# Patient Record
Sex: MALE | Race: WHITE | NOT HISPANIC OR LATINO | Employment: OTHER | URBAN - NONMETROPOLITAN AREA
[De-identification: names, ages, dates, MRNs, and addresses within clinical notes are randomized per-mention and may not be internally consistent; named-entity substitution may affect disease eponyms.]

---

## 2017-02-23 ENCOUNTER — APPOINTMENT (OUTPATIENT)
Dept: LAB | Facility: MEDICAL CENTER | Age: 72
End: 2017-02-23
Payer: COMMERCIAL

## 2017-02-23 ENCOUNTER — TRANSCRIBE ORDERS (OUTPATIENT)
Dept: LAB | Facility: MEDICAL CENTER | Age: 72
End: 2017-02-23

## 2017-02-23 DIAGNOSIS — E78.2 MIXED HYPERLIPIDEMIA: ICD-10-CM

## 2017-02-23 DIAGNOSIS — E78.2 MIXED HYPERLIPIDEMIA: Primary | ICD-10-CM

## 2017-02-23 LAB
AST SERPL W P-5'-P-CCNC: 24 U/L (ref 5–45)
CHOLEST SERPL-MCNC: 201 MG/DL (ref 50–200)
HDLC SERPL-MCNC: 73 MG/DL (ref 40–60)
LDLC SERPL CALC-MCNC: 107 MG/DL (ref 0–100)
LDLC SERPL DIRECT ASSAY-MCNC: 119 MG/DL (ref 0–100)
TRIGL SERPL-MCNC: 104 MG/DL

## 2017-02-23 PROCEDURE — 36415 COLL VENOUS BLD VENIPUNCTURE: CPT

## 2017-02-23 PROCEDURE — 83721 ASSAY OF BLOOD LIPOPROTEIN: CPT

## 2017-02-23 PROCEDURE — 84450 TRANSFERASE (AST) (SGOT): CPT

## 2017-02-23 PROCEDURE — 80061 LIPID PANEL: CPT

## 2017-07-11 ENCOUNTER — GENERIC CONVERSION - ENCOUNTER (OUTPATIENT)
Dept: OTHER | Facility: OTHER | Age: 72
End: 2017-07-11

## 2017-10-17 ENCOUNTER — ALLSCRIPTS OFFICE VISIT (OUTPATIENT)
Dept: OTHER | Facility: OTHER | Age: 72
End: 2017-10-17

## 2017-10-17 DIAGNOSIS — Z00.00 ENCOUNTER FOR GENERAL ADULT MEDICAL EXAMINATION WITHOUT ABNORMAL FINDINGS: ICD-10-CM

## 2017-10-17 DIAGNOSIS — E78.5 HYPERLIPIDEMIA: ICD-10-CM

## 2017-11-08 ENCOUNTER — TRANSCRIBE ORDERS (OUTPATIENT)
Dept: RADIOLOGY | Facility: MEDICAL CENTER | Age: 72
End: 2017-11-08

## 2017-11-08 ENCOUNTER — APPOINTMENT (OUTPATIENT)
Dept: LAB | Facility: MEDICAL CENTER | Age: 72
End: 2017-11-08
Payer: COMMERCIAL

## 2017-11-08 ENCOUNTER — GENERIC CONVERSION - ENCOUNTER (OUTPATIENT)
Dept: OTHER | Facility: OTHER | Age: 72
End: 2017-11-08

## 2017-11-08 DIAGNOSIS — E78.5 HYPERLIPIDEMIA: ICD-10-CM

## 2017-11-08 DIAGNOSIS — Z00.00 ENCOUNTER FOR GENERAL ADULT MEDICAL EXAMINATION WITHOUT ABNORMAL FINDINGS: ICD-10-CM

## 2017-11-08 LAB
ALBUMIN SERPL BCP-MCNC: 4 G/DL (ref 3.5–5)
ALP SERPL-CCNC: 56 U/L (ref 46–116)
ALT SERPL W P-5'-P-CCNC: 27 U/L (ref 12–78)
ANION GAP SERPL CALCULATED.3IONS-SCNC: 6 MMOL/L (ref 4–13)
AST SERPL W P-5'-P-CCNC: 31 U/L (ref 5–45)
BILIRUB SERPL-MCNC: 0.78 MG/DL (ref 0.2–1)
BUN SERPL-MCNC: 23 MG/DL (ref 5–25)
CALCIUM SERPL-MCNC: 9.3 MG/DL (ref 8.3–10.1)
CHLORIDE SERPL-SCNC: 103 MMOL/L (ref 100–108)
CO2 SERPL-SCNC: 32 MMOL/L (ref 21–32)
CREAT SERPL-MCNC: 1.03 MG/DL (ref 0.6–1.3)
GFR SERPL CREATININE-BSD FRML MDRD: 71 ML/MIN/1.73SQ M
GLUCOSE P FAST SERPL-MCNC: 92 MG/DL (ref 65–99)
POTASSIUM SERPL-SCNC: 4.1 MMOL/L (ref 3.5–5.3)
PROT SERPL-MCNC: 7.4 G/DL (ref 6.4–8.2)
SODIUM SERPL-SCNC: 141 MMOL/L (ref 136–145)

## 2017-11-08 PROCEDURE — 36415 COLL VENOUS BLD VENIPUNCTURE: CPT

## 2017-11-08 PROCEDURE — 80053 COMPREHEN METABOLIC PANEL: CPT

## 2018-01-15 NOTE — RESULT NOTES
Verified Results  (1) COMPREHENSIVE METABOLIC PANEL [id number] 75:39QH Rufus GUALLPA Order Number: [id number]     Test Name Result Flag Reference   SODIUM 141 mmol/L  136-145   POTASSIUM 4 1 mmol/L  3 5-5 3   CHLORIDE 103 mmol/L  100-108   CARBON DIOXIDE 32 mmol/L  21-32   ANION GAP (CALC) 6 mmol/L  4-13   BLOOD UREA NITROGEN 23 mg/dL  5-25   CREATININE 1 03 mg/dL  0 60-1 30   Standardized to IDMS reference method   CALCIUM 9 3 mg/dL  8 3-10 1   BILI, TOTAL 0 78 mg/dL  0 20-1 00   ALK PHOSPHATAS 56 U/L     ALT (SGPT) 27 U/L  12-78   Specimen collection should occur prior to Sulfasalazine and/or Sulfapyridine administration due to the potential for falsely depressed results  AST(SGOT) 31 U/L  5-45   Specimen collection should occur prior to Sulfasalazine administration due to the potential for falsely depressed results  ALBUMIN 4 0 g/dL  3 5-5 0   TOTAL PROTEIN 7 4 g/dL  6 4-8 2   eGFR 71 ml/min/1 73sq m     National Kidney Disease Education Program recommendations are as follows:  GFR calculation is accurate only with a steady state creatinine  Chronic Kidney disease less than 60 ml/min/1 73 sq  meters  Kidney failure less than 15 ml/min/1 73 sq  meters  GLUCOSE FASTING 92 mg/dL  65-99   Specimen collection should occur prior to Sulfasalazine administration due to the potential for falsely depressed results  Specimen collection should occur prior to Sulfapyridine administration due to the potential for falsely elevated results

## 2018-01-23 NOTE — PROGRESS NOTES
Assessment    1  Never smoker   2  No advance directives (V49 89) (Z78 9)   3  Exercise counseling (V65 41) (Z71 82)   4  Screening for neurological condition (V80 09) (Z13 89)   5  Screening for genitourinary condition (V81 6) (Z13 89)   6  Medicare annual wellness visit, subsequent (V70 0) (Z00 00)   7  Encounter for preventive health examination (V70 0) (Z00 00)    Plan  Exercise counseling    · Benefits of Exercise/Physical Activity; Status:Complete;   Done: [id number]  Health Maintenance    · DIGITAL RECTAL EXAM; Status:Complete;   Done: 52SMG6989 09:42AM  Health Maintenance, Hyperlipemia    · (1) COMPREHENSIVE METABOLIC PANEL; Status:Active; Requested TMX:[id number];   Medicare annual wellness visit, subsequent    · Medicare Annual Wellness Visit; Status:Complete;   Done: 83WNL9757 09:30AM  Need for influenza vaccination    · Stop: Fluzone High-Dose 0 5 ML Intramuscular Suspension Prefilled  Syringe  Screening for depression    · *VB-Depression Screening; Status:Complete;   Done: 14ZWF3143 09:29AM  Screening for genitourinary condition    · *VB - Urinary Incontinence Screen (Dx Z13 89 Screen for UI); Status:Complete;   Done:  87SCU4620 09:29AM  Screening for neurological condition    · *VB - Fall Risk Assessment  (Dx Z13 89 Screen for Neurologic Disorder);  Status:Complete;   Done: 48AWZ6325 09:28AM  SocHx: No advance directives    · We recommend that you create an advance directive ; Status:Complete;   Done:  87DHL3659    Discussion/Summary  Impression: Subsequent Annual Wellness Visit  Cardiovascular screening and counseling: the risks and benefits of screening were discussed and screening is current  Diabetes screening and counseling: the risks and benefits of screening were discussed and screening is current  Colorectal cancer screening and counseling: the risks and benefits of screening were discussed and screening is current     Prostate cancer screening and counseling: the risks and benefits of screening were discussed and screening is current  Osteoporosis screening and counseling: the risks and benefits of screening were discussed and screening is current  Glaucoma screening and counseling: the risks and benefits of screening were discussed and screening is current  History of Present Illness  The patient is being seen for the subsequent annual wellness visit  Medicare Screening and Risk Factors   Hospitalizations: he has been previously hospitalizied and he has been hospitalized Pt denies any hospitalizations within the last year times  Once per lifetime medicare screening tests: ECG and AAA screening US has not yet been done  Medicare Screening Tests Risk Questions   Abdominal aortic aneurysm risk assessment: over 72years of age, but none indicated  Osteoporosis risk assessment:  and over 48years of age  HIV risk assessment: none indicated  Drug and Alcohol Use: The patient has never smoked cigarettes  The patient reports never drinking alcohol  He has never used illicit drugs  Diet and Physical Activity: Current diet includes well balanced meals, limited junk food and 1-2 nothing regularly cups of tea per day  The patient does not exercise  Exercise: No specific exercise, but Pt remains very active around his home or fly fishing  Mood Disorder and Cognitive Impairment Screening: He denies feeling down, depressed, or hopeless over the past two weeks  He denies feeling little interest or pleasure in doing things over the past two weeks  Cognitive impairment screening: denies difficulty learning/retaining new information, denies difficulty handling complex tasks, denies difficulty with reasoning, denies difficulty with spatial ability and orientation, denies difficulty with language and denies difficulty with behavior  Functional Ability/Level of Safety: Hearing is slightly decreased, normal in the right ear and slightly decreased in the left ear   He does not use a hearing aid  The patient is currently able to do activities of daily living without limitations, able to do instrumental activities of daily living without limitations, able to participate in social activities without limitations and able to drive without limitations  Activities of daily living details: does not need help using the phone, no transportation help needed, does not need help shopping, no meal preparation help needed, does not need help doing housework, does not need help doing laundry, does not need help managing medications and does not need help managing money  Fall risk factors: The patient fell None times in the past 12 months  Injury History: no polypharmacy, no alcohol use, no mobility impairment, no antidepressant use, no deconditioning, no postural hypotension, no sedative use, no visual impairment, no urinary incontinence, no antihypertensive use, no cognitive impairment, up and go test was normal and no previous fall  Home safety risk factors:  no grab bars in the bathroom, but no unfamiliar surroundings, no loose rugs, no poor household lighting, no uneven floors, no household clutter and handrails on the stairs  Advance Directives: Advance directives: no living will, no durable power of  for health care directives and no advance directives  Co-Managers and Medical Equipment/Suppliers: See Patient Care Team   Falls Risk: The patient fell 0 times in the past 12 months  The patient is currently asymptomatic Symptoms Include: The patient currently has no urinary incontinence symptoms  Date of last glaucoma screen was [facility] October 2017      Patient Care Team    Care Team Member Role Specialty Office Number           Review of Systems    Constitutional: negative  Eyes: negative  ENT: negative  Cardiovascular: negative  Respiratory: negative  Gastrointestinal: negative  Genitourinary: negative  Musculoskeletal: negative  Integumentary and Breasts: negative  Neurological: negative  Psychiatric: negative  Endocrine: negative  Hematologic and Lymphatic: negative  Over the past 2 weeks, how often have you been bothered by the following problems? 1 ) Little interest or pleasure in doing things? Not at all    2 ) Feeling down, depressed or hopeless? Not at all    3 ) Trouble falling asleep or sleeping too much? Not at all    4 ) Feeling tired or having little energy? Not at all    5 ) Poor appetite or overeating? Not at all    6 ) Feeling bad about yourself, or that you are a failure, or have let yourself or your family down? Not at all    7 ) Trouble concentrating on things, such as reading a newspaper or watching television? Not at all    8 ) Moving or speaking so slowly that other people could have noticed, or the opposite, moving or speaking faster than usual? Not at all    9 ) Thoughts that you would be better off dead or of hurting yourself in some way? Not at all  Score 0      Active Problems    1  Arthralgia (719 40) (M25 50)   2  Encounter for prostate cancer screening (V76 44) (Z12 5)   3  Exercise counseling (V65 41) (Z71 82)   4  Hyperlipemia (272 4) (E78 5)   5  Lyme disease (088 81) (A69 20)   6  Mitral valve prolapse (424 0) (I34 1)   7  Need for influenza vaccination (V04 81) (Z23)   8  No advance directives (V49 89) (Z78 9)   9  Screening for colorectal cancer (V76 51) (Z12 11,Z12 12)   10  Screening for depression (V79 0) (Z13 89)   11  Special screening for other neurological conditions (V80 09) (Z13 89)    Past Medical History    1  History of Chronic cough (786 2) (R05)   2  History of Cough variant asthma (493 82) (J45 991)   3  History of Direct inguinal hernia (550 90) (K40 90)   4  History of Herpes zoster with ophthalmic complication, unspecified herpes zoster eye   disease (053 29) (B02 30)   5  History of Status post left inguinal hernia repair (V45 89) (Z98 890,Z87 19)   6   Status post left inguinal hernia repair, follow-up exam (V67 09) (Z09)   7  History of Unexplained weight loss (783 21) (R63 4)    The active problems and past medical history were reviewed and updated today  Surgical History    1  History of Appendectomy   2  History of Inguinal Hernia Repair   3  History of Tonsillectomy With Adenoidectomy    The surgical history was reviewed and updated today  Family History  Mother    1  Family history of Coronary disease (414 00) (I25 10)    The family history was reviewed and updated today  Social History    · Always uses seat belt   · Never smoker   · No advance directives (V49 89) (Z78 9)   · Non drinker / no alcohol use   · Retired   ·   The social history was reviewed and updated today  The social history was reviewed and is unchanged  Current Meds   1  Aspirin 81 MG TABS; Take 1 tablet daily Recorded   2  Simvastatin 5 MG Oral Tablet; TAKE 1 TABLET DAILY AT BEDTIME Recorded    The medication list was reviewed and updated today  Allergies    1  No Known Drug Allergies    Immunizations  Influenza --- Gavino Rose: Temporarily Deferred: Pt refuses     Vitals  Signs   Recorded: 31GIN2746 54:19JS   Systolic: 793  Diastolic: 68    Results/Data  Medicare Annual Wellness Visit 77VXJ4435 09:30AM [employer]     Test Name Result Flag Reference   MEDICARE [place] VISIT [id number]       *VB - Urinary Incontinence Screen (Dx Z13 89 Screen for UI) 73KEQ2146 09:29AM [employer]     Test Name Result Flag Reference   Urinary Incontinence Assessment 39IQA8023       *VB-Depression Screening 18RAR6787 09:29AM [employer]     Test Name Result Flag Reference   Depression Scale Result      Depression Screen - Negative For Symptoms     *VB - Fall Risk Assessment  (Dx Z13 89 Screen for Neurologic Disorder) 74LCN6853 09:28AM [employer]     Test Name Result Flag Reference   Falls Risk      No falls in the past year       Health Management  Screening for colorectal cancer   COLONOSCOPY; every 10 years;  Next Due: 04SJM0307; Overdue  Health Maintenance   COLONOSCOPY; every 10 years; Next Due: 96CJD9199;  Overdue    Signatures   Electronically signed by : Michelle Aburto DO; Oct 17 2017  9:44AM EST                       (Author)

## 2018-07-04 ENCOUNTER — HOSPITAL ENCOUNTER (EMERGENCY)
Facility: HOSPITAL | Age: 73
Discharge: HOME | End: 2018-07-04
Attending: EMERGENCY MEDICINE
Payer: COMMERCIAL

## 2018-07-04 ENCOUNTER — APPOINTMENT (EMERGENCY)
Dept: RADIOLOGY | Facility: HOSPITAL | Age: 73
End: 2018-07-04
Payer: COMMERCIAL

## 2018-07-04 ENCOUNTER — APPOINTMENT (EMERGENCY)
Dept: ULTRASOUND IMAGING | Facility: HOSPITAL | Age: 73
End: 2018-07-04
Payer: COMMERCIAL

## 2018-07-04 VITALS
DIASTOLIC BLOOD PRESSURE: 64 MMHG | SYSTOLIC BLOOD PRESSURE: 156 MMHG | TEMPERATURE: 98.9 F | OXYGEN SATURATION: 100 % | RESPIRATION RATE: 16 BRPM | HEART RATE: 74 BPM

## 2018-07-04 DIAGNOSIS — M79.89 SWELLING OF LEFT LOWER EXTREMITY: Primary | ICD-10-CM

## 2018-07-04 LAB
ALBUMIN SERPL BCP-MCNC: 3.6 G/DL (ref 3.5–5)
ALP SERPL-CCNC: 68 U/L (ref 46–116)
ALT SERPL W P-5'-P-CCNC: 26 U/L (ref 12–78)
ANION GAP SERPL CALCULATED.3IONS-SCNC: 9 MMOL/L (ref 4–13)
AST SERPL W P-5'-P-CCNC: 23 U/L (ref 5–45)
BASOPHILS # BLD AUTO: 0.07 THOUSANDS/ΜL (ref 0–0.1)
BASOPHILS NFR BLD AUTO: 1 % (ref 0–1)
BILIRUB SERPL-MCNC: 0.4 MG/DL (ref 0.2–1)
BUN SERPL-MCNC: 23 MG/DL (ref 5–25)
CALCIUM SERPL-MCNC: 8.9 MG/DL (ref 8.3–10.1)
CHLORIDE SERPL-SCNC: 102 MMOL/L (ref 100–108)
CK MB SERPL-MCNC: 1.2 NG/ML (ref 0–5)
CK MB SERPL-MCNC: <1 % (ref 0–2.5)
CK SERPL-CCNC: 169 U/L (ref 39–308)
CO2 SERPL-SCNC: 30 MMOL/L (ref 21–32)
CREAT SERPL-MCNC: 0.91 MG/DL (ref 0.6–1.3)
EOSINOPHIL # BLD AUTO: 0.35 THOUSAND/ΜL (ref 0–0.61)
EOSINOPHIL NFR BLD AUTO: 4 % (ref 0–6)
ERYTHROCYTE [DISTWIDTH] IN BLOOD BY AUTOMATED COUNT: 12.7 % (ref 11.6–15.1)
GFR SERPL CREATININE-BSD FRML MDRD: 82 ML/MIN/1.73SQ M
GLUCOSE SERPL-MCNC: 97 MG/DL (ref 65–140)
HCT VFR BLD AUTO: 41.2 % (ref 36.5–49.3)
HGB BLD-MCNC: 13.7 G/DL (ref 12–17)
LYMPHOCYTES # BLD AUTO: 2.42 THOUSANDS/ΜL (ref 0.6–4.47)
LYMPHOCYTES NFR BLD AUTO: 30 % (ref 14–44)
MCH RBC QN AUTO: 30.5 PG (ref 26.8–34.3)
MCHC RBC AUTO-ENTMCNC: 33.3 G/DL (ref 31.4–37.4)
MCV RBC AUTO: 92 FL (ref 82–98)
MONOCYTES # BLD AUTO: 0.92 THOUSAND/ΜL (ref 0.17–1.22)
MONOCYTES NFR BLD AUTO: 11 % (ref 4–12)
NEUTROPHILS # BLD AUTO: 4.41 THOUSANDS/ΜL (ref 1.85–7.62)
NEUTS SEG NFR BLD AUTO: 54 % (ref 43–75)
PLATELET # BLD AUTO: 233 THOUSANDS/UL (ref 149–390)
PMV BLD AUTO: 10.5 FL (ref 8.9–12.7)
POTASSIUM SERPL-SCNC: 4.1 MMOL/L (ref 3.5–5.3)
PROT SERPL-MCNC: 7.2 G/DL (ref 6.4–8.2)
RBC # BLD AUTO: 4.49 MILLION/UL (ref 3.88–5.62)
SODIUM SERPL-SCNC: 141 MMOL/L (ref 136–145)
WBC # BLD AUTO: 8.17 THOUSAND/UL (ref 4.31–10.16)

## 2018-07-04 PROCEDURE — 73590 X-RAY EXAM OF LOWER LEG: CPT

## 2018-07-04 PROCEDURE — 82550 ASSAY OF CK (CPK): CPT | Performed by: EMERGENCY MEDICINE

## 2018-07-04 PROCEDURE — 93971 EXTREMITY STUDY: CPT

## 2018-07-04 PROCEDURE — 82553 CREATINE MB FRACTION: CPT | Performed by: EMERGENCY MEDICINE

## 2018-07-04 PROCEDURE — 80053 COMPREHEN METABOLIC PANEL: CPT | Performed by: EMERGENCY MEDICINE

## 2018-07-04 PROCEDURE — 36415 COLL VENOUS BLD VENIPUNCTURE: CPT | Performed by: EMERGENCY MEDICINE

## 2018-07-04 PROCEDURE — 85025 COMPLETE CBC W/AUTO DIFF WBC: CPT | Performed by: EMERGENCY MEDICINE

## 2018-07-04 PROCEDURE — 99284 EMERGENCY DEPT VISIT MOD MDM: CPT

## 2018-07-04 RX ORDER — ASPIRIN 81 MG/1
81 TABLET ORAL DAILY
COMMUNITY
End: 2022-04-14 | Stop reason: HOSPADM

## 2018-07-04 RX ORDER — SIMVASTATIN 5 MG
5 TABLET ORAL
COMMUNITY
End: 2019-05-20 | Stop reason: SDUPTHER

## 2018-07-04 NOTE — DISCHARGE INSTRUCTIONS
IF YOU HAVE PERSISTENT LEG SWELLING IN 7-10 DAYS CALL YOUR PCP FOR FOLLOW UP ULTRASOUNDS      Leg Edema   WHAT YOU NEED TO KNOW:   Leg edema is swelling caused by fluid buildup  Your legs may swell if you sit or stand for long periods of time, are pregnant, or are injured  Swelling may also occur if you have heart failure or circulation problems  This means that your heart does not pump blood through your body as it should  DISCHARGE INSTRUCTIONS:   Self-care:   · Elevate your legs:  Raise your legs above the level of your heart as often as you can  This will help decrease swelling and pain  Prop your legs on pillows or blankets to keep them elevated comfortably  · Wear pressure stockings: These tight stockings put pressure on your legs to promote blood flow and prevent blood clots  Wear the stockings during the day  Do not wear them while you sleep  · Apply heat:  Heat helps decrease pain and swelling  Apply heat on the area for 20 to 30 minutes every 2 hours for as many days as directed  · Stay active:  Do not stand or sit for long periods of time  Ask your healthcare provider about the best exercise plan for you  · Eat healthy foods:  Healthy foods include fruits, vegetables, whole-grain breads, low-fat dairy products, beans, lean meats, and fish  Ask if you need to be on a special diet  Limit salt  Salt will make your body hold even more fluid  Follow up with your healthcare provider as directed:  Write down your questions so you remember to ask them during your visits  Contact your healthcare provider if:   · You have a fever or feel more tired than usual     · The veins in your legs look larger than usual  They may look full or bulging  · Your legs itch or feel heavy  · You have red or white areas or sores on your legs  The skin may also appear dimpled or have indentations  · You are gaining weight  · You have trouble moving your ankles      · The swelling does not go away, or other parts of your body swell  · You have questions or concerns about your condition or care  Return to the emergency department if:   · You cannot walk  · You feel faint or confused  · Your skin turns blue or gray  · Your leg feels warm, tender, and painful  It may be swollen and red  · You have chest pain or trouble breathing that is worse when you lie down  · You suddenly feel lightheaded and have trouble breathing  · You have new and sudden chest pain  You may have more pain when you take deep breaths or cough  You may also cough up blood  © 2017 [address] Information is for End User's use only and may not be sold, redistributed or otherwise used for commercial purposes  All illustrations and images included in CareNotes® are the copyrighted property of A D [employer] M [employer] Inc  or Clement Díaz  The above information is an  only  It is not intended as medical advice for individual conditions or treatments  Talk to your doctor, nurse or pharmacist before following any medical regimen to see if it is safe and effective for you

## 2018-07-04 NOTE — ED PROVIDER NOTES
History  Chief Complaint   Patient presents with    Leg Swelling     Pt states left lower leg swelling x3 days  Concerned for DVT but states no hx  Patient is a 70-year-old male with a history of hyperlipidemia, mitral prolapse, past history of Lyme disease and shingles coming in with several days of worsening left calf swelling  Patient states approximately 1 week ago he hit both of his legs off a table but no fall or other injuries  He was able to ambulate well and had no pain  He notes over the past 1-2 days worsening left lower extremity swelling  He states yesterday he measured it and it was 3/4 of an inch to an inch bigger  However, today he noticed it is approximately 3 in bear compared to his right side  He has no chest pain, shortness of breath, palpitations, syncope, fevers, back pain, urinary tension  He has no history of recent surgeries in the past 6 weeks, history of blood clots or PEs  No history of clotting disorders    Patient has no pain at this time        History provided by:  Patient   used: No    Leg Pain   Location:  Leg  Injury: no    Leg location:  L lower leg  Pain details:     Quality:  Pressure and aching    Radiates to:  Does not radiate    Severity:  Moderate    Onset quality:  Gradual    Timing:  Constant    Progression:  Worsening  Chronicity:  New  Dislocation: no    Foreign body present:  No foreign bodies  Tetanus status:  Unknown  Prior injury to area:  No  Relieved by:  None tried  Worsened by:  Nothing  Ineffective treatments:  None tried  Associated symptoms: swelling    Associated symptoms: no back pain, no decreased ROM, no fatigue, no fever, no itching, no muscle weakness, no neck pain, no numbness, no stiffness and no tingling    Risk factors: no concern for non-accidental trauma, no frequent fractures, no known bone disorder, no obesity and no recent illness        Prior to Admission Medications   Prescriptions Last Dose Informant Patient Reported? Taking?   aspirin (ECOTRIN LOW STRENGTH) 81 mg EC tablet  Self Yes Yes   Sig: Take 81 mg by mouth daily   simvastatin (ZOCOR) 5 MG tablet  Self Yes Yes   Sig: Take 5 mg by mouth daily at bedtime      Facility-Administered Medications: None       Past Medical History:   Diagnosis Date    Hyperlipidemia     Lyme disease     Mitral valve prolapse     Shingles        Past Surgical History:   Procedure Laterality Date    APPENDECTOMY      HERNIA REPAIR      TONSILLECTOMY         History reviewed  No pertinent family history  I have reviewed and agree with the history as documented  Social History   Substance Use Topics    Smoking status: Never Smoker    Smokeless tobacco: Never Used    Alcohol use No        Review of Systems   Constitutional: Negative for diaphoresis, fatigue and fever  HENT: Negative for ear pain and sore throat  Eyes: Negative for visual disturbance  Respiratory: Negative for chest tightness and shortness of breath  Cardiovascular: Negative for chest pain and palpitations  Gastrointestinal: Negative for abdominal pain, nausea and vomiting  Genitourinary: Negative for difficulty urinating and dysuria  Musculoskeletal: Negative for back pain, neck pain and stiffness  Skin: Negative for itching and rash  Neurological: Negative for weakness  Psychiatric/Behavioral: Negative for confusion  All other systems reviewed and are negative  Physical Exam  Physical Exam   Constitutional: He is oriented to person, place, and time  He appears well-developed and well-nourished  No distress  HENT:   Head: Normocephalic and atraumatic  Mouth/Throat: Oropharynx is clear and moist    Eyes: Conjunctivae and EOM are normal  Pupils are equal, round, and reactive to light  Neck: Normal range of motion  Neck supple  Cardiovascular: Normal rate, regular rhythm, normal heart sounds and intact distal pulses  No murmur heard    Pulmonary/Chest: Effort normal and breath sounds normal  No stridor  No respiratory distress  Abdominal: Soft  Bowel sounds are normal  He exhibits no distension  There is no tenderness  Musculoskeletal: Normal range of motion  He exhibits no edema  Left knee: Normal         Left ankle: Normal         Legs:  Patient has full active range of motion of the bilateral hips, knees, ankles  Patient is neurovascular intact bilaterally  Femoral, popliteal, dorsalis pedis pulses 2+ equal bilateral    Neurological: He is alert and oriented to person, place, and time  No cranial nerve deficit  Skin: Skin is warm  Capillary refill takes less than 2 seconds  He is not diaphoretic  Nursing note and vitals reviewed        Vital Signs  ED Triage Vitals [07/04/18 1817]   Temperature Pulse Respirations Blood Pressure SpO2   98 9 °F (37 2 °C) 68 20 160/74 100 %      Temp Source Heart Rate Source Patient Position - Orthostatic VS BP Location FiO2 (%)   Temporal Monitor Sitting Left arm --      Pain Score       No Pain           Vitals:    07/04/18 1817 07/04/18 1935   BP: 160/74 156/64   Pulse: 68 74   Patient Position - Orthostatic VS: Sitting        Visual Acuity      ED Medications  Medications - No data to display    Diagnostic Studies  Results Reviewed     Procedure Component Value Units Date/Time    Comprehensive metabolic panel [[id number]] Collected:  07/04/18 1903    Lab Status:  Final result Specimen:  Blood from Arm, Right Updated:  07/04/18 1923     Sodium 141 mmol/L      Potassium 4 1 mmol/L      Chloride 102 mmol/L      CO2 30 mmol/L      Anion Gap 9 mmol/L      BUN 23 mg/dL      Creatinine 0 91 mg/dL      Glucose 97 mg/dL      Calcium 8 9 mg/dL      AST 23 U/L      ALT 26 U/L      Alkaline Phosphatase 68 U/L      Total Protein 7 2 g/dL      Albumin 3 6 g/dL      Total Bilirubin 0 40 mg/dL      eGFR 82 ml/min/1 73sq m     Narrative:         National Kidney Disease Education Program recommendations are as follows:  GFR calculation is accurate only with a steady state creatinine  Chronic Kidney disease less than 60 ml/min/1 73 sq  meters  Kidney failure less than 15 ml/min/1 73 sq  meters  CK (with reflex to MB) [[id number]]  (Normal) Collected:  07/04/18 1903    Lab Status:  Final result Specimen:  Blood from Arm, Right Updated:  07/04/18 1923     Total  U/L     CKMB [[id number]] Collected:  07/04/18 1903    Lab Status: In process Specimen:  Blood from Arm, Right Updated:  07/04/18 1923    CBC and differential [[id number]]  (Normal) Collected:  07/04/18 1903    Lab Status:  Final result Specimen:  Blood from Arm, Right Updated:  07/04/18 1910     WBC 8 17 Thousand/uL      RBC 4 49 Million/uL      Hemoglobin 13 7 g/dL      Hematocrit 41 2 %      MCV 92 fL      MCH 30 5 pg      MCHC 33 3 g/dL      RDW 12 7 %      MPV 10 5 fL      Platelets 624 Thousands/uL      Neutrophils Relative 54 %      Lymphocytes Relative 30 %      Monocytes Relative 11 %      Eosinophils Relative 4 %      Basophils Relative 1 %      Neutrophils Absolute 4 41 Thousands/µL      Lymphocytes Absolute 2 42 Thousands/µL      Monocytes Absolute 0 92 Thousand/µL      Eosinophils Absolute 0 35 Thousand/µL      Basophils Absolute 0 07 Thousands/µL                  XR tibia fibula 2 views LEFT    (Results Pending)   VAS lower limb venous duplex study, unilateral/limited    (Results Pending)              Procedures  Procedures       Phone Contacts  ED Phone Contact    ED Course                               MDM  Number of Diagnoses or Management Options  Swelling of left lower extremity:   Diagnosis management comments: According to Phylliss Lime score for DVT patient does have a 2 points for collateral superficial veins present as well as calf swelling greater than 3 cm  Will perform CBC, CMP, CK as he is on a stand as well as ultrasound rule out DVT was paged for completion    7:19 PM  Signed out to DR Bridger Phelan for follow up labs, US and dispo       7:30 PM  Labs, US, and xray resulted - no acute pathology  Will dc home   I personally discharged patient and took out IV from Right arm  Portions of the record may have been created with voice recognition software  Occasional wrong word or "sound a like" substitutions may have occurred due to the inherent limitations of voice recognition software  Read the chart carefully and recognize, using context, where substitutions have occurred  Amount and/or Complexity of Data Reviewed  Clinical lab tests: ordered and reviewed  Tests in the radiology section of CPT®: ordered and reviewed  Tests in the medicine section of CPT®: ordered and reviewed  Independent visualization of images, tracings, or specimens: yes (XRAY ON MY REVIEW REVEALS NO ACUTE FRACTURE)      CritCare Time    Disposition  Final diagnoses:   Swelling of left lower extremity     Time reflects when diagnosis was documented in both MDM as applicable and the Disposition within this note     Time User Action Codes Description Comment    7/4/2018  6:42 PM Ravin Massey [M79 89] Swelling of left lower extremity       ED Disposition     ED Disposition Condition Comment    Discharge  Delma Chandler discharge to home/self care  Condition at discharge: Stable        Follow-up Information     Follow up With Specialties Details Why Contact Info    Tomma Lefort, DO Family Medicine Schedule an appointment as soon as possible for a visit in 3 days  [address]  [place] [phone]  [phone]            Patient's Medications   Discharge Prescriptions    No medications on file     No discharge procedures on file      ED Provider  Electronically Signed by           Hiwot Laureano DO  07/04/18 757 Samuel Johnson DO  07/04/18 [id number]

## 2018-07-05 ENCOUNTER — TELEPHONE (OUTPATIENT)
Dept: FAMILY MEDICINE CLINIC | Facility: CLINIC | Age: 73
End: 2018-07-05

## 2018-07-05 PROCEDURE — 93971 EXTREMITY STUDY: CPT | Performed by: SURGERY

## 2018-07-05 NOTE — TELEPHONE ENCOUNTER
He just wanted to report, he went to the er yesterday for  left calf swelling   They did all kinds of test and did not find anything

## 2018-12-10 ENCOUNTER — OFFICE VISIT (OUTPATIENT)
Dept: CARDIOLOGY CLINIC | Facility: CLINIC | Age: 73
End: 2018-12-10
Payer: COMMERCIAL

## 2018-12-10 VITALS
WEIGHT: 126 LBS | HEIGHT: 69 IN | HEART RATE: 67 BPM | SYSTOLIC BLOOD PRESSURE: 124 MMHG | DIASTOLIC BLOOD PRESSURE: 80 MMHG | BODY MASS INDEX: 18.66 KG/M2

## 2018-12-10 DIAGNOSIS — I34.1 MVP (MITRAL VALVE PROLAPSE): Primary | ICD-10-CM

## 2018-12-10 DIAGNOSIS — E78.5 DYSLIPIDEMIA: ICD-10-CM

## 2018-12-10 DIAGNOSIS — A69.20 LYME DISEASE: ICD-10-CM

## 2018-12-10 PROCEDURE — 93000 ELECTROCARDIOGRAM COMPLETE: CPT | Performed by: INTERNAL MEDICINE

## 2018-12-10 PROCEDURE — 99214 OFFICE O/P EST MOD 30 MIN: CPT | Performed by: INTERNAL MEDICINE

## 2018-12-10 NOTE — PATIENT INSTRUCTIONS
Mitral Valve Prolapse   WHAT YOU NEED TO KNOW:   What is mitral valve prolapse? Mitral valve prolapse (MVP) is a weak or bulging mitral valve in your heart  Your mitral valve has 2 flaps that open and close  It allows blood to flow through your heart in one direction  MVP is a common heart condition that often has no signs or symptoms  What increases my risk for MVP? Most people who have MVP are born with it  The following may increase your risk for MVP:  · Family history of MVP    · Connective tissue problems, such as Marfan syndrome    · Muscle or skeletal problems, such as scoliosis    · Heart disease or heart attack    · Thyroid problems, such as Graves disease  What signs and symptoms may I have with MVP? · Fatigue, anxiety, or migraines    · Dizzy, lightheaded, or faint    · A fast or pounding heartbeat, or heart flutters    · Shortness of breath    · Chest pain  How is MVP diagnosed? Your healthcare provider will examine you and listen to your heart  He will ask what medicines you take, and if you have other health conditions  You may also need any of the following:  · A heart monitor  is also called an ECG or EKG  Sticky pads placed on your skin record your heart's electrical activity  · An echocardiogram  is a type of ultrasound  Sound waves are used to show the structure, movement, and blood vessels of your heart  · An angiogram  may be done to take pictures of your blood vessels and arteries  The pictures may show narrowing or a blockage  You will be given dye to help healthcare providers see the pictures better  Tell the healthcare provider if you have ever had an allergic reaction to contrast dye  How is MVP treated? You may not need any treatment if you do not feel any symptoms  · Medicines:      ¨ Blood pressure medicine  is given to lower your blood pressure  A controlled blood pressure helps protect your heart  ¨ Heart medicine  helps your heart beat more strongly or regularly  ¨ Antiplatelet medicines , such as aspirin, keep platelets from sticking to a damaged part of your artery  Platelets are a part of your blood that helps heal injuries  Platelets may cause a blockage in your mitral valve and affect blood flow through your heart  ¨ Blood thinners  keep clots from forming  Clots may cause a stroke, and can be life-threatening  This medicine makes it more likely for you to bleed or bruise  Follow all blood thinner safety instructions you receive  · Surgery  may be needed if you have severe heart damage from your MVP  Your mitral valve may need to be repaired or replaced during surgery  How can I manage MVP? · Floss and brush your teeth regularly  Tell your healthcare provider or dentist if you have MVP  Professional tooth cleaning , tooth decay, or gum problems could lead to a heart infection  · Eat heart-healthy foods  Heart-healthy foods include fresh fruits and vegetables, low-fat dairy products, chicken (without skin), lean meats, and fish  Eat two 4-ounce servings of fish high in omega-3 fats each week  Examples are salmon, fresh tuna, and herring  Ask for more information on a heart-healthy diet  · Drink liquids as directed  Ask your healthcare provider which liquids you should drink, and how much to have each day  · Stay active  Ask your healthcare provider which activities are best for you  · Do not use alcohol or caffeine  These could make your MVP worse  · Do not smoke  If you smoke, it is never too late to quit  Ask your healthcare provider for information if you need help quitting  Avoid being around others who smoke  When should I contact my healthcare provider? · You have a fever, sore throat, or body aches  · You have questions or concerns about your condition or care  When should I seek immediate care or call 911?    · You have any of the following signs of a stroke:     ¨ Part of your face droops or is numb    ¨ Weakness in an arm or leg    ¨ Confusion or difficulty speaking    ¨ Dizziness, a severe headache, or vision loss    · You have shortness of breath or chest pain  · Your heart beats faster than normal for you, or skips beats  · You suddenly feel dizzy or faint  CARE AGREEMENT:   You have the right to help plan your care  Learn about your health condition and how it may be treated  Discuss treatment options with your caregivers to decide what care you want to receive  You always have the right to refuse treatment  The above information is an  only  It is not intended as medical advice for individual conditions or treatments  Talk to your doctor, nurse or pharmacist before following any medical regimen to see if it is safe and effective for you  © 2017 [employer] Information is for End User's use only and may not be sold, redistributed or otherwise used for commercial purposes  All illustrations and images included in CareNotes® are the copyrighted property of [employer]  or [employer]

## 2018-12-10 NOTE — PROGRESS NOTES
Subjective:        Patient ID: Bjorn Yeh is a 74 y o  male  Chief Complaint:  Justin Andrew is here for routine cardiac follow-up in light of MVP with trace MR and dyslipidemia  Extensive questioning is negative with respect to review of systems cardiac systems  Denies any chest pain, shortness of breath, palpitations, presyncope, syncope, TIA or claudication like symptoms  He remains quite active  Normotensive, euvolemic  EKG stable  The following portions of the patient's history were reviewed and updated as appropriate: allergies, current medications, past family history, past medical history, past social history, past surgical history and problem list   Review of Systems   Constitution: Negative for chills, diaphoresis, malaise/fatigue and weight gain  HENT: Negative for nosebleeds and stridor  Eyes: Negative for double vision, vision loss in left eye, vision loss in right eye and visual disturbance  Cardiovascular: Negative for chest pain, claudication, cyanosis, dyspnea on exertion, irregular heartbeat, leg swelling, near-syncope, orthopnea, palpitations, paroxysmal nocturnal dyspnea and syncope  Respiratory: Negative for cough, shortness of breath, snoring and wheezing  Endocrine: Negative for polydipsia, polyphagia and polyuria  Hematologic/Lymphatic: Negative for bleeding problem  Does not bruise/bleed easily  Skin: Negative for flushing and rash  Musculoskeletal: Negative for falls and myalgias  Gastrointestinal: Negative for abdominal pain, heartburn, hematemesis, hematochezia, melena and nausea  Genitourinary: Negative for hematuria  Neurological: Negative for brief paralysis, dizziness, focal weakness, headaches, light-headedness, loss of balance and vertigo  Psychiatric/Behavioral: Negative for altered mental status and substance abuse  Allergic/Immunologic: Negative for hives            Objective:      /80   Pulse 67   Ht 5' 8 5" (1 74 m)   Wt 57 2 kg (126 lb)   BMI 18 88 kg/m²   Physical Exam   Constitutional: He is oriented to person, place, and time  He appears well-developed and well-nourished  No distress  HENT:   Head: Normocephalic and atraumatic  Eyes: Pupils are equal, round, and reactive to light  EOM are normal  No scleral icterus  Neck: Normal range of motion  Neck supple  No JVD present  No thyromegaly present  Cardiovascular: Normal rate, regular rhythm and normal heart sounds  Exam reveals no gallop and no friction rub  No murmur heard  Pulmonary/Chest: Effort normal and breath sounds normal  No stridor  No respiratory distress  He has no wheezes  He has no rales  Abdominal: Soft  Bowel sounds are normal  He exhibits no distension and no mass  There is no tenderness  Musculoskeletal: Normal range of motion  He exhibits no edema or deformity  Neurological: He is alert and oriented to person, place, and time  Coordination normal    Skin: Skin is warm and dry  No erythema  No pallor  Psychiatric: He has a normal mood and affect   His behavior is normal        Lab Review:   Admission on 07/04/2018, Discharged on 07/04/2018   Component Date Value    WBC 07/04/2018 8 17     RBC 07/04/2018 4 49     Hemoglobin 07/04/2018 13 7     Hematocrit 07/04/2018 41 2     MCV 07/04/2018 92     MCH 07/04/2018 30 5     MCHC 07/04/2018 33 3     RDW 07/04/2018 12 7     MPV 07/04/2018 10 5     Platelets 48/86/4390 233     Neutrophils Relative 07/04/2018 54     Lymphocytes Relative 07/04/2018 30     Monocytes Relative 07/04/2018 11     Eosinophils Relative 07/04/2018 4     Basophils Relative 07/04/2018 1     Neutrophils Absolute 07/04/2018 4 41     Lymphocytes Absolute 07/04/2018 2 42     Monocytes Absolute 07/04/2018 0 92     Eosinophils Absolute 07/04/2018 0 35     Basophils Absolute 07/04/2018 0 07     Sodium 07/04/2018 141     Potassium 07/04/2018 4 1     Chloride 07/04/2018 102     CO2 07/04/2018 30     ANION GAP 07/04/2018 9     BUN 07/04/2018 23     Creatinine 07/04/2018 0 91     Glucose 07/04/2018 97     Calcium 07/04/2018 8 9     AST 07/04/2018 23     ALT 07/04/2018 26     Alkaline Phosphatase 07/04/2018 68     Total Protein 07/04/2018 7 2     Albumin 07/04/2018 3 6     Total Bilirubin 07/04/2018 0 40     eGFR 07/04/2018 82     Total CK 07/04/2018 169     CK-MB Index 07/04/2018 <1 0     CK-MB 07/04/2018 1 2      No results found  Assessment:       1  MVP (mitral valve prolapse)  POCT ECG   2  Lyme disease  POCT ECG   3  Dyslipidemia  Lipid panel    Hepatic function panel        Plan:       Roverto Aguirre is without any signs or symptoms reminiscent of angina, heart failure nor electrical instability  There is no audible evidence for progressive MR  He has no cardiac signs or symptoms reminiscent of recurrent Lyme disease  His diet has not changed, weight is stable, I suspect his lipids are stable, I ordered a lipid profile along with transaminases prior to his next visit in 6 months time  He will call me sooner should any concerning symptoms arise  We did discuss how empiric baby aspirin therapy is no longer strongly recommended for primary prevention  He is aware of the slight increased risk of bleeding risk and he has decided to continue baby aspirin for now

## 2019-05-20 DIAGNOSIS — E78.2 MIXED HYPERLIPIDEMIA: Primary | ICD-10-CM

## 2019-05-20 RX ORDER — SIMVASTATIN 5 MG
5 TABLET ORAL
Qty: 90 TABLET | Refills: 3 | Status: SHIPPED | OUTPATIENT
Start: 2019-05-20 | End: 2020-01-15 | Stop reason: SDUPTHER

## 2019-06-04 ENCOUNTER — APPOINTMENT (OUTPATIENT)
Dept: LAB | Facility: MEDICAL CENTER | Age: 74
End: 2019-06-04
Payer: COMMERCIAL

## 2019-06-04 DIAGNOSIS — E78.5 DYSLIPIDEMIA: ICD-10-CM

## 2019-06-04 LAB
ALBUMIN SERPL BCP-MCNC: 3.9 G/DL (ref 3.5–5)
ALP SERPL-CCNC: 58 U/L (ref 46–116)
ALT SERPL W P-5'-P-CCNC: 23 U/L (ref 12–78)
AST SERPL W P-5'-P-CCNC: 23 U/L (ref 5–45)
BILIRUB DIRECT SERPL-MCNC: 0.24 MG/DL (ref 0–0.2)
BILIRUB SERPL-MCNC: 0.88 MG/DL (ref 0.2–1)
CHOLEST SERPL-MCNC: 189 MG/DL (ref 50–200)
HDLC SERPL-MCNC: 73 MG/DL (ref 40–60)
LDLC SERPL CALC-MCNC: 106 MG/DL (ref 0–100)
NONHDLC SERPL-MCNC: 116 MG/DL
PROT SERPL-MCNC: 7.3 G/DL (ref 6.4–8.2)
TRIGL SERPL-MCNC: 51 MG/DL

## 2019-06-04 PROCEDURE — 80061 LIPID PANEL: CPT

## 2019-06-04 PROCEDURE — 80076 HEPATIC FUNCTION PANEL: CPT

## 2019-06-04 PROCEDURE — 36415 COLL VENOUS BLD VENIPUNCTURE: CPT

## 2019-06-10 ENCOUNTER — OFFICE VISIT (OUTPATIENT)
Dept: CARDIOLOGY CLINIC | Facility: CLINIC | Age: 74
End: 2019-06-10
Payer: COMMERCIAL

## 2019-06-10 VITALS
BODY MASS INDEX: 19.4 KG/M2 | DIASTOLIC BLOOD PRESSURE: 62 MMHG | HEART RATE: 67 BPM | WEIGHT: 128 LBS | HEIGHT: 68 IN | SYSTOLIC BLOOD PRESSURE: 118 MMHG

## 2019-06-10 DIAGNOSIS — E78.5 DYSLIPIDEMIA: Primary | ICD-10-CM

## 2019-06-10 DIAGNOSIS — I34.1 MITRAL VALVE PROLAPSE: ICD-10-CM

## 2019-06-10 PROCEDURE — 99214 OFFICE O/P EST MOD 30 MIN: CPT | Performed by: INTERNAL MEDICINE

## 2020-01-15 ENCOUNTER — OFFICE VISIT (OUTPATIENT)
Dept: CARDIOLOGY CLINIC | Facility: CLINIC | Age: 75
End: 2020-01-15
Payer: COMMERCIAL

## 2020-01-15 VITALS
BODY MASS INDEX: 18.94 KG/M2 | HEIGHT: 68 IN | DIASTOLIC BLOOD PRESSURE: 72 MMHG | WEIGHT: 125 LBS | HEART RATE: 64 BPM | SYSTOLIC BLOOD PRESSURE: 122 MMHG

## 2020-01-15 DIAGNOSIS — I34.1 MITRAL VALVE PROLAPSE: Primary | ICD-10-CM

## 2020-01-15 DIAGNOSIS — E78.2 MIXED HYPERLIPIDEMIA: ICD-10-CM

## 2020-01-15 PROCEDURE — 99214 OFFICE O/P EST MOD 30 MIN: CPT | Performed by: INTERNAL MEDICINE

## 2020-01-15 RX ORDER — SIMVASTATIN 5 MG
5 TABLET ORAL
Qty: 90 TABLET | Refills: 3 | Status: SHIPPED | OUTPATIENT
Start: 2020-01-15 | End: 2022-02-04

## 2020-01-15 NOTE — PROGRESS NOTES
Subjective:        Patient ID: Onur Villanueva is a 66 y o  male  Chief Complaint:  Mya Peck is here for routine follow-up  He has mitral valve prolapse and dyslipidemia  No cardiac symptoms  No audible evidence for progressive mitral insufficiency, mid systolic click heard  The following portions of the patient's history were reviewed and updated as appropriate: allergies, current medications, past family history, past medical history, past social history, past surgical history and problem list   Review of Systems   Constitution: Negative for chills, diaphoresis, malaise/fatigue and weight gain  HENT: Negative for nosebleeds and stridor  Eyes: Negative for double vision, vision loss in left eye, vision loss in right eye and visual disturbance  Cardiovascular: Negative for chest pain, claudication, cyanosis, dyspnea on exertion, irregular heartbeat, leg swelling, near-syncope, orthopnea, palpitations, paroxysmal nocturnal dyspnea and syncope  Respiratory: Negative for cough, shortness of breath, snoring and wheezing  Endocrine: Negative for polydipsia, polyphagia and polyuria  Hematologic/Lymphatic: Negative for bleeding problem  Does not bruise/bleed easily  Skin: Negative for flushing and rash  Musculoskeletal: Negative for falls and myalgias  Gastrointestinal: Negative for abdominal pain, heartburn, hematemesis, hematochezia, melena and nausea  Genitourinary: Negative for hematuria  Neurological: Negative for brief paralysis, dizziness, focal weakness, headaches, light-headedness, loss of balance and vertigo  Psychiatric/Behavioral: Negative for altered mental status and substance abuse  Allergic/Immunologic: Negative for hives  Objective:      /72   Pulse 64   Ht 5' 8" (1 727 m)   Wt 56 7 kg (125 lb)   BMI 19 01 kg/m²   Physical Exam   Constitutional: He is oriented to person, place, and time  He appears well-developed and well-nourished  No distress  HENT:   Head: Normocephalic and atraumatic  Eyes: Pupils are equal, round, and reactive to light  EOM are normal  No scleral icterus  Neck: Normal range of motion  Neck supple  No JVD present  No thyromegaly present  Cardiovascular: Normal rate, regular rhythm and intact distal pulses  Exam reveals no gallop and no friction rub  Murmur (Short grade 1 S1 coincident murmur at apex) heard  Mid systolic click heard near apex   Pulmonary/Chest: Effort normal and breath sounds normal  No stridor  No respiratory distress  He has no wheezes  He has no rales  Abdominal: Soft  Bowel sounds are normal  He exhibits no distension and no mass  There is no tenderness  Musculoskeletal: Normal range of motion  He exhibits no edema or deformity  Neurological: He is alert and oriented to person, place, and time  Coordination normal    Skin: Skin is warm and dry  No erythema  No pallor  Psychiatric: He has a normal mood and affect  His behavior is normal  Judgment and thought content normal        Lab Review:   No visits with results within 6 Month(s) from this visit  Latest known visit with results is:   Appointment on 06/04/2019   Component Date Value    Cholesterol 06/04/2019 189     Triglycerides 06/04/2019 51     HDL, Direct 06/04/2019 73*    LDL Calculated 06/04/2019 106*    Non-HDL-Chol (CHOL-HDL) 06/04/2019 116     Total Bilirubin 06/04/2019 0 88     Bilirubin, Direct 06/04/2019 0 24*    Alkaline Phosphatase 06/04/2019 58     AST 06/04/2019 23     ALT 06/04/2019 23     Total Protein 06/04/2019 7 3     Albumin 06/04/2019 3 9      No results found  Assessment:       1  Mitral valve prolapse     2  Mixed hyperlipidemia  simvastatin (ZOCOR) 5 MG tablet        Plan:       Debra Chappell has no signs or symptoms reminiscent of angina, heart failure nor electrical instability/dysrhythmia  He maintains a healthy active lifestyle no cardiopulmonary symptoms      His lipids are well controlled, transaminases normal, mitral valve prolapse stable with no audible evidence for progressive MR  SBE antibiotic prophylaxis is not required  I will see him back in 6 months time, he will call sooner with any concerning potential cardiac symptoms in the meantime

## 2020-09-10 ENCOUNTER — OFFICE VISIT (OUTPATIENT)
Dept: CARDIOLOGY CLINIC | Facility: CLINIC | Age: 75
End: 2020-09-10
Payer: COMMERCIAL

## 2020-09-10 VITALS
BODY MASS INDEX: 19.55 KG/M2 | HEART RATE: 73 BPM | HEIGHT: 68 IN | WEIGHT: 129 LBS | DIASTOLIC BLOOD PRESSURE: 60 MMHG | SYSTOLIC BLOOD PRESSURE: 120 MMHG

## 2020-09-10 DIAGNOSIS — E78.5 DYSLIPIDEMIA: ICD-10-CM

## 2020-09-10 DIAGNOSIS — I34.1 MITRAL VALVE PROLAPSE: Primary | ICD-10-CM

## 2020-09-10 PROCEDURE — 93000 ELECTROCARDIOGRAM COMPLETE: CPT | Performed by: INTERNAL MEDICINE

## 2020-09-10 PROCEDURE — 99213 OFFICE O/P EST LOW 20 MIN: CPT | Performed by: INTERNAL MEDICINE

## 2020-09-10 NOTE — PROGRESS NOTES
Subjective:        Patient ID: Riesa Angelucci is a 66 y o  male  Chief Complaint:  Danii Joseph is here for MVP and dyslipidemia follow-up  He offers no complaints whatsoever  Active still fishing, denies chest pain shortness of breath palpitations presyncope syncope TIA or claudication like symptoms  No edema orthopnea  The following portions of the patient's history were reviewed and updated as appropriate: allergies, current medications, past family history, past medical history, past social history, past surgical history and problem list   Review of Systems   Constitution: Negative for chills, diaphoresis, malaise/fatigue and weight gain  HENT: Negative for nosebleeds and stridor  Eyes: Negative for double vision, vision loss in left eye, vision loss in right eye and visual disturbance  Cardiovascular: Negative for chest pain, claudication, cyanosis, dyspnea on exertion, irregular heartbeat, leg swelling, near-syncope, orthopnea, palpitations, paroxysmal nocturnal dyspnea and syncope  Respiratory: Negative for cough, shortness of breath, snoring and wheezing  Endocrine: Negative for polydipsia, polyphagia and polyuria  Hematologic/Lymphatic: Negative for bleeding problem  Does not bruise/bleed easily  Skin: Negative for flushing and rash  Musculoskeletal: Negative for falls and myalgias  Gastrointestinal: Negative for abdominal pain, heartburn, hematemesis, hematochezia, melena and nausea  Genitourinary: Negative for hematuria  Neurological: Negative for brief paralysis, dizziness, focal weakness, headaches, light-headedness, loss of balance and vertigo  Psychiatric/Behavioral: Negative for altered mental status and substance abuse  Allergic/Immunologic: Negative for hives  Objective:      /60   Pulse 73   Ht 5' 8" (1 727 m)   Wt 58 5 kg (129 lb)   BMI 19 61 kg/m²   Physical Exam   Constitutional: He is oriented to person, place, and time   He appears well-developed and well-nourished  No distress  HENT:   Head: Normocephalic and atraumatic  Eyes: Pupils are equal, round, and reactive to light  EOM are normal  No scleral icterus  Neck: Normal range of motion  Neck supple  No JVD present  No thyromegaly present  Cardiovascular: Normal rate and regular rhythm  Exam reveals no gallop and no friction rub  Murmur (Grade 1-2 S1 coincident short systolic murmur at apex) heard  Pulmonary/Chest: Effort normal and breath sounds normal  No stridor  No respiratory distress  He has no wheezes  He has no rales  Abdominal: Soft  Bowel sounds are normal  He exhibits no distension and no mass  There is no abdominal tenderness  Musculoskeletal: Normal range of motion  General: No deformity or edema  Neurological: He is alert and oriented to person, place, and time  Coordination normal    Skin: Skin is warm and dry  No erythema  No pallor  Psychiatric: He has a normal mood and affect  His behavior is normal        Lab Review:   No visits with results within 6 Month(s) from this visit  Latest known visit with results is:   Appointment on 06/04/2019   Component Date Value    Cholesterol 06/04/2019 189     Triglycerides 06/04/2019 51     HDL, Direct 06/04/2019 73*    LDL Calculated 06/04/2019 106*    Non-HDL-Chol (CHOL-HDL) 06/04/2019 116     Total Bilirubin 06/04/2019 0 88     Bilirubin, Direct 06/04/2019 0 24*    Alkaline Phosphatase 06/04/2019 58     AST 06/04/2019 23     ALT 06/04/2019 23     Total Protein 06/04/2019 7 3     Albumin 06/04/2019 3 9      No results found  Assessment:       1  Mitral valve prolapse  Lipid panel    CBC and differential    Comprehensive metabolic panel    Echo complete with contrast if indicated   2  Dyslipidemia  POCT ECG        Plan:       Rosa Hatch has no signs or symptoms reminiscent of angina heart failure nor electrical instability      By auscultation his MR from MVP sounds as if it remains mild, it remains asymptomatic echocardiogram ordered in 6 months time just prior to his next visit  SBE prophylaxis not required  Lipids have always been well controlled, continue low-dose statin therapy, check transaminases and lipid profile with other routine blood work ordered today to be done at his earliest convenience before the end of the year fasting  He will call should any elective surgery be scheduled or should he develop any concerning potential cardiac symptoms prior to his 6 month scheduled follow-up visit

## 2020-09-10 NOTE — PATIENT INSTRUCTIONS
Mitral Valve Prolapse   WHAT YOU NEED TO KNOW:   What is mitral valve prolapse? Mitral valve prolapse (MVP) is a weak or bulging mitral valve in your heart  Your mitral valve has 2 flaps that open and close  It allows blood to flow through your heart in one direction  MVP is a common heart condition that often has no signs or symptoms  What increases my risk for MVP? Most people who have MVP are born with it  The following may increase your risk for MVP:  · Family history of MVP    · Connective tissue problems, such as Marfan syndrome    · Muscle or skeletal problems, such as scoliosis    · Heart disease or heart attack    · Thyroid problems, such as Graves disease  What signs and symptoms may I have with MVP? · Fatigue, anxiety, or migraines    · Dizzy, lightheaded, or faint    · A fast or pounding heartbeat, or heart flutters    · Shortness of breath    · Chest pain  How is MVP diagnosed? Your healthcare provider will examine you and listen to your heart  He will ask what medicines you take, and if you have other health conditions  You may also need any of the following:  · A heart monitor  is also called an ECG or EKG  Sticky pads placed on your skin record your heart's electrical activity  · An echocardiogram  is a type of ultrasound  Sound waves are used to show the structure, movement, and blood vessels of your heart  · An angiogram  may be done to take pictures of your blood vessels and arteries  The pictures may show narrowing or a blockage  You will be given dye to help healthcare providers see the pictures better  Tell the healthcare provider if you have ever had an allergic reaction to contrast dye  How is MVP treated? You may not need any treatment if you do not feel any symptoms  · Medicines:      ¨ Blood pressure medicine  is given to lower your blood pressure  A controlled blood pressure helps protect your heart  ¨ Heart medicine  helps your heart beat more strongly or regularly  ¨ Antiplatelet medicines , such as aspirin, keep platelets from sticking to a damaged part of your artery  Platelets are a part of your blood that helps heal injuries  Platelets may cause a blockage in your mitral valve and affect blood flow through your heart  ¨ Blood thinners  keep clots from forming  Clots may cause a stroke, and can be life-threatening  This medicine makes it more likely for you to bleed or bruise  Follow all blood thinner safety instructions you receive  · Surgery  may be needed if you have severe heart damage from your MVP  Your mitral valve may need to be repaired or replaced during surgery  How can I manage MVP? · Floss and brush your teeth regularly  Tell your healthcare provider or dentist if you have MVP  Professional tooth cleaning , tooth decay, or gum problems could lead to a heart infection  · Eat heart-healthy foods  Heart-healthy foods include fresh fruits and vegetables, low-fat dairy products, chicken (without skin), lean meats, and fish  Eat two 4-ounce servings of fish high in omega-3 fats each week  Examples are salmon, fresh tuna, and herring  Ask for more information on a heart-healthy diet  · Drink liquids as directed  Ask your healthcare provider which liquids you should drink, and how much to have each day  · Stay active  Ask your healthcare provider which activities are best for you  · Do not use alcohol or caffeine  These could make your MVP worse  · Do not smoke  If you smoke, it is never too late to quit  Ask your healthcare provider for information if you need help quitting  Avoid being around others who smoke  When should I contact my healthcare provider? · You have a fever, sore throat, or body aches  · You have questions or concerns about your condition or care  When should I seek immediate care or call 911?    · You have any of the following signs of a stroke:     ¨ Part of your face droops or is numb    ¨ Weakness in an arm or leg    ¨ Confusion or difficulty speaking    ¨ Dizziness, a severe headache, or vision loss    · You have shortness of breath or chest pain  · Your heart beats faster than normal for you, or skips beats  · You suddenly feel dizzy or faint  CARE AGREEMENT:   You have the right to help plan your care  Learn about your health condition and how it may be treated  Discuss treatment options with your caregivers to decide what care you want to receive  You always have the right to refuse treatment  The above information is an  only  It is not intended as medical advice for individual conditions or treatments  Talk to your doctor, nurse or pharmacist before following any medical regimen to see if it is safe and effective for you  © 2017 [address] Information is for End User's use only and may not be sold, redistributed or otherwise used for commercial purposes  All illustrations and images included in CareNotes® are the copyrighted property of [employer]  or [facility]  Cholesterol and Your Health   AMBULATORY CARE:   Cholesterol  is a waxy, fat-like substance  Cholesterol is made by your body, but also comes from certain foods you eat  Your body uses cholesterol to make hormones and new cells  Your body also uses cholesterol to protect nerves  Cholesterol comes from foods such as meat and dairy products  Your total cholesterol level is made up by LDL cholesterol, HDL cholesterol, and triglycerides:  · LDL cholesterol  is called bad cholesterol  because it forms plaque in your arteries  As plaque builds up, your arteries become narrow, and less blood flows through  When plaque decreases blood flow to your heart, you may have chest pain  If plaque completely blocks an artery that bring blood to your heart, you may have a heart attack  Plaque can break off and form blood clots  Blood clots may block arteries in your brain and cause a stroke             · HDL cholesterol  is called good cholesterol  because it helps remove LDL cholesterol from your arteries  It does this by attaching to LDL cholesterol and carrying it to your liver  Your liver breaks down LDL cholesterol so your body can get rid of it  High levels of HDL cholesterol can help prevent a heart attack and stroke  Low levels of HDL cholesterol can increase your risk for heart disease, heart attack, and stroke  · Triglycerides  are a type of fat that store energy from foods you eat  High levels of triglycerides also cause plaque buildup  This can increase your risk for a heart attack or stroke  If your triglyceride level is high, your LDL cholesterol level may also be high  How food affects your cholesterol levels:   · Unhealthy fats  increase LDL cholesterol and triglyceride levels in your blood  They are found in foods high in cholesterol, saturated fat, and trans fat:     ¨ Cholesterol  is found in eggs, dairy, and meat  ¨ Saturated fat  is found in butter, cheese, ice cream, whole milk, and coconut oil  Saturated fat is also found in meat, such as sausage, hot dogs, and bologna  ¨ Trans fat  is found in liquid oils and is used in fried and baked foods  Foods that contain trans fats include chips, crackers, muffins, sweet rolls, microwave popcorn, and cookies  · Healthy fats,  also called unsaturated fats, help lower LDL cholesterol and triglyceride levels  Healthy fats include the following:     ¨ Monounsaturated fats  are found in foods such as olive oil, canola oil, avocado, nuts, and olives  ¨ Polyunsaturated fats,  such as omega 3 fats, are found in fish, such as salmon, trout, and tuna  They can also be found in plant foods such as flaxseed, walnuts, and soybeans    Other things that affect your cholesterol levels:   · Smoking cigarettes    · Being overweight or obese     · Drinking large amounts of alcohol    · Not enough exercise or no exercise    · Certain genes passed from your parents to you  What you need to know about having your cholesterol levels checked: Adults 21to 39years of age should have their cholesterol levels checked every 4 to 6 years  Adults 45 years and older should have their cholesterol checked every 1 to 2 years  You may need your cholesterol checked more often, or at a younger age, if you have risk factors for heart disease  You may also need to have your cholesterol checked more often if you have other health conditions, such as diabetes  Blood tests are used to check cholesterol levels  Blood tests measure your levels of triglycerides, LDL cholesterol, and HDL cholesterol  Cholesterol level goals: Your cholesterol level goal may depend on your risk for heart disease  It may also depend on your age and other health conditions  Ask your healthcare provider if the following goals are right for you:  · Your total cholesterol level  should be less than 200 mg/dL  This number may also depend on your HDL and LDL cholesterol goals  · Your LDL cholesterol level  should be less than 130 mg/dL  · Your HDL cholesterol level  should be 60 mg/dL or higher  · Your triglyceride level  should be less than 150 mg/dL  Treatment for high cholesterol:  Treatment for high cholesterol will also decrease your risk of heart disease, heart attack, and stroke  Treatment may include any of the following:  · Medicines  may be given to lower your LDL cholesterol, triglyceride levels, or total cholesterol level   You may need medicines to lower your cholesterol if any of the following is true:     ¨ You have a history of stroke, TIA, unstable angina, or a heart attack    ¨ Your LDL cholesterol level is 190 mg/dL or higher    ¨ You are age 36to 76years of age, have diabetes, and your LDL cholesterol is 70 mg/dL or higher    ¨ You are age 36to 76years of age, have risk factors for heart disease, and your LDL cholesterol is 70 mg/dL or higher    · Lifestyle changes  include changes to your diet, exercise, weight loss, and quitting smoking  It also includes decreasing the amount of alcohol you drink  · Supplements  include fish oil, red yeast rice, and garlic  Fish oil may help lower your triglyceride and LDL cholesterol levels  It may also increase your HDL cholesterol level  Red yeast rice may help decrease your total cholesterol level and LDL cholesterol level  Garlic may help lower your total cholesterol level  Do not take these supplements without talking to your healthcare provider  Nutrition to help lower your cholesterol levels:  A registered dietitian can help you create a healthy eating plan  Read food labels and choose foods low in saturated fat, trans fats, and cholesterol  · Decrease the total amount of fat you eat  Choose lean meats, fat-free or 1% fat milk, and low-fat dairy products, such as yogurt and cheese  Try to limit or avoid red meats  Limit or do not eat fried foods or baked goods such as cookies  · Replace unhealthy fats with healthy fats  Cook foods in olive oil or canola oil  Choose soft margarines that are low in saturated fat and trans fat  Seeds, nuts, and avocados are other examples of healthy fats  · Eat foods with omega-3 fats  Examples include salmon, tuna, mackerel, walnuts, and flaxseed  Eat fish 2 times per week  Children and pregnant women should not eat fish that have high levels of mercury, such as shark, swordfish, and marissa mackerel  · Increase the amount of plant-based foods you eat  Plant-based foods are low in cholesterol and fat  Eating more of these foods may help lower your cholesterol and help you lose weight  Examples of plant-based foods includes fruits, vegetables, legumes, and whole grains  Replace milk that contains dairy with almond, soy, or coconut milk  Eat beans and foods with soy for protein instead of meat  Ask your healthcare provider or dietitian for more information on plant-based foods       · Increase the amount of fiber you eat  High-fiber foods can help lower your LDL cholesterol  You should eat between 20 and 30 grams of fiber each day  Eat at least 5 servings of fruits and vegetables each day  Other examples of high-fiber foods include whole-grain or whole-wheat breads, pastas, or cereals, and brown rice  Eat 3 ounces of whole-grain foods each day  Increase fiber slowly  You may have abdominal discomfort, bloating, and gas if you add fiber to your diet too quickly  Lifestyle changes you can make to help lower your cholesterol levels:   · Maintain a healthy weight  Ask your healthcare provider how much you should weigh  Ask him or her to help you create a weight loss plan if you are overweight  Weight loss can decrease your total cholesterol and triglyceride levels  · Exercise regularly  Exercise can help lower your total cholesterol level and maintain a healthy weight  Exercise can also help increase your HDL cholesterol level  Work with your healthcare provider to create an exercise program that is right for you  Get at least 30 minutes of moderate exercise most days of the week  Examples of exercise include brisk walking, swimming, or biking  · Do not smoke  Nicotine and other chemicals in cigarettes and cigars can damage your lungs, heart, and blood vessels  They can also raise your triglyceride levels  Ask your healthcare provider for information if you currently smoke and need help to quit  E-cigarettes or smokeless tobacco still contain nicotine  Talk to your healthcare provider before you use these products  · Limit or do not drink alcohol  Alcohol can increase your triglyceride levels  Ask your healthcare provider if it is safe for you to drink alcohol  Also ask how much is safe for you to drink each day  © 2017 [address] Information is for End User's use only and may not be sold, redistributed or otherwise used for commercial purposes   All illustrations and images included in [employer] are the copyrighted property of [employer]  or [employer]  The above information is an  only  It is not intended as medical advice for individual conditions or treatments  Talk to your doctor, nurse or pharmacist before following any medical regimen to see if it is safe and effective for you

## 2020-12-15 ENCOUNTER — LAB (OUTPATIENT)
Dept: LAB | Facility: MEDICAL CENTER | Age: 75
End: 2020-12-15
Payer: COMMERCIAL

## 2020-12-15 LAB
ALBUMIN SERPL BCP-MCNC: 4.1 G/DL (ref 3.5–5)
ALP SERPL-CCNC: 65 U/L (ref 46–116)
ALT SERPL W P-5'-P-CCNC: 27 U/L (ref 12–78)
ANION GAP SERPL CALCULATED.3IONS-SCNC: 6 MMOL/L (ref 4–13)
AST SERPL W P-5'-P-CCNC: 25 U/L (ref 5–45)
BASOPHILS # BLD AUTO: 0.09 THOUSANDS/ΜL (ref 0–0.1)
BASOPHILS NFR BLD AUTO: 1 % (ref 0–1)
BILIRUB SERPL-MCNC: 0.93 MG/DL (ref 0.2–1)
BUN SERPL-MCNC: 23 MG/DL (ref 5–25)
CALCIUM SERPL-MCNC: 9.5 MG/DL (ref 8.3–10.1)
CHLORIDE SERPL-SCNC: 105 MMOL/L (ref 100–108)
CHOLEST SERPL-MCNC: 213 MG/DL (ref 50–200)
CO2 SERPL-SCNC: 31 MMOL/L (ref 21–32)
CREAT SERPL-MCNC: 0.94 MG/DL (ref 0.6–1.3)
EOSINOPHIL # BLD AUTO: 0.52 THOUSAND/ΜL (ref 0–0.61)
EOSINOPHIL NFR BLD AUTO: 7 % (ref 0–6)
ERYTHROCYTE [DISTWIDTH] IN BLOOD BY AUTOMATED COUNT: 13 % (ref 11.6–15.1)
GFR SERPL CREATININE-BSD FRML MDRD: 78 ML/MIN/1.73SQ M
GLUCOSE P FAST SERPL-MCNC: 99 MG/DL (ref 65–99)
HCT VFR BLD AUTO: 44.4 % (ref 36.5–49.3)
HDLC SERPL-MCNC: 115 MG/DL
HGB BLD-MCNC: 14.1 G/DL (ref 12–17)
IMM GRANULOCYTES # BLD AUTO: 0.02 THOUSAND/UL (ref 0–0.2)
IMM GRANULOCYTES NFR BLD AUTO: 0 % (ref 0–2)
LDLC SERPL CALC-MCNC: 76 MG/DL (ref 0–100)
LYMPHOCYTES # BLD AUTO: 2.93 THOUSANDS/ΜL (ref 0.6–4.47)
LYMPHOCYTES NFR BLD AUTO: 40 % (ref 14–44)
MCH RBC QN AUTO: 29.3 PG (ref 26.8–34.3)
MCHC RBC AUTO-ENTMCNC: 31.8 G/DL (ref 31.4–37.4)
MCV RBC AUTO: 92 FL (ref 82–98)
MONOCYTES # BLD AUTO: 0.77 THOUSAND/ΜL (ref 0.17–1.22)
MONOCYTES NFR BLD AUTO: 11 % (ref 4–12)
NEUTROPHILS # BLD AUTO: 3 THOUSANDS/ΜL (ref 1.85–7.62)
NEUTS SEG NFR BLD AUTO: 41 % (ref 43–75)
NONHDLC SERPL-MCNC: 98 MG/DL
NRBC BLD AUTO-RTO: 0 /100 WBCS
PLATELET # BLD AUTO: 210 THOUSANDS/UL (ref 149–390)
PMV BLD AUTO: 11 FL (ref 8.9–12.7)
POTASSIUM SERPL-SCNC: 4 MMOL/L (ref 3.5–5.3)
PROT SERPL-MCNC: 7.4 G/DL (ref 6.4–8.2)
RBC # BLD AUTO: 4.82 MILLION/UL (ref 3.88–5.62)
SODIUM SERPL-SCNC: 142 MMOL/L (ref 136–145)
TRIGL SERPL-MCNC: 112 MG/DL
WBC # BLD AUTO: 7.33 THOUSAND/UL (ref 4.31–10.16)

## 2020-12-15 PROCEDURE — 80053 COMPREHEN METABOLIC PANEL: CPT | Performed by: INTERNAL MEDICINE

## 2020-12-15 PROCEDURE — 85025 COMPLETE CBC W/AUTO DIFF WBC: CPT | Performed by: INTERNAL MEDICINE

## 2020-12-15 PROCEDURE — 36415 COLL VENOUS BLD VENIPUNCTURE: CPT | Performed by: INTERNAL MEDICINE

## 2020-12-15 PROCEDURE — 80061 LIPID PANEL: CPT | Performed by: INTERNAL MEDICINE

## 2021-04-01 ENCOUNTER — OFFICE VISIT (OUTPATIENT)
Dept: CARDIOLOGY CLINIC | Facility: CLINIC | Age: 76
End: 2021-04-01
Payer: COMMERCIAL

## 2021-04-01 VITALS
HEART RATE: 71 BPM | DIASTOLIC BLOOD PRESSURE: 70 MMHG | WEIGHT: 128 LBS | SYSTOLIC BLOOD PRESSURE: 126 MMHG | HEIGHT: 68 IN | BODY MASS INDEX: 19.4 KG/M2

## 2021-04-01 DIAGNOSIS — R01.1 CHANGE IN HEART MURMUR: ICD-10-CM

## 2021-04-01 DIAGNOSIS — I34.1 MITRAL VALVE PROLAPSE: Primary | ICD-10-CM

## 2021-04-01 DIAGNOSIS — R53.83 FATIGUE, UNSPECIFIED TYPE: ICD-10-CM

## 2021-04-01 PROCEDURE — 99214 OFFICE O/P EST MOD 30 MIN: CPT | Performed by: INTERNAL MEDICINE

## 2021-04-01 NOTE — PROGRESS NOTES
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, he feels little bit more tired than usual   He wonders if it is just because he is working too much  He is not tired doing physical activity, not like that, he just says he feels overall tired after a long day  No edema orthopnea or PND  No palpitations presyncope or syncope  No chest pain tightness or pressure  He was able to remove a lot of snow and shovel snow this year without any cardiopulmonary complaints  The following portions of the patient's history were reviewed and updated as appropriate: allergies, current medications, past family history, past medical history, past social history, past surgical history and problem list   Review of Systems   Constitution: Positive for malaise/fatigue  Negative for chills, diaphoresis and weight gain  HENT: Negative for nosebleeds and stridor  Eyes: Negative for double vision, vision loss in left eye, vision loss in right eye and visual disturbance  Cardiovascular: Negative for chest pain, claudication, cyanosis, dyspnea on exertion, irregular heartbeat, leg swelling, near-syncope, orthopnea, palpitations, paroxysmal nocturnal dyspnea and syncope  Respiratory: Negative for cough, shortness of breath, snoring and wheezing  Endocrine: Negative for polydipsia, polyphagia and polyuria  Hematologic/Lymphatic: Negative for bleeding problem  Does not bruise/bleed easily  Skin: Negative for flushing and rash  Musculoskeletal: Negative for falls and myalgias  Gastrointestinal: Negative for abdominal pain, heartburn, hematemesis, hematochezia, melena and nausea  Genitourinary: Negative for hematuria  Neurological: Negative for brief paralysis, dizziness, focal weakness, headaches, light-headedness, loss of balance and vertigo     Psychiatric/Behavioral: Negative for altered mental status and substance abuse  Allergic/Immunologic: Negative for hives  Objective:      /70   Pulse 71   Ht 5' 8" (1 727 m)   Wt 58 1 kg (128 lb)   BMI 19 46 kg/m²   Physical Exam   Constitutional: He is oriented to person, place, and time  He appears well-developed and well-nourished  No distress  HENT:   Head: Normocephalic and atraumatic  Eyes: Pupils are equal, round, and reactive to light  EOM are normal  No scleral icterus  Neck: Normal range of motion  Neck supple  No JVD present  No thyromegaly present  Cardiovascular: Normal rate and regular rhythm  Exam reveals no gallop and no friction rub  Murmur (At S1 coincident grade 2 short systolic murmur at apex ) heard  Pulmonary/Chest: Effort normal and breath sounds normal  No stridor  No respiratory distress  He has no wheezes  He has no rales  Abdominal: Soft  Bowel sounds are normal  He exhibits no distension and no mass  There is no abdominal tenderness  Musculoskeletal: Normal range of motion  General: No deformity or edema  Neurological: He is alert and oriented to person, place, and time  Coordination normal    Skin: Skin is warm and dry  No erythema  No pallor  Psychiatric: He has a normal mood and affect  His behavior is normal        Lab Review:   No visits with results within 2 Month(s) from this visit     Latest known visit with results is:   Office Visit on 09/10/2020   Component Date Value    Cholesterol 12/15/2020 213*    Triglycerides 12/15/2020 112     HDL, Direct 12/15/2020 115     LDL Calculated 12/15/2020 76     Non-HDL-Chol (CHOL-HDL) 12/15/2020 98     WBC 12/15/2020 7 33     RBC 12/15/2020 4 82     Hemoglobin 12/15/2020 14 1     Hematocrit 12/15/2020 44 4     MCV 12/15/2020 92     MCH 12/15/2020 29 3     MCHC 12/15/2020 31 8     RDW 12/15/2020 13 0     MPV 12/15/2020 11 0     Platelets 61/20/4788 210     nRBC 12/15/2020 0     Neutrophils Relative 12/15/2020 41*    Immat GRANS % 12/15/2020 0     Lymphocytes Relative 12/15/2020 40     Monocytes Relative 12/15/2020 11     Eosinophils Relative 12/15/2020 7*    Basophils Relative 12/15/2020 1     Neutrophils Absolute 12/15/2020 3 00     Immature Grans Absolute 12/15/2020 0 02     Lymphocytes Absolute 12/15/2020 2 93     Monocytes Absolute 12/15/2020 0 77     Eosinophils Absolute 12/15/2020 0 52     Basophils Absolute 12/15/2020 0 09     Sodium 12/15/2020 142     Potassium 12/15/2020 4 0     Chloride 12/15/2020 105     CO2 12/15/2020 31     ANION GAP 12/15/2020 6     BUN 12/15/2020 23     Creatinine 12/15/2020 0 94     Glucose, Fasting 12/15/2020 99     Calcium 12/15/2020 9 5     AST 12/15/2020 25     ALT 12/15/2020 27     Alkaline Phosphatase 12/15/2020 65     Total Protein 12/15/2020 7 4     Albumin 12/15/2020 4 1     Total Bilirubin 12/15/2020 0 93     eGFR 12/15/2020 78      No results found  Assessment:       1  Mitral valve prolapse  Echo complete with contrast if indicated   2  Fatigue, unspecified type  Echo complete with contrast if indicated    TSH, 3rd generation with Free T4 reflex    Vitamin D 25 hydroxy   3  Change in heart murmur  Echo complete with contrast if indicated        Plan:       Echocardiogram ordered for MVP MR investigation in light of progressive fatigue and progressive audible murmur  His lipids are spectacular, I recommend no changes here  I also ordered a vitamin D level and TSH level due to fatigue  As long as the above testing is unremarkable I do not think this is anything more than appropriate fatigue for his age after long days work  I will see him back in 6 months, he will call sooner with any progressive symptoms or development of any potential cardiac symptoms otherwise

## 2021-04-01 NOTE — PATIENT INSTRUCTIONS
Mitral Valve Prolapse   WHAT YOU NEED TO KNOW:   What is mitral valve prolapse? Mitral valve prolapse (MVP) is a weak or bulging mitral valve in your heart  Your mitral valve has 2 flaps that open and close  It allows blood to flow through your heart in one direction  MVP is a common heart condition that often has no signs or symptoms  What increases my risk for MVP? Most people who have MVP are born with it  The following may increase your risk for MVP:  · Family history of MVP    · History of rheumatic fever    · Connective tissue disorders, such as Marfan syndrome    · Muscle or skeletal problems, such as some types of muscular dystrophy or scoliosis    · Heart disease or heart attack    · Autoimmune conditions such as Graves disease    What signs and symptoms may I have with MVP? Many people do not have symptoms  You may have any of the following if MVP gets worse:  · A fast or pounding heartbeat, or heart flutters    · Feeling dizzy, lightheaded, or faint    · Fatigue or anxiety    · Feeling out of breath    · Chest pain    How is MVP diagnosed? Your healthcare provider will examine you and listen to your heart  He or she will ask what medicines you take, and if you have other health conditions  You may also need any of the following:  · An echocardiogram  is a type of ultrasound  Sound waves are used to show the structure, movement, and blood vessels of your heart  · A heart monitor  is also called an ECG or EKG  Sticky pads placed on your skin record your heart's electrical activity  How is MVP treated? You may not need any treatment if you do not have any symptoms  If you have symptoms, you may need any of the following:  · Medicines  may be given to help your heart beat more strongly or regularly  You may need blood thinning medicines to lower your risk of blood clots  You may instead be given medicines to make your blood vessels dilate (open)       · Surgery  may be needed if you have severe MVP that causes blood to flow back into your atrium  Your mitral valve may need to be repaired or replaced during surgery  How can I manage MVP? · Eat heart-healthy foods  Heart-healthy foods include fresh fruits and vegetables, low-fat dairy products, chicken (without skin), lean meats, and fish  Eat two 4-ounce servings of fish high in omega-3 fats each week  Examples are salmon, fresh tuna, and herring  Ask for more information on a heart-healthy diet  · Maintain a healthy weight  Being overweight makes your heart work harder  Ask your healthcare provider how much you should weigh  Ask him or her to help you create a weight loss plan if you are overweight  · Stay active  Ask your healthcare provider which activities are best for you  · Do not use caffeine  Caffeine can make your MVP symptoms worse  · Do not smoke  Nicotine and other chemicals in cigarettes and cigars can cause damage to your heart  Ask your healthcare provider for information if you currently smoke and need help to quit  E-cigarettes or smokeless tobacco still contain nicotine  Talk to your healthcare provider before you use these products  Call 911 for any of the following:   · You have shortness of breath or chest pain  · You have any of the following signs of a stroke:     ? Part of your face droops or is numb    ? Weakness in an arm or leg    ? Confusion or difficulty speaking    ? Dizziness, a severe headache, or vision loss    When should I seek immediate care? · You faint  · You have new symptoms or your symptoms get worse  When should I contact my healthcare provider? · Your heart beats faster than normal for you, or skips beats  · You have questions or concerns about your condition or care  CARE AGREEMENT:   You have the right to help plan your care  Learn about your health condition and how it may be treated   Discuss treatment options with your healthcare providers to decide what care you want to receive  You always have the right to refuse treatment  The above information is an  only  It is not intended as medical advice for individual conditions or treatments  Talk to your doctor, nurse or pharmacist before following any medical regimen to see if it is safe and effective for you  © Copyright [address] Information is for End User's use only and may not be sold, redistributed or otherwise used for commercial purposes   All illustrations and images included in CareNotes® are the copyrighted property of [employer]  or [address] [place] [address]

## 2021-04-12 ENCOUNTER — APPOINTMENT (OUTPATIENT)
Dept: LAB | Facility: MEDICAL CENTER | Age: 76
End: 2021-04-12
Payer: COMMERCIAL

## 2021-04-12 LAB
25(OH)D3 SERPL-MCNC: 26.6 NG/ML (ref 30–100)
TSH SERPL DL<=0.05 MIU/L-ACNC: 3.5 UIU/ML (ref 0.36–3.74)

## 2021-04-12 PROCEDURE — 36415 COLL VENOUS BLD VENIPUNCTURE: CPT | Performed by: INTERNAL MEDICINE

## 2021-04-12 PROCEDURE — 84443 ASSAY THYROID STIM HORMONE: CPT | Performed by: INTERNAL MEDICINE

## 2021-04-12 PROCEDURE — 82306 VITAMIN D 25 HYDROXY: CPT | Performed by: INTERNAL MEDICINE

## 2021-04-12 NOTE — RESULT ENCOUNTER NOTE
Call patient  [place] normal but Vit D low  I rec Vit supplement  [id number] iu for 8 weeks then we recheck it  Let me know if agrees to Rx and ill order

## 2021-04-13 DIAGNOSIS — E55.9 VITAMIN D DEFICIENCY: Primary | ICD-10-CM

## 2021-04-15 DIAGNOSIS — E55.9 VITAMIN D DEFICIENCY: Primary | ICD-10-CM

## 2021-04-15 RX ORDER — ERGOCALCIFEROL 1.25 MG/1
50000 CAPSULE ORAL WEEKLY
Qty: 8 CAPSULE | Refills: 0 | Status: ON HOLD | OUTPATIENT
Start: 2021-04-15 | End: 2022-01-28

## 2021-04-29 ENCOUNTER — HOSPITAL ENCOUNTER (OUTPATIENT)
Dept: NON INVASIVE DIAGNOSTICS | Facility: CLINIC | Age: 76
Discharge: HOME | End: 2021-04-29
Payer: COMMERCIAL

## 2021-04-29 DIAGNOSIS — I34.1 MITRAL VALVE PROLAPSE: ICD-10-CM

## 2021-04-29 DIAGNOSIS — R01.1 CHANGE IN HEART MURMUR: ICD-10-CM

## 2021-04-29 DIAGNOSIS — R53.83 FATIGUE, UNSPECIFIED TYPE: ICD-10-CM

## 2021-04-29 PROCEDURE — 93306 TTE W/DOPPLER COMPLETE: CPT | Performed by: INTERNAL MEDICINE

## 2021-04-29 PROCEDURE — 93306 TTE W/DOPPLER COMPLETE: CPT

## 2021-07-12 ENCOUNTER — APPOINTMENT (OUTPATIENT)
Dept: LAB | Facility: MEDICAL CENTER | Age: 76
End: 2021-07-12
Payer: COMMERCIAL

## 2021-07-12 DIAGNOSIS — E55.9 VITAMIN D DEFICIENCY: ICD-10-CM

## 2021-07-12 LAB — 25(OH)D3 SERPL-MCNC: 62.5 NG/ML (ref 30–100)

## 2021-07-12 PROCEDURE — 82306 VITAMIN D 25 HYDROXY: CPT

## 2021-07-12 PROCEDURE — 36415 COLL VENOUS BLD VENIPUNCTURE: CPT

## 2021-10-11 ENCOUNTER — OFFICE VISIT (OUTPATIENT)
Dept: CARDIOLOGY CLINIC | Facility: CLINIC | Age: 76
End: 2021-10-11
Payer: COMMERCIAL

## 2021-10-11 VITALS
SYSTOLIC BLOOD PRESSURE: 126 MMHG | DIASTOLIC BLOOD PRESSURE: 70 MMHG | HEIGHT: 68 IN | BODY MASS INDEX: 18.79 KG/M2 | WEIGHT: 124 LBS | HEART RATE: 80 BPM

## 2021-10-11 DIAGNOSIS — E78.5 DYSLIPIDEMIA: ICD-10-CM

## 2021-10-11 DIAGNOSIS — I34.1 MITRAL VALVE PROLAPSE: Primary | ICD-10-CM

## 2021-10-11 PROCEDURE — 99214 OFFICE O/P EST MOD 30 MIN: CPT | Performed by: INTERNAL MEDICINE

## 2022-01-28 ENCOUNTER — APPOINTMENT (EMERGENCY)
Dept: RADIOLOGY | Facility: HOSPITAL | Age: 77
End: 2022-01-28
Payer: COMMERCIAL

## 2022-01-28 ENCOUNTER — HOSPITAL ENCOUNTER (OUTPATIENT)
Facility: HOSPITAL | Age: 77
Setting detail: OBSERVATION
Discharge: HOME | End: 2022-01-29
Attending: EMERGENCY MEDICINE | Admitting: FAMILY MEDICINE
Payer: COMMERCIAL

## 2022-01-28 ENCOUNTER — APPOINTMENT (EMERGENCY)
Dept: CT IMAGING | Facility: HOSPITAL | Age: 77
End: 2022-01-28
Payer: COMMERCIAL

## 2022-01-28 DIAGNOSIS — R53.1 WEAKNESS: ICD-10-CM

## 2022-01-28 DIAGNOSIS — R53.83 FATIGUE: ICD-10-CM

## 2022-01-28 DIAGNOSIS — U07.1 PNEUMONIA DUE TO COVID-19 VIRUS: Primary | ICD-10-CM

## 2022-01-28 DIAGNOSIS — J12.82 PNEUMONIA DUE TO COVID-19 VIRUS: Primary | ICD-10-CM

## 2022-01-28 PROBLEM — E78.5 DYSLIPIDEMIA: Status: ACTIVE | Noted: 2022-01-28

## 2022-01-28 PROBLEM — A41.9 SEPSIS (HCC): Status: ACTIVE | Noted: 2022-01-28

## 2022-01-28 PROBLEM — E87.1 HYPONATREMIA: Status: ACTIVE | Noted: 2022-01-28

## 2022-01-28 LAB
2HR DELTA HS TROPONIN: -1 NG/L
4HR DELTA HS TROPONIN: -1 NG/L
ALBUMIN SERPL BCP-MCNC: 3.6 G/DL (ref 3.5–5)
ALP SERPL-CCNC: 62 U/L (ref 46–116)
ALT SERPL W P-5'-P-CCNC: 25 U/L (ref 12–78)
ANION GAP SERPL CALCULATED.3IONS-SCNC: 13 MMOL/L (ref 4–13)
APTT PPP: 30 SECONDS (ref 23–37)
AST SERPL W P-5'-P-CCNC: 31 U/L (ref 5–45)
ATRIAL RATE: 101 BPM
BASOPHILS # BLD AUTO: 0.02 THOUSANDS/ΜL (ref 0–0.1)
BASOPHILS NFR BLD AUTO: 0 % (ref 0–1)
BILIRUB SERPL-MCNC: 1.03 MG/DL (ref 0.2–1)
BUN SERPL-MCNC: 23 MG/DL (ref 5–25)
CALCIUM SERPL-MCNC: 9.6 MG/DL (ref 8.3–10.1)
CARDIAC TROPONIN I PNL SERPL HS: 8 NG/L
CARDIAC TROPONIN I PNL SERPL HS: 8 NG/L
CARDIAC TROPONIN I PNL SERPL HS: 9 NG/L
CHLORIDE SERPL-SCNC: 95 MMOL/L (ref 100–108)
CO2 SERPL-SCNC: 26 MMOL/L (ref 21–32)
CREAT SERPL-MCNC: 1.09 MG/DL (ref 0.6–1.3)
D DIMER PPP FEU-MCNC: 0.61 UG/ML FEU
EOSINOPHIL # BLD AUTO: 0 THOUSAND/ΜL (ref 0–0.61)
EOSINOPHIL NFR BLD AUTO: 0 % (ref 0–6)
ERYTHROCYTE [DISTWIDTH] IN BLOOD BY AUTOMATED COUNT: 12.3 % (ref 11.6–15.1)
FLUAV RNA RESP QL NAA+PROBE: NEGATIVE
FLUBV RNA RESP QL NAA+PROBE: NEGATIVE
GFR SERPL CREATININE-BSD FRML MDRD: 64 ML/MIN/1.73SQ M
GLUCOSE SERPL-MCNC: 134 MG/DL (ref 65–140)
HCT VFR BLD AUTO: 48.3 % (ref 36.5–49.3)
HGB BLD-MCNC: 16.4 G/DL (ref 12–17)
IMM GRANULOCYTES # BLD AUTO: 0.03 THOUSAND/UL (ref 0–0.2)
IMM GRANULOCYTES NFR BLD AUTO: 0 % (ref 0–2)
INR PPP: 1.09 (ref 0.84–1.19)
LACTATE SERPL-SCNC: 1.9 MMOL/L (ref 0.5–2)
LYMPHOCYTES # BLD AUTO: 1.54 THOUSANDS/ΜL (ref 0.6–4.47)
LYMPHOCYTES NFR BLD AUTO: 16 % (ref 14–44)
MCH RBC QN AUTO: 29 PG (ref 26.8–34.3)
MCHC RBC AUTO-ENTMCNC: 34 G/DL (ref 31.4–37.4)
MCV RBC AUTO: 85 FL (ref 82–98)
MONOCYTES # BLD AUTO: 0.73 THOUSAND/ΜL (ref 0.17–1.22)
MONOCYTES NFR BLD AUTO: 7 % (ref 4–12)
NEUTROPHILS # BLD AUTO: 7.53 THOUSANDS/ΜL (ref 1.85–7.62)
NEUTS SEG NFR BLD AUTO: 77 % (ref 43–75)
NRBC BLD AUTO-RTO: 0 /100 WBCS
NT-PROBNP SERPL-MCNC: 312 PG/ML
P AXIS: 76 DEGREES
PLATELET # BLD AUTO: 184 THOUSANDS/UL (ref 149–390)
PMV BLD AUTO: 10.7 FL (ref 8.9–12.7)
POTASSIUM SERPL-SCNC: 3.5 MMOL/L (ref 3.5–5.3)
PR INTERVAL: 146 MS
PROCALCITONIN SERPL-MCNC: 0.2 NG/ML
PROT SERPL-MCNC: 8.1 G/DL (ref 6.4–8.2)
PROTHROMBIN TIME: 13.6 SECONDS (ref 11.6–14.5)
QRS AXIS: 67 DEGREES
QRSD INTERVAL: 92 MS
QT INTERVAL: 358 MS
QTC INTERVAL: 464 MS
RBC # BLD AUTO: 5.66 MILLION/UL (ref 3.88–5.62)
RSV RNA RESP QL NAA+PROBE: NEGATIVE
SARS-COV-2 RNA RESP QL NAA+PROBE: POSITIVE
SODIUM SERPL-SCNC: 134 MMOL/L (ref 136–145)
T WAVE AXIS: 73 DEGREES
VENTRICULAR RATE: 101 BPM
WBC # BLD AUTO: 9.85 THOUSAND/UL (ref 4.31–10.16)

## 2022-01-28 PROCEDURE — 83605 ASSAY OF LACTIC ACID: CPT | Performed by: EMERGENCY MEDICINE

## 2022-01-28 PROCEDURE — 85610 PROTHROMBIN TIME: CPT | Performed by: PHYSICIAN ASSISTANT

## 2022-01-28 PROCEDURE — 85730 THROMBOPLASTIN TIME PARTIAL: CPT | Performed by: PHYSICIAN ASSISTANT

## 2022-01-28 PROCEDURE — 94760 N-INVAS EAR/PLS OXIMETRY 1: CPT

## 2022-01-28 PROCEDURE — 99220 PR INITIAL OBSERVATION CARE/DAY 70 MINUTES: CPT | Performed by: PHYSICIAN ASSISTANT

## 2022-01-28 PROCEDURE — 36415 COLL VENOUS BLD VENIPUNCTURE: CPT

## 2022-01-28 PROCEDURE — G1004 CDSM NDSC: HCPCS

## 2022-01-28 PROCEDURE — 85379 FIBRIN DEGRADATION QUANT: CPT | Performed by: EMERGENCY MEDICINE

## 2022-01-28 PROCEDURE — 71275 CT ANGIOGRAPHY CHEST: CPT

## 2022-01-28 PROCEDURE — 84484 ASSAY OF TROPONIN QUANT: CPT

## 2022-01-28 PROCEDURE — 80053 COMPREHEN METABOLIC PANEL: CPT

## 2022-01-28 PROCEDURE — 96365 THER/PROPH/DIAG IV INF INIT: CPT

## 2022-01-28 PROCEDURE — 93010 ELECTROCARDIOGRAM REPORT: CPT | Performed by: INTERNAL MEDICINE

## 2022-01-28 PROCEDURE — 93005 ELECTROCARDIOGRAM TRACING: CPT

## 2022-01-28 PROCEDURE — 99285 EMERGENCY DEPT VISIT HI MDM: CPT

## 2022-01-28 PROCEDURE — 85025 COMPLETE CBC W/AUTO DIFF WBC: CPT

## 2022-01-28 PROCEDURE — 0241U HB NFCT DS VIR RESP RNA 4 TRGT: CPT | Performed by: EMERGENCY MEDICINE

## 2022-01-28 PROCEDURE — 84145 PROCALCITONIN (PCT): CPT | Performed by: PHYSICIAN ASSISTANT

## 2022-01-28 PROCEDURE — 96361 HYDRATE IV INFUSION ADD-ON: CPT

## 2022-01-28 PROCEDURE — 99285 EMERGENCY DEPT VISIT HI MDM: CPT | Performed by: EMERGENCY MEDICINE

## 2022-01-28 PROCEDURE — 83880 ASSAY OF NATRIURETIC PEPTIDE: CPT | Performed by: EMERGENCY MEDICINE

## 2022-01-28 PROCEDURE — 87040 BLOOD CULTURE FOR BACTERIA: CPT | Performed by: EMERGENCY MEDICINE

## 2022-01-28 PROCEDURE — 71045 X-RAY EXAM CHEST 1 VIEW: CPT

## 2022-01-28 RX ORDER — CEFTRIAXONE 1 G/50ML
1000 INJECTION, SOLUTION INTRAVENOUS ONCE
Status: COMPLETED | OUTPATIENT
Start: 2022-01-28 | End: 2022-01-28

## 2022-01-28 RX ORDER — ONDANSETRON 2 MG/ML
4 INJECTION INTRAMUSCULAR; INTRAVENOUS EVERY 6 HOURS PRN
Status: DISCONTINUED | OUTPATIENT
Start: 2022-01-28 | End: 2022-01-29 | Stop reason: HOSPADM

## 2022-01-28 RX ORDER — ASCORBIC ACID 500 MG
1000 TABLET ORAL DAILY
Status: DISCONTINUED | OUTPATIENT
Start: 2022-01-28 | End: 2022-01-29 | Stop reason: HOSPADM

## 2022-01-28 RX ORDER — ZINC SULFATE 50(220)MG
220 CAPSULE ORAL DAILY
Status: DISCONTINUED | OUTPATIENT
Start: 2022-01-28 | End: 2022-01-29 | Stop reason: HOSPADM

## 2022-01-28 RX ORDER — GUAIFENESIN 600 MG
600 TABLET, EXTENDED RELEASE 12 HR ORAL EVERY 12 HOURS SCHEDULED
Status: DISCONTINUED | OUTPATIENT
Start: 2022-01-28 | End: 2022-01-29 | Stop reason: HOSPADM

## 2022-01-28 RX ORDER — ASPIRIN 81 MG/1
81 TABLET, CHEWABLE ORAL DAILY
Status: DISCONTINUED | OUTPATIENT
Start: 2022-01-29 | End: 2022-01-29 | Stop reason: HOSPADM

## 2022-01-28 RX ORDER — SODIUM CHLORIDE 9 MG/ML
125 INJECTION, SOLUTION INTRAVENOUS CONTINUOUS
Status: DISCONTINUED | OUTPATIENT
Start: 2022-01-28 | End: 2022-01-28

## 2022-01-28 RX ORDER — LANOLIN ALCOHOL/MO/W.PET/CERES
3 CREAM (GRAM) TOPICAL
Status: DISCONTINUED | OUTPATIENT
Start: 2022-01-28 | End: 2022-01-29 | Stop reason: HOSPADM

## 2022-01-28 RX ORDER — DEXAMETHASONE SODIUM PHOSPHATE 4 MG/ML
6 INJECTION, SOLUTION INTRA-ARTICULAR; INTRALESIONAL; INTRAMUSCULAR; INTRAVENOUS; SOFT TISSUE EVERY 24 HOURS
Status: DISCONTINUED | OUTPATIENT
Start: 2022-01-28 | End: 2022-01-29 | Stop reason: HOSPADM

## 2022-01-28 RX ORDER — PRAVASTATIN SODIUM 20 MG
10 TABLET ORAL
Status: DISCONTINUED | OUTPATIENT
Start: 2022-01-28 | End: 2022-01-29 | Stop reason: HOSPADM

## 2022-01-28 RX ORDER — HEPARIN SODIUM 10000 [USP'U]/100ML
3-20 INJECTION, SOLUTION INTRAVENOUS
Status: DISCONTINUED | OUTPATIENT
Start: 2022-01-28 | End: 2022-01-29

## 2022-01-28 RX ORDER — MELATONIN
2000 DAILY
Status: DISCONTINUED | OUTPATIENT
Start: 2022-01-28 | End: 2022-01-29 | Stop reason: HOSPADM

## 2022-01-28 RX ORDER — SODIUM CHLORIDE, SODIUM GLUCONATE, SODIUM ACETATE, POTASSIUM CHLORIDE, MAGNESIUM CHLORIDE, SODIUM PHOSPHATE, DIBASIC, AND POTASSIUM PHOSPHATE .53; .5; .37; .037; .03; .012; .00082 G/100ML; G/100ML; G/100ML; G/100ML; G/100ML; G/100ML; G/100ML
100 INJECTION, SOLUTION INTRAVENOUS ONCE
Status: COMPLETED | OUTPATIENT
Start: 2022-01-28 | End: 2022-01-28

## 2022-01-28 RX ADMIN — REMDESIVIR 200 MG: 100 INJECTION, POWDER, LYOPHILIZED, FOR SOLUTION INTRAVENOUS at 18:20

## 2022-01-28 RX ADMIN — Medication 3 MG: at 21:07

## 2022-01-28 RX ADMIN — IOHEXOL 85 ML: 350 INJECTION, SOLUTION INTRAVENOUS at 15:01

## 2022-01-28 RX ADMIN — GUAIFENESIN 600 MG: 600 TABLET, EXTENDED RELEASE ORAL at 18:10

## 2022-01-28 RX ADMIN — SODIUM CHLORIDE 125 ML/HR: 0.9 INJECTION, SOLUTION INTRAVENOUS at 12:56

## 2022-01-28 RX ADMIN — DEXAMETHASONE SODIUM PHOSPHATE 6 MG: 4 INJECTION, SOLUTION INTRAMUSCULAR; INTRAVENOUS at 18:12

## 2022-01-28 RX ADMIN — HEPARIN SODIUM 12 UNITS/KG/HR: 10000 INJECTION, SOLUTION INTRAVENOUS at 18:08

## 2022-01-28 RX ADMIN — ZINC SULFATE 220 MG (50 MG) CAPSULE 220 MG: CAPSULE at 18:10

## 2022-01-28 RX ADMIN — SODIUM CHLORIDE, SODIUM GLUCONATE, SODIUM ACETATE, POTASSIUM CHLORIDE, MAGNESIUM CHLORIDE, SODIUM PHOSPHATE, DIBASIC, AND POTASSIUM PHOSPHATE 100 ML/HR: .53; .5; .37; .037; .03; .012; .00082 INJECTION, SOLUTION INTRAVENOUS at 18:09

## 2022-01-28 RX ADMIN — CHOLECALCIFEROL TAB 25 MCG (1000 UNIT) 2000 UNITS: 25 TAB at 18:10

## 2022-01-28 RX ADMIN — ONDANSETRON 4 MG: 2 INJECTION INTRAMUSCULAR; INTRAVENOUS at 19:15

## 2022-01-28 RX ADMIN — CEFTRIAXONE 1000 MG: 1 INJECTION, SOLUTION INTRAVENOUS at 12:57

## 2022-01-28 RX ADMIN — SODIUM CHLORIDE 2500 ML: 9 INJECTION, SOLUTION INTRAVENOUS at 15:24

## 2022-01-28 RX ADMIN — PRAVASTATIN SODIUM 10 MG: 20 TABLET ORAL at 18:11

## 2022-01-28 RX ADMIN — OXYCODONE HYDROCHLORIDE AND ACETAMINOPHEN 1000 MG: 500 TABLET ORAL at 18:10

## 2022-01-28 NOTE — ASSESSMENT & PLAN NOTE
· Likely hypovolemic hyponatremia in the setting of poor appetite with covid  · Appears dry by exam with dry mucous membranes  · Patient received a 2 5 liter bolus in the ER  · Will continue IVF hydration with isolyte at 100mL/hr x 1 more liter    · Reassess labs in AM

## 2022-01-28 NOTE — ASSESSMENT & PLAN NOTE
· As evidenced by tachycardia, tachypnea  · Likely in the setting of viral pneumonia from Covid, anxiety, dehydration  · Patient received IVF bolus, IV rocephin in ER  · Will continue IV fluids with isolyte at 100mL/hr    · HOLD on further IV antibiotic doses, pending procalcitonin levels  · Blood cultures x 2 pending  · Lactic acid normal at 1 9

## 2022-01-28 NOTE — H&P
[address] [facility] [address]  H&P- Zack Chandler 1945, 64 y o  male MRN: [id number]  Unit/Bed#: FA82 Encounter: [id number]  Primary Care Provider: Sara Duval DO   Date and time admitted to hospital: 1/28/2022 11:24 AM    Pneumonia due to COVID-19 virus  Assessment & Plan  · Patient presented with significant fatigue, weakness, cough, and shortness of breath x 1 week  · Covid positive today  · Unvaccinated  · Oxygen utilized for symptomatic relief vs  True hypoxia at 2L/min currently  · CTA chest negative for PE, but suggestive for Covid pneumonia  Will admit to med/surg follow mild covid tx pathway:  · IV remdesivir - day 1 of up to 5  · IV decadron 6mg daily - day 1 of up to 10  · Anticoagulation - based on new guidelines given D-dimer > 0 05 and oxygen needs will provide IV heparin infusion while here  · Respiratory protocol  · Supportive care - mucinex, OOB encouraged, incentive spirometry  Will also add vitamin D, Zinc, Vitamin C for supportive care  Sepsis ([facility] [address] )  Assessment & Plan  · As evidenced by tachycardia, tachypnea  · Likely in the setting of viral pneumonia from Covid, anxiety, dehydration  · Patient received IVF bolus, IV rocephin in ER  · Will continue IV fluids with isolyte at 100mL/hr    · HOLD on further IV antibiotic doses, pending procalcitonin levels  · Blood cultures x 2 pending  · Lactic acid normal at 1 9  Hyponatremia  Assessment & Plan  · Likely hypovolemic hyponatremia in the setting of poor appetite with covid  · Appears dry by exam with dry mucous membranes  · Patient received a 2 5 liter bolus in the ER  · Will continue IVF hydration with isolyte at 100mL/hr x 1 more liter  · Reassess labs in AM    Dyslipidemia  Assessment & Plan  Continue statin  VTE Pharmacologic Prophylaxis:   High Risk (Score >/= 5) - Pharmacological DVT Prophylaxis Ordered: heparin drip  Sequential Compression Devices Ordered    Code Status: Level 1 - Full Code Discussion with family: Updated  (son) via phone  Anticipated Length of Stay: Patient will be admitted on an inpatient basis with an anticipated length of stay of greater than 2 midnights secondary to need for IV steroids, IV remdesivir       Total Time for Visit, including Counseling / Coordination of Care: 30 minutes Greater than 50% of this total time spent on direct patient counseling and coordination of care  Chief Complaint: weakness and shortness of breath    History of Present Illness:  Blanca Del Castillo is a 64 y o  male with a PMH of dyslipidemia, otherwise very healthy and active, who presents with a 1 week history of malaise, weakness, cough, short of breath, poor appetite, and nausea  He became so weak recently he was falling at home  Notes he has barely ate or drank anything over the past several days  No fevers but has been having chills  Feels lightheaded at times as if he may faint  No associated chest pain, palpitations, headache, diarrhea  He is unvaccinated for Covid - notes his family told him not to get the vaccine  He has not had any sick contacts that he knows of  Testing positive for Covid in ER today and meeting sepsis criteria  Still feeling ill despite IV fluid bolus, feels he cannot go home safely  Review of Systems:  Review of Systems   All other systems reviewed and are negative  Past Medical and Surgical History:   Past Medical History:   Diagnosis Date    History of echocardiogram 03/22/2018    Normal EF, normal LVSF  Thick and rebundent MV leaflets w/ mild posterior leaflet prolapse w/ trace regurg   Hyperlipidemia     Lyme disease     Mitral valve prolapse     Shingles        Past Surgical History:   Procedure Laterality Date    APPENDECTOMY      HERNIA REPAIR      TONSILLECTOMY         Meds/Allergies:  Prior to Admission medications    Medication Sig Start Date End Date Taking?  Authorizing Provider   aspirin (ECOTRIN LOW STRENGTH) 81 mg EC tablet Take 81 mg by mouth daily    Historical Provider, MD   ergocalciferol (VITAMIN D2) 50,000 units Take 1 capsule (50,000 Units total) by mouth once a week 4/15/21   Frantz Plummer DO   simvastatin (ZOCOR) 5 MG tablet Take 1 tablet (5 mg total) by mouth daily at bedtime 1/15/20   Frantz Plummer DO     I have reviewed home medications using recent Epic encounter  Allergies: No Known Allergies    Social History:  Marital Status:      Substance Use History:   Social History     Substance and Sexual Activity   Alcohol Use No     Social History     Tobacco Use   Smoking Status Never Smoker   Smokeless Tobacco Never Used     Social History     Substance and Sexual Activity   Drug Use No       Family History:  History reviewed  No pertinent family history  Physical Exam:   Vitals:   Blood Pressure: 144/72 (01/28/22 1645)  Pulse: (!) 123 (01/28/22 1645)  Temperature: 98 9 °F (37 2 °C) (01/28/22 1124)  Temp Source: Temporal (01/28/22 1124)  Respirations: 18 (01/28/22 1645)  SpO2: 97 % (01/28/22 1645)    Physical Exam  Vitals and nursing note reviewed  Constitutional:       General: He is not in acute distress  Appearance: He is ill-appearing  He is not diaphoretic  HENT:      Head: Normocephalic  Mouth/Throat:      Mouth: Mucous membranes are dry  Comments: Very dry MM  Cardiovascular:      Rate and Rhythm: Normal rate and regular rhythm  Heart sounds: Normal heart sounds  Pulmonary:      Effort: Pulmonary effort is normal  No respiratory distress  Breath sounds: Normal breath sounds  No wheezing or rales  Abdominal:      General: There is no distension  Palpations: Abdomen is soft  Tenderness: There is no abdominal tenderness  Musculoskeletal:      Right lower leg: No edema  Left lower leg: No edema  Skin:     General: Skin is warm and dry  Neurological:      Mental Status: He is alert and oriented to person, place, and time     Psychiatric:         Mood and Affect: Mood normal           Additional Data:     Lab Results:  Results from last 7 days   Lab Units 01/28/22  1147   WBC Thousand/uL 9 85   HEMOGLOBIN g/dL 16 4   HEMATOCRIT % 48 3   PLATELETS Thousands/uL 184   NEUTROS PCT % 77*   LYMPHS PCT % 16   MONOS PCT % 7   EOS PCT % 0     Results from last 7 days   Lab Units 01/28/22  1147   SODIUM mmol/L 134*   POTASSIUM mmol/L 3 5   CHLORIDE mmol/L 95*   CO2 mmol/L 26   BUN mg/dL 23   CREATININE mg/dL 1 09   ANION GAP mmol/L 13   CALCIUM mg/dL 9 6   ALBUMIN g/dL 3 6   TOTAL BILIRUBIN mg/dL 1 03*   ALK PHOS U/L 62   ALT U/L 25   AST U/L 31   GLUCOSE RANDOM mg/dL 134                 Results from last 7 days   Lab Units 01/28/22  1523   LACTIC ACID mmol/L 1 9       Imaging: Reviewed radiology reports from this admission including: chest CT scan  CTA ED chest PE study   Final Result by Latonia Elizalde MD (01/28 4792)      A few subtle peripheral groundglass opacities in the right lower lobe may represent Covid 19 pneumonia in this patient with confirmed Covid 19  Predominantly discoid left lower lobe density in keeping with atelectasis  No pulmonary arterial embolism as visualized  Much of the lower lobe segmental level vasculature is obscured by motion artifact  Workstation performed: [id number]         XR chest 1 view portable   Final Result by Lulu Johnson MD (01/28 1307)      No acute cardiopulmonary disease  Workstation performed: [id number]             EKG and Other Studies Reviewed on Admission:   · EKG: Sinus tachycardia  rate 101  Incomplete right bundle branch block  Anteroseptal infarct (cited on or before 06-MAY-2015)  Abnormal ECG  Confirmed by Madelaine Sow ([id number]) on 1/28/2022 4:33:58 PM    ** Please Note: This note has been constructed using a voice recognition system   **

## 2022-01-28 NOTE — ED PROVIDER NOTES
History  Chief Complaint   Patient presents with    Weakness - Generalized     pt c/o weakness and nausea for the past week  pt denies diarrhea and vomiting  HPI  63 yo male notes ill for a week, was getting a bit better for a few days, then past few days worsened again; notes short of breath; notes no flu or covid vaccine because he family said not to get one; he denies chest pain, abm pain, or any pain,  Notes fatigue, worsening short of breath  Prior to Admission Medications   Prescriptions Last Dose Informant Patient Reported? Taking?   aspirin (ECOTRIN LOW STRENGTH) 81 mg EC tablet  Self Yes No   Sig: Take 81 mg by mouth daily   ergocalciferol (VITAMIN D2) 50,000 units   No No   Sig: Take 1 capsule (50,000 Units total) by mouth once a week   simvastatin (ZOCOR) 5 MG tablet   No No   Sig: Take 1 tablet (5 mg total) by mouth daily at bedtime      Facility-Administered Medications: None       Past Medical History:   Diagnosis Date    History of echocardiogram 03/22/2018    Normal EF, normal LVSF  Thick and rebundent MV leaflets w/ mild posterior leaflet prolapse w/ trace regurg   Hyperlipidemia     Lyme disease     Mitral valve prolapse     Shingles        Past Surgical History:   Procedure Laterality Date    APPENDECTOMY      HERNIA REPAIR      TONSILLECTOMY         History reviewed  No pertinent family history  I have reviewed and agree with the history as documented  E-Cigarette/Vaping    E-Cigarette Use Never User      E-Cigarette/Vaping Substances    Nicotine No     THC No     CBD No     Flavoring No     Other No     Unknown No      Social History     Tobacco Use    Smoking status: Never Smoker    Smokeless tobacco: Never Used   Vaping Use    Vaping Use: Never used   Substance Use Topics    Alcohol use: No    Drug use: No       Review of Systems   Constitutional: Positive for activity change, appetite change and fatigue  Negative for chills and diaphoresis     HENT: Negative for congestion and sore throat  Eyes: Negative for discharge and redness  Respiratory: Positive for shortness of breath  Negative for chest tightness  Cardiovascular: Negative for chest pain, palpitations and leg swelling  Gastrointestinal: Negative for abdominal pain, diarrhea, nausea and vomiting  Genitourinary: Negative for difficulty urinating  Musculoskeletal: Negative for back pain  Neurological: Positive for light-headedness  Negative for dizziness, seizures, syncope, numbness and headaches  Psychiatric/Behavioral: Negative for confusion  Physical Exam  Physical Exam  Vitals and nursing note reviewed  Constitutional:       General: He is in acute distress  Appearance: Normal appearance  He is normal weight  He is ill-appearing and toxic-appearing  He is not diaphoretic  HENT:      Head: Atraumatic  Mouth/Throat:      Mouth: Mucous membranes are moist    Eyes:      Extraocular Movements: Extraocular movements intact  Conjunctiva/sclera: Conjunctivae normal       Pupils: Pupils are equal, round, and reactive to light  Cardiovascular:      Rate and Rhythm: Normal rate and regular rhythm  Heart sounds: No murmur heard  Pulmonary:      Effort: Respiratory distress present  Breath sounds: No stridor  No wheezing, rhonchi or rales  Comments: Retractions; some sob increase with talking  Chest:      Chest wall: No tenderness  Abdominal:      Tenderness: There is no abdominal tenderness  Musculoskeletal:         General: No swelling or signs of injury  Normal range of motion  Cervical back: Normal range of motion and neck supple  Skin:     General: Skin is warm and dry  Neurological:      General: No focal deficit present  Mental Status: He is alert and oriented to person, place, and time  Cranial Nerves: No cranial nerve deficit  Motor: No weakness           Vital Signs  ED Triage Vitals [01/28/22 1124]   Temperature Pulse Respirations Blood Pressure SpO2   98 9 °F (37 2 °C) 94 18 157/79 98 %      Temp Source Heart Rate Source Patient Position - Orthostatic VS BP Location FiO2 (%)   Temporal Monitor Sitting Left arm --      Pain Score       No Pain           Vitals:    01/28/22 1230 01/28/22 1330 01/28/22 1400 01/28/22 1430   BP: 128/72 135/70 137/80 150/72   Pulse: 87 84 92 94   Patient Position - Orthostatic VS: Sitting Sitting Sitting Sitting         Visual Acuity      ED Medications  Medications   sodium chloride 0 9 % infusion (125 mL/hr Intravenous New Bag 1/28/22 1256)   sodium chloride 0 9 % bolus 2,500 mL (2,500 mL Intravenous New Bag 1/28/22 1524)   cefTRIAXone (ROCEPHIN) IVPB (premix in dextrose) 1,000 mg 50 mL (0 mg Intravenous Stopped 1/28/22 1327)   iohexol (OMNIPAQUE) 350 MG/ML injection (SINGLE-DOSE) 85 mL (85 mL Intravenous Given 1/28/22 1501)       Diagnostic Studies  Results Reviewed     Procedure Component Value Units Date/Time    Lactic acid [[id number]]  (Normal) Collected: 01/28/22 1523    Lab Status: Final result Specimen: Blood from Arm, Right Updated: 01/28/22 1554     LACTIC ACID 1 9 mmol/L     Narrative:      Result may be elevated if tourniquet was used during collection  D-Dimer [[id number]]  (Abnormal) Collected: 01/28/22 1523    Lab Status: Final result Specimen: Blood from Arm, Right Updated: 01/28/22 1548     D-Dimer, Quant 0 61 ug/ml FEU     HS Troponin I 4hr [[id number]] Collected: 01/28/22 1523    Lab Status:  In process Specimen: Blood from Arm, Right Updated: 01/28/22 1533    NT-BNP PRO [[id number]]  (Normal) Collected: 01/28/22 1254    Lab Status: Final result Specimen: Blood Updated: 01/28/22 1356     NT-proBNP 312 pg/mL     HS Troponin I 2hr [[id number]]  (Normal) Collected: 01/28/22 1326    Lab Status: Final result Specimen: Blood from Arm, Right Updated: 01/28/22 1352     hs TnI 2hr 8 ng/L      Delta 2hr hsTnI -1 ng/L     COVID/FLU/RSV - 2 hour TAT [[id number]]  (Abnormal) Collected: 01/28/22 1236    Lab Status: Final result Specimen: Nares from Nasopharyngeal Swab Updated: 01/28/22 1323     SARS-CoV-2 Positive     INFLUENZA A PCR Negative     INFLUENZA B PCR Negative     RSV PCR Negative    Narrative:      FOR PEDIATRIC PATIENTS - copy/paste COVID Guidelines URL to browser: [email or web address]    SARS-CoV-2 assay is a Nucleic Acid Amplification assay intended for the  qualitative detection of nucleic acid from SARS-CoV-2 in nasopharyngeal  swabs  Results are for the presumptive identification of SARS-CoV-2 RNA  Positive results are indicative of infection with SARS-CoV-2, the virus  causing COVID-19, but do not rule out bacterial infection or co-infection  with other viruses  Laboratories within the [facility] and its  territories are required to report all positive results to the appropriate  public health authorities  Negative results do not preclude SARS-CoV-2  infection and should not be used as the sole basis for treatment or other  patient management decisions  Negative results must be combined with  clinical observations, patient history, and epidemiological information  This test has not been FDA cleared or approved  This test has been authorized by FDA under an Emergency Use Authorization  (EUA)  This test is only authorized for the duration of time the  declaration that circumstances exist justifying the authorization of the  emergency use of an in vitro diagnostic tests for detection of SARS-CoV-2  virus and/or diagnosis of COVID-19 infection under section 564(b)(1) of  the Act, 21 U  S C  500GKT-0(Q)(4), unless the authorization is terminated  or revoked sooner  The test has been validated but independent review by FDA  and CLIA is pending  Test performed using [employer] GeneXpert: This RT-PCR assay targets N2,  a region unique to SARS-CoV-2   A conserved region in the E-gene was chosen  for pan-Sarbecovirus detection which includes SARS-CoV-2  Blood culture #2 [[id number]] Collected: 01/28/22 1229    Lab Status: In process Specimen: Blood from Arm, Left Updated: 01/28/22 1245    Blood culture #1 [[id number]] Collected: 01/28/22 1229    Lab Status:  In process Specimen: Blood from Arm, Right Updated: 01/28/22 1245    HS Troponin 0hr (reflex protocol) [[id number]]  (Normal) Collected: 01/28/22 1147    Lab Status: Final result Specimen: Blood from Arm, Right Updated: 01/28/22 1225     hs TnI 0hr 9 ng/L     Comprehensive metabolic panel [[id number]]  (Abnormal) Collected: 01/28/22 1147    Lab Status: Final result Specimen: Blood from Arm, Right Updated: 01/28/22 1220     Sodium 134 mmol/L      Potassium 3 5 mmol/L      Chloride 95 mmol/L      CO2 26 mmol/L      ANION GAP 13 mmol/L      BUN 23 mg/dL      Creatinine 1 09 mg/dL      Glucose 134 mg/dL      Calcium 9 6 mg/dL      AST 31 U/L      ALT 25 U/L      Alkaline Phosphatase 62 U/L      Total Protein 8 1 g/dL      Albumin 3 6 g/dL      Total Bilirubin 1 03 mg/dL      eGFR 64 ml/min/1 73sq m     Narrative:      [place] guidelines for Chronic Kidney Disease (CKD):     Stage 1 with normal or high GFR (GFR > 90 mL/min/1 73 square meters)    Stage 2 Mild CKD (GFR = 60-89 mL/min/1 73 square meters)    Stage 3A Moderate CKD (GFR = 45-59 mL/min/1 73 square meters)    Stage 3B Moderate CKD (GFR = 30-44 mL/min/1 73 square meters)    Stage 4 Severe CKD (GFR = 15-29 mL/min/1 73 square meters)    Stage 5 End Stage CKD (GFR <15 mL/min/1 73 square meters)  Note: GFR calculation is accurate only with a steady state creatinine    CBC and differential [[id number]]  (Abnormal) Collected: 01/28/22 1147    Lab Status: Final result Specimen: Blood from Arm, Right Updated: 01/28/22 1203     WBC 9 85 Thousand/uL      RBC 5 66 Million/uL      Hemoglobin 16 4 g/dL      Hematocrit 48 3 %      MCV 85 fL      MCH 29 0 pg      MCHC 34 0 g/dL      RDW 12 3 %      MPV 10 7 fL      Platelets 184 Thousands/uL      nRBC 0 /100 WBCs      Neutrophils Relative 77 %      Immat GRANS % 0 %      Lymphocytes Relative 16 %      Monocytes Relative 7 %      Eosinophils Relative 0 %      Basophils Relative 0 %      Neutrophils Absolute 7 53 Thousands/µL      Immature Grans Absolute 0 03 Thousand/uL      Lymphocytes Absolute 1 54 Thousands/µL      Monocytes Absolute 0 73 Thousand/µL      Eosinophils Absolute 0 00 Thousand/µL      Basophils Absolute 0 02 Thousands/µL                  CTA ED chest PE study   Final Result by Akanksha Hernandez MD (01/28 7736)      A few subtle peripheral groundglass opacities in the right lower lobe may represent Covid 19 pneumonia in this patient with confirmed Covid 19  Predominantly discoid left lower lobe density in keeping with atelectasis  No pulmonary arterial embolism as visualized  Much of the lower lobe segmental level vasculature is obscured by motion artifact  Workstation performed: [id number]         XR chest 1 view portable   Final Result by Jose M Coffman MD (01/28 9513)      No acute cardiopulmonary disease  Workstation performed: [id number]                    Procedures  Procedures         ED Course                             notes he felt better with supplemental oxygen added by ED nursing;          cxr rt infiltrate        MDM    Ct no pe but pneumonia;   Patient markedly improved, subjectively and objectively ; notes he is still very uncomfortable going home;     Disposition  Final diagnoses:   Pneumonia due to COVID-19 virus   Weakness   Fatigue     Time reflects when diagnosis was documented in both MDM as applicable and the Disposition within this note     Time User Action Codes Description Comment    1/28/2022  3:52 PM Ashlee Leather Add [U07 1,  J12 82] Pneumonia due to COVID-19 virus     1/28/2022  3:52 PM Ashlee Leather Add [R53 1] Weakness     1/28/2022  3:52 PM Ashlee Leather Add [R53 83] Fatigue       ED Disposition     ED Disposition Condition Date/Time Comment    Admit Stable Fri Jan 28, 2022  3:52 PM Case was discussed with thor and the patient's admission status was agreed to be Admission Status: observation status to the service of Dr Mohan Hughes   Follow-up Information    None         Patient's Medications   Discharge Prescriptions    No medications on file       No discharge procedures on file      PDMP Review     None          ED Provider  Electronically Signed by           Maria Isabel Bailey MD  01/28/22 3416

## 2022-01-28 NOTE — ASSESSMENT & PLAN NOTE
· Patient presented with significant fatigue, weakness, cough, and shortness of breath x 1 week  · Covid positive today  · Unvaccinated  · Oxygen utilized for symptomatic relief vs  True hypoxia at 2L/min currently  · CTA chest negative for PE, but suggestive for Covid pneumonia  Will admit to med/surg follow mild covid tx pathway:  · IV remdesivir - day 1 of up to 5  · IV decadron 6mg daily - day 1 of up to 10  · Anticoagulation - based on new guidelines given D-dimer > 0 05 and oxygen needs will provide IV heparin infusion while here  · Respiratory protocol  · Supportive care - mucinex, OOB encouraged, incentive spirometry  Will also add vitamin D, Zinc, Vitamin C for supportive care

## 2022-01-29 VITALS
SYSTOLIC BLOOD PRESSURE: 127 MMHG | HEART RATE: 70 BPM | RESPIRATION RATE: 18 BRPM | DIASTOLIC BLOOD PRESSURE: 71 MMHG | OXYGEN SATURATION: 99 % | TEMPERATURE: 97.4 F

## 2022-01-29 LAB
ALBUMIN SERPL BCP-MCNC: 2.8 G/DL (ref 3.5–5)
ALP SERPL-CCNC: 55 U/L (ref 46–116)
ALT SERPL W P-5'-P-CCNC: 23 U/L (ref 12–78)
ANION GAP SERPL CALCULATED.3IONS-SCNC: 13 MMOL/L (ref 4–13)
APTT PPP: 44 SECONDS (ref 23–37)
APTT PPP: >210 SECONDS (ref 23–37)
AST SERPL W P-5'-P-CCNC: 37 U/L (ref 5–45)
BILIRUB SERPL-MCNC: 0.51 MG/DL (ref 0.2–1)
BUN SERPL-MCNC: 17 MG/DL (ref 5–25)
CALCIUM ALBUM COR SERPL-MCNC: 9.4 MG/DL (ref 8.3–10.1)
CALCIUM SERPL-MCNC: 8.4 MG/DL (ref 8.3–10.1)
CHLORIDE SERPL-SCNC: 103 MMOL/L (ref 100–108)
CO2 SERPL-SCNC: 25 MMOL/L (ref 21–32)
CREAT SERPL-MCNC: 0.89 MG/DL (ref 0.6–1.3)
GFR SERPL CREATININE-BSD FRML MDRD: 81 ML/MIN/1.73SQ M
GLUCOSE SERPL-MCNC: 145 MG/DL (ref 65–140)
POTASSIUM SERPL-SCNC: 3.7 MMOL/L (ref 3.5–5.3)
PROCALCITONIN SERPL-MCNC: 0.16 NG/ML
PROT SERPL-MCNC: 6.6 G/DL (ref 6.4–8.2)
SODIUM SERPL-SCNC: 141 MMOL/L (ref 136–145)

## 2022-01-29 PROCEDURE — 80053 COMPREHEN METABOLIC PANEL: CPT | Performed by: PHYSICIAN ASSISTANT

## 2022-01-29 PROCEDURE — 84145 PROCALCITONIN (PCT): CPT | Performed by: PHYSICIAN ASSISTANT

## 2022-01-29 PROCEDURE — 97163 PT EVAL HIGH COMPLEX 45 MIN: CPT | Performed by: PHYSICAL THERAPIST

## 2022-01-29 PROCEDURE — 97116 GAIT TRAINING THERAPY: CPT | Performed by: PHYSICAL THERAPIST

## 2022-01-29 PROCEDURE — 97167 OT EVAL HIGH COMPLEX 60 MIN: CPT

## 2022-01-29 PROCEDURE — 85730 THROMBOPLASTIN TIME PARTIAL: CPT | Performed by: FAMILY MEDICINE

## 2022-01-29 PROCEDURE — 99217 PR OBSERVATION CARE DISCHARGE MANAGEMENT: CPT | Performed by: PHYSICIAN ASSISTANT

## 2022-01-29 PROCEDURE — 97530 THERAPEUTIC ACTIVITIES: CPT

## 2022-01-29 RX ORDER — MELATONIN
2000 DAILY
Qty: 15 TABLET | Refills: 0 | Status: SHIPPED | OUTPATIENT
Start: 2022-01-30 | End: 2022-02-28

## 2022-01-29 RX ORDER — ONDANSETRON 4 MG/1
4 TABLET, ORALLY DISINTEGRATING ORAL EVERY 6 HOURS PRN
Qty: 20 TABLET | Refills: 0 | Status: SHIPPED | OUTPATIENT
Start: 2022-01-29 | End: 2022-02-03 | Stop reason: SDUPTHER

## 2022-01-29 RX ORDER — PSEUDOEPHEDRINE HCL 120 MG/1
120 TABLET, FILM COATED, EXTENDED RELEASE ORAL 2 TIMES DAILY
Qty: 10 TABLET | Refills: 0 | Status: SHIPPED | OUTPATIENT
Start: 2022-01-29 | End: 2022-02-04

## 2022-01-29 RX ORDER — HYDROCODONE POLISTIREX AND CHLORPHENIRAMINE POLISTIREX 10; 8 MG/5ML; MG/5ML
5 SUSPENSION, EXTENDED RELEASE ORAL EVERY 12 HOURS PRN
Qty: 120 ML | Refills: 0 | Status: SHIPPED | OUTPATIENT
Start: 2022-01-29 | End: 2022-02-08

## 2022-01-29 RX ORDER — DEXAMETHASONE 6 MG/1
6 TABLET ORAL
Qty: 8 TABLET | Refills: 0 | Status: SHIPPED | OUTPATIENT
Start: 2022-01-29 | End: 2022-02-03 | Stop reason: SDUPTHER

## 2022-01-29 RX ORDER — ZINC SULFATE 50(220)MG
220 CAPSULE ORAL DAILY
Qty: 15 CAPSULE | Refills: 0 | Status: SHIPPED | OUTPATIENT
Start: 2022-01-30 | End: 2022-02-04

## 2022-01-29 RX ORDER — GUAIFENESIN 600 MG
600 TABLET, EXTENDED RELEASE 12 HR ORAL EVERY 12 HOURS SCHEDULED
Qty: 30 TABLET | Refills: 0 | Status: SHIPPED | OUTPATIENT
Start: 2022-01-29 | End: 2022-02-03

## 2022-01-29 RX ADMIN — ONDANSETRON 4 MG: 2 INJECTION INTRAMUSCULAR; INTRAVENOUS at 12:19

## 2022-01-29 RX ADMIN — OXYCODONE HYDROCHLORIDE AND ACETAMINOPHEN 1000 MG: 500 TABLET ORAL at 08:37

## 2022-01-29 RX ADMIN — PRAVASTATIN SODIUM 10 MG: 20 TABLET ORAL at 16:06

## 2022-01-29 RX ADMIN — CHOLECALCIFEROL TAB 25 MCG (1000 UNIT) 2000 UNITS: 25 TAB at 08:36

## 2022-01-29 RX ADMIN — ASPIRIN 81 MG CHEWABLE TABLET 81 MG: 81 TABLET CHEWABLE at 08:36

## 2022-01-29 RX ADMIN — ZINC SULFATE 220 MG (50 MG) CAPSULE 220 MG: CAPSULE at 08:37

## 2022-01-29 RX ADMIN — GUAIFENESIN 600 MG: 600 TABLET, EXTENDED RELEASE ORAL at 08:36

## 2022-01-29 NOTE — PLAN OF CARE
Problem: OCCUPATIONAL THERAPY ADULT  Goal: Performs self-care activities at highest level of function for planned discharge setting  See evaluation for individualized goals  Description: Treatment Interventions: ADL retraining,Functional transfer training,UE strengthening/ROM,Endurance training,Patient/family training,Compensatory technique education,Energy conservation,Activityengagement  Equipment Recommended: Bedside commode (Rolling walker)       See flowsheet documentation for full assessment, interventions and recommendations  Note: Limitation: Decreased ADL status,Decreased UE ROM,Decreased UE strength,Decreased Safe judgement during ADL,Decreased self-care trans,Decreased high-level ADLs     Assessment: Pt is a 64 y o  male seen for OT evaluation s/p admit to [facility] on 1/28/2022 w/ <principal problem not specified>  Comorbidities affecting pt's functional performance at time of assessment include: Hyperlipidemia, lyme disease, shingles, mitral valve prolapse, CAD  Personal factors affecting pt at time of IE include:steps to enter environment, limited home support, difficulty performing ADLS, difficulty performing IADLS  and health management   Prior to admission, pt was I with ADLs and mobility, required assist for IADLS  Upon evaluation: Pt presents supine on RA with the following vital signs: O2 sats between 98 -100% HR between 78-82 BPM  Pt requires supervision 2* the following deficits impacting occupational performance: weakness, decreased strength, decreased balance and decreased tolerance  Pt resting in chair at end of session with all needs in reach,  all lines in place  Pt to benefit from continued skilled OT tx while in the hospital to address deficits as defined above and maximize level of functional independence w ADL's and functional mobility   Occupational Performance areas to address include: grooming, bathing/shower, toilet hygiene, dressing, health maintenance, functional mobility, community mobility and clothing management  The patient's raw score on the AM-PAC Daily Activity inpatient short form is 24, standardized score is 57 54, greater than 39 4  Patients at this level are likely to benefit from discharge to home with increased suport  Please refer to the recommendation of the Occupational Therapist for safe discharge planning        OT Discharge Recommendation: Home with home health rehabilitation

## 2022-01-29 NOTE — RESPIRATORY THERAPY NOTE
RT Protocol Note  Lonny Chandler 64 y o  male MRN: [id number]  Unit/Bed#: 358-35 Encounter: [id number]    Assessment    Active Problems:    Hyponatremia    Pneumonia due to COVID-19 virus    Sepsis ([address] )    Dyslipidemia      Home Pulmonary Medications:  Patient does not use any home respiratory medications, no oxygen or pap therapy       Past Medical History:   Diagnosis Date    Coronary artery disease     History of echocardiogram 03/22/2018    Normal EF, normal LVSF  Thick and rebundent MV leaflets w/ mild posterior leaflet prolapse w/ trace regurg   Hyperlipidemia     Lyme disease     Mitral valve prolapse     Shingles      Social History     Socioeconomic History    Marital status:       Spouse name: None    Number of children: None    Years of education: None    Highest education level: None   Occupational History    None   Tobacco Use    Smoking status: Never Smoker    Smokeless tobacco: Never Used   Vaping Use    Vaping Use: Never used   Substance and Sexual Activity    Alcohol use: Never     Alcohol/week: 0 0 standard drinks    Drug use: Never    Sexual activity: Not Currently     Partners: Female   Other Topics Concern    None   Social History Narrative    None     Social Determinants of Health     Financial Resource Strain: Not on file   Food Insecurity: Not on file   Transportation Needs: Not on file   Physical Activity: Not on file   Stress: Not on file   Social Connections: Not on file   Intimate Partner Violence: Not on file   Housing Stability: Not on file       Subjective    Subjective Data: Patient said he was so sob in the emergency room, he feels better, but can't take a deep breathe in    Objective    Physical Exam:   Assessment Type: During-treatment  General Appearance: Alert,Awake  Respiratory Pattern: Normal  Chest Assessment: Chest expansion symmetrical,Trachea midline  Bilateral Breath Sounds: Diminished  Cough: Non-productive,Dry  O2 Device: room air    Vitals:  Blood pressure 147/91, pulse 82, temperature 99 1 °F (37 3 °C), temperature source Oral, resp  rate 18, SpO2 96 %  Imaging and other studies: I have personally reviewed pertinent reports  O2 Device: room air     Plan    Respiratory Plan: Mild Distress pathway  Airway Clearance Plan: Incentive Spirometer     Resp Comments: Received patient in his room, he is on room air  Patient stated that he has no problems with his lungs, no oxygen, no pap therapy, no brething medicines  Patient was instructed on the use of the incentive spirometer to be uses Q1H to help promote bronchial hygiene  Patient will need more encouragment with this, he did poor on the return demonstration  He stated he just can't take deep breathes   I did go over self-proning, patient stated he will try laying on his sides

## 2022-01-29 NOTE — UTILIZATION REVIEW
Initial Clinical Review    Admission: Date/Time/Statement:   Admission Orders (From admission, onward)     Ordered        01/28/22 1648  Place in Observation  Once                      Orders Placed This Encounter   Procedures    Place in Observation     Standing Status:   Standing     Number of Occurrences:   1     Order Specific Question:   Level of Care     Answer:   Med Surg [16]     ED Arrival Information     Expected Arrival Acuity    1/28/2022 1/28/2022 11:23 Urgent         Means of arrival Escorted by Service Admission type    Ambulance [facility] Hospitalist Urgent         Arrival complaint    Weakness        Chief Complaint   Patient presents with    Weakness - Generalized     pt c/o weakness and nausea for the past week  pt denies diarrhea and vomiting  Initial Presentation: 64year old male, presents to ED via EMS presents with a 1 week history of malaise, weakness, cough, SOB,  poor appetite, and nausea  He became so weak recently he was falling at home  Notes he has barely ate or drank anything over the past several days  No fevers but has been having chills  Observation admission for evaluation and treatment of pneumonia d/t covid-19 +, unvaccinated, work up in ED revealed covid+, CTA chest A few subtle peripheral ggo, RLL, represent Covid 19 pneumonia, elevated d-dimer, hyponatremia  Exam reveals tachycardia, tachypnea, very dry mucous membranes, ill appearing,  PMH of dyslipidemia, otherwise very healthy and active  Plan: O2, titrate to needs, IV remdesivir, IV Decadron, IV heparin infusion, oob, I/S vit D, Zinc, Vit C, IVF bolus, IV ABX x1 dose pending procalcitonin levels, BC x2  Pending, Hyponatremia s/p 2 5 liters bolus in ED, c/w IVF hydration w/isolye 100ml/hr  Trend serial cmp  C/w statin  Date: 1/29/22   Day 2: RA sat 97%, P/T, OT recommend d/c with walker at home, and bedside commode for home    Covid care w/po steroids, cough, vit supplements, procalcitonin neg, no further ABX needed  Hyponatremia resolved w/IVF  Pt was discharged earlier than expected given no hypoxia, no need for IV treatments, labs stable  Patient has good support at home with family        ED Triage Vitals [01/28/22 1124]   Temperature Pulse Respirations Blood Pressure SpO2   98 9 °F (37 2 °C) 94 18 157/79 98 %      Temp Source Heart Rate Source Patient Position - Orthostatic VS BP Location FiO2 (%)   Temporal Monitor Sitting Left arm --      Pain Score       No Pain          Wt Readings from Last 1 Encounters:   10/11/21 56 2 kg (124 lb)     Additional Vital Signs:   01/29/22 0934 -- -- -- -- -- 97 % -- -- None (Room air) --   01/29/22 07:08:57 97 4 °F (36 3 °C) Abnormal  68 18 118/66 83 97 % -- -- None (Room air) Lying   01/28/22 2300 -- 82 18 -- -- 96 % -- -- None (Room air) --   01/28/22 2127 -- 82 18 147/91 110 95 % -- -- None (Room air) Sitting   01/28/22 21:09:50 99 1 °F (37 3 °C) 86 -- -- -- 95 % -- -- None (Room air) --   01/28/22 1910 -- 98 24 Abnormal  149/72 -- 99 % 28 2 L/min Nasal cannula Sitting   01/28/22 1730 -- 84 26 Abnormal  142/83 105 99 % 28 2 L/min Nasal cannula Sitting   01/28/22 1645 -- 123 Abnormal  18 144/72 -- 97 % 28 2 L/min Nasal cannula Sitting   01/28/22 1430 -- 94 26 Abnormal  150/72 103 100 % 28 2 L/min Nasal cannula Sitting   01/28/22 1400 -- 92 22 137/80 102 99 % 28 2 L/min Nasal cannula Sitting   01/28/22 1330 -- 84 20 135/70 97 98 % 28 2 L/min Nasal cannula Sitting   01/28/22 1230 -- 87 22 128/72 96 99 % -- -- None (Room air) Sitting   01/28/22 1200 -- -- -- -- -- -- -- -- None (Room air) --   01/28/22 1130 -- 103 20 137/73 97 99 % -- -- None (Room air) Sitting       Pertinent Labs/Diagnostic Test Results:   1/28/22 CTA ED chest PE study: A few subtle peripheral groundglass opacities in the right lower lobe may represent Covid 19 pneumonia in this patient with confirmed Covid 19  Predominantly discoid left lower lobe density in keeping with atelectasis   No pulmonary arterial embolism as visualized  Much of the lower lobe segmental level vasculature is obscured by motion artifact  1/29/22 CXR:No acute cardiopulmonary disease  Results from last 7 days   Lab Units 01/28/22  1236   SARS-COV-2  Positive*     Results from last 7 days   Lab Units 01/28/22  1147   WBC Thousand/uL 9 85   HEMOGLOBIN g/dL 16 4   HEMATOCRIT % 48 3   PLATELETS Thousands/uL 184   NEUTROS ABS Thousands/µL 7 53         Results from last 7 days   Lab Units 01/28/22  1147   SODIUM mmol/L 134*   POTASSIUM mmol/L 3 5   CHLORIDE mmol/L 95*   CO2 mmol/L 26   ANION GAP mmol/L 13   BUN mg/dL 23   CREATININE mg/dL 1 09   EGFR ml/min/1 73sq m 64   CALCIUM mg/dL 9 6     Results from last 7 days   Lab Units 01/28/22  1147   AST U/L 31   ALT U/L 25   ALK PHOS U/L 62   TOTAL PROTEIN g/dL 8 1   ALBUMIN g/dL 3 6   TOTAL BILIRUBIN mg/dL 1 03*         Results from last 7 days   Lab Units 01/28/22  1147   GLUCOSE RANDOM mg/dL 134     Results from last 7 days   Lab Units 01/28/22  1523 01/28/22  1326 01/28/22  1147   HS TNI 0HR ng/L  --   --  9   HS TNI 2HR ng/L  --  8  --    HSTNI D2 ng/L  --  -1  --    HS TNI 4HR ng/L 8  --   --    HSTNI D4 ng/L -1  --   --      Results from last 7 days   Lab Units 01/28/22  1523   D-DIMER QUANTITATIVE ug/ml FEU 0 61*     Results from last 7 days   Lab Units 01/29/22  0100 01/28/22  1523   PROTIME seconds  --  13 6   INR   --  1 09   PTT seconds 44* 30         Results from last 7 days   Lab Units 01/29/22  0543 01/28/22  1254   PROCALCITONIN ng/ml 0 16 0 20     Results from last 7 days   Lab Units 01/28/22  1523   LACTIC ACID mmol/L 1 9             Results from last 7 days   Lab Units 01/28/22  1254   NT-PRO BNP pg/mL 312       Results from last 7 days   Lab Units 01/28/22  1236   INFLUENZA A PCR  Negative   INFLUENZA B PCR  Negative   RSV PCR  Negative       Results from last 7 days   Lab Units 01/28/22  1229   BLOOD CULTURE  Received in Microbiology Lab  Culture in Progress  Received in Microbiology Lab  Culture in Progress  ED Treatment:   Medication Administration from 01/28/2022 1123 to 01/28/2022 2035       Date/Time Order Dose Route Action     01/28/2022 1256 sodium chloride 0 9 % infusion 125 mL/hr Intravenous New Bag     01/28/2022 1257 cefTRIAXone (ROCEPHIN) IVPB (premix in dextrose) 1,000 mg 50 mL 1,000 mg Intravenous New Bag     01/28/2022 1524 sodium chloride 0 9 % bolus 2,500 mL 2,500 mL Intravenous New Bag     01/28/2022 1501 iohexol (OMNIPAQUE) 350 MG/ML injection (SINGLE-DOSE) 85 mL 85 mL Intravenous Given     01/28/2022 1812 dexamethasone (DECADRON) injection 6 mg 6 mg Intravenous Given     01/28/2022 1820 remdesivir (Veklury) 200 mg in sodium chloride 0 9 % 290 mL IVPB 200 mg Intravenous Given     01/28/2022 1808 heparin (porcine) 25,000 units in 0 45% NaCl 250 mL infusion (premix) 12 Units/kg/hr Intravenous New Bag     01/28/2022 1809 multi-electrolyte (PLASMALYTE-A/ISOLYTE-S PH 7 4) IV solution 100 mL/hr Intravenous New Bag     01/28/2022 1810 guaiFENesin (MUCINEX) 12 hr tablet 600 mg 600 mg Oral Given     01/28/2022 1810 cholecalciferol (VITAMIN D3) tablet 2,000 Units 2,000 Units Oral Given     01/28/2022 1810 ascorbic acid (VITAMIN C) tablet 1,000 mg 1,000 mg Oral Given     01/28/2022 1810 zinc sulfate (ZINCATE) capsule 220 mg 220 mg Oral Given     01/28/2022 1811 pravastatin (PRAVACHOL) tablet 10 mg 10 mg Oral Given     01/28/2022 1915 ondansetron (ZOFRAN) injection 4 mg 4 mg Intravenous Given        Past Medical History:   Diagnosis Date    Coronary artery disease     History of echocardiogram 03/22/2018    Normal EF, normal LVSF  Thick and rebundent MV leaflets w/ mild posterior leaflet prolapse w/ trace regurg      Hyperlipidemia     Lyme disease     Mitral valve prolapse     Shingles      Present on Admission:   Hyponatremia   Pneumonia due to COVID-19 virus   Sepsis ([address] )   Dyslipidemia      Admitting Diagnosis: Fatigue [R53 83]  Weakness [R53 1]  Pneumonia due to COVID-19 virus [U07 1, J12 82]  Age/Sex: 64 y o  male  Admission Orders:SCD, I/S, titrate 02 to keep sat >90%, up with assistance,   Scheduled Medications:  ascorbic acid, 1,000 mg, Oral, Daily  aspirin, 81 mg, Oral, Daily  cholecalciferol, 2,000 Units, Oral, Daily  dexamethasone, 6 mg, Intravenous, Q24H  guaiFENesin, 600 mg, Oral, Q12H [facility]  melatonin, 3 mg, Oral, HS  pravastatin, 10 mg, Oral, Daily With Dinner  remdesivir, 100 mg, Intravenous, Q24H  zinc sulfate, 220 mg, Oral, Daily    Continuous IV Infusions:  heparin (porcine), 3-20 Units/kg/hr (Order-Specific), Intravenous, Titrated  PRN Meds:  ondansetron, 4 mg, Intravenous, Q6H PRN 1/28 x1, 1/29 x1            Network Utilization Review Department  ATTENTION: Please call with any questions or concerns to [phone] and carefully listen to the prompts so that you are directed to the right person  All voicemails are confidential   Cleotis Dirk all requests for admission clinical reviews, approved or denied determinations and any other requests to dedicated fax number below belonging to the campus where the patient is receiving treatment   List of dedicated fax numbers for the Facilities:  [address] DENIALS (Administrative/Medical Necessity) [phone]   [address] (Maternity/NICU/Pediatrics) [phone]   [address]  [phone]   [id number] [place] [address] [phone] [facility] [address] [place] [address] [phone]

## 2022-01-29 NOTE — ASSESSMENT & PLAN NOTE
· Resolved  · Likely hypovolemic hyponatremia in the setting of poor appetite with covid  · Appears dry by exam with dry mucous membranes  · Patient received a 2 5 liter bolus in the ER  · Will continue IVF hydration with isolyte at 100mL/hr x 1 more liter

## 2022-01-29 NOTE — PLAN OF CARE
Problem: PHYSICAL THERAPY ADULT  Goal: Performs mobility at highest level of function for planned discharge setting  See evaluation for individualized goals  Description: Treatment/Interventions: Bed mobility,Gait training,Functional transfer training,LE strengthening/ROM,Endurance training,Therapeutic exercise,Elevations          See flowsheet documentation for full assessment, interventions and recommendations  Note: Prognosis: Good  Problem List: Decreased strength,Decreased range of motion,Decreased endurance,Impaired balance,Decreased mobility  Assessment: Pt is 64 y o  male seen for PT evaluation s/p admit to [address] on 1/28/2022 w/ Pneumonia due to COVID-19 virus  PT consulted to assess pt's functional mobility and d/c needs  Order placed for PT eval and tx, w/ up and OOB as tolerated order  Comorbidities affecting pt's physical performance at time of assessment include: Pneumonia, due to COVID-19  PTA, pt was independent w/ all functional mobility w/ No AD   Personal factors affecting pt at time of IE include: ambulating w/ assistive device, inability to ambulate household distances, inability to navigate community distances and inability to navigate level surfaces w/o external assistance  Please find objective findings from PT assessment regarding body systems outlined above with impairments and limitations including weakness, decreased ROM, impaired balance, decreased endurance, impaired coordination, gait deviations, pain, decreased activity tolerance and decreased functional mobility tolerance  From PT/mobility standpoint, recommendation at time of d/c would be home with home health rehabilitation pending progress in order to facilitate return to PLOF  PT Discharge Recommendation: Home with home health rehabilitation          See flowsheet documentation for full assessment

## 2022-01-29 NOTE — PLAN OF CARE
Problem: Nutrition/Hydration-ADULT  Goal: Nutrient/Hydration intake appropriate for improving, restoring or maintaining nutritional needs  Description: Monitor and assess patient's nutrition/hydration status for malnutrition  Collaborate with interdisciplinary team and initiate plan and interventions as ordered  Monitor patient's weight and dietary intake as ordered or per policy  Utilize nutrition screening tool and intervene as necessary  Determine patient's food preferences and provide high-protein, high-caloric foods as appropriate       INTERVENTIONS:  - Monitor oral intake, urinary output, labs, and treatment plans  - Assess nutrition and hydration status and recommend course of action  - Evaluate amount of meals eaten  - Assist patient with eating if necessary   - Allow adequate time for meals  - Recommend/ encourage appropriate diets, oral nutritional supplements, and vitamin/mineral supplements  - Order, calculate, and assess calorie counts as needed  - Recommend, monitor, and adjust tube feedings and TPN/PPN based on assessed needs  - Assess need for intravenous fluids  - Provide specific nutrition/hydration education as appropriate  - Include patient/family/caregiver in decisions related to nutrition  Outcome: Progressing     Problem: PAIN - ADULT  Goal: Verbalizes/displays adequate comfort level or baseline comfort level  Description: Interventions:  - Encourage patient to monitor pain and request assistance  - Assess pain using appropriate pain scale  - Administer analgesics based on type and severity of pain and evaluate response  - Implement non-pharmacological measures as appropriate and evaluate response  - Consider cultural and social influences on pain and pain management  - Notify physician/advanced practitioner if interventions unsuccessful or patient reports new pain  Outcome: Progressing     Problem: INFECTION - ADULT  Goal: Absence or prevention of progression during hospitalization  Description: INTERVENTIONS:  - Assess and monitor for signs and symptoms of infection  - Monitor lab/diagnostic results  - Monitor all insertion sites, i e  indwelling lines, tubes, and drains  - Monitor endotracheal if appropriate and nasal secretions for changes in amount and color  - Cougar appropriate cooling/warming therapies per order  - Administer medications as ordered  - Instruct and encourage patient and family to use good hand hygiene technique  - Identify and instruct in appropriate isolation precautions for identified infection/condition  Outcome: Progressing     Problem: SAFETY ADULT  Goal: Patient will remain free of falls  Description: INTERVENTIONS:  - Educate patient/family on patient safety including physical limitations  - Instruct patient to call for assistance with activity   - Consult OT/PT to assist with strengthening/mobility   - Keep Call bell within reach  - Keep bed low and locked with side rails adjusted as appropriate  - Keep care items and personal belongings within reach  - Initiate and maintain comfort rounds  - Make Fall Risk Sign visible to staff  - Offer Toileting every 2 Hours, in advance of need  - Initiate/Maintain bed/chair alarm  - Obtain necessary fall risk management equipment: non-skid socks, fall bracelet, bed/chair alarms  - Apply yellow socks and bracelet for high fall risk patients  - Consider moving patient to room near nurses station  Outcome: Progressing  Goal: Maintain or return to baseline ADL function  Description: INTERVENTIONS:  -  Assess patient's ability to carry out ADLs; assess patient's baseline for ADL function and identify physical deficits which impact ability to perform ADLs (bathing, care of mouth/teeth, toileting, grooming, dressing, etc )  - Assess/evaluate cause of self-care deficits   - Assess range of motion  - Assess patient's mobility; develop plan if impaired  - Assess patient's need for assistive devices and provide as appropriate  - Encourage maximum independence but intervene and supervise when necessary  - Involve family in performance of ADLs  - Assess for home care needs following discharge   - Consider OT consult to assist with ADL evaluation and planning for discharge  - Provide patient education as appropriate  Outcome: Progressing  Goal: Maintains/Returns to pre admission functional level  Description: INTERVENTIONS:  - Perform BMAT or MOVE assessment daily    - Set and communicate daily mobility goal to care team and patient/family/caregiver  - Collaborate with rehabilitation services on mobility goals if consulted  - Perform Range of Motion 3-4 times a day  - Reposition patient every 2 hours  - Dangle patient 3-4 times a day  - Stand patient 3-4 times a day  - Ambulate patient 3-4 times a day  - Out of bed to chair 3-4 times a day   - Out of bed for meals 3-4 times a day  - Out of bed for toileting  - Record patient progress and toleration of activity level   Outcome: Progressing     Problem: DISCHARGE PLANNING  Goal: Discharge to home or other facility with appropriate resources  Description: INTERVENTIONS:  - Identify barriers to discharge w/patient and caregiver  - Arrange for needed discharge resources and transportation as appropriate  - Identify discharge learning needs (meds, wound care, etc )  - Arrange for interpretive services to assist at discharge as needed  - Refer to Case Management Department for coordinating discharge planning if the patient needs post-hospital services based on physician/advanced practitioner order or complex needs related to functional status, cognitive ability, or social support system  Outcome: Progressing     Problem: Knowledge Deficit  Goal: Patient/family/caregiver demonstrates understanding of disease process, treatment plan, medications, and discharge instructions  Description: Complete learning assessment and assess knowledge base    Interventions:  - Provide teaching at level of understanding  - Provide teaching via preferred learning methods  Outcome: Progressing     Problem: RESPIRATORY - ADULT  Goal: Achieves optimal ventilation and oxygenation  Description: INTERVENTIONS:  - Assess for changes in respiratory status  - Assess for changes in mentation and behavior  - Position to facilitate oxygenation and minimize respiratory effort  - Oxygen administered by appropriate delivery if ordered  - Initiate smoking cessation education as indicated  - Encourage broncho-pulmonary hygiene including cough, deep breathe, Incentive Spirometry  - Assess the need for suctioning and aspirate as needed  - Assess and instruct to report SOB or any respiratory difficulty  - Respiratory Therapy support as indicated  Outcome: Progressing

## 2022-01-29 NOTE — PLAN OF CARE
Problem: Nutrition/Hydration-ADULT  Goal: Nutrient/Hydration intake appropriate for improving, restoring or maintaining nutritional needs  Description: Monitor and assess patient's nutrition/hydration status for malnutrition  Collaborate with interdisciplinary team and initiate plan and interventions as ordered  Monitor patient's weight and dietary intake as ordered or per policy  Utilize nutrition screening tool and intervene as necessary  Determine patient's food preferences and provide high-protein, high-caloric foods as appropriate       INTERVENTIONS:  - Monitor oral intake, urinary output, labs, and treatment plans  - Assess nutrition and hydration status and recommend course of action  - Evaluate amount of meals eaten  - Assist patient with eating if necessary   - Allow adequate time for meals  - Recommend/ encourage appropriate diets, oral nutritional supplements, and vitamin/mineral supplements  - Order, calculate, and assess calorie counts as needed  - Recommend, monitor, and adjust tube feedings and TPN/PPN based on assessed needs  - Assess need for intravenous fluids  - Provide specific nutrition/hydration education as appropriate  - Include patient/family/caregiver in decisions related to nutrition  Outcome: Progressing     Problem: PAIN - ADULT  Goal: Verbalizes/displays adequate comfort level or baseline comfort level  Description: Interventions:  - Encourage patient to monitor pain and request assistance  - Assess pain using appropriate pain scale  - Administer analgesics based on type and severity of pain and evaluate response  - Implement non-pharmacological measures as appropriate and evaluate response  - Consider cultural and social influences on pain and pain management  - Notify physician/advanced practitioner if interventions unsuccessful or patient reports new pain  Outcome: Progressing     Problem: INFECTION - ADULT  Goal: Absence or prevention of progression during hospitalization  Description: INTERVENTIONS:  - Assess and monitor for signs and symptoms of infection  - Monitor lab/diagnostic results  - Monitor all insertion sites, i e  indwelling lines, tubes, and drains  - Monitor endotracheal if appropriate and nasal secretions for changes in amount and color  - [place] appropriate cooling/warming therapies per order  - Administer medications as ordered  - Instruct and encourage patient and family to use good hand hygiene technique  - Identify and instruct in appropriate isolation precautions for identified infection/condition  Outcome: Progressing     Problem: SAFETY ADULT  Goal: Patient will remain free of falls  Description: INTERVENTIONS:  - Educate patient/family on patient safety including physical limitations  - Instruct patient to call for assistance with activity   - Consult OT/PT to assist with strengthening/mobility   - Keep Call bell within reach  - Keep bed low and locked with side rails adjusted as appropriate  - Keep care items and personal belongings within reach  - Initiate and maintain comfort rounds  - Make Fall Risk Sign visible to staff  - Offer Toileting every 2 Hours, in advance of need  - Initiate/Maintain bed and chair alarm  - Obtain necessary fall risk management equipment: non skid socks  - Apply yellow socks and bracelet for high fall risk patients  - Consider moving patient to room near nurses station  Outcome: Progressing  Goal: Maintain or return to baseline ADL function  Description: INTERVENTIONS:  -  Assess patient's ability to carry out ADLs; assess patient's baseline for ADL function and identify physical deficits which impact ability to perform ADLs (bathing, care of mouth/teeth, toileting, grooming, dressing, etc )  - Assess/evaluate cause of self-care deficits   - Assess range of motion  - Assess patient's mobility; develop plan if impaired  - Assess patient's need for assistive devices and provide as appropriate  - Encourage maximum independence but intervene and supervise when necessary  - Involve family in performance of ADLs  - Assess for home care needs following discharge   - Consider OT consult to assist with ADL evaluation and planning for discharge  - Provide patient education as appropriate  Outcome: Progressing  Goal: Maintains/Returns to pre admission functional level  Description: INTERVENTIONS:  - Perform BMAT or MOVE assessment daily    - Set and communicate daily mobility goal to care team and patient/family/caregiver  - Collaborate with rehabilitation services on mobility goals if consulted  - Perform Range of Motion 2 times a day  - Reposition patient every 2 hours  - Dangle patient 2 times a day  - Stand patient 2 times a day  - Ambulate patient 3 times a day  - Out of bed to chair 3 times a day   - Out of bed for meals 3 times a day  - Out of bed for toileting  - Record patient progress and toleration of activity level   Outcome: Progressing     Problem: DISCHARGE PLANNING  Goal: Discharge to home or other facility with appropriate resources  Description: INTERVENTIONS:  - Identify barriers to discharge w/patient and caregiver  - Arrange for needed discharge resources and transportation as appropriate  - Identify discharge learning needs (meds, wound care, etc )  - Arrange for interpretive services to assist at discharge as needed  - Refer to Case Management Department for coordinating discharge planning if the patient needs post-hospital services based on physician/advanced practitioner order or complex needs related to functional status, cognitive ability, or social support system  Outcome: Progressing     Problem: Knowledge Deficit  Goal: Patient/family/caregiver demonstrates understanding of disease process, treatment plan, medications, and discharge instructions  Description: Complete learning assessment and assess knowledge base    Interventions:  - Provide teaching at level of understanding  - Provide teaching via preferred learning methods  Outcome: Progressing     Problem: RESPIRATORY - ADULT  Goal: Achieves optimal ventilation and oxygenation  Description: INTERVENTIONS:  - Assess for changes in respiratory status  - Assess for changes in mentation and behavior  - Position to facilitate oxygenation and minimize respiratory effort  - Oxygen administered by appropriate delivery if ordered  - Initiate smoking cessation education as indicated  - Encourage broncho-pulmonary hygiene including cough, deep breathe, Incentive Spirometry  - Assess the need for suctioning and aspirate as needed  - Assess and instruct to report SOB or any respiratory difficulty  - Respiratory Therapy support as indicated  Outcome: Progressing

## 2022-01-29 NOTE — ASSESSMENT & PLAN NOTE
· Patient presented with significant fatigue, weakness, cough, and shortness of breath x 1 week  · Covid positive 1/28  · Unvaccinated  · Oxygen utilized for symptomatic relief vs  True hypoxia at 2L/min currently  Now on room air with saturations >97%  · CTA chest negative for PE, but suggestive for Covid pneumonia  Will admit to med/surg follow mild covid tx pathway:  · With no oxygen needs, patient no longer needs any therapies  · Will provide decadron for home given his viral pneumonia status and to prevent hypoxia  · Also provide supportive care at home with sudafed, cough medicine, mucinex, zofran for nausea  Will also add vitamin D, Zinc, Vitamin C for supportive care    · PT/OT with recommendations to utilize a walker at home, patient also requested bedside commode for home  · Patient has good support at home with family

## 2022-01-29 NOTE — PHYSICAL THERAPY NOTE
Physical Therapy Evaluation     Patient Name: Carolin Nunes    BJSWN'G Date: 1/29/2022     Problem List  Principal Problem:    Pneumonia due to COVID-19 virus  Active Problems:    Hyponatremia    Sepsis ([address] )    Dyslipidemia       Past Medical History  Past Medical History:   Diagnosis Date    Coronary artery disease     History of echocardiogram 03/22/2018    Normal EF, normal LVSF  Thick and rebundent MV leaflets w/ mild posterior leaflet prolapse w/ trace regurg   Hyperlipidemia     Lyme disease     Mitral valve prolapse     Shingles         Past Surgical History  Past Surgical History:   Procedure Laterality Date    APPENDECTOMY      EYE SURGERY      HERNIA REPAIR      TONSILLECTOMY             01/29/22 1034   Note Type   Note type Evaluation   Pain Assessment   Pain Score No Pain   Restrictions/Precautions   Weight Bearing Precautions Per Order No   Other Precautions Contact/isolation; Airborne/isolation;Multiple lines   Home Living   Additional Comments See OT Eval   Prior Function   Comments See OT Eval   Cognition   Overall Cognitive Status WFL   Arousal/Participation Alert   Attention Within functional limits   Orientation Level Oriented X4   Memory Within functional limits   Following Commands Follows all commands and directions without difficulty   Subjective   Subjective patient agreeable to PT    RLE Assessment   RLE Assessment WFL   LLE Assessment   LLE Assessment WFL   Bed Mobility   Supine to Sit 5  Supervision   Additional items Increased time required   Transfers   Sit to Stand 5  Supervision   Additional items Increased time required   Stand to Sit 5  Supervision   Additional items Increased time required   Stand pivot 5  Supervision   Additional items Increased time required   Additional Comments RW increased time    Ambulation/Elevation   Gait pattern Decreased hip extension;Decreased heel strike;Decreased toe off; Foward flexed; Inconsistent dipti   Gait Assistance 5  Supervision   Assistive Device Rolling walker   Distance 75 feet in room    Ambulation/Elevation Additional Comments Room air, SpO2 WFL    Balance   Static Sitting Good   Dynamic Sitting Good   Static Standing Fair +   Dynamic Standing Fair   Ambulatory Fair   Activity Tolerance   Activity Tolerance Patient tolerated treatment well   Assessment   Prognosis Good   Problem List Decreased strength;Decreased range of motion;Decreased endurance; Impaired balance;Decreased mobility   Assessment Pt is 64 y o  male seen for PT evaluation s/p admit to [address] on 1/28/2022 w/ Pneumonia due to COVID-19 virus  PT consulted to assess pt's functional mobility and d/c needs  Order placed for PT eval and tx, w/ up and OOB as tolerated order  Comorbidities affecting pt's physical performance at time of assessment include: Pneumonia, due to COVID-19  PTA, pt was independent w/ all functional mobility w/ No AD   Personal factors affecting pt at time of IE include: ambulating w/ assistive device, inability to ambulate household distances, inability to navigate community distances and inability to navigate level surfaces w/o external assistance  Please find objective findings from PT assessment regarding body systems outlined above with impairments and limitations including weakness, decreased ROM, impaired balance, decreased endurance, impaired coordination, gait deviations, pain, decreased activity tolerance and decreased functional mobility tolerance  From PT/mobility standpoint, recommendation at time of d/c would be home with home health rehabilitation pending progress in order to facilitate return to PLOF     Goals   Patient Goals to go home   LTG Expiration Date 02/12/22   Long Term Goal #1 Patient will be (I) with all trasnfers and bed mobility    Long Term Goal #2 patient will safely ambulate 250 feet (I) to help facilitate return to [facility]    Plan   Treatment/Interventions Bed mobility;Gait training;Functional transfer training;LE strengthening/ROM; Endurance training; Therapeutic exercise;Elevations   PT Frequency   (3-5x per week )   Recommendation   PT Discharge Recommendation Home with home health rehabilitation   [facility] Basic Mobility Inpatient   Turning in Bed Without Bedrails 4   Lying on Back to Sitting on Edge of Flat Bed 4   Moving Bed to Chair 4   Standing Up From Chair 3   Walk in Room 4   Climb 3-5 Stairs 3   Basic Mobility Inpatient Raw Score 22   Basic Mobility Standardized Score 47 4   Highest Level Of Mobility   -[facility] Goal 7: Walk 25 feet or more   -HLM Goal Achieved Yes     Patient seated in chair at the end of the session, all needs within reach  Patients raw score on the [facility] Basic Mobility inpatient short form is 22, standardized score is 47 4, (greater than /) 42  9  patient's at this level are likely to benefit from D/C to home, however, please refer to therapist recommendation for safe D/C Plan  Co treatment with OT secondary to complex medical condition of pt, possible A of 2 required to achieve and maintain transitional movements, requiring the need of skilled therapeutic intervention of 2 therapists to achieve delivery of services

## 2022-01-29 NOTE — ED NOTES
Very anxious, stating "i'm so sick"  Cool wash cloth given, repositioned  VSS abdiel Sexton, TARYN  01/28/22 1919

## 2022-01-29 NOTE — DISCHARGE SUMMARY
[address]  Discharge- Olya Chandler 1945, 64 y o  male MRN: [id number]  Unit/Bed#: 602-91 Encounter: [id number]  Primary Care Provider: Danny Cedeño DO   Date and time admitted to hospital: 1/28/2022 11:24 AM    * Pneumonia due to COVID-19 virus  Assessment & Plan  · Patient presented with significant fatigue, weakness, cough, and shortness of breath x 1 week  · Covid positive 1/28  · Unvaccinated  · Oxygen utilized for symptomatic relief vs  True hypoxia at 2L/min currently  Now on room air with saturations >97%  · CTA chest negative for PE, but suggestive for Covid pneumonia  Will admit to med/surg follow mild covid tx pathway:  · With no oxygen needs, patient no longer needs any therapies  · Will provide decadron for home given his viral pneumonia status and to prevent hypoxia  · Also provide supportive care at home with sudafed, cough medicine, mucinex, zofran for nausea  Will also add vitamin D, Zinc, Vitamin C for supportive care  · PT/OT with recommendations to utilize a walker at home, patient also requested bedside commode for home  · Patient has good support at home with family    Sepsis [facility])  Assessment & Plan  · As evidenced by tachycardia, tachypnea  Now Resolved  · Likely in the setting of viral pneumonia from Covid, anxiety, dehydration  · Patient received IVF bolus, IV rocephin in ER  · Will continue IV fluids with isolyte at 100mL/hr    · HOLD on further IV antibiotic doses, pending procalcitonin levels --> negative  · Blood cultures x 2 pending  · Lactic acid normal at 1 9  Hyponatremia  Assessment & Plan  · Resolved  · Likely hypovolemic hyponatremia in the setting of poor appetite with covid  · Appears dry by exam with dry mucous membranes  · Patient received a 2 5 liter bolus in the ER  · Will continue IVF hydration with isolyte at 100mL/hr x 1 more liter            Medical Problems             Resolved Problems  Date Reviewed: 1/29/2022    None Discharging Physician / Practitioner: Angela Maxwell PA-C  PCP: Jasbir Yost DO  Admission Date:   Admission Orders (From admission, onward)     Ordered        01/28/22 1648  Place in Observation  Once                      Discharge Date: 01/29/22    Consultations During Hospital Stay:  · none    Procedures Performed:   · none    Significant Findings / Test Results:   · CXR no acute cardiopulmonary disease  · CTA chest A few subtle peripheral groundglass opacities in the right lower lobe may represent Covid 19 pneumonia in this patient with confirmed Covid 19  Predominantly discoid left lower lobe density in keeping with atelectasis  No pulmonary arterial embolism as visualized  Much of the lower lobe segmental level vasculature is obscured by motion artifact  Incidental Findings:   · none     Test Results Pending at Discharge (will require follow up):   · none     Outpatient Tests Requested:  · none    Complications:  none    Reason for Admission: COVID sepsis    Hospital Course:   Awa Beltran is a 64 y o  male patient who originally presented to the hospital on 1/28/2022 due to 1 weeks history of malaise, weakness, cough, shortness of breath, poor appetite, and nausea  He was reportedly falling at home due to weakness  Has not eaten or drank over the last several days  Unvaccinated for COVID, was found to be COVID positive here  CXR unremarkable, CTA chest showing possibility of mild COVID pneumonia  Patient tolerating room air very well  States zofran helps his nausea  He does not wish to go home at this time but understands we are not truly providing him with any treatments and he is able to take medications at home  Will provide with tussionex for cough, zofran for nausea, mucinex for mucous thinning, sudafed for congestion  Explained this to his family and they are agreeable  PT/OT evaluated and determined he should use a walker while acutely sick       The patient, initially admitted to the hospital as inpatient, was discharged earlier than expected given the following: no hypoxia, no need for IV treatments, labs stable  Please see above list of diagnoses and related plan for additional information  Condition at Discharge: stable    Discharge Day Visit / Exam:   Subjective:  Patient reports continued nausea for which Zofran helps with  Vitals: Blood Pressure: 118/66 (01/29/22 0708)  Pulse: 68 (01/29/22 0708)  Temperature: (!) 97 4 °F (36 3 °C) (01/29/22 0708)  Temp Source: Oral (01/29/22 0708)  Respirations: 18 (01/29/22 0708)  SpO2: 97 % (01/29/22 0934)  Exam:   Physical Exam  Vitals and nursing note reviewed  Constitutional:       Appearance: He is ill-appearing  HENT:      Head: Normocephalic and atraumatic  Cardiovascular:      Rate and Rhythm: Normal rate and regular rhythm  Pulses: Normal pulses  Heart sounds: Normal heart sounds  No murmur heard  No gallop  Pulmonary:      Effort: Pulmonary effort is normal       Breath sounds: Normal breath sounds  No wheezing, rhonchi or rales  Abdominal:      General: Bowel sounds are normal       Palpations: Abdomen is soft  Tenderness: There is no abdominal tenderness  There is no guarding or rebound  Musculoskeletal:         General: Normal range of motion  Right lower leg: No edema  Left lower leg: No edema  Skin:     General: Skin is warm and dry  Neurological:      General: No focal deficit present  Mental Status: He is alert and oriented to person, place, and time  Psychiatric:         Mood and Affect: Mood normal          Behavior: Behavior normal           Discussion with Family: Updated  (son) via phone  Discharge instructions/Information to patient and family:   See after visit summary for information provided to patient and family        Provisions for Follow-Up Care:  See after visit summary for information related to follow-up care and any pertinent home health orders  Disposition:   Home    Planned Readmission: none     Discharge Statement:  I spent 45 minutes discharging the patient  This time was spent on the day of discharge  I had direct contact with the patient on the day of discharge  Greater than 50% of the total time was spent examining patient, answering all patient questions, arranging and discussing plan of care with patient as well as directly providing post-discharge instructions  Additional time then spent on discharge activities  Discharge Medications:  See after visit summary for reconciled discharge medications provided to patient and/or family        **Please Note: This note may have been constructed using a voice recognition system**

## 2022-01-29 NOTE — OCCUPATIONAL THERAPY NOTE
Occupational Therapy Evaluation     Patient Name: Asha GARCIAEW'S Date: 1/29/2022  Problem List  Principal Problem:    Pneumonia due to COVID-19 virus  Active Problems:    Hyponatremia    Sepsis ([address] )    Dyslipidemia    Past Medical History  Past Medical History:   Diagnosis Date    Coronary artery disease     History of echocardiogram 03/22/2018    Normal EF, normal LVSF  Thick and rebundent MV leaflets w/ mild posterior leaflet prolapse w/ trace regurg   Hyperlipidemia     Lyme disease     Mitral valve prolapse     Shingles      Past Surgical History  Past Surgical History:   Procedure Laterality Date    APPENDECTOMY      EYE SURGERY      HERNIA REPAIR      TONSILLECTOMY               01/29/22 1033   OT Last Visit   OT Visit Date 01/29/22   Note Type   Note type Evaluation   Restrictions/Precautions   Weight Bearing Precautions Per Order No   Other Precautions Contact/isolation; Airborne/isolation;Multiple lines; Fall Risk   Pain Assessment   Pain Score No Pain   Home Living   Type of [address] [place] [address] Two level;Stairs to enter with rails   Bathroom Shower/Tub Tub only   Bathroom Toilet Standard   Bathroom Accessibility Accessible   Home Equipment Cane   Additional Comments Pt states he has 5 + 3 JANNIE with HR  Lives in a 2 level home with bathroom downstairs and bedroom upstairs FFOS to second floor  Pt states he stays on main level during the day and goes up to second floor only at night  Pt states that he has a SPC available however was not using PTA    Prior Function   Level of [place] Independent with ADLs and functional mobility; Needs assistance with IADLs   Lives With Son   Receives Help From Family   ADL Assistance Independent   IADLs Needs assistance   Falls in the last 6 months 0   Vocational Volunteer work   Comments Pt lives with his son however he works full time, pt reports working 3-4 days a week at his granddaughter [employer], is I with all ADLS and mobility however has assist from his son for IADLS  +   Psychosocial   Psychosocial (WDL) WDL   Subjective   Subjective "They keep saying my numbers are good but I just feel so sick"   ADL   Where Assessed Chair   LB Dressing Assistance 6  Modified independent   LB Dressing Deficit Don/doff R sock; Don/doff L sock   Additional Comments increased time required, notable SOB with same   Bed Mobility   Supine to Sit 5  Supervision   Additional items Assist x 1; Increased time required   Additional Comments OOB at end of sesion   Transfers   Sit to Stand 5  Supervision   Additional items Assist x 1; Increased time required;Verbal cues   Stand to Sit 5  Supervision   Additional items Assist x 1; Increased time required;Verbal cues   Stand pivot 5  Supervision   Additional items Assist x 1; Increased time required;Verbal cues   Additional Comments RW level, increased time, VC   Functional Mobility   Functional Mobility 5  Supervision   Additional Comments Requires use of RW for mobility due to unsteadiness in standing  Pt reports feeling SOB however O2 at 98% on RA  Approx 100ft    Additional items Rolling walker   Balance   Static Sitting Good   Dynamic Sitting Good   Static Standing Fair +   Dynamic Standing Fair   Ambulatory Fair   Activity Tolerance   Activity Tolerance Patient limited by fatigue   RUE Assessment   RUE Assessment WFL   LUE Assessment   LUE Assessment WFL   Hand Function   Gross Motor Coordination Functional   Fine Motor Coordination Functional   Sensation   Light Touch No apparent deficits   Sharp/Dull No apparent deficits   Cognition   Overall Cognitive Status WFL   Arousal/Participation Alert; Cooperative   Attention Within functional limits   Orientation Level Oriented X4   Memory Within functional limits   Following Commands Follows all commands and directions without difficulty   Comments Overall pleasant and cooperative, requires cues for carryover of RW use and safety    Assessment   Limitation Decreased ADL status; Decreased UE ROM; Decreased UE strength;Decreased Safe judgement during ADL;Decreased self-care trans;Decreased high-level ADLs   Assessment Pt is a 64 y o  male seen for OT evaluation s/p admit [facility] w/ <principal problem not specified>  Comorbidities affecting pt's functional performance at time of assessment include: Hyperlipidemia, lyme disease, shingles, mitral valve prolapse, CAD  Personal factors affecting pt at time of IE include:steps to enter environment, limited home support, difficulty performing ADLS, difficulty performing IADLS  and health management   Prior to admission, pt was I with ADLs and mobility, required assist for IADLS  Upon evaluation: Pt presents supine on RA with the following vital signs: O2 sats between 98 -100% HR between 78-82 BPM  Pt requires supervision 2* the following deficits impacting occupational performance: weakness, decreased strength, decreased balance and decreased tolerance  Pt resting in chair at end of session with all needs in reach,  all lines in place  Pt to benefit from continued skilled OT tx while in the hospital to address deficits as defined above and maximize level of functional independence w ADL's and functional mobility  Occupational Performance areas to address include: grooming, bathing/shower, toilet hygiene, dressing, health maintenance, functional mobility, community mobility and clothing management  The patient's raw score on the AM-PAC Daily Activity inpatient short form is 24, standardized score is 57 54, greater than 39 4  Patients at this level are likely to benefit from discharge to home with increased suport  Please refer to the recommendation of the Occupational Therapist for safe discharge planning      Goals   Short Term Goal #2 Pt will increase activity tolerance to G for 30 min txment sessions   Short Term Goal  Pt will demonstrate 100% carryover of energy conservation techniques with functional I/ADL/leisure tasks w/o cues s/p skilled education   Long Term Goal #1 Pt will complete UB TE to increase strength in order to improve overall functional abilities    Long Term Goal #2 Pt will complete UB/LB self cares including toileting at mod I level with AE PRN  Long Term Goal Pt will complete transfers/functional mobility at MOD I level with DME PRN and good safety awareness   Plan   Treatment Interventions ADL retraining;Functional transfer training;UE strengthening/ROM; Endurance training;Patient/family training; Compensatory technique education; Energy conservation; Activityengagement   Goal Expiration Date 02/13/22   OT Frequency 3-5x/wk   Recommendation   OT Discharge Recommendation Home with home health rehabilitation   Equipment Recommended Bedside commode  (Rolling walker)   AM-PAC Daily Activity Inpatient   Lower Body Dressing 4   Bathing 4   Toileting 4   Upper Body Dressing 4   Grooming 4   Eating 4   Daily Activity Raw Score 24   Daily Activity Standardized Score (Calc for Raw Score >=11) 57 54   AM-PAC Applied Cognition Inpatient   Following a Speech/Presentation 4   Understanding Ordinary Conversation 4   Taking Medications 4   Remembering Where Things Are Placed or Put Away 4   Remembering List of 4-5 Errands 4   Taking Care of Complicated Tasks 4   Applied Cognition Raw Score 24   Applied Cognition Standardized Score 62 21

## 2022-01-29 NOTE — NURSING NOTE
Patient transported to [facility] via wheelchair where daughter was met, AVS reviewed  Vitals stable no concerns at this time

## 2022-01-29 NOTE — ASSESSMENT & PLAN NOTE
· As evidenced by tachycardia, tachypnea  Now Resolved  · Likely in the setting of viral pneumonia from Covid, anxiety, dehydration  · Patient received IVF bolus, IV rocephin in ER  · Will continue IV fluids with isolyte at 100mL/hr    · HOLD on further IV antibiotic doses, pending procalcitonin levels --> negative  · Blood cultures x 2 pending  · Lactic acid normal at 1 9

## 2022-01-29 NOTE — CASE MANAGEMENT
Case Management Assessment & Discharge Planning Note    Patient name Lucas Chandler  Location [id number] MRN [id number]  : 1945 Date 2022       Current Admission Date: 2022  Current Admission Diagnosis:Pneumonia due to COVID-19 virus   Patient Active Problem List    Diagnosis Date Noted    Hyponatremia 2022    Pneumonia due to COVID-19 virus 2022    Sepsis ([address] ) 2022    Dyslipidemia 2022      LOS (days): 0  Geometric Mean LOS (GMLOS) (days):   Days to GMLOS:     OBJECTIVE:              Current admission status: Observation       Preferred Pharmacy:   [facility] [address]  Phone: [phone] Fax: [phone]     [address]  Phone: [phone] Fax: [phone] ([place])   [facility] [address] [place] [address]  Phone: [phone] Fax: [phone]    Primary Care Provider: Estrella Garber DO    Primary Insurance: [facility]  Secondary Insurance:     ASSESSMENT:  Jennifer Espinoza Agents    There are no active Health Care Agents on file  Readmission Root Cause  30 Day Readmission: No    Patient Information  Admitted from[de-identified] Home  Mental Status: Alert  During Assessment patient was accompanied by: Not accompanied during assessment  Assessment information provided by[de-identified] Patient (I reviewed assessment on the phone since patient has covid )  Primary Caregiver: Self  Support Systems: Son  [place] of Residence: [facility] do you live in?: [address] entry access options   Select all that apply : No steps to enter home  Type of Current Residence: 2 story home  Upon entering residence, is there a bedroom on the main floor (no further steps)?: No (If patient is too weak to go up the stairs he sleeps on sofa on 2rst floor )  A bedroom is located on the following floor levels of residence (select all that apply):: 2nd Floor  Upon entering residence, is there a bathroom on the main floor (no further steps)?: Yes  Number of steps to 2nd floor from main floor: One Flight  In the last 12 months, was there a time when you were not able to pay the mortgage or rent on time?: No  In the last 12 months, how many places have you lived?: 1  In the last 12 months, was there a time when you did not have a steady place to sleep or slept in a shelter (including now)?: No  Homeless/housing insecurity resource given?: N/A  Living Arrangements: Lives w/ Son  Is patient a ?: No    Activities of Daily Living Prior to Admission  Functional Status: Independent  Completes ADLs independently?: Yes  Ambulates independently?: Yes  Does patient use assisted devices?: No  Does patient currently own DME?: No  Does patient have a history of Outpatient Therapy (PT/OT)?: No  Does the patient have a history of Short-Term Rehab?: No  Does patient have a history of HHC?: No  Does patient currently have [facility]?: No         Patient Information Continued  Income Source: Pension/detention  Does patient have prescription coverage?: Yes  Within the past 12 months, you worried that your food would run out before you got the money to buy more : Never true  Within the past 12 months, the food you bought just didnt last and you didnt have money to get more : Never true  Food insecurity resource given?: N/A  Does patient receive dialysis treatments?: No  Does patient have a history of substance abuse?: No  Does patient have a history of Mental Health Diagnosis?: No         Means of Transportation  Means of Transport to Appts[de-identified] Drives Self  In the past 12 months, has lack of transportation kept you from medical appointments or from getting medications?: No  In the past 12 months, has lack of transportation kept you from meetings, work, or from getting things needed for daily living?: No  Was application for public transport provided?: N/A        DISCHARGE DETAILS:    Discharge planning discussed with[de-identified] Pt  Freedom of Choice: Yes                        [facility]         Is the patient interested in [place] [address] at discharge?: No    DME Referral Provided  Referral made for DME?: Yes  DME referral completed for the following items[de-identified] Porsha Serrato  DME Supplier Name[de-identified] [employer]              Treatment Team Recommendation: Home  Discharge Destination Plan[de-identified] Home  Transport at Discharge : [place]

## 2022-01-31 ENCOUNTER — TRANSITIONAL CARE MANAGEMENT (OUTPATIENT)
Dept: FAMILY MEDICINE CLINIC | Facility: CLINIC | Age: 77
End: 2022-01-31

## 2022-02-02 LAB
BACTERIA BLD CULT: NORMAL
BACTERIA BLD CULT: NORMAL

## 2022-02-03 ENCOUNTER — TELEMEDICINE (OUTPATIENT)
Dept: FAMILY MEDICINE CLINIC | Facility: CLINIC | Age: 77
End: 2022-02-03
Payer: COMMERCIAL

## 2022-02-03 VITALS — HEIGHT: 68 IN | WEIGHT: 124 LBS | BODY MASS INDEX: 18.79 KG/M2

## 2022-02-03 DIAGNOSIS — U07.1 PNEUMONIA DUE TO COVID-19 VIRUS: ICD-10-CM

## 2022-02-03 DIAGNOSIS — J12.82 PNEUMONIA DUE TO COVID-19 VIRUS: ICD-10-CM

## 2022-02-03 DIAGNOSIS — U07.1 COVID-19: Primary | ICD-10-CM

## 2022-02-03 PROCEDURE — 99214 OFFICE O/P EST MOD 30 MIN: CPT | Performed by: FAMILY MEDICINE

## 2022-02-03 RX ORDER — ONDANSETRON 4 MG/1
4 TABLET, ORALLY DISINTEGRATING ORAL EVERY 6 HOURS PRN
Qty: 10 TABLET | Refills: 0 | Status: ON HOLD | OUTPATIENT
Start: 2022-02-03 | End: 2022-02-08 | Stop reason: SDUPTHER

## 2022-02-03 RX ORDER — DEXAMETHASONE 6 MG/1
6 TABLET ORAL
Qty: 5 TABLET | Refills: 0 | Status: SHIPPED | OUTPATIENT
Start: 2022-02-03 | End: 2022-02-08 | Stop reason: HOSPADM

## 2022-02-03 NOTE — PROGRESS NOTES
COVID-19 Outpatient Progress Note    Assessment/Plan:  I told the patient to get some Ensure and to try to to consume 2-3 bottles a day of Ensure since he is not eating well I am going to refill his Decadron given 5 more days worth of Decadron he is long enough from his initial symptoms that he really does not qualify for mulniviranab or paxlovid    Problem List Items Addressed This Visit     None         Disposition:     I recommended continued isolation until at least 24 hours have passed since recovery defined as resolution of fever without the use of fever-reducing medications AND improvement in COVID symptoms AND 10 days have passed since onset of symptoms (or 10 days have passed since date of first positive viral diagnostic test for asymptomatic patients)  I have spent 15 minutes directly with the patient  Greater than 50% of this time was spent in counseling/coordination of care regarding: diagnostic results, prognosis, risks and benefits of treatment options, instructions for management and patient and family education  Encounter provider Rashad Martinez DO    Provider located at [place] [address]  [address] [address]    Recent Visits  No visits were found meeting these conditions  Showing recent visits within past 7 days and meeting all other requirements  Today's Visits  Date Type Provider Dept   02/03/22 Telemedicine Rashad Martinez DO [facility]   Showing today's visits and meeting all other requirements  Future Appointments  No visits were found meeting these conditions  Showing future appointments within next 150 days and meeting all other requirements     This virtual check-in was done via telephone and he agrees to proceed  Patient agrees to participate in a virtual check in via telephone or video visit instead of presenting to the office to address urgent/immediate medical needs  Patient is aware this is a billable service      After connecting through Telephone, the patient was identified by name and date of birth  Ruel Crigler was informed that this was a telemedicine visit and that the exam was being conducted confidentially over secure lines  My office door was closed  No one else was in the room  Ruel Crigler acknowledged consent and understanding of privacy and security of the telemedicine visit  I informed the patient that I have reviewed his record in Epic and presented the opportunity for him to ask any questions regarding the visit today  The patient agreed to participate  It was my intent to perform this visit via video technology but the patient was not able to do a video connection so the visit was completed via audio telephone only  Verification of patient location:  Patient is located in the following state in which I hold an active license: [place]    Subjective:   Ruel Crigler is a 64 y o  male who has been screened for COVID-19  Patient's symptoms include fever, fatigue, malaise, nasal congestion, sore throat, cough, shortness of breath, chest tightness, myalgias and headache      - Date of symptom onset: 1/17/2022  - Date of positive COVID-19 test: 1/28/2022  Type of test: PCR  COVID-19 vaccination status: Not vaccinated    Nazia Herrmann has been staying home and has isolated themselves in his home  He is taking care to not share personal items and is cleaning all surfaces that are touched often, like counters, tabletops, and doorknobs using household cleaning sprays or wipes  He is wearing a mask when he leaves his room  Lab Results   Component Value Date    SARSCOV2 Positive (A) 01/28/2022     Past Medical History:   Diagnosis Date    Coronary artery disease     History of echocardiogram 03/22/2018    Normal EF, normal LVSF  Thick and rebundent MV leaflets w/ mild posterior leaflet prolapse w/ trace regurg      Hyperlipidemia     Lyme disease     Mitral valve prolapse     Shingles      Past Surgical History:   Procedure Laterality Date    APPENDECTOMY      EYE SURGERY      HERNIA REPAIR      TONSILLECTOMY       Current Outpatient Medications   Medication Sig Dispense Refill    ascorbic acid (VITAMIN C) 1000 MG tablet Take 1 tablet (1,000 mg total) by mouth daily 15 tablet 0    aspirin (ECOTRIN LOW STRENGTH) 81 mg EC tablet Take 81 mg by mouth daily      cholecalciferol (VITAMIN D3) 1,000 units tablet Take 2 tablets (2,000 Units total) by mouth daily 15 tablet 0    dexamethasone (DECADRON) 6 mg tablet Take 1 tablet (6 mg total) by mouth daily with breakfast 8 tablet 0    hydrocodone-chlorpheniramine polistirex (TUSSIONEX) 10-8 mg/5 mL ER suspension Take 5 mL by mouth every 12 (twelve) hours as needed for cough for up to 10 days Max Daily Amount: 10 mL 120 mL 0    ondansetron (Zofran ODT) 4 mg disintegrating tablet Take 1 tablet (4 mg total) by mouth every 6 (six) hours as needed for nausea or vomiting 20 tablet 0    guaiFENesin (MUCINEX) 600 mg 12 hr tablet Take 1 tablet (600 mg total) by mouth every 12 (twelve) hours (Patient not taking: Reported on 2/3/2022 ) 30 tablet 0    pseudoephedrine (SUDAFED) 120 MG 12 hr tablet Take 1 tablet (120 mg total) by mouth 2 (two) times a day (Patient not taking: Reported on 2/3/2022 ) 10 tablet 0    simvastatin (ZOCOR) 5 MG tablet Take 1 tablet (5 mg total) by mouth daily at bedtime (Patient not taking: Reported on 2/3/2022 ) 90 tablet 3    zinc sulfate (ZINCATE) 220 mg capsule Take 1 capsule (220 mg total) by mouth daily (Patient not taking: Reported on 2/3/2022 ) 15 capsule 0     No current facility-administered medications for this visit  No Known Allergies    Review of Systems   Constitutional: Positive for fatigue and fever  HENT: Positive for congestion and sore throat  Respiratory: Positive for cough, chest tightness and shortness of breath  Musculoskeletal: Positive for myalgias  Neurological: Positive for headaches  Objective:    Vitals:    02/03/22 0816   Weight: 56 2 kg (124 lb)   Height: 5' 8" (1 727 m)       Physical Exam  Constitutional:       Appearance: He is ill-appearing  Pulmonary:      Breath sounds: Wheezing present  Comments: Coughing during the visit  Neurological:      General: No focal deficit present  Mental Status: He is alert and oriented to person, place, and time  Psychiatric:         Mood and Affect: Mood normal          Behavior: Behavior normal          Thought Content: Thought content normal          Judgment: Judgment normal          VIRTUAL VISIT DISCLAIMER    Luciano Chandler verbally agrees to participate in [employer]  Pt is aware that [employer] could be limited without vital signs or the ability to perform a full hands-on physical exam  Vincent Chandler understands he or the provider may request at any time to terminate the video visit and request the patient to seek care or treatment in person

## 2022-02-03 NOTE — TELEPHONE ENCOUNTER
Pt asked for more Ondansetron - He doesn't want to run out because they are helping him with his nausea

## 2022-02-04 ENCOUNTER — APPOINTMENT (EMERGENCY)
Dept: CT IMAGING | Facility: HOSPITAL | Age: 77
DRG: 314 | End: 2022-02-04
Payer: COMMERCIAL

## 2022-02-04 ENCOUNTER — APPOINTMENT (EMERGENCY)
Dept: RADIOLOGY | Facility: HOSPITAL | Age: 77
DRG: 314 | End: 2022-02-04
Payer: COMMERCIAL

## 2022-02-04 ENCOUNTER — HOSPITAL ENCOUNTER (INPATIENT)
Facility: HOSPITAL | Age: 77
LOS: 3 days | Discharge: HOME | DRG: 314 | End: 2022-02-08
Attending: EMERGENCY MEDICINE | Admitting: FAMILY MEDICINE
Payer: COMMERCIAL

## 2022-02-04 ENCOUNTER — TELEMEDICINE (OUTPATIENT)
Dept: FAMILY MEDICINE CLINIC | Facility: CLINIC | Age: 77
End: 2022-02-04
Payer: COMMERCIAL

## 2022-02-04 VITALS — HEIGHT: 68 IN | BODY MASS INDEX: 18.79 KG/M2 | WEIGHT: 124 LBS

## 2022-02-04 DIAGNOSIS — U07.1 COVID-19: Primary | ICD-10-CM

## 2022-02-04 DIAGNOSIS — U07.1 COVID-19 VIRUS INFECTION: Primary | ICD-10-CM

## 2022-02-04 DIAGNOSIS — R11.0 NAUSEA: ICD-10-CM

## 2022-02-04 DIAGNOSIS — I31.9 PERICARDITIS: ICD-10-CM

## 2022-02-04 DIAGNOSIS — R06.00 DYSPNEA: ICD-10-CM

## 2022-02-04 DIAGNOSIS — J12.82 PNEUMONIA DUE TO COVID-19 VIRUS: ICD-10-CM

## 2022-02-04 DIAGNOSIS — R07.89 OTHER CHEST PAIN: ICD-10-CM

## 2022-02-04 DIAGNOSIS — R07.9 CHEST PAIN: ICD-10-CM

## 2022-02-04 DIAGNOSIS — E87.2 LACTIC ACIDOSIS: ICD-10-CM

## 2022-02-04 DIAGNOSIS — U07.1 PNEUMONIA DUE TO COVID-19 VIRUS: ICD-10-CM

## 2022-02-04 LAB
ALBUMIN SERPL BCP-MCNC: 3.6 G/DL (ref 3.5–5)
ALP SERPL-CCNC: 63 U/L (ref 46–116)
ALT SERPL W P-5'-P-CCNC: 44 U/L (ref 12–78)
AMORPH PHOS CRY URNS QL MICRO: ABNORMAL /HPF
ANION GAP SERPL CALCULATED.3IONS-SCNC: 11 MMOL/L (ref 4–13)
ANISOCYTOSIS BLD QL SMEAR: PRESENT
APTT PPP: 23 SECONDS (ref 23–37)
AST SERPL W P-5'-P-CCNC: 28 U/L (ref 5–45)
BACTERIA UR QL AUTO: ABNORMAL /HPF
BASOPHILS # BLD MANUAL: 0 THOUSAND/UL (ref 0–0.1)
BASOPHILS NFR MAR MANUAL: 0 % (ref 0–1)
BILIRUB DIRECT SERPL-MCNC: 0.19 MG/DL (ref 0–0.2)
BILIRUB SERPL-MCNC: 0.91 MG/DL (ref 0.2–1)
BILIRUB UR QL STRIP: NEGATIVE
BUN SERPL-MCNC: 29 MG/DL (ref 5–25)
CALCIUM SERPL-MCNC: 9.3 MG/DL (ref 8.3–10.1)
CARDIAC TROPONIN I PNL SERPL HS: 5 NG/L
CHLORIDE SERPL-SCNC: 96 MMOL/L (ref 100–108)
CLARITY UR: ABNORMAL
CO2 SERPL-SCNC: 28 MMOL/L (ref 21–32)
COLOR UR: YELLOW
CREAT SERPL-MCNC: 1.18 MG/DL (ref 0.6–1.3)
D DIMER PPP FEU-MCNC: 2.69 UG/ML FEU
EOSINOPHIL # BLD MANUAL: 0 THOUSAND/UL (ref 0–0.4)
EOSINOPHIL NFR BLD MANUAL: 0 % (ref 0–6)
ERYTHROCYTE [DISTWIDTH] IN BLOOD BY AUTOMATED COUNT: 12.4 % (ref 11.6–15.1)
GFR SERPL CREATININE-BSD FRML MDRD: 58 ML/MIN/1.73SQ M
GLUCOSE SERPL-MCNC: 133 MG/DL (ref 65–140)
GLUCOSE UR STRIP-MCNC: NEGATIVE MG/DL
HCT VFR BLD AUTO: 47.2 % (ref 36.5–49.3)
HGB BLD-MCNC: 16 G/DL (ref 12–17)
HGB UR QL STRIP.AUTO: ABNORMAL
INR PPP: 1.02 (ref 0.84–1.19)
KETONES UR STRIP-MCNC: NEGATIVE MG/DL
LACTATE SERPL-SCNC: 3.4 MMOL/L (ref 0.5–2)
LEUKOCYTE ESTERASE UR QL STRIP: NEGATIVE
LYMPHOCYTES # BLD AUTO: 1.22 THOUSAND/UL (ref 0.6–4.47)
LYMPHOCYTES # BLD AUTO: 10 % (ref 14–44)
MACROCYTES BLD QL AUTO: PRESENT
MAGNESIUM SERPL-MCNC: 2.1 MG/DL (ref 1.6–2.6)
MCH RBC QN AUTO: 28.8 PG (ref 26.8–34.3)
MCHC RBC AUTO-ENTMCNC: 33.9 G/DL (ref 31.4–37.4)
MCV RBC AUTO: 85 FL (ref 82–98)
MONOCYTES # BLD AUTO: 1.34 THOUSAND/UL (ref 0–1.22)
MONOCYTES NFR BLD: 11 % (ref 4–12)
NEUTROPHILS # BLD MANUAL: 9.52 THOUSAND/UL (ref 1.85–7.62)
NEUTS BAND NFR BLD MANUAL: 3 % (ref 0–8)
NEUTS SEG NFR BLD AUTO: 75 % (ref 43–75)
NITRITE UR QL STRIP: NEGATIVE
NON-SQ EPI CELLS URNS QL MICRO: ABNORMAL /HPF
NT-PROBNP SERPL-MCNC: 282 PG/ML
PH UR STRIP.AUTO: 8 [PH]
PLATELET # BLD AUTO: 354 THOUSANDS/UL (ref 149–390)
PLATELET BLD QL SMEAR: ADEQUATE
PMV BLD AUTO: 10.4 FL (ref 8.9–12.7)
POTASSIUM SERPL-SCNC: 4.1 MMOL/L (ref 3.5–5.3)
PROT SERPL-MCNC: 7.7 G/DL (ref 6.4–8.2)
PROT UR STRIP-MCNC: NEGATIVE MG/DL
PROTHROMBIN TIME: 12.9 SECONDS (ref 11.6–14.5)
RBC # BLD AUTO: 5.56 MILLION/UL (ref 3.88–5.62)
RBC #/AREA URNS AUTO: ABNORMAL /HPF
SODIUM SERPL-SCNC: 135 MMOL/L (ref 136–145)
SP GR UR STRIP.AUTO: 1.02 (ref 1–1.03)
TSH SERPL DL<=0.05 MIU/L-ACNC: 1.92 UIU/ML (ref 0.36–3.74)
UROBILINOGEN UR QL STRIP.AUTO: 2 E.U./DL
VARIANT LYMPHS # BLD AUTO: 1 %
WBC # BLD AUTO: 12.2 THOUSAND/UL (ref 4.31–10.16)
WBC #/AREA URNS AUTO: ABNORMAL /HPF

## 2022-02-04 PROCEDURE — 84443 ASSAY THYROID STIM HORMONE: CPT | Performed by: EMERGENCY MEDICINE

## 2022-02-04 PROCEDURE — 83880 ASSAY OF NATRIURETIC PEPTIDE: CPT | Performed by: EMERGENCY MEDICINE

## 2022-02-04 PROCEDURE — 85610 PROTHROMBIN TIME: CPT | Performed by: EMERGENCY MEDICINE

## 2022-02-04 PROCEDURE — 85730 THROMBOPLASTIN TIME PARTIAL: CPT | Performed by: EMERGENCY MEDICINE

## 2022-02-04 PROCEDURE — 71275 CT ANGIOGRAPHY CHEST: CPT

## 2022-02-04 PROCEDURE — 83735 ASSAY OF MAGNESIUM: CPT | Performed by: EMERGENCY MEDICINE

## 2022-02-04 PROCEDURE — 80048 BASIC METABOLIC PNL TOTAL CA: CPT | Performed by: EMERGENCY MEDICINE

## 2022-02-04 PROCEDURE — 99285 EMERGENCY DEPT VISIT HI MDM: CPT

## 2022-02-04 PROCEDURE — 93005 ELECTROCARDIOGRAM TRACING: CPT

## 2022-02-04 PROCEDURE — 81001 URINALYSIS AUTO W/SCOPE: CPT | Performed by: EMERGENCY MEDICINE

## 2022-02-04 PROCEDURE — 36415 COLL VENOUS BLD VENIPUNCTURE: CPT | Performed by: EMERGENCY MEDICINE

## 2022-02-04 PROCEDURE — 84484 ASSAY OF TROPONIN QUANT: CPT | Performed by: EMERGENCY MEDICINE

## 2022-02-04 PROCEDURE — 71045 X-RAY EXAM CHEST 1 VIEW: CPT

## 2022-02-04 PROCEDURE — G1004 CDSM NDSC: HCPCS

## 2022-02-04 PROCEDURE — 96365 THER/PROPH/DIAG IV INF INIT: CPT

## 2022-02-04 PROCEDURE — 99285 EMERGENCY DEPT VISIT HI MDM: CPT | Performed by: EMERGENCY MEDICINE

## 2022-02-04 PROCEDURE — 85027 COMPLETE CBC AUTOMATED: CPT | Performed by: EMERGENCY MEDICINE

## 2022-02-04 PROCEDURE — 99213 OFFICE O/P EST LOW 20 MIN: CPT | Performed by: FAMILY MEDICINE

## 2022-02-04 PROCEDURE — 87040 BLOOD CULTURE FOR BACTERIA: CPT | Performed by: EMERGENCY MEDICINE

## 2022-02-04 PROCEDURE — 85007 BL SMEAR W/DIFF WBC COUNT: CPT | Performed by: EMERGENCY MEDICINE

## 2022-02-04 PROCEDURE — 74177 CT ABD & PELVIS W/CONTRAST: CPT

## 2022-02-04 PROCEDURE — 83605 ASSAY OF LACTIC ACID: CPT | Performed by: EMERGENCY MEDICINE

## 2022-02-04 PROCEDURE — 96375 TX/PRO/DX INJ NEW DRUG ADDON: CPT

## 2022-02-04 PROCEDURE — 1111F DSCHRG MED/CURRENT MED MERGE: CPT | Performed by: FAMILY MEDICINE

## 2022-02-04 PROCEDURE — 80076 HEPATIC FUNCTION PANEL: CPT | Performed by: EMERGENCY MEDICINE

## 2022-02-04 PROCEDURE — 85379 FIBRIN DEGRADATION QUANT: CPT | Performed by: EMERGENCY MEDICINE

## 2022-02-04 RX ORDER — FENTANYL CITRATE 50 UG/ML
25 INJECTION, SOLUTION INTRAMUSCULAR; INTRAVENOUS ONCE
Status: COMPLETED | OUTPATIENT
Start: 2022-02-04 | End: 2022-02-04

## 2022-02-04 RX ORDER — ONDANSETRON 4 MG/1
4 TABLET, ORALLY DISINTEGRATING ORAL ONCE
Status: COMPLETED | OUTPATIENT
Start: 2022-02-04 | End: 2022-02-04

## 2022-02-04 RX ORDER — LORAZEPAM 2 MG/ML
0.5 INJECTION INTRAMUSCULAR ONCE
Status: COMPLETED | OUTPATIENT
Start: 2022-02-04 | End: 2022-02-04

## 2022-02-04 RX ADMIN — SODIUM CHLORIDE, SODIUM LACTATE, POTASSIUM CHLORIDE, AND CALCIUM CHLORIDE 1000 ML: .6; .31; .03; .02 INJECTION, SOLUTION INTRAVENOUS at 22:06

## 2022-02-04 RX ADMIN — FENTANYL CITRATE 25 MCG: 50 INJECTION, SOLUTION INTRAMUSCULAR; INTRAVENOUS at 21:17

## 2022-02-04 RX ADMIN — LORAZEPAM 0.5 MG: 2 INJECTION INTRAMUSCULAR; INTRAVENOUS at 21:17

## 2022-02-04 RX ADMIN — IOHEXOL 100 ML: 350 INJECTION, SOLUTION INTRAVENOUS at 22:43

## 2022-02-04 RX ADMIN — ONDANSETRON 4 MG: 4 TABLET, ORALLY DISINTEGRATING ORAL at 21:20

## 2022-02-04 NOTE — PROGRESS NOTES
COVID-19 Outpatient Progress Note    Assessment/Plan:    Problem List Items Addressed This Visit     None         Disposition:     I recommended continued isolation until at least 24 hours have passed since recovery defined as resolution of fever without the use of fever-reducing medications AND improvement in COVID symptoms AND 10 days have passed since onset of symptoms (or 10 days have passed since date of first positive viral diagnostic test for asymptomatic patients)  I have spent 10 minutes directly with the patient  Greater than 50% of this time was spent in counseling/coordination of care regarding: diagnostic results, prognosis, risks and benefits of treatment options, instructions for management and patient and family education  Encounter provider Johnathan Jovel DO    Provider located at [address] [place] [id number]    Recent Visits  Date Type Provider Dept   02/03/22 Telemedicine Johnathan Jovel DO [facility]   Showing recent visits within past 7 days and meeting all other requirements  Today's Visits  Date Type Provider Dept   02/04/22 Telemedicine Johnathan Jovel DO [facility]   Showing today's visits and meeting all other requirements  Future Appointments  No visits were found meeting these conditions  Showing future appointments within next 150 days and meeting all other requirements     This virtual check-in was done via telephone and he agrees to proceed  Patient agrees to participate in a virtual check in via telephone or video visit instead of presenting to the office to address urgent/immediate medical needs  Patient is aware this is a billable service  After connecting through Telephone, the patient was identified by name and date of birth  Tj Servin was informed that this was a telemedicine visit and that the exam was being conducted confidentially over secure lines  My office door was closed   No one else was in the room  Evangelina Zavala acknowledged consent and understanding of privacy and security of the telemedicine visit  I informed the patient that I have reviewed his record in Epic and presented the opportunity for him to ask any questions regarding the visit today  The patient agreed to participate  It was my intent to perform this visit via video technology but the patient was not able to do a video connection so the visit was completed via audio telephone only  Verification of patient location:  Patient is located in the following state in which I hold an active license: [place]    Subjective:   Evangelina Zavala is a 64 y o  male who has been screened for COVID-19  Patient's symptoms include fatigue, malaise, nasal congestion, sore throat, nausea and headache  Patient denies fever, chills, rhinorrhea, anosmia, loss of taste, cough, shortness of breath, chest tightness, abdominal pain, vomiting, diarrhea and myalgias  - Date of symptom onset: 1/25/2022  - Date of positive COVID-19 test: 1/28/2022  Type of test: PCR  COVID-19 vaccination status: Not vaccinated    Jonathon Lassiter has been staying home and has isolated themselves in his home  He is taking care to not share personal items and is cleaning all surfaces that are touched often, like counters, tabletops, and doorknobs using household cleaning sprays or wipes  He is wearing a mask when he leaves his room  Lab Results   Component Value Date    SARSCOV2 Positive (A) 01/28/2022     Past Medical History:   Diagnosis Date    Coronary artery disease     History of echocardiogram 03/22/2018    Normal EF, normal LVSF  Thick and rebundent MV leaflets w/ mild posterior leaflet prolapse w/ trace regurg      Hyperlipidemia     Lyme disease     Mitral valve prolapse     Shingles      Past Surgical History:   Procedure Laterality Date    APPENDECTOMY      EYE SURGERY      HERNIA REPAIR      TONSILLECTOMY       Current Outpatient Medications Medication Sig Dispense Refill    ascorbic acid (VITAMIN C) 1000 MG tablet Take 1 tablet (1,000 mg total) by mouth daily 15 tablet 0    aspirin (ECOTRIN LOW STRENGTH) 81 mg EC tablet Take 81 mg by mouth daily      cholecalciferol (VITAMIN D3) 1,000 units tablet Take 2 tablets (2,000 Units total) by mouth daily 15 tablet 0    dexamethasone (DECADRON) 6 mg tablet Take 1 tablet (6 mg total) by mouth daily with breakfast 5 tablet 0    hydrocodone-chlorpheniramine polistirex (TUSSIONEX) 10-8 mg/5 mL ER suspension Take 5 mL by mouth every 12 (twelve) hours as needed for cough for up to 10 days Max Daily Amount: 10 mL 120 mL 0    ondansetron (Zofran ODT) 4 mg disintegrating tablet Take 1 tablet (4 mg total) by mouth every 6 (six) hours as needed for nausea or vomiting 10 tablet 0     No current facility-administered medications for this visit  No Known Allergies    Review of Systems   Constitutional: Positive for fatigue  Negative for chills and fever  HENT: Positive for congestion and sore throat  Negative for rhinorrhea  Respiratory: Negative for cough, chest tightness and shortness of breath  Gastrointestinal: Positive for nausea  Negative for abdominal pain, diarrhea and vomiting  Musculoskeletal: Negative for myalgias  Neurological: Positive for headaches  Objective:    Vitals:    02/04/22 0854   Weight: 56 2 kg (124 lb)   Height: 5' 8" (1 727 m)       Physical Exam  Neurological:      General: No focal deficit present  Mental Status: He is alert and oriented to person, place, and time  Psychiatric:         Mood and Affect: Mood normal          Behavior: Behavior normal          Thought Content: Thought content normal          Judgment: Judgment normal          VIRTUAL VISIT DISCLAIMER    Marquise Chandler verbally agrees to participate in [employer]   Pt is aware that [employer] could be limited without vital signs or the ability to perform a full hands-on physical exam  Vincent Chandler understands he or the provider may request at any time to terminate the video visit and request the patient to seek care or treatment in person

## 2022-02-05 PROBLEM — E87.2 LACTIC ACIDOSIS: Status: ACTIVE | Noted: 2022-02-05

## 2022-02-05 PROBLEM — U07.1 COVID-19 VIRUS INFECTION: Status: ACTIVE | Noted: 2022-02-05

## 2022-02-05 PROBLEM — E87.20 LACTIC ACIDOSIS: Status: ACTIVE | Noted: 2022-02-05

## 2022-02-05 PROBLEM — R79.89 ELEVATED D-DIMER: Status: ACTIVE | Noted: 2022-02-05

## 2022-02-05 PROBLEM — R07.89 OTHER CHEST PAIN: Status: ACTIVE | Noted: 2022-02-05

## 2022-02-05 LAB
2HR DELTA HS TROPONIN: 0 NG/L
4HR DELTA HS TROPONIN: 0 NG/L
ALBUMIN SERPL BCP-MCNC: 2.7 G/DL (ref 3.5–5)
ALP SERPL-CCNC: 48 U/L (ref 46–116)
ALT SERPL W P-5'-P-CCNC: 27 U/L (ref 12–78)
ANION GAP SERPL CALCULATED.3IONS-SCNC: 7 MMOL/L (ref 4–13)
AST SERPL W P-5'-P-CCNC: 21 U/L (ref 5–45)
BILIRUB SERPL-MCNC: 0.84 MG/DL (ref 0.2–1)
BUN SERPL-MCNC: 25 MG/DL (ref 5–25)
CALCIUM ALBUM COR SERPL-MCNC: 9.1 MG/DL (ref 8.3–10.1)
CALCIUM SERPL-MCNC: 8.1 MG/DL (ref 8.3–10.1)
CARDIAC TROPONIN I PNL SERPL HS: 5 NG/L
CARDIAC TROPONIN I PNL SERPL HS: 5 NG/L
CHLORIDE SERPL-SCNC: 100 MMOL/L (ref 100–108)
CO2 SERPL-SCNC: 28 MMOL/L (ref 21–32)
CREAT SERPL-MCNC: 0.86 MG/DL (ref 0.6–1.3)
CRP SERPL QL: 4.7 MG/L
ERYTHROCYTE [DISTWIDTH] IN BLOOD BY AUTOMATED COUNT: 12.3 % (ref 11.6–15.1)
GFR SERPL CREATININE-BSD FRML MDRD: 83 ML/MIN/1.73SQ M
GLUCOSE SERPL-MCNC: 117 MG/DL (ref 65–140)
HCT VFR BLD AUTO: 41.2 % (ref 36.5–49.3)
HGB BLD-MCNC: 13.7 G/DL (ref 12–17)
LACTATE SERPL-SCNC: 1.7 MMOL/L (ref 0.5–2)
LACTATE SERPL-SCNC: 2 MMOL/L (ref 0.5–2)
LACTATE SERPL-SCNC: 2.2 MMOL/L (ref 0.5–2)
LACTATE SERPL-SCNC: 2.4 MMOL/L (ref 0.5–2)
MAGNESIUM SERPL-MCNC: 2 MG/DL (ref 1.6–2.6)
MCH RBC QN AUTO: 28.5 PG (ref 26.8–34.3)
MCHC RBC AUTO-ENTMCNC: 33.3 G/DL (ref 31.4–37.4)
MCV RBC AUTO: 86 FL (ref 82–98)
PHOSPHATE SERPL-MCNC: 3.3 MG/DL (ref 2.3–4.1)
PLATELET # BLD AUTO: 273 THOUSANDS/UL (ref 149–390)
PMV BLD AUTO: 10.4 FL (ref 8.9–12.7)
POTASSIUM SERPL-SCNC: 3.8 MMOL/L (ref 3.5–5.3)
PROT SERPL-MCNC: 5.9 G/DL (ref 6.4–8.2)
RBC # BLD AUTO: 4.8 MILLION/UL (ref 3.88–5.62)
SODIUM SERPL-SCNC: 135 MMOL/L (ref 136–145)
WBC # BLD AUTO: 11.82 THOUSAND/UL (ref 4.31–10.16)

## 2022-02-05 PROCEDURE — 99220 PR INITIAL OBSERVATION CARE/DAY 70 MINUTES: CPT | Performed by: FAMILY MEDICINE

## 2022-02-05 PROCEDURE — 83605 ASSAY OF LACTIC ACID: CPT | Performed by: PHYSICIAN ASSISTANT

## 2022-02-05 PROCEDURE — 85027 COMPLETE CBC AUTOMATED: CPT | Performed by: PHYSICIAN ASSISTANT

## 2022-02-05 PROCEDURE — 80053 COMPREHEN METABOLIC PANEL: CPT | Performed by: PHYSICIAN ASSISTANT

## 2022-02-05 PROCEDURE — 83735 ASSAY OF MAGNESIUM: CPT | Performed by: PHYSICIAN ASSISTANT

## 2022-02-05 PROCEDURE — 36415 COLL VENOUS BLD VENIPUNCTURE: CPT | Performed by: EMERGENCY MEDICINE

## 2022-02-05 PROCEDURE — 97163 PT EVAL HIGH COMPLEX 45 MIN: CPT | Performed by: PHYSICAL THERAPIST

## 2022-02-05 PROCEDURE — 84484 ASSAY OF TROPONIN QUANT: CPT | Performed by: EMERGENCY MEDICINE

## 2022-02-05 PROCEDURE — C9113 INJ PANTOPRAZOLE SODIUM, VIA: HCPCS | Performed by: FAMILY MEDICINE

## 2022-02-05 PROCEDURE — 94664 DEMO&/EVAL PT USE INHALER: CPT

## 2022-02-05 PROCEDURE — 84100 ASSAY OF PHOSPHORUS: CPT | Performed by: PHYSICIAN ASSISTANT

## 2022-02-05 PROCEDURE — 86140 C-REACTIVE PROTEIN: CPT | Performed by: FAMILY MEDICINE

## 2022-02-05 PROCEDURE — 97167 OT EVAL HIGH COMPLEX 60 MIN: CPT

## 2022-02-05 PROCEDURE — 93005 ELECTROCARDIOGRAM TRACING: CPT

## 2022-02-05 PROCEDURE — 94760 N-INVAS EAR/PLS OXIMETRY 1: CPT

## 2022-02-05 RX ORDER — HEPARIN SODIUM 5000 [USP'U]/ML
5000 INJECTION, SOLUTION INTRAVENOUS; SUBCUTANEOUS EVERY 8 HOURS SCHEDULED
Status: DISCONTINUED | OUTPATIENT
Start: 2022-02-05 | End: 2022-02-08 | Stop reason: HOSPADM

## 2022-02-05 RX ORDER — ALBUTEROL SULFATE 90 UG/1
2 AEROSOL, METERED RESPIRATORY (INHALATION) EVERY 4 HOURS PRN
Status: DISCONTINUED | OUTPATIENT
Start: 2022-02-05 | End: 2022-02-08 | Stop reason: HOSPADM

## 2022-02-05 RX ORDER — SODIUM CHLORIDE 9 MG/ML
75 INJECTION, SOLUTION INTRAVENOUS CONTINUOUS
Status: DISCONTINUED | OUTPATIENT
Start: 2022-02-05 | End: 2022-02-06

## 2022-02-05 RX ORDER — IBUPROFEN 600 MG/1
600 TABLET ORAL EVERY 8 HOURS SCHEDULED
Status: DISCONTINUED | OUTPATIENT
Start: 2022-02-05 | End: 2022-02-07

## 2022-02-05 RX ORDER — ASPIRIN 81 MG/1
81 TABLET ORAL DAILY
Status: DISCONTINUED | OUTPATIENT
Start: 2022-02-05 | End: 2022-02-08 | Stop reason: HOSPADM

## 2022-02-05 RX ORDER — LIDOCAINE 50 MG/G
1 PATCH TOPICAL DAILY
Status: DISCONTINUED | OUTPATIENT
Start: 2022-02-05 | End: 2022-02-08 | Stop reason: HOSPADM

## 2022-02-05 RX ORDER — MELATONIN
2000 DAILY
Status: DISCONTINUED | OUTPATIENT
Start: 2022-02-05 | End: 2022-02-08 | Stop reason: HOSPADM

## 2022-02-05 RX ORDER — LORAZEPAM 2 MG/ML
0.5 INJECTION INTRAMUSCULAR ONCE
Status: COMPLETED | OUTPATIENT
Start: 2022-02-05 | End: 2022-02-05

## 2022-02-05 RX ORDER — ASCORBIC ACID 500 MG
1000 TABLET ORAL DAILY
Status: DISCONTINUED | OUTPATIENT
Start: 2022-02-05 | End: 2022-02-08 | Stop reason: HOSPADM

## 2022-02-05 RX ORDER — ACETAMINOPHEN 325 MG/1
650 TABLET ORAL EVERY 6 HOURS PRN
Status: DISCONTINUED | OUTPATIENT
Start: 2022-02-05 | End: 2022-02-08 | Stop reason: HOSPADM

## 2022-02-05 RX ORDER — HYDROCODONE POLISTIREX AND CHLORPHENIRAMINE POLISTIREX 10; 8 MG/5ML; MG/5ML
5 SUSPENSION, EXTENDED RELEASE ORAL EVERY 12 HOURS PRN
Status: ACTIVE | OUTPATIENT
Start: 2022-02-05 | End: 2022-02-08

## 2022-02-05 RX ORDER — ONDANSETRON 2 MG/ML
4 INJECTION INTRAMUSCULAR; INTRAVENOUS EVERY 6 HOURS PRN
Status: DISCONTINUED | OUTPATIENT
Start: 2022-02-05 | End: 2022-02-08 | Stop reason: HOSPADM

## 2022-02-05 RX ORDER — NIFEDIPINE 10 MG/1
10 CAPSULE ORAL EVERY 8 HOURS SCHEDULED
Status: DISCONTINUED | OUTPATIENT
Start: 2022-02-05 | End: 2022-02-08 | Stop reason: HOSPADM

## 2022-02-05 RX ORDER — PANTOPRAZOLE SODIUM 40 MG/1
40 INJECTION, POWDER, FOR SOLUTION INTRAVENOUS
Status: DISCONTINUED | OUTPATIENT
Start: 2022-02-05 | End: 2022-02-07

## 2022-02-05 RX ADMIN — SODIUM CHLORIDE 75 ML/HR: 9 INJECTION, SOLUTION INTRAVENOUS at 03:27

## 2022-02-05 RX ADMIN — HEPARIN SODIUM 5000 UNITS: 5000 INJECTION INTRAVENOUS; SUBCUTANEOUS at 21:04

## 2022-02-05 RX ADMIN — ASPIRIN 81 MG: 81 TABLET, COATED ORAL at 09:24

## 2022-02-05 RX ADMIN — LORAZEPAM 0.5 MG: 2 INJECTION INTRAMUSCULAR; INTRAVENOUS at 03:43

## 2022-02-05 RX ADMIN — IBUPROFEN 600 MG: 600 TABLET, FILM COATED ORAL at 21:04

## 2022-02-05 RX ADMIN — HEPARIN SODIUM 5000 UNITS: 5000 INJECTION INTRAVENOUS; SUBCUTANEOUS at 05:07

## 2022-02-05 RX ADMIN — HEPARIN SODIUM 5000 UNITS: 5000 INJECTION INTRAVENOUS; SUBCUTANEOUS at 13:24

## 2022-02-05 RX ADMIN — SODIUM CHLORIDE 75 ML/HR: 9 INJECTION, SOLUTION INTRAVENOUS at 15:41

## 2022-02-05 RX ADMIN — OXYCODONE HYDROCHLORIDE AND ACETAMINOPHEN 1000 MG: 500 TABLET ORAL at 09:24

## 2022-02-05 RX ADMIN — PANTOPRAZOLE SODIUM 40 MG: 40 INJECTION, POWDER, FOR SOLUTION INTRAVENOUS at 10:00

## 2022-02-05 RX ADMIN — LIDOCAINE 5% 1 PATCH: 700 PATCH TOPICAL at 09:24

## 2022-02-05 RX ADMIN — Medication 2000 UNITS: at 09:24

## 2022-02-05 RX ADMIN — IBUPROFEN 600 MG: 600 TABLET, FILM COATED ORAL at 13:24

## 2022-02-05 RX ADMIN — NIFEDIPINE 10 MG: 10 CAPSULE ORAL at 09:25

## 2022-02-05 RX ADMIN — IBUPROFEN 600 MG: 600 TABLET, FILM COATED ORAL at 09:24

## 2022-02-05 NOTE — ASSESSMENT & PLAN NOTE
D-dimer level of 2 69  No documented hx of DVT/PE  PE study negative for acute PE  He is COVID-19 positive which may be contributing  DVT prophylaxis using IV subcutaneous heparin  Monitor closely

## 2022-02-05 NOTE — PHYSICAL THERAPY NOTE
Physical Therapy Evaluation     Patient Name: Tru Tellez    BXYIM'I Date: 2/5/2022     Problem List  Principal Problem:    Other chest pain  Active Problems:    Dyslipidemia    Lactic acidosis    Elevated d-dimer    COVID-19 virus infection       Past Medical History  Past Medical History:   Diagnosis Date    Coronary artery disease     History of echocardiogram 03/22/2018    Normal EF, normal LVSF  Thick and rebundent MV leaflets w/ mild posterior leaflet prolapse w/ trace regurg   Hyperlipidemia     Lyme disease     Mitral valve prolapse     Shingles         Past Surgical History  Past Surgical History:   Procedure Laterality Date    APPENDECTOMY      EYE SURGERY      HERNIA REPAIR      TONSILLECTOMY             02/05/22 1158   Note Type   Note type Evaluation   Restrictions/Precautions   Other Precautions Multiple lines; Fall Risk; Chair Alarm; Bed Alarm   Home Living   Additional Comments See OT Evaluation    Prior Function   Comments See OT Evaluation    Cognition   Overall Cognitive Status WFL   Arousal/Participation Alert   Orientation Level Oriented X4   Memory Within functional limits   Following Commands Follows all commands and directions without difficulty   Subjective   Subjective Patient agreeable to PT    RLE Assessment   RLE Assessment WFL   LLE Assessment   LLE Assessment WFL   Bed Mobility   Additional Comments Seated in chair at the beginning of the session    Transfers   Sit to Stand 5  Supervision   Additional items Verbal cues   Stand to Sit 5  Supervision   Additional items Verbal cues   Ambulation/Elevation   Gait pattern Foward flexed; Excessively slow   Assistive Device Rolling walker   Distance 250 feet   Stair Management Assistance 5  Supervision   Stair Management Technique Two rails; Alternating pattern   Number of Stairs 3   Balance   Static Sitting Good   Dynamic Sitting Good   Static Standing Fair   Dynamic Standing Fair   Ambulatory Fair -   Assessment   Prognosis Good   Problem List Decreased strength; Impaired balance;Decreased endurance;Decreased range of motion;Decreased mobility   Assessment Pt is 64 y o  male seen for PT evaluation s/p admit [facility] [place] [facility] on 2/4/2022 w/ Other chest pain  PT consulted to assess pt's functional mobility and d/c needs  Order placed for PT eval and tx, w/ up and OOB as tolerated order  Comorbidities affecting pt's physical performance at time of assessment include: Lyme disease, Shingles, Covid 19, chest pain  PTA, pt was independent w/ all functional mobility w/ RW  Personal factors affecting pt at time of IE include: ambulating w/ assistive device, inability to ambulate household distances, inability to navigate community distances, unable to perform dynamic tasks in community, unable to perform physical activity, inability to perform IADLs and inability to perform ADLs  Please find objective findings from PT assessment regarding body systems outlined above with impairments and limitations including weakness, decreased ROM, impaired balance, gait deviations, pain, decreased activity tolerance and decreased functional mobility tolerance  Pt to benefit from continued PT tx to address deficits as defined above and maximize level of functional independent mobility and consistency  From PT/mobility standpoint, recommendation at time of d/c would be home with outpatient rehabilitation pending progress in order to facilitate return to PLOF  Goals   Patient Goals To feel better    Short Term Goal #1 Pt will participate in TE to help facilitate return to PLOF    LTG Expiration Date 02/19/22   Long Term Goal #1 Patient will safely ambulate 400 feet (I) to help facilitate return to PLOF    Long Term Goal #2 Patient will be (I) with all transfers and bed mobility    Plan   Treatment/Interventions ADL retraining;Functional transfer training;Elevations;LE strengthening/ROM; Bed mobility;Gait training; Endurance training; Therapeutic exercise   PT Frequency 3-5x/wk   Recommendation   PT Discharge Recommendation Home with outpatient rehabilitation   [facility] Basic Mobility Inpatient   Turning in Bed Without Bedrails 4   Lying on Back to Sitting on Edge of Flat Bed 4   Moving Bed to Chair 4   Standing Up From Chair 4   Walk in Room 4   Climb 3-5 Stairs 3   Basic Mobility Inpatient Raw Score 23   Basic Mobility Standardized Score 50 88   Highest Level Of Mobility   [facility] Goal 7: Walk 25 feet or more     Patient seated in chair at the end of the session, all needs within reach  Patients raw score on the [facility] Basic Mobility inpatient short form is 23, standardized score is 50 88, (greater than / 42  9  patient's at this level are likely to benefit from D/C to home, however, please refer to therapist recommendation for safe D/C Plan  Co treatment with OT secondary to complex medical condition of pt, possible A of 2 required to achieve and maintain transitional movements, requiring the need of skilled therapeutic intervention of 2 therapists to achieve delivery of services

## 2022-02-05 NOTE — RESPIRATORY THERAPY NOTE
RT Protocol Note  Olya Chandler 64 y o  male MRN: [id number]  Unit/Bed#: 423-01 Encounter: [id number]    Assessment    Principal Problem:    Other chest pain  Active Problems:    Dyslipidemia    Lactic acidosis    Elevated d-dimer    COVID-19 virus infection      Home Pulmonary Medications:  none       Past Medical History:   Diagnosis Date    Coronary artery disease     History of echocardiogram 03/22/2018    Normal EF, normal LVSF  Thick and rebundent MV leaflets w/ mild posterior leaflet prolapse w/ trace regurg   Hyperlipidemia     Lyme disease     Mitral valve prolapse     Shingles      Social History     Socioeconomic History    Marital status:      Spouse name: None    Number of children: None    Years of education: None    Highest education level: None   Occupational History    None   Tobacco Use    Smoking status: Never Smoker    Smokeless tobacco: Never Used   Vaping Use    Vaping Use: Never used   Substance and Sexual Activity    Alcohol use: Never     Alcohol/week: 0 0 standard drinks    Drug use: Never    Sexual activity: Not Currently     Partners: Female   Other Topics Concern    None   Social History Narrative    None     Social Determinants of Health     Financial Resource Strain: Not on file   Food Insecurity: No Food Insecurity    Worried About Running Out of Food in the Last Year: Never true    Flavia of Food in the Last Year: Never true   Transportation Needs: No Transportation Needs    Lack of Transportation (Medical): No    Lack of Transportation (Non-Medical):  No   Physical Activity: Not on file   Stress: Not on file   Social Connections: Not on file   Intimate Partner Violence: Not on file   Housing Stability: Low Risk     Unable to Pay for Housing in the Last Year: No    Number of Places Lived in the Last Year: 1    Unstable Housing in the Last Year: No       Subjective         Objective    Physical Exam:   Assessment Type: Assess only  General Appearance: Alert,Awake  Respiratory Pattern: Normal  Chest Assessment: Chest expansion symmetrical  Bilateral Breath Sounds: Diminished  Cough: Non-productive  O2 Device: None room air    Vitals:  Blood pressure (!) 176/91, pulse 62, temperature (!) 97 4 °F (36 3 °C), resp  rate 16, height 5' 8" (1 727 m), weight 52 2 kg (115 lb 1 3 oz), SpO2 99 %  Imaging and other studies: I have personally reviewed pertinent reports        O2 Device: None room air     Plan    Respiratory Plan: Mild Distress pathway      Mdi Q4r prn for wheezing Proventil

## 2022-02-05 NOTE — ASSESSMENT & PLAN NOTE
Presented to the ER with complaints of chest pain  Associated with SOB     EKG shows-normal sinus rhythm at a rate of 83 beats per minute  Hs Tnl 0hr, 2hr and 4hr level at 5  Admit to med surg  Telemetry monitoring  Consider ECHO when available on Monday  Continue daily aspirin  OT, PT eval  Cardiology consult when Available on Monday  Supportive care- continue vitamin C, vitamin D, IV fluids

## 2022-02-05 NOTE — ASSESSMENT & PLAN NOTE
Tested positive for COVID-19 on 01/28  Admitted on Mild protocol, with no oxygen requirement   Was discharge home on 1/29  No Oxygen requirement upon arrival to the ER today with SPO2 96-98% on room air  Respiratory protocol  Incentive spirometry  Will continue to monitor respiratory status, SPO2   Monitor vital signs closely  Pulmonary consult if he has worsening respiratory symptoms  Supportive care

## 2022-02-05 NOTE — OCCUPATIONAL THERAPY NOTE
Occupational Therapy Evaluation     Patient Name: Blanca Del Castillo  ISJVI'G Date: 2/5/2022  Problem List  Principal Problem:    Other chest pain  Active Problems:    Dyslipidemia    Lactic acidosis    Elevated d-dimer    COVID-19 virus infection    Past Medical History  Past Medical History:   Diagnosis Date    Coronary artery disease     History of echocardiogram 03/22/2018    Normal EF, normal LVSF  Thick and rebundent MV leaflets w/ mild posterior leaflet prolapse w/ trace regurg   Hyperlipidemia     Lyme disease     Mitral valve prolapse     Shingles      Past Surgical History  Past Surgical History:   Procedure Laterality Date    APPENDECTOMY      EYE SURGERY      HERNIA REPAIR      TONSILLECTOMY          02/05/22 1157   OT Last Visit   OT Visit Date 02/05/22   Note Type   Note type Evaluation   Restrictions/Precautions   Other Precautions Multiple lines   Pain Assessment   Pain Score No Pain   Home Living   Type of [address] Two level;1/2 bath on main level;Bed/bath upstairs;Stairs to enter with rails   Bathroom Shower/Tub Tub only   Bathroom Toilet Standard   Bathroom Accessibility Accessible   Home Equipment Walker;Cane   Additional Comments Pt has 5+3 JANNIE, FFOS to second floor    Prior Function   Level of [place] Independent with ADLs and functional mobility; Needs assistance with IADLs   Lives With Son   Receives Help From Family   ADL Assistance Independent   IADLs Needs assistance   Falls in the last 6 months 0   Vocational Volunteer work   Comments Pt reports working at his [employer] most days durng the week +, son assists with some IADLs   Psychosocial   Psychosocial (WDL) WDL   Subjective   Subjective "When I wasnt walking I just kept huffing and puffing and huffing and puffing and huffing and puffing and huffing and puffing"   ADL   Where Assessed Chair   LB Dressing Assistance 6  Modified independent   LB Dressing Deficit Thread RLE into pants; Thread LLE into pants;Pull up over hips   Toileting Assistance  6  Modified independent   Additional Comments Pt completes LB dressing and toileting standing at RW no LOB   Bed Mobility   Additional Comments OOB upon presentation and at end of session   Transfers   Sit to Stand 5  Supervision   Stand to Sit 5  Supervision   Stand pivot 5  Supervision   Additional Comments RW   Functional Mobility   Functional Mobility 5  Supervision   Additional Comments ~250 ft    Additional items Rolling walker   Balance   Static Sitting Good   Dynamic Sitting Good   Static Standing Good   Dynamic Standing Fair +   Ambulatory Fair   Activity Tolerance   Activity Tolerance Patient limited by fatigue   RUE Assessment   RUE Assessment WFL   LUE Assessment   LUE Assessment WFL   Hand Function   Gross Motor Coordination Functional   Fine Motor Coordination Functional   Sensation   Light Touch No apparent deficits   Sharp/Dull No apparent deficits   Cognition   Overall Cognitive Status WFL   Arousal/Participation Alert; Cooperative   Attention Within functional limits   Orientation Level Oriented X4   Memory Within functional limits   Following Commands Follows all commands and directions without difficulty   Comments Pleasant and cooperative    Assessment   Assessment Patient is a 64 y o  male admitted to [facility] on 2/4/2022 due to Other chest pain  Pt performed at (I) level with no OT needs identified at this time Comorbidities affecting pt's physical performance at time of assessment include hyperlipidemia, lyme disease, shingles, mitral valve prolapse, CAD  The patient's raw score on the AM-PAC Daily Activity inpatient short form is  , standardized score is 57 54, greater than 39 4  Patients at this level are likely to benefit from DC to home  From OT standpoint recommend anticipate no needs upon D/C  No further acute OT needs identified at this time   Recommend continued mobilization with hospital staff and restorative services while in the hospital to increase pts endurance and strength upon D/C  From OT standpoint, recommend D/C to home with family support when medically cleared  D/C pt from OT caseload at this time     Goals   Patient Goals to feel better   Recommendation   OT Discharge Recommendation No rehabilitation needs   AM-PAC Daily Activity Inpatient   Lower Body Dressing 4   Bathing 4   Toileting 4   Upper Body Dressing 4   Grooming 4   Eating 4   Daily Activity Raw Score 24   Daily Activity Standardized Score (Calc for Raw Score >=11) 57 54   AM-PAC Applied Cognition Inpatient   Following a Speech/Presentation 4   Understanding Ordinary Conversation 4   Taking Medications 4   Remembering Where Things Are Placed or Put Away 4   Remembering List of 4-5 Errands 4   Taking Care of Complicated Tasks 4   Applied Cognition Raw Score 24   Applied Cognition Standardized Score 62 21

## 2022-02-05 NOTE — H&P
[address] [facility] [address] [place]  H&P- Sendy Chandler 1945, 64 y o  male MRN: [id number]  Unit/Bed#: 423-01 Encounter: [id number]  Primary Care Provider: Urbano Stafford DO   Date and time admitted to hospital: 2/4/2022  8:54 PM    * Other chest pain  Assessment & Plan  Presented to the ER with complaints of chest pain  Associated with SOB  EKG shows-normal sinus rhythm at a rate of 83 beats per minute  Hs Tnl 0hr, 2hr and 4hr level at 5  Admit to med surg  Telemetry monitoring  Consider ECHO when available on Monday  Continue daily aspirin  OT, PT eval  Cardiology consult when Available on Monday  Supportive care- continue vitamin C, vitamin D, IV fluids    COVID-19 virus infection  Assessment & Plan  Tested positive for COVID-19 on 01/28  Admitted on Mild protocol, with no oxygen requirement  Was discharge home on 1/29  No Oxygen requirement upon arrival to the ER today with SPO2 96-98% on room air  Respiratory protocol  Incentive spirometry  Will continue to monitor respiratory status, SPO2   Monitor vital signs closely  Pulmonary consult if he has worsening respiratory symptoms  Supportive care      Elevated d-dimer  Assessment & Plan  D-dimer level of 2 69  No documented hx of DVT/PE  PE study negative for acute PE  He is COVID-19 positive which may be contributing  DVT prophylaxis using IV subcutaneous heparin  Monitor closely    Lactic acidosis  Assessment & Plan  Initial lactic acid level of 3 2  Repeat level 2 4  Received IV fluids in the ER  Will continue with gentle IV fluids  Trend lactic acid levels until resolved    Dyslipidemia  Assessment & Plan  Not currently on medication regimen  Continue PCP follow up    VTE Pharmacologic Prophylaxis: VTE Score: 5 High Risk (Score >/= 5) - Pharmacological DVT Prophylaxis Ordered: heparin  Sequential Compression Devices Ordered  Code Status: Level 1 - Full Code   Discussion with family: None       Anticipated Length of Stay: Patient will be admitted on an observation basis with an anticipated length of stay of less than 2 midnights secondary to Chest pain, lactic acidosis, elevated D-dimer,  Total Time for Visit, including Counseling / Coordination of Care: 30 minutes Greater than 50% of this total time spent on direct patient counseling and coordination of care  Chief Complaint:  Chest pain    History of Present Illness:  Hay Bernal is a 64 y o  male with a PMH of CAD, hyperlipidemia, Lyme disease who presents tot he ER with complaints of chest pain  He states that he also has been having intermittent shortness of breath which seemed to be better by pacing back and forth  He states that he has also been feeling very weak and that his symptoms started since he tested positive for COVID-19  He was admitted from 01/28 to 1/29 for COVID pneumonia  He had no oxygen requirement at that time and was subsequently discharged  Labs completed in the ER with results as shown below, PE study with CT abdomen and pelvis completed with results as shown below  In the ER prior to admission he received  Zofran 4 mg IV, lactated Ringer's 1 L, fentanyl 25 mcg IV and lorazepam 0 5 mg IV  At bedside in ER room 3 patient is awake and alert, he is quite ill appearing, he reports that he his breathing is better but his chest still feel tight  He also states that he feels a bit anxious  Patient is being admitted on Observation status med surg level care for further management of chest pain, elevated D-dimer, lactic acidosis  Review of Systems:  Review of Systems   Constitutional: Positive for activity change, appetite change and fatigue  Negative for fever  HENT: Negative for congestion  Eyes: Negative for photophobia and visual disturbance  Respiratory: Positive for cough, chest tightness and shortness of breath  Negative for wheezing  Cardiovascular: Negative for chest pain, palpitations and leg swelling     Gastrointestinal: Negative for abdominal pain, diarrhea, nausea and vomiting  Endocrine: Negative for polydipsia, polyphagia and polyuria  Genitourinary: Negative for difficulty urinating, dysuria, flank pain and frequency  Musculoskeletal: Positive for gait problem  Negative for arthralgias, back pain and joint swelling  Skin: Negative for color change, pallor and rash  Neurological: Positive for weakness  Negative for dizziness, tremors, syncope, light-headedness, numbness and headaches  Psychiatric/Behavioral: Negative for agitation and confusion  The patient is nervous/anxious  Past Medical and Surgical History:   Past Medical History:   Diagnosis Date    Coronary artery disease     History of echocardiogram 03/22/2018    Normal EF, normal LVSF  Thick and rebundent MV leaflets w/ mild posterior leaflet prolapse w/ trace regurg   Hyperlipidemia     Lyme disease     Mitral valve prolapse     Shingles        Past Surgical History:   Procedure Laterality Date    APPENDECTOMY      EYE SURGERY      HERNIA REPAIR      TONSILLECTOMY         Meds/Allergies:  Prior to Admission medications    Medication Sig Start Date End Date Taking?  Authorizing Provider   ascorbic acid (VITAMIN C) 1000 MG tablet Take 1 tablet (1,000 mg total) by mouth daily 1/30/22   Jonh Stapleton PA-C   aspirin (ECOTRIN LOW STRENGTH) 81 mg EC tablet Take 81 mg by mouth daily    Historical Provider, MD   cholecalciferol (VITAMIN D3) 1,000 units tablet Take 2 tablets (2,000 Units total) by mouth daily 1/30/22   Jonh Stapleton PA-C   dexamethasone (DECADRON) 6 mg tablet Take 1 tablet (6 mg total) by mouth daily with breakfast 2/3/22   [facility],    hydrocodone-chlorpheniramine polistirex (TUSSIONEX) 10-8 mg/5 mL ER suspension Take 5 mL by mouth every 12 (twelve) hours as needed for cough for up to 10 days Max Daily Amount: 10 mL 1/29/22 2/8/22  Jonh Stapleton PA-C   ondansetron (Zofran ODT) 4 mg disintegrating tablet Take 1 tablet (4 mg total) by mouth every 6 (six) hours as needed for nausea or vomiting 2/3/22   Destini Person, DO     I have reviewed home medications with patient personally  Allergies: No Known Allergies    Social History:  Marital Status:    Occupation:  Retired  Patient Pre-hospital Living Situation: Home  Patient Pre-hospital Level of Mobility: walks with cane  Patient Pre-hospital Diet Restrictions: None reported  Substance Use History:   Social History     Substance and Sexual Activity   Alcohol Use Never    Alcohol/week: 0 0 standard drinks     Social History     Tobacco Use   Smoking Status Never Smoker   Smokeless Tobacco Never Used     Social History     Substance and Sexual Activity   Drug Use Never       Family History:  History reviewed  No pertinent family history  Physical Exam:     Vitals:   Blood Pressure: (!) 176/91 (02/05/22 0226)  Pulse: 62 (02/05/22 0419)  Temperature: (!) 97 4 °F (36 3 °C) (02/05/22 0226)  Temp Source: Temporal (02/04/22 2054)  Respirations: 16 (02/05/22 0419)  Height: 5' 8" (172 7 cm) (02/04/22 2054)  Weight - Scale: 52 kg (114 lb 10 2 oz) (02/05/22 0456)  SpO2: 99 % (02/05/22 0419)    Physical Exam  Constitutional:       General: He is not in acute distress  Appearance: He is ill-appearing  He is not diaphoretic  HENT:      Head: Normocephalic and atraumatic  Nose: No congestion or rhinorrhea  Mouth/Throat:      Mouth: Mucous membranes are dry  Pharynx: Oropharynx is clear  Cardiovascular:      Rate and Rhythm: Normal rate and regular rhythm  Pulses: Normal pulses  Pulmonary:      Effort: No respiratory distress  Breath sounds: No stridor  No wheezing, rhonchi or rales  Abdominal:      General: There is no distension  Tenderness: There is no abdominal tenderness  Musculoskeletal:         General: Normal range of motion  Cervical back: Normal range of motion and neck supple  Right lower leg: No edema  Left lower leg: No edema     Skin:     Capillary Refill: Capillary refill takes less than 2 seconds  Coloration: Skin is not jaundiced or pale  Findings: No bruising, erythema or rash  Neurological:      Mental Status: He is alert and oriented to person, place, and time  Additional Data:     Lab Results:  Results from last 7 days   Lab Units 02/04/22 2115   WBC Thousand/uL 12 20*   HEMOGLOBIN g/dL 16 0   HEMATOCRIT % 47 2   PLATELETS Thousands/uL 354   BANDS PCT % 3   LYMPHO PCT % 10*   MONO PCT % 11   EOS PCT % 0     Results from last 7 days   Lab Units 02/04/22 2115   SODIUM mmol/L 135*   POTASSIUM mmol/L 4 1   CHLORIDE mmol/L 96*   CO2 mmol/L 28   BUN mg/dL 29*   CREATININE mg/dL 1 18   ANION GAP mmol/L 11   CALCIUM mg/dL 9 3   ALBUMIN g/dL 3 6   TOTAL BILIRUBIN mg/dL 0 91   ALK PHOS U/L 63   ALT U/L 44   AST U/L 28   GLUCOSE RANDOM mg/dL 133     Results from last 7 days   Lab Units 02/04/22 2115   INR  1 02             Results from last 7 days   Lab Units 02/05/22  0158 02/04/22  2352 02/04/22 2115 01/29/22  0543   LACTIC ACID mmol/L 2 2* 2 4* 3 4*  --    PROCALCITONIN ng/ml  --   --   --  0 16       Imaging: Reviewed radiology reports from this admission including: PE study with CT abdomen and pelvis  PE Study with CT Abdomen and Pelvis with contrast   Final Result by Lien Garza MD (02/04 8899)      1  No evidence of pulmonary embolism  2   No focal consolidation of the lungs  3   Coronary artery calcifications  4   Enlarged prostate  Workstation performed: [id number]         XR chest 1 view portable   ED Interpretation by Bree Dean MD (02/04 2145)   Read by me; Radiologist to provide formal interpretation  No acute process no change from 1/28/22            EKG and Other Studies Reviewed on Admission:   · EKG: NSR  HR 83 beats per minute  ** Please Note: This note has been constructed using a voice recognition system   **

## 2022-02-05 NOTE — ED PROVIDER NOTES
History  Chief Complaint   Patient presents with    Medical Problem     diagnosed with covid 2 weeks, states he weak and feels sick      57-year-old male presents not feeling well complains of anterior constant chest pain shortness of breath feeling the need sit up the shortness of breath is worse but he feels better when he paces  Patient was recently admitted from [facility] for [place] pneumonia he was in the mild category; patient states he has not felt well since returning home  He denies any fever chills he denies any pleuritic pain he has been nauseated but no vomiting  He has had no abdominal or back pain no lower extremity edema  Complains of generalized weakness no headache no trauma or falls no dizziness          Prior to Admission Medications   Prescriptions Last Dose Informant Patient Reported? Taking?   ascorbic acid (VITAMIN C) 1000 MG tablet   No No   Sig: Take 1 tablet (1,000 mg total) by mouth daily   aspirin (ECOTRIN LOW STRENGTH) 81 mg EC tablet  Self Yes No   Sig: Take 81 mg by mouth daily   cholecalciferol (VITAMIN D3) 1,000 units tablet   No No   Sig: Take 2 tablets (2,000 Units total) by mouth daily   dexamethasone (DECADRON) 6 mg tablet   No No   Sig: Take 1 tablet (6 mg total) by mouth daily with breakfast   hydrocodone-chlorpheniramine polistirex (TUSSIONEX) 10-8 mg/5 mL ER suspension   No No   Sig: Take 5 mL by mouth every 12 (twelve) hours as needed for cough for up to 10 days Max Daily Amount: 10 mL   ondansetron (Zofran ODT) 4 mg disintegrating tablet   No No   Sig: Take 1 tablet (4 mg total) by mouth every 6 (six) hours as needed for nausea or vomiting      Facility-Administered Medications: None       Past Medical History:   Diagnosis Date    Coronary artery disease     History of echocardiogram 03/22/2018    Normal EF, normal LVSF  Thick and rebundent MV leaflets w/ mild posterior leaflet prolapse w/ trace regurg      Hyperlipidemia     Lyme disease     Mitral valve prolapse     Shingles        Past Surgical History:   Procedure Laterality Date    APPENDECTOMY      EYE SURGERY      HERNIA REPAIR      TONSILLECTOMY         History reviewed  No pertinent family history  I have reviewed and agree with the history as documented  E-Cigarette/Vaping    E-Cigarette Use Never User      E-Cigarette/Vaping Substances    Nicotine No     THC No     CBD No     Flavoring No     Other No     Unknown No      Social History     Tobacco Use    Smoking status: Never Smoker    Smokeless tobacco: Never Used   Vaping Use    Vaping Use: Never used   Substance Use Topics    Alcohol use: Never     Alcohol/week: 0 0 standard drinks    Drug use: Never       Review of Systems   Constitutional: Positive for activity change and appetite change  Negative for chills and fever  HENT: Negative for congestion, ear pain, rhinorrhea, sneezing and sore throat  Eyes: Negative for discharge  Respiratory: Positive for shortness of breath  Negative for cough  Cardiovascular: Positive for chest pain  Negative for leg swelling  Gastrointestinal: Positive for nausea  Negative for abdominal pain, blood in stool, diarrhea and vomiting  Endocrine: Negative for polyuria  Genitourinary: Negative for difficulty urinating, dysuria, frequency and urgency  Musculoskeletal: Negative for back pain and myalgias  Skin: Negative for rash  Neurological: Positive for weakness (generalized)  Negative for dizziness, light-headedness, numbness and headaches  Hematological: Negative for adenopathy  Psychiatric/Behavioral: Negative for confusion  The patient is nervous/anxious  All other systems reviewed and are negative  Physical Exam  Physical Exam  Vitals and nursing note reviewed  Constitutional:       General: He is in acute distress  HENT:      Head: Normocephalic  Right Ear: Tympanic membrane normal       Left Ear: Tympanic membrane normal       Nose: Nose normal  No congestion  Mouth/Throat:      Mouth: Mucous membranes are moist       Pharynx: No oropharyngeal exudate or posterior oropharyngeal erythema  Eyes:      General:         Right eye: No discharge  Left eye: Discharge present  Extraocular Movements: Extraocular movements intact  Conjunctiva/sclera: Conjunctivae normal       Pupils: Pupils are equal, round, and reactive to light  Cardiovascular:      Rate and Rhythm: Normal rate  Pulmonary:      Effort: Respiratory distress present  Breath sounds: No wheezing or rales  Comments: Tachypnea speaks short sentences sats % distant bs no wheezing  Chest:      Chest wall: No tenderness  Abdominal:      General: Abdomen is flat  Bowel sounds are normal  There is no distension  Tenderness: There is no abdominal tenderness  There is no left CVA tenderness, guarding or rebound  Musculoskeletal:         General: Normal range of motion  Cervical back: Normal range of motion and neck supple  Right lower leg: No edema  Left lower leg: No edema  Skin:     General: Skin is warm and dry  Capillary Refill: Capillary refill takes less than 2 seconds  Neurological:      Mental Status: He is alert  Mental status is at baseline  Cranial Nerves: No cranial nerve deficit  Sensory: No sensory deficit  Motor: No weakness        Coordination: Coordination normal    Psychiatric:      Comments: anxous         Vital Signs  ED Triage Vitals [02/04/22 2054]   Temperature Pulse Respirations Blood Pressure SpO2   97 6 °F (36 4 °C) 92 18 (!) 183/89 100 %      Temp Source Heart Rate Source Patient Position - Orthostatic VS BP Location FiO2 (%)   Temporal Monitor Sitting Left arm --      Pain Score       No Pain           Vitals:    02/04/22 2054 02/04/22 2200   BP: (!) 183/89 145/79   Pulse: 92 62   Patient Position - Orthostatic VS: Sitting Lying         Visual Acuity      ED Medications  Medications   fentanyl citrate (PF) 100 MCG/2ML 25 mcg (25 mcg Intravenous Given 2/4/22 2117)   ondansetron (ZOFRAN-ODT) dispersible tablet 4 mg (4 mg Oral Given 2/4/22 2120)   LORazepam (ATIVAN) injection 0 5 mg (0 5 mg Intravenous Given 2/4/22 2117)   lactated ringers bolus 1,000 mL (1,000 mL Intravenous New Bag 2/4/22 2206)   iohexol (OMNIPAQUE) 350 MG/ML injection (SINGLE-DOSE) 100 mL (100 mL Intravenous Given 2/4/22 2243)       Diagnostic Studies  Results Reviewed     Procedure Component Value Units Date/Time    Lactic acid 2 Hours [[id number]]  (Abnormal) Collected: 02/04/22 2352    Lab Status: Final result Specimen: Blood from Arm, Right Updated: 02/05/22 0022     LACTIC ACID 2 4 mmol/L     Narrative:      Result may be elevated if tourniquet was used during collection  HS Troponin I 2hr [[id number]]  (Normal) Collected: 02/04/22 2352    Lab Status: Final result Specimen: Blood from Arm, Right Updated: 02/05/22 0021     hs TnI 2hr 5 ng/L      Delta 2hr hsTnI 0 ng/L     HS Troponin I 4hr [[id number]]     Lab Status: No result Specimen: Blood     Lactic acid [[id number]]  (Abnormal) Collected: 02/04/22 2115    Lab Status: Final result Specimen: Blood from Arm, Right Updated: 02/04/22 2155     LACTIC ACID 3 4 mmol/L     Narrative:      Result may be elevated if tourniquet was used during collection  CBC and differential [[id number]]  (Abnormal) Collected: 02/04/22 2115    Lab Status: Final result Specimen: Blood from Arm, Right Updated: 02/04/22 2149     WBC 12 20 Thousand/uL      RBC 5 56 Million/uL      Hemoglobin 16 0 g/dL      Hematocrit 47 2 %      MCV 85 fL      MCH 28 8 pg      MCHC 33 9 g/dL      RDW 12 4 %      MPV 10 4 fL      Platelets 144 Thousands/uL     Narrative: This is an appended report  These results have been appended to a previously verified report      Manual Differential(PHLEBS Do Not Order) [[id number]]  (Abnormal) Collected: 02/04/22 2115    Lab Status: Final result Specimen: Blood from Arm, Right Updated: 02/04/22 9097 Segmented % 75 %      Bands % 3 %      Lymphocytes % 10 %      Monocytes % 11 %      Eosinophils, % 0 %      Basophils % 0 %      Atypical Lymphocytes % 1 %      Absolute Neutrophils 9 52 Thousand/uL      Lymphocytes Absolute 1 22 Thousand/uL      Monocytes Absolute 1 34 Thousand/uL      Eosinophils Absolute 0 00 Thousand/uL      Basophils Absolute 0 00 Thousand/uL      Total Counted --     Anisocytosis Present     Macrocytes Present     Platelet Estimate Adequate    Hepatic function panel [[id number]]  (Normal) Collected: 02/04/22 2115    Lab Status: Final result Specimen: Blood from Arm, Right Updated: 02/04/22 2148     Total Bilirubin 0 91 mg/dL      Bilirubin, Direct 0 19 mg/dL      Alkaline Phosphatase 63 U/L      AST 28 U/L      ALT 44 U/L      Total Protein 7 7 g/dL      Albumin 3 6 g/dL     TSH, 3rd generation with Free T4 reflex [[id number]]  (Normal) Collected: 02/04/22 2115    Lab Status: Final result Specimen: Blood from Arm, Right Updated: 02/04/22 2148     TSH 3RD GENERATON 1 920 uIU/mL     Narrative:      Patients undergoing fluorescein dye angiography may retain small amounts of fluorescein in the body for 48-72 hours post procedure  Samples containing fluorescein can produce falsely depressed TSH values  If the patient had this procedure,a specimen should be resubmitted post fluorescein clearance        Magnesium [[id number]]  (Normal) Collected: 02/04/22 2115    Lab Status: Final result Specimen: Blood from Arm, Right Updated: 02/04/22 2148     Magnesium 2 1 mg/dL     NT-BNP PRO [[id number]]  (Normal) Collected: 02/04/22 2115    Lab Status: Final result Specimen: Blood from Arm, Right Updated: 02/04/22 2148     NT-proBNP 282 pg/mL     HS Troponin 0hr (reflex protocol) [[id number]]  (Normal) Collected: 02/04/22 2115    Lab Status: Final result Specimen: Blood from Arm, Right Updated: 02/04/22 2147     hs TnI 0hr 5 ng/L     Basic metabolic panel [[id number]]  (Abnormal) Collected: 02/04/22 2115    Lab Status: Final result Specimen: Blood from Arm, Right Updated: 02/04/22 2139     Sodium 135 mmol/L      Potassium 4 1 mmol/L      Chloride 96 mmol/L      CO2 28 mmol/L      ANION GAP 11 mmol/L      BUN 29 mg/dL      Creatinine 1 18 mg/dL      Glucose 133 mg/dL      Calcium 9 3 mg/dL      eGFR 58 ml/min/1 73sq m     Narrative:      [place] guidelines for Chronic Kidney Disease (CKD):     Stage 1 with normal or high GFR (GFR > 90 mL/min/1 73 square meters)    Stage 2 Mild CKD (GFR = 60-89 mL/min/1 73 square meters)    Stage 3A Moderate CKD (GFR = 45-59 mL/min/1 73 square meters)    Stage 3B Moderate CKD (GFR = 30-44 mL/min/1 73 square meters)    Stage 4 Severe CKD (GFR = 15-29 mL/min/1 73 square meters)    Stage 5 End Stage CKD (GFR <15 mL/min/1 73 square meters)  Note: GFR calculation is accurate only with a steady state creatinine    D-Dimer [[id number]]  (Abnormal) Collected: 02/04/22 2115    Lab Status: Final result Specimen: Blood from Arm, Right Updated: 02/04/22 2139     D-Dimer, Quant 2 69 ug/ml FEU     Urine Microscopic [[id number]]  (Abnormal) Collected: 02/04/22 2123    Lab Status: Final result Specimen: Urine, Clean Catch Updated: 02/04/22 2137     RBC, UA 4-10 /hpf      WBC, UA 0-1 /hpf      Epithelial Cells Occasional /hpf      Bacteria, UA Moderate /hpf      AMORPH PHOSPATES Moderate /hpf     Protime-INR [[id number]]  (Normal) Collected: 02/04/22 2115    Lab Status: Final result Specimen: Blood from Arm, Right Updated: 02/04/22 2136     Protime 12 9 seconds      INR 1 02    APTT [[id number]]  (Normal) Collected: 02/04/22 2115    Lab Status: Final result Specimen: Blood from Arm, Right Updated: 02/04/22 2136     PTT 23 seconds     UA w Reflex to Microscopic w Reflex to Culture [[id number]]  (Abnormal) Collected: 02/04/22 2123    Lab Status: Final result Specimen: Urine, Clean Catch Updated: 02/04/22 2128     Color, UA Yellow     Clarity, UA Cloudy     Specific Mineral, UA 1 020 pH, UA 8 0     Leukocytes, UA Negative     Nitrite, UA Negative     Protein, UA Negative mg/dl      Glucose, UA Negative mg/dl      Ketones, UA Negative mg/dl      Urobilinogen, UA 2 0 E U /dl      Bilirubin, UA Negative     Blood, UA Small    Blood culture #1 [[id number]] Collected: 02/04/22 2116    Lab Status: In process Specimen: Blood from Arm, Right Updated: 02/04/22 2122    Blood culture #2 [[id number]] Collected: 02/04/22 2116    Lab Status: In process Specimen: Blood from Arm, Left Updated: 02/04/22 2122                 PE Study with CT Abdomen and Pelvis with contrast   Final Result by Max Canada MD (02/04 9039)      1  No evidence of pulmonary embolism  2   No focal consolidation of the lungs  3   Coronary artery calcifications  4   Enlarged prostate  Workstation performed: [id number]         XR chest 1 view portable   ED Interpretation by Sandy Smith MD (02/04 2145)   Read by me; Radiologist to provide formal interpretation   No acute process no change from 1/28/22                   Procedures  ECG 12 Lead Documentation Only    Date/Time: 2/4/2022 9:00 PM  Performed by: Sandy Smith MD  Authorized by: Sandy Smith MD     Indications / Diagnosis:  Weakness  ECG reviewed by me, the ED Provider: yes    Patient location:  ED  Previous ECG:     Previous ECG:  Compared to current    Comparison ECG info:  1/28/2022 11:24    Similarity:  No change  Rate:     ECG rate:  83    ECG rate assessment: normal    Rhythm:     Rhythm: sinus rhythm    QRS:     QRS axis:  Normal  Comments:      Nonspecific twave changes             ED Course  ED Course as of 02/05/22 0041   Sat Feb 05, 2022   0034 Case reviewed with MICHELLE Hernandez recommends obs             HEART Risk Score      Most Recent Value   Heart Score Risk Calculator    History 0 Filed at: 02/05/2022 0015   ECG 1 Filed at: 02/05/2022 0015   Age 2 Filed at: 02/05/2022 0015   Risk Factors 2 Filed at: 02/05/2022 0015   Troponin 0 Filed at: 02/05/2022 0015   HEART Score 5 Filed at: 02/05/2022 0015                        SBIRT 22yo+      Most Recent Value   SBIRT (23 yo +)    In order to provide better care to our patients, we are screening all of our patients for alcohol and drug use  Would it be okay to ask you these screening questions? Yes Filed at: 02/04/2022 2107   Initial Alcohol Screen: US AUDIT-C     1  How often do you have a drink containing alcohol? 0 Filed at: 02/04/2022 2107   2  How many drinks containing alcohol do you have on a typical day you are drinking? 0 Filed at: 02/04/2022 2107   3a  Male UNDER 65: How often do you have five or more drinks on one occasion? 0 Filed at: 02/04/2022 2107   3b  FEMALE Any Age, or MALE 65+: How often do you have 4 or more drinks on one occassion? 0 Filed at: 02/04/2022 2107   Audit-C Score 0 Filed at: 02/04/2022 2107   JEFFREY: How many times in the past year have you    Used an illegal drug or used a prescription medication for non-medical reasons?  Never Filed at: 02/04/2022 2107          Wells' Criteria for PE      Most Recent Value   Wells' Criteria for PE    Clinical signs and symptoms of DVT 0 Filed at: 02/04/2022 2159   PE is primary diagnosis or equally likely 3 Filed at: 02/04/2022 2159   HR >100 0 Filed at: 02/04/2022 2159   Immobilization at least 3 days or Surgery in the previous 4 weeks 0 Filed at: 02/04/2022 2159   Previous, objectively diagnosed PE or DVT 0 Filed at: 02/04/2022 2159   Hemoptysis 0 Filed at: 02/04/2022 2159   Malignancy with treatment within 6 months or palliative 0 Filed at: 02/04/2022 2159   Wells' Criteria Total 3 Filed at: 02/04/2022 2159                MDM  Number of Diagnoses or Management Options  Chest pain  COVID-19 virus infection  Dyspnea  Lactic acidosis  Nausea  Diagnosis management comments: Mdm:  History of mild COVID with mild respiratory distress satting normally will evaluate for bacterial pneumonia, pulmonary embolism, acute coronary syndrome and initiate supportive care  Disposition  Final diagnoses:   COVID-19 virus infection - 1/28   Nausea   Dyspnea   Lactic acidosis   Chest pain     Time reflects when diagnosis was documented in both MDM as applicable and the Disposition within this note     Time User Action Codes Description Comment    2/5/2022 12:14 AM Darlina Caprice Add [U07 1] COVID-19 virus infection     2/5/2022 12:14 AM Geraline Seats, Arlander Mutton [R11 0] Nausea     2/5/2022 12:16 AM Darlina Caprice Add [R06 00] Dyspnea     2/5/2022 12:16 AM Darlina Caprice Add [E87 2] Lactic acidosis     2/5/2022 12:25 AM Darlina Caprice Modify [U07 1] COVID-19 virus infection 1/28 2/5/2022 12:25 AM Darlina Caprice Add [R07 9] Chest pain       ED Disposition     ED Disposition Condition Date/Time Comment    Admit Stable Sat Feb 5, 2022 12:14 AM Case was discussed with Leopold Golds, PAC and the patient's admission status was agreed to be Admission Status: observation status to the service of Dr Toya Henderson   Follow-up Information    None         Patient's Medications   Discharge Prescriptions    No medications on file       No discharge procedures on file      PDMP Review     None          ED Provider  Electronically Signed by           Keyla Dickerson MD  02/05/22 7029

## 2022-02-05 NOTE — PLAN OF CARE
Problem: PHYSICAL THERAPY ADULT  Goal: Performs mobility at highest level of function for planned discharge setting  See evaluation for individualized goals  Description: Treatment/Interventions: ADL retraining,Functional transfer training,Elevations,LE strengthening/ROM,Bed mobility,Gait training,Endurance training,Therapeutic exercise          See flowsheet documentation for full assessment, interventions and recommendations  Note: Prognosis: Good  Problem List: Decreased strength,Impaired balance,Decreased endurance,Decreased range of motion,Decreased mobility  Assessment: Pt is 64 y o  male seen for PT evaluation s/p admit to [address] on 2/4/2022 w/ Other chest pain  PT consulted to assess pt's functional mobility and d/c needs  Order placed for PT eval and tx, w/ up and OOB as tolerated order  Comorbidities affecting pt's physical performance at time of assessment include: Lyme disease, Shingles, Covid 19, chest pain  PTA, pt was independent w/ all functional mobility w/ RW  Personal factors affecting pt at time of IE include: ambulating w/ assistive device, inability to ambulate household distances, inability to navigate community distances, unable to perform dynamic tasks in community, unable to perform physical activity, inability to perform IADLs and inability to perform ADLs  Please find objective findings from PT assessment regarding body systems outlined above with impairments and limitations including weakness, decreased ROM, impaired balance, gait deviations, pain, decreased activity tolerance and decreased functional mobility tolerance  Pt to benefit from continued PT tx to address deficits as defined above and maximize level of functional independent mobility and consistency  From PT/mobility standpoint, recommendation at time of d/c would be home with outpatient rehabilitation pending progress in order to facilitate return to PLOF             PT Discharge Recommendation: Home with outpatient rehabilitation          See flowsheet documentation for full assessment

## 2022-02-05 NOTE — ASSESSMENT & PLAN NOTE
Initial lactic acid level of 3 2  Repeat level 2 4  Received IV fluids in the ER  Will continue with gentle IV fluids  Trend lactic acid levels until resolved

## 2022-02-05 NOTE — CASE MANAGEMENT
Case Management Assessment & Discharge Planning Note    Patient name Lady Peck  Location /459[facility]80 MRN [id number]  : 1945 Date 2022       Current Admission Date: 2022  Current Admission Diagnosis:Other chest pain   Patient Active Problem List    Diagnosis Date Noted    Other chest pain 2022    Lactic acidosis 2022    Elevated d-dimer 2022    COVID-19 virus infection 2022    Hyponatremia 2022    Pneumonia due to COVID-19 virus 2022    Sepsis ([facility] [address] ) 2022    Dyslipidemia 2022      LOS (days): 0  Geometric Mean LOS (GMLOS) (days):   Days to GMLOS:     OBJECTIVE:              Current admission status: Observation       Preferred Pharmacy:   [facility] [address] [place] [address]  Phone: [phone] Fax: [phone]    [facility] [address]  Phone: [phone] Fax: [phone] (Sheebastzayda)   [facility], [address] [place] [address] [address]  Phone: [phone] Fax: [phone]    Primary Care Provider: Urbano Stafford DO    Primary Insurance: [facility]  Secondary Insurance:     ASSESSMENT:  Jennifer 26 Agents    There are no active Health Care Agents on file         Advance Directives  Does patient have a [address]?: No  Was patient offered paperwork?: Yes (declines)  Does patient currently have a Health Care decision maker?: Yes, please see Health Care Proxy section  Does patient have Advance Directives?: Yes (declines)         Readmission Root Cause  30 Day Readmission: No    Patient Information  Admitted from[de-identified] Home  Mental Status: Alert  During Assessment patient was accompanied by: Not accompanied during assessment  Assessment information provided by[de-identified] Patient  Primary Caregiver: Self  Support Systems: [facility] of Residence: [address] do you live in?: [address] entry access options   Select all that apply : Stairs  Number of steps to enter home : 10  Type of Current Residence: 2 story home  Upon entering residence, is there a bedroom on the main floor (no further steps)?: No  A bedroom is located on the following floor levels of residence (select all that apply):: 2nd Floor  Upon entering residence, is there a bathroom on the main floor (no further steps)?: Yes  Number of steps to 2nd floor from main floor: One Flight  In the last 12 months, was there a time when you were not able to pay the mortgage or rent on time?: No  In the last 12 months, was there a time when you did not have a steady place to sleep or slept in a shelter (including now)?: No  Homeless/housing insecurity resource given?: N/A  Living Arrangements: Lives w/ Son    Activities of Daily Living Prior to Admission  Functional Status: Independent  Completes ADLs independently?: Yes  Ambulates independently?: Yes  Does patient use assisted devices?: No  Does patient currently own DME?: Yes  What DME does the patient currently own?: [employer]  Does patient have a history of Outpatient Therapy (PT/OT)?: No  Does the patient have a history of Short-Term Rehab?: No  Does patient have a history of HHC?: No  [facility]         Patient Information Continued  Does patient have prescription coverage?: Yes  Within the past 12 months, you worried that your food would run out before you got the money to buy more : Never true  Within the past 12 months, the food you bought just didnt last and you didnt have money to get more : Never true  Food insecurity resource given?: N/A  Does patient receive dialysis treatments?: No  Does patient have a history of substance abuse?: No  Does patient have a history of Mental Health Diagnosis?: No    PHQ 2/9 Screening   Reviewed PHQ 2/9 Depression Screening Score?: No    Means of Transportation  Means of Transport to Appts[de-identified] Family transport (son or daughter)  In the past 12 months, has lack of transportation kept you from medical appointments or from getting medications?: No  In the past 12 months, has lack of transportation kept you from meetings, work, or from getting things needed for daily living?: No  Was application for public transport provided?: N/A        DISCHARGE DETAILS:    Discharge planning discussed with[de-identified] patient        CM contacted family/caregiver?: No- see comments (pt is alert, independent and declines)  Were Treatment Team discharge recommendations reviewed with patient/caregiver?: Yes  Did patient/caregiver verbalize understanding of patient care needs?: Yes  Were patient/caregiver advised of the risks associated with not following Treatment Team discharge recommendations?: Yes  Discharge Destination Plan[de-identified] Home  Transport at Discharge : Family   Plans at this time are home on dc with OP follow up  Pt's daughter to transport him home on dc  CM will follow and assist in dc planning

## 2022-02-05 NOTE — PLAN OF CARE
Problem: Potential for Falls  Goal: Patient will remain free of falls  Description: INTERVENTIONS:  - Educate patient/family on patient safety including physical limitations  - Instruct patient to call for assistance with activity   - Consult OT/PT to assist with strengthening/mobility   - Keep Call bell within reach  - Keep bed low and locked with side rails adjusted as appropriate  - Keep care items and personal belongings within reach  - Initiate and maintain comfort rounds  - Make Fall Risk Sign visible to staff  - Offer Toileting every 2 Hours, in advance of need  - Initiate/Maintain bed alarm  - Obtain necessary fall risk management equipment  - Apply yellow socks and bracelet for high fall risk patients  - Consider moving patient to room near nurses station  Outcome: Progressing     Problem: Nutrition/Hydration-ADULT  Goal: Nutrient/Hydration intake appropriate for improving, restoring or maintaining nutritional needs  Description: Monitor and assess patient's nutrition/hydration status for malnutrition  Collaborate with interdisciplinary team and initiate plan and interventions as ordered  Monitor patient's weight and dietary intake as ordered or per policy  Utilize nutrition screening tool and intervene as necessary  Determine patient's food preferences and provide high-protein, high-caloric foods as appropriate       INTERVENTIONS:  - Monitor oral intake, urinary output, labs, and treatment plans  - Assess nutrition and hydration status and recommend course of action  - Evaluate amount of meals eaten  - Assist patient with eating if necessary   - Allow adequate time for meals  - Recommend/ encourage appropriate diets, oral nutritional supplements, and vitamin/mineral supplements  - Order, calculate, and assess calorie counts as needed  - Recommend, monitor, and adjust tube feedings and TPN/PPN based on assessed needs  - Assess need for intravenous fluids  - Provide specific nutrition/hydration education as appropriate  - Include patient/family/caregiver in decisions related to nutrition  Outcome: Progressing     Problem: PAIN - ADULT  Goal: Verbalizes/displays adequate comfort level or baseline comfort level  Description: Interventions:  - Encourage patient to monitor pain and request assistance  - Assess pain using appropriate pain scale  - Administer analgesics based on type and severity of pain and evaluate response  - Implement non-pharmacological measures as appropriate and evaluate response  - Consider cultural and social influences on pain and pain management  - Notify physician/advanced practitioner if interventions unsuccessful or patient reports new pain  Outcome: Progressing     Problem: INFECTION - ADULT  Goal: Absence or prevention of progression during hospitalization  Description: INTERVENTIONS:  - Assess and monitor for signs and symptoms of infection  - Monitor lab/diagnostic results  - Monitor all insertion sites, i e  indwelling lines, tubes, and drains  - Monitor endotracheal if appropriate and nasal secretions for changes in amount and color  - [place] appropriate cooling/warming therapies per order  - Administer medications as ordered  - Instruct and encourage patient and family to use good hand hygiene technique  - Identify and instruct in appropriate isolation precautions for identified infection/condition  Outcome: Progressing     Problem: SAFETY ADULT  Goal: Patient will remain free of falls  Description: INTERVENTIONS:  - Educate patient/family on patient safety including physical limitations  - Instruct patient to call for assistance with activity   - Consult OT/PT to assist with strengthening/mobility   - Keep Call bell within reach  - Keep bed low and locked with side rails adjusted as appropriate  - Keep care items and personal belongings within reach  - Initiate and maintain comfort rounds  - Make Fall Risk Sign visible to staff  - Offer Toileting every 2 Hours, in advance of need  - Initiate/Maintain bed alarm  - Obtain necessary fall risk management equipment  - Apply yellow socks and bracelet for high fall risk patients  - Consider moving patient to room near nurses station  Outcome: Progressing  Goal: Maintain or return to baseline ADL function  Description: INTERVENTIONS:  -  Assess patient's ability to carry out ADLs; assess patient's baseline for ADL function and identify physical deficits which impact ability to perform ADLs (bathing, care of mouth/teeth, toileting, grooming, dressing, etc )  - Assess/evaluate cause of self-care deficits   - Assess range of motion  - Assess patient's mobility; develop plan if impaired  - Assess patient's need for assistive devices and provide as appropriate  - Encourage maximum independence but intervene and supervise when necessary  - Involve family in performance of ADLs  - Assess for home care needs following discharge   - Consider OT consult to assist with ADL evaluation and planning for discharge  - Provide patient education as appropriate  Outcome: Progressing  Goal: Maintains/Returns to pre admission functional level  Description: INTERVENTIONS:  - Perform BMAT or MOVE assessment daily    - Set and communicate daily mobility goal to care team and patient/family/caregiver  - Collaborate with rehabilitation services on mobility goals if consulted  - Perform Range of Motion 3 times a day  - Reposition patient every 2 hours    - Dangle patient 3 times a day  - Stand patient 3 times a day  - Ambulate patient 3 times a day  - Out of bed to chair 3 times a day   - Out of bed for meals 3 times a day  - Out of bed for toileting  - Record patient progress and toleration of activity level   Outcome: Progressing     Problem: DISCHARGE PLANNING  Goal: Discharge to home or other facility with appropriate resources  Description: INTERVENTIONS:  - Identify barriers to discharge w/patient and caregiver  - Arrange for needed discharge resources and transportation as appropriate  - Identify discharge learning needs (meds, wound care, etc )  - Arrange for interpretive services to assist at discharge as needed  - Refer to Case Management Department for coordinating discharge planning if the patient needs post-hospital services based on physician/advanced practitioner order or complex needs related to functional status, cognitive ability, or social support system  Outcome: Progressing     Problem: Knowledge Deficit  Goal: Patient/family/caregiver demonstrates understanding of disease process, treatment plan, medications, and discharge instructions  Description: Complete learning assessment and assess knowledge base    Interventions:  - Provide teaching at level of understanding  - Provide teaching via preferred learning methods  Outcome: Progressing     Problem: CARDIOVASCULAR - ADULT  Goal: Maintains optimal cardiac output and hemodynamic stability  Description: INTERVENTIONS:  - Monitor I/O, vital signs and rhythm  - Monitor for S/S and trends of decreased cardiac output  - Administer and titrate ordered vasoactive medications to optimize hemodynamic stability  - Assess quality of pulses, skin color and temperature  - Assess for signs of decreased coronary artery perfusion  - Instruct patient to report change in severity of symptoms  Outcome: Progressing  Goal: Absence of cardiac dysrhythmias or at baseline rhythm  Description: INTERVENTIONS:  - Continuous cardiac monitoring, vital signs, obtain 12 lead EKG if ordered  - Administer antiarrhythmic and heart rate control medications as ordered  - Monitor electrolytes and administer replacement therapy as ordered  Outcome: Progressing     Problem: RESPIRATORY - ADULT  Goal: Achieves optimal ventilation and oxygenation  Description: INTERVENTIONS:  - Assess for changes in respiratory status  - Assess for changes in mentation and behavior  - Position to facilitate oxygenation and minimize respiratory effort  - Oxygen administered by appropriate delivery if ordered  - Initiate smoking cessation education as indicated  - Encourage broncho-pulmonary hygiene including cough, deep breathe, Incentive Spirometry  - Assess the need for suctioning and aspirate as needed  - Assess and instruct to report SOB or any respiratory difficulty  - Respiratory Therapy support as indicated  Outcome: Progressing     Problem: METABOLIC, FLUID AND ELECTROLYTES - ADULT  Goal: Electrolytes maintained within normal limits  Description: INTERVENTIONS:  - Monitor labs and assess patient for signs and symptoms of electrolyte imbalances  - Administer electrolyte replacement as ordered  - Monitor response to electrolyte replacements, including repeat lab results as appropriate  - Instruct patient on fluid and nutrition as appropriate  Outcome: Progressing  Goal: Fluid balance maintained  Description: INTERVENTIONS:  - Monitor labs   - Monitor I/O and WT  - Instruct patient on fluid and nutrition as appropriate  - Assess for signs & symptoms of volume excess or deficit  Outcome: Progressing  Goal: Glucose maintained within target range  Description: INTERVENTIONS:  - Monitor Blood Glucose as ordered  - Assess for signs and symptoms of hyperglycemia and hypoglycemia  - Administer ordered medications to maintain glucose within target range  - Assess nutritional intake and initiate nutrition service referral as needed  Outcome: Progressing

## 2022-02-06 LAB
ALBUMIN SERPL BCP-MCNC: 2.7 G/DL (ref 3.5–5)
ALP SERPL-CCNC: 47 U/L (ref 46–116)
ALT SERPL W P-5'-P-CCNC: 30 U/L (ref 12–78)
ANION GAP SERPL CALCULATED.3IONS-SCNC: 6 MMOL/L (ref 4–13)
AST SERPL W P-5'-P-CCNC: 21 U/L (ref 5–45)
BILIRUB SERPL-MCNC: 0.95 MG/DL (ref 0.2–1)
BUN SERPL-MCNC: 24 MG/DL (ref 5–25)
CALCIUM ALBUM COR SERPL-MCNC: 9.1 MG/DL (ref 8.3–10.1)
CALCIUM SERPL-MCNC: 8.1 MG/DL (ref 8.3–10.1)
CHLORIDE SERPL-SCNC: 103 MMOL/L (ref 100–108)
CO2 SERPL-SCNC: 28 MMOL/L (ref 21–32)
CREAT SERPL-MCNC: 0.93 MG/DL (ref 0.6–1.3)
ERYTHROCYTE [DISTWIDTH] IN BLOOD BY AUTOMATED COUNT: 12.5 % (ref 11.6–15.1)
GFR SERPL CREATININE-BSD FRML MDRD: 78 ML/MIN/1.73SQ M
GLUCOSE SERPL-MCNC: 82 MG/DL (ref 65–140)
HCT VFR BLD AUTO: 42 % (ref 36.5–49.3)
HGB BLD-MCNC: 13.8 G/DL (ref 12–17)
MCH RBC QN AUTO: 29.1 PG (ref 26.8–34.3)
MCHC RBC AUTO-ENTMCNC: 32.9 G/DL (ref 31.4–37.4)
MCV RBC AUTO: 89 FL (ref 82–98)
NRBC BLD AUTO-RTO: 0 /100 WBCS
PLATELET # BLD AUTO: 241 THOUSANDS/UL (ref 149–390)
PMV BLD AUTO: 10.4 FL (ref 8.9–12.7)
POTASSIUM SERPL-SCNC: 4 MMOL/L (ref 3.5–5.3)
PROT SERPL-MCNC: 5.7 G/DL (ref 6.4–8.2)
RBC # BLD AUTO: 4.74 MILLION/UL (ref 3.88–5.62)
SODIUM SERPL-SCNC: 137 MMOL/L (ref 136–145)
WBC # BLD AUTO: 8.85 THOUSAND/UL (ref 4.31–10.16)

## 2022-02-06 PROCEDURE — C9113 INJ PANTOPRAZOLE SODIUM, VIA: HCPCS | Performed by: FAMILY MEDICINE

## 2022-02-06 PROCEDURE — 85027 COMPLETE CBC AUTOMATED: CPT | Performed by: FAMILY MEDICINE

## 2022-02-06 PROCEDURE — 99232 SBSQ HOSP IP/OBS MODERATE 35: CPT | Performed by: FAMILY MEDICINE

## 2022-02-06 PROCEDURE — 80053 COMPREHEN METABOLIC PANEL: CPT | Performed by: FAMILY MEDICINE

## 2022-02-06 RX ORDER — SIMVASTATIN 5 MG
5 TABLET ORAL
COMMUNITY
End: 2022-02-28

## 2022-02-06 RX ADMIN — HEPARIN SODIUM 5000 UNITS: 5000 INJECTION INTRAVENOUS; SUBCUTANEOUS at 05:05

## 2022-02-06 RX ADMIN — SODIUM CHLORIDE 75 ML/HR: 9 INJECTION, SOLUTION INTRAVENOUS at 05:11

## 2022-02-06 RX ADMIN — HEPARIN SODIUM 5000 UNITS: 5000 INJECTION INTRAVENOUS; SUBCUTANEOUS at 14:11

## 2022-02-06 RX ADMIN — Medication 2000 UNITS: at 08:08

## 2022-02-06 RX ADMIN — LIDOCAINE 5% 1 PATCH: 700 PATCH TOPICAL at 08:08

## 2022-02-06 RX ADMIN — NIFEDIPINE 10 MG: 10 CAPSULE ORAL at 21:19

## 2022-02-06 RX ADMIN — NIFEDIPINE 10 MG: 10 CAPSULE ORAL at 14:11

## 2022-02-06 RX ADMIN — ONDANSETRON 4 MG: 2 INJECTION INTRAMUSCULAR; INTRAVENOUS at 16:34

## 2022-02-06 RX ADMIN — HEPARIN SODIUM 5000 UNITS: 5000 INJECTION INTRAVENOUS; SUBCUTANEOUS at 21:19

## 2022-02-06 RX ADMIN — IBUPROFEN 600 MG: 600 TABLET, FILM COATED ORAL at 21:19

## 2022-02-06 RX ADMIN — OXYCODONE HYDROCHLORIDE AND ACETAMINOPHEN 1000 MG: 500 TABLET ORAL at 08:07

## 2022-02-06 RX ADMIN — PANTOPRAZOLE SODIUM 40 MG: 40 INJECTION, POWDER, FOR SOLUTION INTRAVENOUS at 09:30

## 2022-02-06 RX ADMIN — IBUPROFEN 600 MG: 600 TABLET, FILM COATED ORAL at 05:05

## 2022-02-06 RX ADMIN — IBUPROFEN 600 MG: 600 TABLET, FILM COATED ORAL at 14:11

## 2022-02-06 RX ADMIN — ASPIRIN 81 MG: 81 TABLET, COATED ORAL at 08:07

## 2022-02-06 NOTE — PROGRESS NOTES
[facility] [address] [place]  Progress Note Dana Chandler 1945, 64 y o  male MRN: [id number]  Unit/Bed#: 423-01 Encounter: [id number]  Primary Care Provider: Demian Carmen DO   Date and time admitted to hospital: 2/4/2022  8:54 PM    * Other chest pain  Assessment & Plan  - ACS ruled out with negative troponin  - patient reports improvement in pain  - With recent COVID infection and some non-specific ST changes on EKG there is a possibility of pericarditis with reported improvement on movement and leaning forward consistent with pericarditis  Discussed with Cardiology on call, not clear cut pericarditis, only mild CRP elevation (although may be nonspecific with recent COVID)  Trial of NSAIDs - ibuprofen - hold off from colchicine  - 2D Echo ordered  - Cardiology consulted   - Symptoms associated with difficulty swallowing and belching suggestive of GI origin, consider esophageal spasm  - Swallow eval  - Trial of PPI   - CTA chest, abd, pelvis - no evidence of a mass, no acute finding   - Barium esophagram  - Pain management  - hopeful discharge in 24-48 hours  - PT/OT evaluation noted, outpatient PT recommended    COVID-19 virus infection  Assessment & Plan  Tested positive for COVID-19 on 01/28  Admitted on Mild protocol, with no oxygen requirement   Was discharged home on 1/29  Remains with no oxygen requirements since admission  Monitor respiratory status closely      Elevated d-dimer  Assessment & Plan  D-dimer level of 2 69  No documented hx of DVT/PE  PE study negative for acute PE  He is COVID-19 positive which may be contributing  DVT prophylaxis using IV subcutaneous heparin  Monitor closely    Lactic acidosis  Assessment & Plan  Resolved with IV fluids, discontinue    Dyslipidemia  Assessment & Plan  Not currently on medication regimen  Continue PCP follow up    Other severe protein calorie malnutrition    Assessment & Plan  Adult Degree of Malnutrition: Other severe protein calorie malnutrition  Malnutrition Characteristics: Inadequate energy,Weight loss (Severe PCM r/t prolong inadequate po intake for at least 2 weeks or more with significant wt loss of 13 7%   BMI low at 16   Treated with regular diet and supplements )        VTE Pharmacologic Prophylaxis: VTE Score: 5 High Risk (Score >/= 5) - Pharmacological DVT Prophylaxis Ordered: heparin  Sequential Compression Devices Ordered  Patient Centered Rounds: I performed bedside rounds with nursing staff today  Discussions with Specialists or Other Care Team Provider:  Case management    Education and Discussions with Family / Patient: Updated  (son) via phone  Time Spent for Care: 30 minutes  More than 50% of total time spent on counseling and coordination of care as described above  Current Length of Stay: 1 day(s)  Current Patient Status: Inpatient   Certification Statement: The patient will continue to require additional inpatient hospital stay due to Diagnostic workup, close monitoring  Discharge Plan: Anticipate discharge in 24-48 hrs to home  Code Status: Level 1 - Full Code    Subjective:   Patient seen and examined  Continues to complain of generalized weakness and poor appetite  He does report improvement on his chest symptoms  Objective:     Vitals:   Temp (24hrs), Av 8 °F (36 6 °C), Min:97 3 °F (36 3 °C), Max:98 2 °F (36 8 °C)    Temp:  [97 3 °F (36 3 °C)-98 2 °F (36 8 °C)] 97 3 °F (36 3 °C)  HR:  [63-70] 64  Resp:  [16-18] 16  BP: (104-141)/(64-80) 137/75  SpO2:  [99 %-100 %] 100 %  Body mass index is 16 73 kg/m²  Input and Output Summary (last 24 hours): Intake/Output Summary (Last 24 hours) at 2022 1104  Last data filed at 2022 7964  Gross per 24 hour   Intake 700 ml   Output 2575 ml   Net -1875 ml       Physical Exam:   Physical Exam  Constitutional:       General: He is not in acute distress  HENT:      Head: Normocephalic and atraumatic  Nose: No congestion  Mouth/Throat:      Pharynx: Oropharynx is clear  Eyes:      Conjunctiva/sclera: Conjunctivae normal    Cardiovascular:      Rate and Rhythm: Normal rate and regular rhythm  Heart sounds: No murmur heard  Pulmonary:      Effort: No respiratory distress  Breath sounds: No wheezing or rales  Abdominal:      General: There is no distension  Tenderness: There is no abdominal tenderness  There is no guarding  Musculoskeletal:      Right lower leg: No edema  Left lower leg: No edema  Skin:     General: Skin is warm and dry  Neurological:      Mental Status: He is oriented to person, place, and time  Psychiatric:         Mood and Affect: Mood normal           Additional Data:     Labs:  Results from last 7 days   Lab Units 02/06/22  0638 02/05/22  0433 02/04/22  2115   WBC Thousand/uL 8 85   < > 12 20*   HEMOGLOBIN g/dL 13 8   < > 16 0   HEMATOCRIT % 42 0   < > 47 2   PLATELETS Thousands/uL 241   < > 354   BANDS PCT %  --   --  3   LYMPHO PCT %  --   --  10*   MONO PCT %  --   --  11   EOS PCT %  --   --  0    < > = values in this interval not displayed       Results from last 7 days   Lab Units 02/06/22  0638   SODIUM mmol/L 137   POTASSIUM mmol/L 4 0   CHLORIDE mmol/L 103   CO2 mmol/L 28   BUN mg/dL 24   CREATININE mg/dL 0 93   ANION GAP mmol/L 6   CALCIUM mg/dL 8 1*   ALBUMIN g/dL 2 7*   TOTAL BILIRUBIN mg/dL 0 95   ALK PHOS U/L 47   ALT U/L 30   AST U/L 21   GLUCOSE RANDOM mg/dL 82     Results from last 7 days   Lab Units 02/04/22  2115   INR  1 02             Results from last 7 days   Lab Units 02/05/22  0605 02/05/22  0433 02/05/22  0158 02/04/22  2352   LACTIC ACID mmol/L 1 7 2 0 2 2* 2 4*       Lines/Drains:  Invasive Devices  Report    Peripheral Intravenous Line            Peripheral IV 02/05/22 Right Hand <1 day                  Telemetry:  Telemetry Orders (From admission, onward)             48 Hour Telemetry Monitoring  Continuous x 48 hours        References:    Telemetry Guidelines   Question:  Reason for 48 Hour Telemetry  Answer:  Acute MI, chest pain - R/O MI, or unstable angina                            Imaging: No pertinent imaging reviewed  Recent Cultures (last 7 days):         Last 24 Hours Medication List:   Current Facility-Administered Medications   Medication Dose Route Frequency Provider Last Rate    acetaminophen  650 mg Oral Q6H PRN MELISSA Gaspar-SONYA      albuterol  2 puff Inhalation Q4H PRN Glen Dubin, MD      ascorbic acid  1,000 mg Oral Daily MELISSA Gaspar-SONYA      aspirin  81 mg Oral Daily MELISSA Gaspar-C      cholecalciferol  2,000 Units Oral Daily Brayan Damian, PA-C      heparin (porcine)  5,000 Units Subcutaneous Q8H [address] Brayan Damian PA-C      hydrocodone-chlorpheniramine polistirex  5 mL Oral Q12H PRN MELISSA Gaspar-SONYA      ibuprofen  600 mg Oral Q8H [address] Glen Dubin, MD      lidocaine  1 patch Topical Daily Glen Dubin, MD      NIFEdipine  10 mg Oral Q8H [address] Elda Triplett MD      ondansetron  4 mg Intravenous Q6H PRN Brayan Damian PA-C      pantoprazole  40 mg Intravenous Q24H Alyse Conley MD          Today, Patient Was Seen By: Caroline Casas MD    **Please Note: This note may have been constructed using a voice recognition system  **

## 2022-02-06 NOTE — PLAN OF CARE
Problem: Potential for Falls  Goal: Patient will remain free of falls  Description: INTERVENTIONS:  - Educate patient/family on patient safety including physical limitations  - Instruct patient to call for assistance with activity   - Consult OT/PT to assist with strengthening/mobility   - Keep Call bell within reach  - Keep bed low and locked with side rails adjusted as appropriate  - Keep care items and personal belongings within reach  - Initiate and maintain comfort rounds  - Make Fall Risk Sign visible to staff  - Offer Toileting every 2 Hours, in advance of need  - Initiate/Maintain bed alarm  - Obtain necessary fall risk management equipment  - Apply yellow socks and bracelet for high fall risk patients  - Consider moving patient to room near nurses station  Outcome: Progressing     Problem: Nutrition/Hydration-ADULT  Goal: Nutrient/Hydration intake appropriate for improving, restoring or maintaining nutritional needs  Description: Monitor and assess patient's nutrition/hydration status for malnutrition  Collaborate with interdisciplinary team and initiate plan and interventions as ordered  Monitor patient's weight and dietary intake as ordered or per policy  Utilize nutrition screening tool and intervene as necessary  Determine patient's food preferences and provide high-protein, high-caloric foods as appropriate       INTERVENTIONS:  - Monitor oral intake, urinary output, labs, and treatment plans  - Assess nutrition and hydration status and recommend course of action  - Evaluate amount of meals eaten  - Assist patient with eating if necessary   - Allow adequate time for meals  - Recommend/ encourage appropriate diets, oral nutritional supplements, and vitamin/mineral supplements  - Order, calculate, and assess calorie counts as needed  - Recommend, monitor, and adjust tube feedings and TPN/PPN based on assessed needs  - Assess need for intravenous fluids  - Provide specific nutrition/hydration education as appropriate  - Include patient/family/caregiver in decisions related to nutrition  Outcome: Progressing     Problem: PAIN - ADULT  Goal: Verbalizes/displays adequate comfort level or baseline comfort level  Description: Interventions:  - Encourage patient to monitor pain and request assistance  - Assess pain using appropriate pain scale  - Administer analgesics based on type and severity of pain and evaluate response  - Implement non-pharmacological measures as appropriate and evaluate response  - Consider cultural and social influences on pain and pain management  - Notify physician/advanced practitioner if interventions unsuccessful or patient reports new pain  Outcome: Progressing     Problem: INFECTION - ADULT  Goal: Absence or prevention of progression during hospitalization  Description: INTERVENTIONS:  - Assess and monitor for signs and symptoms of infection  - Monitor lab/diagnostic results  - Monitor all insertion sites, i e  indwelling lines, tubes, and drains  - Monitor endotracheal if appropriate and nasal secretions for changes in amount and color  - [place] appropriate cooling/warming therapies per order  - Administer medications as ordered  - Instruct and encourage patient and family to use good hand hygiene technique  - Identify and instruct in appropriate isolation precautions for identified infection/condition  Outcome: Progressing     Problem: SAFETY ADULT  Goal: Patient will remain free of falls  Description: INTERVENTIONS:  - Educate patient/family on patient safety including physical limitations  - Instruct patient to call for assistance with activity   - Consult OT/PT to assist with strengthening/mobility   - Keep Call bell within reach  - Keep bed low and locked with side rails adjusted as appropriate  - Keep care items and personal belongings within reach  - Initiate and maintain comfort rounds  - Make Fall Risk Sign visible to staff  - Offer Toileting every 2 Hours, in advance of need  - Initiate/Maintain bed alarm  - Obtain necessary fall risk management equipment  - Apply yellow socks and bracelet for high fall risk patients  - Consider moving patient to room near nurses station  Outcome: Progressing  Goal: Maintain or return to baseline ADL function  Description: INTERVENTIONS:  -  Assess patient's ability to carry out ADLs; assess patient's baseline for ADL function and identify physical deficits which impact ability to perform ADLs (bathing, care of mouth/teeth, toileting, grooming, dressing, etc )  - Assess/evaluate cause of self-care deficits   - Assess range of motion  - Assess patient's mobility; develop plan if impaired  - Assess patient's need for assistive devices and provide as appropriate  - Encourage maximum independence but intervene and supervise when necessary  - Involve family in performance of ADLs  - Assess for home care needs following discharge   - Consider OT consult to assist with ADL evaluation and planning for discharge  - Provide patient education as appropriate  Outcome: Progressing  Goal: Maintains/Returns to pre admission functional level  Description: INTERVENTIONS:  - Perform BMAT or MOVE assessment daily    - Set and communicate daily mobility goal to care team and patient/family/caregiver  - Collaborate with rehabilitation services on mobility goals if consulted  - Perform Range of Motion 3 times a day  - Reposition patient every 2 hours    - Dangle patient 3 times a day  - Stand patient 3 times a day  - Ambulate patient 3 times a day  - Out of bed to chair 3 times a day   - Out of bed for meals 3 times a day  - Out of bed for toileting  - Record patient progress and toleration of activity level   Outcome: Progressing     Problem: DISCHARGE PLANNING  Goal: Discharge to home or other facility with appropriate resources  Description: INTERVENTIONS:  - Identify barriers to discharge w/patient and caregiver  - Arrange for needed discharge resources and transportation as appropriate  - Identify discharge learning needs (meds, wound care, etc )  - Arrange for interpretive services to assist at discharge as needed  - Refer to Case Management Department for coordinating discharge planning if the patient needs post-hospital services based on physician/advanced practitioner order or complex needs related to functional status, cognitive ability, or social support system  Outcome: Progressing     Problem: Knowledge Deficit  Goal: Patient/family/caregiver demonstrates understanding of disease process, treatment plan, medications, and discharge instructions  Description: Complete learning assessment and assess knowledge base    Interventions:  - Provide teaching at level of understanding  - Provide teaching via preferred learning methods  Outcome: Progressing     Problem: CARDIOVASCULAR - ADULT  Goal: Maintains optimal cardiac output and hemodynamic stability  Description: INTERVENTIONS:  - Monitor I/O, vital signs and rhythm  - Monitor for S/S and trends of decreased cardiac output  - Administer and titrate ordered vasoactive medications to optimize hemodynamic stability  - Assess quality of pulses, skin color and temperature  - Assess for signs of decreased coronary artery perfusion  - Instruct patient to report change in severity of symptoms  Outcome: Progressing  Goal: Absence of cardiac dysrhythmias or at baseline rhythm  Description: INTERVENTIONS:  - Continuous cardiac monitoring, vital signs, obtain 12 lead EKG if ordered  - Administer antiarrhythmic and heart rate control medications as ordered  - Monitor electrolytes and administer replacement therapy as ordered  Outcome: Progressing     Problem: RESPIRATORY - ADULT  Goal: Achieves optimal ventilation and oxygenation  Description: INTERVENTIONS:  - Assess for changes in respiratory status  - Assess for changes in mentation and behavior  - Position to facilitate oxygenation and minimize respiratory effort  - Oxygen administered by appropriate delivery if ordered  - Initiate smoking cessation education as indicated  - Encourage broncho-pulmonary hygiene including cough, deep breathe, Incentive Spirometry  - Assess the need for suctioning and aspirate as needed  - Assess and instruct to report SOB or any respiratory difficulty  - Respiratory Therapy support as indicated  Outcome: Progressing     Problem: METABOLIC, FLUID AND ELECTROLYTES - ADULT  Goal: Electrolytes maintained within normal limits  Description: INTERVENTIONS:  - Monitor labs and assess patient for signs and symptoms of electrolyte imbalances  - Administer electrolyte replacement as ordered  - Monitor response to electrolyte replacements, including repeat lab results as appropriate  - Instruct patient on fluid and nutrition as appropriate  Outcome: Progressing  Goal: Fluid balance maintained  Description: INTERVENTIONS:  - Monitor labs   - Monitor I/O and WT  - Instruct patient on fluid and nutrition as appropriate  - Assess for signs & symptoms of volume excess or deficit  Outcome: Progressing  Goal: Glucose maintained within target range  Description: INTERVENTIONS:  - Monitor Blood Glucose as ordered  - Assess for signs and symptoms of hyperglycemia and hypoglycemia  - Administer ordered medications to maintain glucose within target range  - Assess nutritional intake and initiate nutrition service referral as needed  Outcome: Progressing     Problem: MOBILITY - ADULT  Goal: Maintain or return to baseline ADL function  Description: INTERVENTIONS:  -  Assess patient's ability to carry out ADLs; assess patient's baseline for ADL function and identify physical deficits which impact ability to perform ADLs (bathing, care of mouth/teeth, toileting, grooming, dressing, etc )  - Assess/evaluate cause of self-care deficits   - Assess range of motion  - Assess patient's mobility; develop plan if impaired  - Assess patient's need for assistive devices and provide as appropriate  - Encourage maximum independence but intervene and supervise when necessary  - Involve family in performance of ADLs  - Assess for home care needs following discharge   - Consider OT consult to assist with ADL evaluation and planning for discharge  - Provide patient education as appropriate  Outcome: Progressing  Goal: Maintains/Returns to pre admission functional level  Description: INTERVENTIONS:  - Perform BMAT or MOVE assessment daily    - Set and communicate daily mobility goal to care team and patient/family/caregiver  - Collaborate with rehabilitation services on mobility goals if consulted  - Perform Range of Motion 3 times a day  - Reposition patient every 2 hours    - Dangle patient 3 times a day  - Stand patient 3 times a day  - Ambulate patient 3 times a day  - Out of bed to chair 3 times a day   - Out of bed for meals 3 times a day  - Out of bed for toileting  - Record patient progress and toleration of activity level   Outcome: Progressing     Problem: Prexisting or High Potential for Compromised Skin Integrity  Goal: Skin integrity is maintained or improved  Description: INTERVENTIONS:  - Identify patients at risk for skin breakdown  - Assess and monitor skin integrity  - Assess and monitor nutrition and hydration status  - Monitor labs   - Assess for incontinence   - Turn and reposition patient  - Assist with mobility/ambulation  - Relieve pressure over bony prominences  - Avoid friction and shearing  - Provide appropriate hygiene as needed including keeping skin clean and dry  - Evaluate need for skin moisturizer/barrier cream  - Collaborate with interdisciplinary team   - Patient/family teaching  - Consider wound care consult   Outcome: Progressing

## 2022-02-06 NOTE — ASSESSMENT & PLAN NOTE
- ACS ruled out with negative troponin  - patient reports improvement in pain  - With recent COVID infection and some non-specific ST changes on EKG there is a possibility of pericarditis with reported improvement on movement and leaning forward consistent with pericarditis  Discussed with Cardiology on call, not clear cut pericarditis, only mild CRP elevation (although may be nonspecific with recent COVID)   Trial of NSAIDs - ibuprofen - hold off from colchicine  - 2D Echo ordered  - Cardiology consulted   - Symptoms associated with difficulty swallowing and belching suggestive of GI origin, consider esophageal spasm  - Swallow eval  - Trial of PPI   - CTA chest, abd, pelvis - no evidence of a mass, no acute finding   - Barium esophagram  - Pain management  - hopeful discharge in 24-48 hours  - PT/OT evaluation noted, outpatient PT recommended

## 2022-02-06 NOTE — PLAN OF CARE
Problem: Potential for Falls  Goal: Patient will remain free of falls  Description: INTERVENTIONS:  - Educate patient/family on patient safety including physical limitations  - Instruct patient to call for assistance with activity   - Consult OT/PT to assist with strengthening/mobility   - Keep Call bell within reach  - Keep bed low and locked with side rails adjusted as appropriate  - Keep care items and personal belongings within reach  - Initiate and maintain comfort rounds  - Make Fall Risk Sign visible to staff  - Offer Toileting every 2 Hours, in advance of need  - Initiate/Maintain bed alarm  - Obtain necessary fall risk management equipment  - Apply yellow socks and bracelet for high fall risk patients  - Consider moving patient to room near nurses station  Outcome: Progressing     Problem: Nutrition/Hydration-ADULT  Goal: Nutrient/Hydration intake appropriate for improving, restoring or maintaining nutritional needs  Description: Monitor and assess patient's nutrition/hydration status for malnutrition  Collaborate with interdisciplinary team and initiate plan and interventions as ordered  Monitor patient's weight and dietary intake as ordered or per policy  Utilize nutrition screening tool and intervene as necessary  Determine patient's food preferences and provide high-protein, high-caloric foods as appropriate       INTERVENTIONS:  - Monitor oral intake, urinary output, labs, and treatment plans  - Assess nutrition and hydration status and recommend course of action  - Evaluate amount of meals eaten  - Assist patient with eating if necessary   - Allow adequate time for meals  - Recommend/ encourage appropriate diets, oral nutritional supplements, and vitamin/mineral supplements  - Order, calculate, and assess calorie counts as needed  - Recommend, monitor, and adjust tube feedings and TPN/PPN based on assessed needs  - Assess need for intravenous fluids  - Provide specific nutrition/hydration education as appropriate  - Include patient/family/caregiver in decisions related to nutrition  Outcome: Progressing     Problem: PAIN - ADULT  Goal: Verbalizes/displays adequate comfort level or baseline comfort level  Description: Interventions:  - Encourage patient to monitor pain and request assistance  - Assess pain using appropriate pain scale  - Administer analgesics based on type and severity of pain and evaluate response  - Implement non-pharmacological measures as appropriate and evaluate response  - Consider cultural and social influences on pain and pain management  - Notify physician/advanced practitioner if interventions unsuccessful or patient reports new pain  Outcome: Progressing     Problem: INFECTION - ADULT  Goal: Absence or prevention of progression during hospitalization  Description: INTERVENTIONS:  - Assess and monitor for signs and symptoms of infection  - Monitor lab/diagnostic results  - Monitor all insertion sites, i e  indwelling lines, tubes, and drains  - Monitor endotracheal if appropriate and nasal secretions for changes in amount and color  - [place] appropriate cooling/warming therapies per order  - Administer medications as ordered  - Instruct and encourage patient and family to use good hand hygiene technique  - Identify and instruct in appropriate isolation precautions for identified infection/condition  Outcome: Progressing     Problem: SAFETY ADULT  Goal: Patient will remain free of falls  Description: INTERVENTIONS:  - Educate patient/family on patient safety including physical limitations  - Instruct patient to call for assistance with activity   - Consult OT/PT to assist with strengthening/mobility   - Keep Call bell within reach  - Keep bed low and locked with side rails adjusted as appropriate  - Keep care items and personal belongings within reach  - Initiate and maintain comfort rounds  - Make Fall Risk Sign visible to staff  - Offer Toileting every 2 Hours, in advance of need  - Initiate/Maintain bed alarm  - Obtain necessary fall risk management equipment  - Apply yellow socks and bracelet for high fall risk patients  - Consider moving patient to room near nurses station  Outcome: Progressing  Goal: Maintain or return to baseline ADL function  Description: INTERVENTIONS:  -  Assess patient's ability to carry out ADLs; assess patient's baseline for ADL function and identify physical deficits which impact ability to perform ADLs (bathing, care of mouth/teeth, toileting, grooming, dressing, etc )  - Assess/evaluate cause of self-care deficits   - Assess range of motion  - Assess patient's mobility; develop plan if impaired  - Assess patient's need for assistive devices and provide as appropriate  - Encourage maximum independence but intervene and supervise when necessary  - Involve family in performance of ADLs  - Assess for home care needs following discharge   - Consider OT consult to assist with ADL evaluation and planning for discharge  - Provide patient education as appropriate  Outcome: Progressing  Goal: Maintains/Returns to pre admission functional level  Description: INTERVENTIONS:  - Perform BMAT or MOVE assessment daily    - Set and communicate daily mobility goal to care team and patient/family/caregiver  - Collaborate with rehabilitation services on mobility goals if consulted  - Perform Range of Motion 3 times a day  - Reposition patient every 2 hours    - Dangle patient 3 times a day  - Stand patient 3 times a day  - Ambulate patient 3 times a day  - Out of bed to chair 3 times a day   - Out of bed for meals 3 times a day  - Out of bed for toileting  - Record patient progress and toleration of activity level   Outcome: Progressing     Problem: DISCHARGE PLANNING  Goal: Discharge to home or other facility with appropriate resources  Description: INTERVENTIONS:  - Identify barriers to discharge w/patient and caregiver  - Arrange for needed discharge resources and transportation as appropriate  - Identify discharge learning needs (meds, wound care, etc )  - Arrange for interpretive services to assist at discharge as needed  - Refer to Case Management Department for coordinating discharge planning if the patient needs post-hospital services based on physician/advanced practitioner order or complex needs related to functional status, cognitive ability, or social support system  Outcome: Progressing     Problem: Knowledge Deficit  Goal: Patient/family/caregiver demonstrates understanding of disease process, treatment plan, medications, and discharge instructions  Description: Complete learning assessment and assess knowledge base    Interventions:  - Provide teaching at level of understanding  - Provide teaching via preferred learning methods  Outcome: Progressing     Problem: CARDIOVASCULAR - ADULT  Goal: Maintains optimal cardiac output and hemodynamic stability  Description: INTERVENTIONS:  - Monitor I/O, vital signs and rhythm  - Monitor for S/S and trends of decreased cardiac output  - Administer and titrate ordered vasoactive medications to optimize hemodynamic stability  - Assess quality of pulses, skin color and temperature  - Assess for signs of decreased coronary artery perfusion  - Instruct patient to report change in severity of symptoms  Outcome: Progressing  Goal: Absence of cardiac dysrhythmias or at baseline rhythm  Description: INTERVENTIONS:  - Continuous cardiac monitoring, vital signs, obtain 12 lead EKG if ordered  - Administer antiarrhythmic and heart rate control medications as ordered  - Monitor electrolytes and administer replacement therapy as ordered  Outcome: Progressing     Problem: RESPIRATORY - ADULT  Goal: Achieves optimal ventilation and oxygenation  Description: INTERVENTIONS:  - Assess for changes in respiratory status  - Assess for changes in mentation and behavior  - Position to facilitate oxygenation and minimize respiratory effort  - Oxygen administered by appropriate delivery if ordered  - Initiate smoking cessation education as indicated  - Encourage broncho-pulmonary hygiene including cough, deep breathe, Incentive Spirometry  - Assess the need for suctioning and aspirate as needed  - Assess and instruct to report SOB or any respiratory difficulty  - Respiratory Therapy support as indicated  Outcome: Progressing     Problem: METABOLIC, FLUID AND ELECTROLYTES - ADULT  Goal: Electrolytes maintained within normal limits  Description: INTERVENTIONS:  - Monitor labs and assess patient for signs and symptoms of electrolyte imbalances  - Administer electrolyte replacement as ordered  - Monitor response to electrolyte replacements, including repeat lab results as appropriate  - Instruct patient on fluid and nutrition as appropriate  Outcome: Progressing  Goal: Fluid balance maintained  Description: INTERVENTIONS:  - Monitor labs   - Monitor I/O and WT  - Instruct patient on fluid and nutrition as appropriate  - Assess for signs & symptoms of volume excess or deficit  Outcome: Progressing  Goal: Glucose maintained within target range  Description: INTERVENTIONS:  - Monitor Blood Glucose as ordered  - Assess for signs and symptoms of hyperglycemia and hypoglycemia  - Administer ordered medications to maintain glucose within target range  - Assess nutritional intake and initiate nutrition service referral as needed  Outcome: Progressing     Problem: MOBILITY - ADULT  Goal: Maintain or return to baseline ADL function  Description: INTERVENTIONS:  -  Assess patient's ability to carry out ADLs; assess patient's baseline for ADL function and identify physical deficits which impact ability to perform ADLs (bathing, care of mouth/teeth, toileting, grooming, dressing, etc )  - Assess/evaluate cause of self-care deficits   - Assess range of motion  - Assess patient's mobility; develop plan if impaired  - Assess patient's need for assistive devices and provide as appropriate  - Encourage maximum independence but intervene and supervise when necessary  - Involve family in performance of ADLs  - Assess for home care needs following discharge   - Consider OT consult to assist with ADL evaluation and planning for discharge  - Provide patient education as appropriate  Outcome: Progressing  Goal: Maintains/Returns to pre admission functional level  Description: INTERVENTIONS:  - Perform BMAT or MOVE assessment daily    - Set and communicate daily mobility goal to care team and patient/family/caregiver  - Collaborate with rehabilitation services on mobility goals if consulted  - Perform Range of Motion 3 times a day  - Reposition patient every 2 hours    - Dangle patient 3 times a day  - Stand patient 3 times a day  - Ambulate patient 3 times a day  - Out of bed to chair 3 times a day   - Out of bed for meals 3 times a day  - Out of bed for toileting  - Record patient progress and toleration of activity level   Outcome: Progressing     Problem: Prexisting or High Potential for Compromised Skin Integrity  Goal: Skin integrity is maintained or improved  Description: INTERVENTIONS:  - Identify patients at risk for skin breakdown  - Assess and monitor skin integrity  - Assess and monitor nutrition and hydration status  - Monitor labs   - Assess for incontinence   - Turn and reposition patient  - Assist with mobility/ambulation  - Relieve pressure over bony prominences  - Avoid friction and shearing  - Provide appropriate hygiene as needed including keeping skin clean and dry  - Evaluate need for skin moisturizer/barrier cream  - Collaborate with interdisciplinary team   - Patient/family teaching  - Consider wound care consult   Outcome: Progressing

## 2022-02-06 NOTE — ASSESSMENT & PLAN NOTE
Tested positive for COVID-19 on 01/28  Admitted on Mild protocol, with no oxygen requirement   Was discharged home on 1/29  Remains with no oxygen requirements since admission  Monitor respiratory status closely

## 2022-02-07 ENCOUNTER — TELEPHONE (OUTPATIENT)
Dept: FAMILY MEDICINE CLINIC | Facility: CLINIC | Age: 77
End: 2022-02-07

## 2022-02-07 ENCOUNTER — APPOINTMENT (INPATIENT)
Dept: NON INVASIVE DIAGNOSTICS | Facility: HOSPITAL | Age: 77
DRG: 314 | End: 2022-02-07
Payer: COMMERCIAL

## 2022-02-07 ENCOUNTER — APPOINTMENT (INPATIENT)
Dept: RADIOLOGY | Facility: HOSPITAL | Age: 77
DRG: 314 | End: 2022-02-07
Payer: COMMERCIAL

## 2022-02-07 LAB
ANION GAP SERPL CALCULATED.3IONS-SCNC: 4 MMOL/L (ref 4–13)
AORTIC ROOT: 3.1 CM
APICAL FOUR CHAMBER EJECTION FRACTION: 59 %
ATRIAL RATE: 83 BPM
BASOPHILS # BLD MANUAL: 0 THOUSAND/UL (ref 0–0.1)
BASOPHILS NFR MAR MANUAL: 0 % (ref 0–1)
BUN SERPL-MCNC: 22 MG/DL (ref 5–25)
CALCIUM SERPL-MCNC: 8.3 MG/DL (ref 8.3–10.1)
CHLORIDE SERPL-SCNC: 101 MMOL/L (ref 100–108)
CO2 SERPL-SCNC: 28 MMOL/L (ref 21–32)
CREAT SERPL-MCNC: 0.86 MG/DL (ref 0.6–1.3)
E WAVE DECELERATION TIME: 324 MS
EOSINOPHIL # BLD MANUAL: 0.1 THOUSAND/UL (ref 0–0.4)
EOSINOPHIL NFR BLD MANUAL: 1 % (ref 0–6)
ERYTHROCYTE [DISTWIDTH] IN BLOOD BY AUTOMATED COUNT: 12.6 % (ref 11.6–15.1)
FRACTIONAL SHORTENING: 42 % (ref 28–44)
GFR SERPL CREATININE-BSD FRML MDRD: 83 ML/MIN/1.73SQ M
GIANT PLATELETS BLD QL SMEAR: PRESENT
GLUCOSE SERPL-MCNC: 87 MG/DL (ref 65–140)
HCT VFR BLD AUTO: 43.3 % (ref 36.5–49.3)
HGB BLD-MCNC: 14.4 G/DL (ref 12–17)
INTERVENTRICULAR SEPTUM IN DIASTOLE (PARASTERNAL SHORT AXIS VIEW): 1.2 CM (ref 0.48–0.89)
LEFT ATRIUM AREA SYSTOLE SINGLE PLANE A4C: 13.7 CM2
LEFT ATRIUM SIZE: 2.3 CM
LEFT INTERNAL DIMENSION IN SYSTOLE: 1.9 CM (ref 2.1–4)
LEFT VENTRICULAR INTERNAL DIMENSION IN DIASTOLE: 3.3 CM (ref 3.64–5.42)
LEFT VENTRICULAR POSTERIOR WALL IN END DIASTOLE: 1.3 CM (ref 0.46–0.88)
LEFT VENTRICULAR STROKE VOLUME: 33 ML
LG PLATELETS BLD QL SMEAR: PRESENT
LYMPHOCYTES # BLD AUTO: 2.78 THOUSAND/UL (ref 0.6–4.47)
LYMPHOCYTES # BLD AUTO: 27 % (ref 14–44)
MCH RBC QN AUTO: 28.8 PG (ref 26.8–34.3)
MCHC RBC AUTO-ENTMCNC: 33.3 G/DL (ref 31.4–37.4)
MCV RBC AUTO: 87 FL (ref 82–98)
METAMYELOCYTES NFR BLD MANUAL: 1 % (ref 0–1)
MONOCYTES # BLD AUTO: 0.21 THOUSAND/UL (ref 0–1.22)
MONOCYTES NFR BLD: 2 % (ref 4–12)
MV E'TISSUE VEL-SEP: 6 CM/S
MV PEAK A VEL: 0.83 M/S
MV PEAK E VEL: 85 CM/S
MV STENOSIS PRESSURE HALF TIME: 0 MS
NEUTROPHILS # BLD MANUAL: 6.89 THOUSAND/UL (ref 1.85–7.62)
NEUTS BAND NFR BLD MANUAL: 8 % (ref 0–8)
NEUTS SEG NFR BLD AUTO: 59 % (ref 43–75)
P AXIS: 74 DEGREES
PLATELET # BLD AUTO: 273 THOUSANDS/UL (ref 149–390)
PLATELET BLD QL SMEAR: ADEQUATE
PMV BLD AUTO: 10.9 FL (ref 8.9–12.7)
POTASSIUM SERPL-SCNC: 3.6 MMOL/L (ref 3.5–5.3)
PR INTERVAL: 140 MS
QRS AXIS: 42 DEGREES
QRSD INTERVAL: 82 MS
QT INTERVAL: 400 MS
QTC INTERVAL: 470 MS
RBC # BLD AUTO: 5 MILLION/UL (ref 3.88–5.62)
RBC MORPH BLD: NORMAL
RIGHT ATRIUM AREA SYSTOLE A4C: 15.4 CM2
RIGHT VENTRICLE ID DIMENSION: 2.7 CM
SL CV LV EF: 60
SL CV PED ECHO LEFT VENTRICLE DIASTOLIC VOLUME (MOD BIPLANE) 2D: 44 ML
SL CV PED ECHO LEFT VENTRICLE SYSTOLIC VOLUME (MOD BIPLANE) 2D: 11 ML
SODIUM SERPL-SCNC: 133 MMOL/L (ref 136–145)
T WAVE AXIS: 80 DEGREES
TR MAX PG: 22 MMHG
TRICUSPID ANNULAR PLANE SYSTOLIC EXCURSION: 2.1 CM
TRICUSPID VALVE PEAK REGURGITATION VELOCITY: 2.35 M/S
VARIANT LYMPHS # BLD AUTO: 2 %
VENTRICULAR RATE: 83 BPM
WBC # BLD AUTO: 10.28 THOUSAND/UL (ref 4.31–10.16)
Z-SCORE OF INTERVENTRICULAR SEPTUM IN END DIASTOLE: 4.86
Z-SCORE OF LEFT VENTRICULAR DIMENSION IN END SYSTOLE: -2.97
Z-SCORE OF LEFT VENTRICULAR POSTERIOR WALL IN END DIASTOLE: 5.92

## 2022-02-07 PROCEDURE — 92610 EVALUATE SWALLOWING FUNCTION: CPT

## 2022-02-07 PROCEDURE — 85027 COMPLETE CBC AUTOMATED: CPT | Performed by: FAMILY MEDICINE

## 2022-02-07 PROCEDURE — C9113 INJ PANTOPRAZOLE SODIUM, VIA: HCPCS | Performed by: FAMILY MEDICINE

## 2022-02-07 PROCEDURE — 97116 GAIT TRAINING THERAPY: CPT

## 2022-02-07 PROCEDURE — 85007 BL SMEAR W/DIFF WBC COUNT: CPT | Performed by: FAMILY MEDICINE

## 2022-02-07 PROCEDURE — 99223 1ST HOSP IP/OBS HIGH 75: CPT | Performed by: INTERNAL MEDICINE

## 2022-02-07 PROCEDURE — 93306 TTE W/DOPPLER COMPLETE: CPT

## 2022-02-07 PROCEDURE — 93306 TTE W/DOPPLER COMPLETE: CPT | Performed by: INTERNAL MEDICINE

## 2022-02-07 PROCEDURE — 99231 SBSQ HOSP IP/OBS SF/LOW 25: CPT | Performed by: FAMILY MEDICINE

## 2022-02-07 PROCEDURE — 97110 THERAPEUTIC EXERCISES: CPT

## 2022-02-07 PROCEDURE — 93010 ELECTROCARDIOGRAM REPORT: CPT | Performed by: INTERNAL MEDICINE

## 2022-02-07 PROCEDURE — 80048 BASIC METABOLIC PNL TOTAL CA: CPT | Performed by: FAMILY MEDICINE

## 2022-02-07 RX ORDER — POLYETHYLENE GLYCOL 3350 17 G/17G
17 POWDER, FOR SOLUTION ORAL DAILY PRN
Status: DISCONTINUED | OUTPATIENT
Start: 2022-02-07 | End: 2022-02-08 | Stop reason: HOSPADM

## 2022-02-07 RX ORDER — COLCHICINE 0.6 MG/1
0.6 TABLET ORAL 2 TIMES DAILY
Status: DISCONTINUED | OUTPATIENT
Start: 2022-02-07 | End: 2022-02-08 | Stop reason: HOSPADM

## 2022-02-07 RX ORDER — IBUPROFEN 400 MG/1
400 TABLET ORAL EVERY 6 HOURS SCHEDULED
Status: DISCONTINUED | OUTPATIENT
Start: 2022-02-07 | End: 2022-02-08

## 2022-02-07 RX ORDER — KETOROLAC TROMETHAMINE 30 MG/ML
15 INJECTION, SOLUTION INTRAMUSCULAR; INTRAVENOUS ONCE
Status: COMPLETED | OUTPATIENT
Start: 2022-02-07 | End: 2022-02-07

## 2022-02-07 RX ORDER — PANTOPRAZOLE SODIUM 40 MG/1
40 TABLET, DELAYED RELEASE ORAL
Status: DISCONTINUED | OUTPATIENT
Start: 2022-02-07 | End: 2022-02-08 | Stop reason: HOSPADM

## 2022-02-07 RX ORDER — SUCRALFATE 1 G/1
1 TABLET ORAL
Status: DISCONTINUED | OUTPATIENT
Start: 2022-02-07 | End: 2022-02-08 | Stop reason: HOSPADM

## 2022-02-07 RX ADMIN — IBUPROFEN 400 MG: 400 TABLET ORAL at 17:20

## 2022-02-07 RX ADMIN — HEPARIN SODIUM 5000 UNITS: 5000 INJECTION INTRAVENOUS; SUBCUTANEOUS at 05:14

## 2022-02-07 RX ADMIN — NIFEDIPINE 10 MG: 10 CAPSULE ORAL at 15:26

## 2022-02-07 RX ADMIN — ASPIRIN 81 MG: 81 TABLET, COATED ORAL at 08:14

## 2022-02-07 RX ADMIN — KETOROLAC TROMETHAMINE 15 MG: 30 INJECTION, SOLUTION INTRAMUSCULAR at 09:40

## 2022-02-07 RX ADMIN — COLCHICINE 0.6 MG: 0.6 TABLET, FILM COATED ORAL at 09:40

## 2022-02-07 RX ADMIN — ONDANSETRON 4 MG: 2 INJECTION INTRAMUSCULAR; INTRAVENOUS at 19:57

## 2022-02-07 RX ADMIN — NIFEDIPINE 10 MG: 10 CAPSULE ORAL at 21:01

## 2022-02-07 RX ADMIN — SUCRALFATE 1 G: 1 TABLET ORAL at 21:01

## 2022-02-07 RX ADMIN — SUCRALFATE 1 G: 1 TABLET ORAL at 08:14

## 2022-02-07 RX ADMIN — SUCRALFATE 1 G: 1 TABLET ORAL at 15:26

## 2022-02-07 RX ADMIN — NIFEDIPINE 10 MG: 10 CAPSULE ORAL at 05:13

## 2022-02-07 RX ADMIN — POLYETHYLENE GLYCOL 3350 17 G: 17 POWDER, FOR SOLUTION ORAL at 20:59

## 2022-02-07 RX ADMIN — HEPARIN SODIUM 5000 UNITS: 5000 INJECTION INTRAVENOUS; SUBCUTANEOUS at 21:01

## 2022-02-07 RX ADMIN — HEPARIN SODIUM 5000 UNITS: 5000 INJECTION INTRAVENOUS; SUBCUTANEOUS at 15:26

## 2022-02-07 RX ADMIN — PANTOPRAZOLE SODIUM 40 MG: 40 TABLET, DELAYED RELEASE ORAL at 15:26

## 2022-02-07 RX ADMIN — OXYCODONE HYDROCHLORIDE AND ACETAMINOPHEN 1000 MG: 500 TABLET ORAL at 08:14

## 2022-02-07 RX ADMIN — LIDOCAINE 5% 1 PATCH: 700 PATCH TOPICAL at 08:18

## 2022-02-07 RX ADMIN — COLCHICINE 0.6 MG: 0.6 TABLET, FILM COATED ORAL at 17:20

## 2022-02-07 RX ADMIN — IBUPROFEN 600 MG: 600 TABLET, FILM COATED ORAL at 05:14

## 2022-02-07 RX ADMIN — PANTOPRAZOLE SODIUM 40 MG: 40 INJECTION, POWDER, FOR SOLUTION INTRAVENOUS at 08:14

## 2022-02-07 RX ADMIN — Medication 2000 UNITS: at 08:14

## 2022-02-07 NOTE — CONSULTS
Consultation - Cardiology   Elsy Chandler 64 y o  male MRN: [id number]  Unit/Bed#: 423-01 Encounter: [id number]    Inpatient consult to Cardiology  Consult performed by: Laina Vazquez DO  Consult ordered by: Klaus Cortez MD          History of Present Illness   Physician Requesting Consult: Klaus Cortez MD  Reason for Consult / Principal Problem: Chest pain      Assessment:  1  Acute pericarditis secondary to #2  2  COVID-19 pneumonia   3  Mitral valve prolapse with trace mitral regurgitation - 4/2021  4  Dyslipidemia        Assessment/ Plan:  Mr Jd José is a pleasant 66-year-old gentleman a known history of COVID-19 pneumonia admitted with chest pain  Features of his chest pain are consistent with pericarditis  He reports worsening with inspiration and improvement with leaning forward  He does have an elevated CRP albeit in the setting of COVID  He has noted some improvement with ibuprofen  Continue ibuprofen 600 mg t i d  for one month along with PPI and then taper  Add colchicine 0 6 mg b i d  for three months  I will also administer one dose of IV Toradol given ongoing discomfort  He underwent an echocardiogram earlier today which will be reviewed to evaluate for a pericardial effusion  He has a known history of mitral valve prolapse as well  HPI: Hay Bernal is a 64y o  year old male who has a history of mitral valve prolapse and dyslipidemia admitted to [address] [place] [address]  with intermittent chest pain  He was diagnosed with COVID-19 on the 28th of January  He was admitted and treated with mild COVID pathway  He was discharged home a short while later and continued with generalized malaise and fatigue and intermittent chest pain  He reports intermittent tightness in his upper chest which is  worse with deep breathing  It is not necessarily worse with lying flat but seems to improve with leaning forward    He also reports he feels better when he is up walking around  He was readmitted for further evaluation and management  He is currently being treated with supportive therapy for COVID  Due to the suspicion of pericarditis a cardiology consultation has been sought  He was noted to have an elevated CRP  He was placed on oral ibuprofen over the weekend with some improvement although his discomfort persists  Prior to admission to the hospital his diagnosis of COVID he reports that he is active  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  With the activity he performs he is asymptomatic  He denies any exertional chest pain or shortness of breath  He denies signs or symptoms of volume overload including lower extremity edema, paroxysmal nocturnal dyspnea, orthopnea, acute weight gain or increasing abdominal girth  He denies lightheadedness, syncope or presyncope  He denies palpitations  He denies symptoms of claudication  He is followed by Dr Yonis Mike for his history of mitral valve prolapse and dyslipidemia  ROS:  Positive as per the HPI, all other systems were reviewed and were negative  Historical Information      Past Medical History:   Diagnosis Date    Coronary artery disease     History of echocardiogram 03/22/2018    Normal EF, normal LVSF  Thick and rebundent MV leaflets w/ mild posterior leaflet prolapse w/ trace regurg      Hyperlipidemia     Lyme disease     Mitral valve prolapse     Shingles      Past Surgical History:   Procedure Laterality Date    APPENDECTOMY      EYE SURGERY      HERNIA REPAIR      TONSILLECTOMY       Social History     Substance and Sexual Activity   Alcohol Use Never    Alcohol/week: 0 0 standard drinks     Social History     Substance and Sexual Activity   Drug Use Never     Social History     Tobacco Use   Smoking Status Never Smoker   Smokeless Tobacco Never Used     Family History: non-contributory    Meds/Allergies   all current active meds have been reviewed       No Known Allergies    Objective   Vitals: Blood pressure 127/72, pulse 67, temperature 97 8 °F (36 6 °C), resp  rate 18, height 5' 8" (1 727 m), weight 48 6 kg (107 lb 2 3 oz), SpO2 99 %  , Body mass index is 16 29 kg/m² ,   Orthostatic Blood Pressures      Most Recent Value   Blood Pressure 127/72 filed at 02/07/2022 0512   Patient Position - Orthostatic VS Lying filed at 02/07/2022 2230        Systolic (97QFO), BBO:286 , Min:122 , YJA:496     Diastolic (13MCX), PVC:42, Min:69, Max:75      Intake/Output Summary (Last 24 hours) at 2/7/2022 0654  Last data filed at 2/7/2022 0524  Gross per 24 hour   Intake 960 ml   Output 2150 ml   Net -1190 ml       Weight (last 2 days)     Date/Time Weight    02/07/22 0536 48 6 (107 14)    02/06/22 0548 49 9 (110 01)    02/05/22 0534 52 (114 64)    02/05/22 0456 52 (114 64)    02/05/22 0227 52 (114 64)          Invasive Devices  Report    Peripheral Intravenous Line            Peripheral IV 02/05/22 Right Hand 1 day                    Physical Exam: /72 (BP Location: Right arm)   Pulse 67   Temp 97 8 °F (36 6 °C)   Resp 18   Ht 5' 8" (1 727 m)   Wt 48 6 kg (107 lb 2 3 oz)   SpO2 99%   BMI 16 29 kg/m²     General Appearance:    Alert, cooperative, no distress, appears stated age   Head:    Normocephalic, without obvious abnormality, atraumatic   Eyes:    PERRL, conjunctiva/corneas clear, EOM's intact   Throat:   Lips, mucosa, and tongue normal; teeth and gums normal   Neck:   Supple, symmetrical, trachea midline, no adenopathy;        thyroid:  No enlargement/tenderness/nodules; no carotid    bruit or JVD   Back:     Symmetric, no curvature, ROM normal, no CVA tenderness   Lungs:     Coarse breath sounds bilaterally   Chest wall:    No tenderness or deformity   Heart:    Regular rate and rhythm, S1 and S2 normal, 2/6 holosystolic   murmur at apex   Abdomen:     Soft, non-tender, bowel sounds active all four quadrants,     no masses, no organomegaly   Extremities:   Extremities normal, atraumatic, no cyanosis or edema   Pulses:   2+ and symmetric all extremities   Skin:   Skin color, texture, turgor normal, no rashes or lesions   Lymph nodes:   Cervical, supraclavicular, and axillary nodes normal   Neurologic:   CNII-XII intact  Normal strength, sensation and reflexes       throughout           Laboratory Results:        CBC with diff:   Results from last 7 days   Lab Units 02/07/22  0430 02/06/22  0638 02/05/22  0433   WBC Thousand/uL 10 28* 8 85 11 82*   HEMOGLOBIN g/dL 14 4 13 8 13 7   HEMATOCRIT % 43 3 42 0 41 2   MCV fL 87 89 86   PLATELETS Thousands/uL 273 241 273   MCH pg 28 8 29 1 28 5   MCHC g/dL 33 3 32 9 33 3   RDW % 12 6 12 5 12 3   MPV fL 10 9 10 4 10 4   NRBC AUTO /100 WBCs  --  0  --          CMP:  Results from last 7 days   Lab Units 02/07/22  0430 02/06/22  3469 02/05/22 0433 02/04/22 2115 02/04/22 2115   POTASSIUM mmol/L 3 6 4 0 3 8   < > 4 1   CHLORIDE mmol/L 101 103 100   < > 96*   CO2 mmol/L 28 28 28   < > 28   BUN mg/dL 22 24 25   < > 29*   CREATININE mg/dL 0 86 0 93 0 86   < > 1 18   CALCIUM mg/dL 8 3 8 1* 8 1*   < > 9 3   AST U/L  --  21 21  --  28   ALT U/L  --  30 27  --  44   ALK PHOS U/L  --  47 48  --  63   EGFR ml/min/1 73sq m 83 78 83   < > 58    < > = values in this interval not displayed  BMP:  Results from last 7 days   Lab Units 02/07/22  0430 02/06/22  0638 02/05/22 0433   POTASSIUM mmol/L 3 6 4 0 3 8   CHLORIDE mmol/L 101 103 100   CO2 mmol/L 28 28 28   BUN mg/dL 22 24 25   CREATININE mg/dL 0 86 0 93 0 86   CALCIUM mg/dL 8 3 8 1* 8 1*       BNP:  No results for input(s): BNP in the last 72 hours      Magnesium:   Results from last 7 days   Lab Units 02/05/22 0433 02/04/22 2115   MAGNESIUM mg/dL 2 0 2 1       Coags:   Results from last 7 days   Lab Units 02/04/22  2115   PTT seconds 23   INR  1 02       Cardiac testing:   Results for orders placed during the hospital encounter of 04/29/21    Echo complete with contrast if indicated    Narrative  [facility], [address] [place] [address]    Transthoracic Echocardiogram  2D, M-mode, Doppler, and Color Doppler    Study date:  2021    Patient: Lalo Kelly  MR number: [id number]  Account number: [de-identified]  : 1945  Age: 64 years  Gender: Male  Status: Outpatient  Location: [facility] [place]  Height: 68 in  Weight: 134 lb  BP: 128/ 72 mmHg    Indications: Mitral Valve Disease    Diagnoses: I34 1 - Nonrheumatic mitral (valve) prolapse    Sonographer:  Sherren Lawrence, RDCS  Interpreting Physician: LONI Fritz  Primary Physician:  Donny Cagle DO  Referring Physician: LONI Fritz  Group:  [facility]    SUMMARY    LEFT VENTRICLE:  Systolic function was normal  Ejection fraction was estimated to be 65 %  There were no regional wall motion abnormalities  MITRAL VALVE:  There was a mild prolapse involving the posterior leaflet  There was trace regurgitation  TRICUSPID VALVE:  There was trace regurgitation  HISTORY: PRIOR HISTORY: Lyme Disease, Shingles, Hyperlipidemia    PROCEDURE: The study was performed in the [facility] and Vascular Center  This was a routine study  The transthoracic approach was used  The study included complete 2D imaging, M-mode, complete spectral Doppler, and color Doppler  The  heart rate was 68 bpm, at the start of the study  Image quality was adequate  LEFT VENTRICLE: Size was normal  Systolic function was normal  Ejection fraction was estimated to be 65 %  There were no regional wall motion abnormalities  Wall thickness was normal  No evidence of apical thrombus   DOPPLER: Left  ventricular diastolic function parameters were normal     RIGHT VENTRICLE: The size was normal  Systolic function was normal  Wall thickness was normal     LEFT ATRIUM: Size was normal     RIGHT ATRIUM: Size was normal     MITRAL VALVE: Valve structure was normal  There was mild diffuse thickening  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  There was normal leaflet separation  There was a mild prolapse involving the posterior leaflet  DOPPLER: The transmitral velocity was within the normal range  There was no evidence for stenosis  There was trace regurgitation  AORTIC VALVE: The valve was trileaflet  Leaflets exhibited normal thickness and normal cuspal separation  DOPPLER: Transaortic velocity was within the normal range  There was no evidence for stenosis  There was no significant  regurgitation  TRICUSPID VALVE: The valve structure was normal  There was normal leaflet separation  DOPPLER: The transtricuspid velocity was within the normal range  There was no evidence for stenosis  There was trace regurgitation  PULMONIC VALVE: Leaflets exhibited normal thickness, no calcification, and normal cuspal separation  DOPPLER: The transpulmonic velocity was within the normal range  There was no significant regurgitation  PERICARDIUM: There was no pericardial effusion  The pericardium was normal in appearance  AORTA: The root exhibited normal size  SYSTEMIC VEINS: IVC: The inferior vena cava was normal in size  SYSTEM MEASUREMENT TABLES    2D  %FS: 54 47 %  Ao Diam: 3 26 cm  EDV(Teich): 95 95 ml  EF(Teich): 85 31 %  ESV(Teich): 14 09 ml  IVSd: 0 86 cm  LA Area: 18 45 cm2  LA Diam: 3 01 cm  LVEDV MOD A4C: 66 19 ml  LVEF MOD A4C: 49 13 %  LVESV MOD A4C: 33 67 ml  LVIDd: 4 57 cm  LVIDs: 2 08 cm  LVLd A4C: 6 6 cm  LVLs A4C: 5 78 cm  LVPWd: 0 81 cm  RA Area: 14 92 cm2  RVIDd: 2 94 cm  SV MOD A4C: 32 52 ml  SV(Teich): 81 86 ml    CW  AV Env  Ti: 317 16 ms  AV VTI: 25 88 cm  AV Vmax: 1 17 m/s  AV Vmean: 0 82 m/s  AV maxP 47 mmHg  AV meanPG: 3 04 mmHg  TR Vmax: 2 49 m/s  TR maxP 78 mmHg    MM  TAPSE: 2 2 cm    PW  E' Sept: 0 08 m/s  E/E' Sept: 10 62  LVOT Env  Ti: 293 05 ms  LVOT VTI: 20 92 cm  LVOT Vmax: 1 03 m/s  LVOT Vmean: 0 71 m/s  LVOT maxP 25 mmHg  LVOT meanP 3 mmHg  MV A Ghassan: 0 87 m/s  MV Dec [place]: 3 86 m/s2  MV DecT: 211 58 ms  MV E Ghassan: 0 82 m/s  MV E/A Ratio: 0 94  MV PHT: 61 36 ms  MVA By PHT: 3 59 cm2    [facility] Accredited Echocardiography Laboratory    Prepared and electronically signed by    LONI Nicholas  Signed 2021 11:42:34    No results found for this or any previous visit  No results found for this or any previous visit  No results found for this or any previous visit  Imaging: I have personally reviewed pertinent reports  XR chest 1 view portable    Result Date: 2022  Narrative: CHEST INDICATION:   sob  Chest pain Patient has confirmed COVID-19  COMPARISON:  2022 EXAM PERFORMED/VIEWS:  XR CHEST PORTABLE 1 image FINDINGS: Cardiomediastinal silhouette appears unremarkable  The lungs are clear  No pneumothorax or pleural effusion  Osseous structures appear within normal limits for patient age  Impression: No acute cardiopulmonary disease  Workstation performed: [id number]     XR chest 1 view portable    Result Date: 2022  Narrative: CHEST INDICATION:   weakness  COMPARISON:  2015 EXAM PERFORMED/VIEWS:  XR CHEST PORTABLE FINDINGS: Cardiomediastinal silhouette appears unremarkable  The lungs are clear  No pneumothorax or pleural effusion  Osseous structures appear within normal limits for patient age  Impression: No acute cardiopulmonary disease  Workstation performed: [id number]     PE Study with CT Abdomen and Pelvis with contrast    Result Date: 2022  Narrative: CT PULMONARY ANGIOGRAM OF THE CHEST AND CT ABDOMEN AND PELVIS WITH INTRAVENOUS CONTRAST INDICATION:   weakness, elevated dimer, elevated lactic  Patient has confirmed COVID-19  COMPARISON:  CT of the chest on 2022  TECHNIQUE:  CT examination of the chest, abdomen and pelvis was performed    Thin section CT angiographic technique was used in the chest in order to evaluate for pulmonary embolus and coronal 3D MIP postprocessing was performed on the acquisition scanner  Axial, sagittal, and coronal 2D reformatted images were created from the source data and submitted for interpretation  Radiation dose length product (DLP) for this visit:  658 94 mGy-cm   This examination, like all CT scans performed in the [facility], was performed utilizing techniques to minimize radiation dose exposure, including the use of iterative  reconstruction and automated exposure control  IV Contrast:  100 mL of iohexol (OMNIPAQUE) Enteric Contrast:  Enteric contrast was not administered  FINDINGS: CHEST PULMONARY ARTERIAL TREE:  No pulmonary embolus is seen  LUNGS:  No significant focal consolidation  There is no tracheal or endobronchial lesion  PLEURA:  Unremarkable  HEART/AORTA:  The heart is normal in size  Coronary artery calcification  No pericardial effusion  No thoracic aortic aneurysm  MEDIASTINUM AND COTY:  Unremarkable  CHEST WALL AND LOWER NECK:   Unremarkable  ABDOMEN LIVER/BILIARY TREE:  Unremarkable  GALLBLADDER:  No calcified gallstones  No pericholecystic inflammatory change  SPLEEN:  Unremarkable  PANCREAS:  Unremarkable  ADRENAL GLANDS:  Unremarkable  KIDNEYS/URETERS:  Unremarkable  No hydronephrosis  STOMACH AND BOWEL:  No bowel obstruction  APPENDIX:  No findings to suggest appendicitis  ABDOMINOPELVIC CAVITY:  No ascites  No pneumoperitoneum  No lymphadenopathy  VESSELS:  Unremarkable for patient's age  PELVIS REPRODUCTIVE ORGANS:  Enlarged prostate  URINARY BLADDER:  Unremarkable  ABDOMINAL WALL/INGUINAL REGIONS:  Unremarkable  OSSEOUS STRUCTURES:  No acute fracture or destructive osseous lesion  Impression: 1  No evidence of pulmonary embolism  2   No focal consolidation of the lungs  3   Coronary artery calcifications  4   Enlarged prostate   Workstation performed: [id number]     CTA ED chest PE study    Result Date: 1/28/2022  Narrative: CTA - CHEST WITH IV CONTRAST - PULMONARY ANGIOGRAM INDICATION:   worsening dyspnea; cxr read as normal  Patient has confirmed COVID-19  COMPARISON: CT chest 5/6/2015  TECHNIQUE: CTA examination of the chest was performed using angiographic technique according to a protocol specifically tailored to evaluate for pulmonary embolism  Axial, sagittal, and coronal 2D reformatted images were created from the source data and  submitted for interpretation  In addition, coronal 3D MIP postprocessing was performed on the acquisition scanner  Radiation dose length product (DLP) for this visit:  190 16 mGy-cm   This examination, like all CT scans performed in the [facility], was performed utilizing techniques to minimize radiation dose exposure, including the use of iterative  reconstruction and automated exposure control  IV Contrast:  85 mL of iohexol (OMNIPAQUE)  FINDINGS: PULMONARY ARTERIAL TREE:  No pulmonary arterial embolism as visualized  Much of the lower lobe subsegmental level vasculature is obscured by motion artifact  LUNGS: A few scattered peripheral groundglass opacities in the right lower lobe, #3/97 may represent mild Covid pneumonia  Discoid left basilar opacity is likely to represent atelectasis No endotracheal or endobronchial lesion  PLEURA:  Unremarkable  HEART/GREAT VESSELS:  Unremarkable for patient's age  MEDIASTINUM AND COTY:  Unremarkable  CHEST WALL AND LOWER NECK:   Unremarkable  VISUALIZED STRUCTURES IN THE UPPER ABDOMEN:  Unremarkable  OSSEOUS STRUCTURES:  No acute fracture or destructive osseous lesion  Impression: A few subtle peripheral groundglass opacities in the right lower lobe may represent Covid 19 pneumonia in this patient with confirmed Covid 19  Predominantly discoid left lower lobe density in keeping with atelectasis  No pulmonary arterial embolism as visualized  Much of the lower lobe segmental level vasculature is obscured by motion artifact   Workstation performed: [id number]       EKG reviewed personally:  Normal sinus rhythm, septal infarct, nonspecific ST and T-wave abnormality  Counseling / Coordination of Care  Total floor / unit time spent today 60 minutes  Greater than 50% of total time was spent with the patient and / or family counseling and / or coordination of care        Code Status: Level 1 - Full Code

## 2022-02-07 NOTE — PHYSICAL THERAPY NOTE
PHYSICAL THERAPY NOTE          Patient Name: Carolin DUTTONH'U Date: 2/7/2022 02/07/22 1144   Note Type   Note Type Treatment   Pain Assessment   Pain Assessment Tool 0-10   Pain Score No Pain   Restrictions/Precautions   Weight Bearing Precautions Per Order No   Other Precautions Contact/isolation; Airborne/isolation;Multiple lines   General   Response to Previous Treatment Patient with no complaints from previous session  Family/Caregiver Present No   Cognition   Overall Cognitive Status WFL   Arousal/Participation Alert; Cooperative   Following Commands Follows all commands and directions without difficulty   Subjective   Subjective Reports he doesn't feel well  Would like to ambulate  Bed Mobility   Additional Comments seated EOB start and end of PT session   Transfers   Sit to Stand 5  Supervision   Additional items Armrests   Stand to Sit 5  Supervision   Additional items Armrests   Stand pivot 5  Supervision   Ambulation/Elevation   Gait pattern   (Foward flexed; Excessively slow)   Gait Assistance 5  Supervision   Assistive Device Rolling walker   Distance 250'   Balance   Static Sitting Good   Dynamic Sitting Fair +   Static Standing Fair +   Dynamic Standing Fair   Ambulatory Fair   Endurance Deficit   Endurance Deficit Yes   Activity Tolerance   Activity Tolerance Patient limited by fatigue   Exercises   Hip Flexion Sitting;20 reps   Hip Abduction Sitting;20 reps   Hip Adduction Sitting;20 reps   Knee AROM Long Arc Quad Sitting;20 reps   Ankle Pumps Sitting;20 reps   Assessment   Prognosis Good   Problem List Decreased strength;Decreased endurance; Impaired balance;Decreased mobility   Assessment Pt  seen for PT treatment session this date with interventions consisting of  therapeutic exercises, bed mobility, transfers and  gait training w/ emphasis on improving pt's ability to ambulate   Pt  Currently performing tx and ambulation at ( SUP) x 1 level of function  Please also refer to physical therapy recommendation for safe DC planning  In comparison to previous session, Pt  With improvements in activity tolerance  Limited by weakness and fatigue  Vitals WFL's RA  Pt is in need of continued activity in PT to improve strength balance endurance mobility transfers and ambulation with return to maximize LOF  From PT/mobility standpoint, recommendation at time of d/c would be HH  in order to promote return to PLOF and independence  The patient's [facility] Basic Mobility Inpatient Short Form Raw Score is 23  A Raw score of greater than 16 suggests the patient may benefit from discharge to home  Goals   LTG Expiration Date 02/19/22   Plan   Treatment/Interventions Functional transfer training;LE strengthening/ROM; Therapeutic exercise; Endurance training;Bed mobility;Gait training   Progress Progressing toward goals   Recommendation   PT Discharge Recommendation Home with outpatient rehabilitation   [facility] Basic Mobility Inpatient   Turning in Bed Without Bedrails 4   Lying on Back to Sitting on Edge of Flat Bed 4   Moving Bed to Chair 4   Standing Up From Chair 4   Walk in Room 4   Climb 3-5 Stairs 3   Basic Mobility Inpatient Raw Score 23   Basic Mobility Standardized Score 50 88   Highest Level Of Mobility   -[facility] Goal 7: Walk 25 feet or more

## 2022-02-07 NOTE — PROGRESS NOTES
[address] [facility] [address]  Progress Note Foreign Chandler 1945, 64 y o  male MRN: [id number]  Unit/Bed#: 423-01 Encounter: [id number]  Primary Care Provider: Cristina Van DO   Date and time admitted to hospital: 2/4/2022  8:54 PM    * Other chest pain  Assessment & Plan  - ACS ruled out with negative troponin  - patient reports improvement in pain  - With recent COVID infection and some non-specific ST changes on EKG there is a possibility of pericarditis with reported improvement on movement and leaning forward consistent with pericarditis  Discussed with Cardiology on call, not clear cut pericarditis, only mild CRP elevation (although may be nonspecific with recent COVID)  Trial of NSAIDs - ibuprofen - hold off from colchicine  - 2D Echo ordered  - Cardiology consulted   - Symptoms associated with difficulty swallowing and belching suggestive of GI origin, consider esophageal spasm  -likely due to pericarditis and has since resolved, continue colcrys and NSAID, added PI for Gi protection  Follow up echo    COVID-19 virus infection  Assessment & Plan  Tested positive for COVID-19 on 01/28  Admitted on Mild protocol, with no oxygen requirement  Was discharged home on 1/29  Remains with no oxygen requirements since admission  Complains of fatigue, and decreased appetite           Progress Note - Luciano Chandler 64 y o  male MRN: [id number]    Unit/Bed#: 423-01 Encounter: [id number]        Subjective:   Patient seen and examined, pain resolved but feels very fatigue sob and no appetite     Objective:     Vitals:   Vitals:    02/07/22 0737   BP: 109/59   Pulse: 72   Resp: 18   Temp:    SpO2: 98%     Body mass index is 16 27 kg/m²      Intake/Output Summary (Last 24 hours) at 2/7/2022 1208  Last data filed at 2/7/2022 0825  Gross per 24 hour   Intake 960 ml   Output 2150 ml   Net -1190 ml       Physical Exam:   /59   Pulse 72   Temp 97 8 °F (36 6 °C)   Resp 18   Ht 5' 8" (1 727 m)   Wt 48 5 kg (107 lb)   SpO2 98%   BMI 16 27 kg/m²   General appearance: alert and oriented, in no acute distress  Head: Normocephalic, without obvious abnormality, atraumatic  Lungs: clear to auscultation bilaterally  Heart: regular rate and rhythm, S1, S2 normal, no murmur, click, rub or gallop  Abdomen: soft, non-tender; bowel sounds normal; no masses,  no organomegaly  Extremities: extremities normal, warm and well-perfused; no cyanosis, clubbing, or edema  Pulses: 2+ and symmetric  Neurologic: Grossly normal     Invasive Devices  Report    Peripheral Intravenous Line            Peripheral IV 02/05/22 Right Hand 1 day                Results from last 7 days   Lab Units 02/07/22  0430 02/06/22  0638 02/05/22  0433   WBC Thousand/uL 10 28* 8 85 11 82*   HEMOGLOBIN g/dL 14 4 13 8 13 7   HEMATOCRIT % 43 3 42 0 41 2   PLATELETS Thousands/uL 273 241 273       Results from last 7 days   Lab Units 02/07/22  0430 02/06/22  0638 02/05/22  0433 02/04/22  2115 02/04/22  2115   POTASSIUM mmol/L 3 6 4 0 3 8   < > 4 1   CHLORIDE mmol/L 101 103 100   < > 96*   CO2 mmol/L 28 28 28   < > 28   BUN mg/dL 22 24 25   < > 29*   CREATININE mg/dL 0 86 0 93 0 86   < > 1 18   CALCIUM mg/dL 8 3 8 1* 8 1*   < > 9 3   ALK PHOS U/L  --  47 48  --  63   ALT U/L  --  30 27  --  44   AST U/L  --  21 21  --  28    < > = values in this interval not displayed         Medication Administration - last 24 hours from 02/06/2022 1208 to 02/07/2022 1208       Date/Time Order Dose Route Action Action by     02/07/2022 0814 ascorbic acid (VITAMIN C) tablet 1,000 mg 1,000 mg Oral Given [facility]     02/07/2022 0814 aspirin (ECOTRIN LOW STRENGTH) EC tablet 81 mg 81 mg Oral Given [facility]     02/07/2022 4291 cholecalciferol (VITAMIN D3) tablet 2,000 Units 2,000 Units Oral Given [facility]     02/06/2022 1634 ondansetron (ZOFRAN) injection 4 mg 4 mg Intravenous Given Anjana Gaming RN     02/07/2022 0514 heparin (porcine) subcutaneous injection 5,000 Units 5,000 Units Subcutaneous Given [address] [facility]     02/06/2022 2119 heparin (porcine) subcutaneous injection 5,000 Units 5,000 Units Subcutaneous Given [address] [facility]     02/06/2022 1411 heparin (porcine) subcutaneous injection 5,000 Units 5,000 Units Subcutaneous [facility]     23/59/6327 0185 pantoprazole (PROTONIX) injection 40 mg 40 mg Intravenous Given Mamadou Bolds     02/07/2022 0513 NIFEdipine (PROCARDIA) capsule 10 mg 10 mg Oral Given [address] [facility]     02/06/2022 2119 NIFEdipine (PROCARDIA) capsule 10 mg 10 mg Oral Given [address] [facility]     02/06/2022 1411 NIFEdipine (PROCARDIA) capsule 10 mg 10 mg Oral Given [facility] L[facility]     50/30/3534 9362 lidocaine (LIDODERM) 5 % patch 1 patch 1 patch Topical Medication Applied Bette Aki     02/07/2022 0816 lidocaine (LIDODERM) 5 % patch 1 patch 1 patch Topical Not Given Mamadou [facility]     02/06/2022 1910 lidocaine (LIDODERM) 5 % patch 1 patch 1 patch Topical Patch Removed 37046 [facility] [place] , RN     02/07/2022 8933 ibuprofen (MOTRIN) tablet 600 mg 600 mg Oral Given [address] [facility]     02/06/2022 2119 ibuprofen (MOTRIN) tablet 600 mg 600 mg Oral Given [address] [facility]     02/06/2022 1411 ibuprofen (MOTRIN) tablet 600 mg 600 mg Oral Given Terressa Loffler, LPN     91/90/9434 8931 sucralfate (CARAFATE) tablet 1 g 1 g Oral Given Mamadou Bolds     02/07/2022 0940 ketorolac (TORADOL) injection 15 mg 15 mg Intravenous Given Mamadou Bolds     02/07/2022 0940 colchicine (COLCRYS) tablet 0 6 mg 0 6 mg Oral Given [employer], Imaging and other studies: I have personally reviewed pertinent reports      VTE Pharmacologic Prophylaxis: Enoxaparin (Lovenox)  VTE Mechanical Prophylaxis: sequential compression device     Bharat Hamilton MD  2/7/2022,12:08 PM

## 2022-02-07 NOTE — SPEECH THERAPY NOTE
Speech Language/Pathology  Speech/Language Pathology  Assessment    Patient Name: Renetta Le  RWMNN'I Date: 2/7/2022     Problem List  Principal Problem:    Other chest pain  Active Problems:    Dyslipidemia    Lactic acidosis    Elevated d-dimer    COVID-19 virus infection    Past Medical History  Past Medical History:   Diagnosis Date    Coronary artery disease     History of echocardiogram 03/22/2018    Normal EF, normal LVSF  Thick and rebundent MV leaflets w/ mild posterior leaflet prolapse w/ trace regurg   Hyperlipidemia     Lyme disease     Mitral valve prolapse     Shingles      Past Surgical History  Past Surgical History:   Procedure Laterality Date    APPENDECTOMY      EYE SURGERY      HERNIA REPAIR      TONSILLECTOMY          02/07/22 0900   Patient Information   Current Medical Covid+, pneumonia, SOB   Social/Educational/Vocational Hx Pt hospitalized 2 weeks ago, discharged, deconditioned at home 2' no PO intake   Swallow Information   Current Risks for Dysphagia & Aspiration Respiratory compromise;General debilitation   Current Symptoms/Concerns Clear throat;Recent Pneumonia;Current Pneumonia; Decreased oral intake;Weight loss;Dehydration   Current Diet Regular; Thin liquid   Baseline Diet Regular; Thin liquids   Baseline Assessment   Behavior/Cognition Alert; Cooperative; Interactive   Speech/Language Status WFL   Patient Positioning Upright in bed   Swallow Mechanism Exam   Labial Symmetry WFL   Labial Strength WFL   Labial ROM WFL   Labial Sensation WFL   Facial Symmetry WFL   Facial Strength WFL   Facial ROM WFL   Facial Sensation WFL   Lingual Symmetry WFL   Lingual Strength WFL   Lingual ROM WFL   Lingual Sensation WFL   Dentition Adequate   Volitional Cough Strong   Consistencies Assessed and Performance   Materials Admnistered Regular/Solid; Thin liquid   Oral Stage WFL   Phargngeal Stage [facility]   Swallow Mechanics WFL; Mild delayed;Swallow initation; Appears prompt;Good Larygneal rise Esophageal Concerns No s/s reported   Summary   Swallow Summary Patient received upright in bed having finished "most" of his breakfast tray  Pt continues to report intermittent dizziness, weakness throughout and deconditioning overally since Covid dx 2 weeks ago  Pt reports that once home from ED, he did not "eat much" for 2 weeks, but walked most of the day  Patient reports "I can't believe how sick I feel "  Education provided regarding Covid and vaccination status encouraged going forward as pt can become sick with Covid again  Pt was not aware  Patient tolerating reg/thin diet with improving PO intake  Swallow is functional, no overt s/s of penetration or aspiration  No ST needs warranted  Recommendations   Risk for Aspiration None   Recommendations Consider oral diet   Diet Solid Recommendation Regular consistency   Diet Liquid Recommendation Thin liquid   Recommended Form of Meds Whole; With thin liquid; With puree   General Precautions Feed only when alert;Minimize distractions;Upright as possible for all oral intake;Remain upright for 45 mins after meals   Compensatory Swallowing Strategies Alternate solids and liquids   Further Evaluations Dietician   Results Reviewed with MD;RN;PT/Family/Caregiver   Speech Therapy Prognosis   Prognosis Good   Prognosis Considerations Medical Diagnosis; Patient Participation Level

## 2022-02-07 NOTE — ASSESSMENT & PLAN NOTE
- ACS ruled out with negative troponin  - patient reports improvement in pain  - With recent COVID infection and some non-specific ST changes on EKG there is a possibility of pericarditis with reported improvement on movement and leaning forward consistent with pericarditis  Discussed with Cardiology on call, not clear cut pericarditis, only mild CRP elevation (although may be nonspecific with recent COVID)  Trial of NSAIDs - ibuprofen - hold off from colchicine  - 2D Echo ordered  - Cardiology consulted   - Symptoms associated with difficulty swallowing and belching suggestive of GI origin, consider esophageal spasm  -likely due to pericarditis and has since resolved, continue colcrys and NSAID, added PI for Gi protection   Follow up echo

## 2022-02-07 NOTE — CASE MANAGEMENT
Case Management Progress Note    Patient name Naif Chandler  Location /490-53 MRN [id number]  : 1945 Date 2022       LOS (days): 2  Geometric Mean LOS (GMLOS) (days): 4 80  Days to GMLOS:2 9        OBJECTIVE:        Current admission status: Inpatient  Preferred Pharmacy:   [facility] [address] [address]  Phone: [phone] Fax: [phone]    [address]  Phone: [phone] Fax: [phone]    [facility]   [facility] [address]  Phone: [phone] Fax: [phone]    Primary Care Provider: Destini Hyman,     Primary Insurance: [facility] RE[facility]  Secondary Insurance:     PROGRESS NOTE:Continuing to follow pt  Pt is a tentative dc home tomorrow  Plans are home on dc with OP follow  CM will f/u tomorrow re: ?medically ready for dc

## 2022-02-07 NOTE — MALNUTRITION/BMI
This medical record reflects one or more clinical indicators suggestive of malnutrition  Malnutrition Findings:   Adult Malnutrition type: Acute illness  Adult Degree of Malnutrition: Other severe protein calorie malnutrition  Malnutrition Characteristics: Inadequate energy,Weight loss (Severe PCM r/t prolong inadequate po intake for at least 2 weeks or more with significant wt loss of 13 7%  BMI low at 16  Treated with regular diet and supplements )    BMI Findings:  Adult BMI Classifications: Underweight < 18 5     Body mass index is 16 27 kg/m²  See Nutrition note dated 2/7/2022 for additional details  Completed nutrition assessment is viewable in the nutrition documentation

## 2022-02-07 NOTE — ASSESSMENT & PLAN NOTE
Tested positive for COVID-19 on 01/28  Admitted on Mild protocol, with no oxygen requirement   Was discharged home on 1/29  Remains with no oxygen requirements since admission  Complains of fatigue, and decreased appetite

## 2022-02-07 NOTE — PLAN OF CARE
Problem: PHYSICAL THERAPY ADULT  Goal: Performs mobility at highest level of function for planned discharge setting  See evaluation for individualized goals  Description: PT CARE PLAN QDZKS:[id number]  Outcome: Progressing  Note: Prognosis: Good  Problem List: Decreased strength,Decreased endurance,Impaired balance,Decreased mobility  Assessment: Pt  seen for PT treatment session this date with interventions consisting of  therapeutic exercises, bed mobility, transfers and  gait training w/ emphasis on improving pt's ability to ambulate  Pt  Currently performing  tx and ambulation at ( SUP) x 1 level of function  Please also refer to physical therapy recommendation for safe DC planning  In comparison to previous session, Pt  With improvements in activity tolerance  Limited by weakness and fatigue  Vitals WFL's RA  Pt is in need of continued activity in PT to improve strength balance endurance mobility transfers and ambulation with return to maximize LOF  From PT/mobility standpoint, recommendation at time of d/c would be HH  in order to promote return to PLOF and independence  The patient's AM-PAC Basic Mobility Inpatient Short Form Raw Score is 23  A Raw score of greater than 16 suggests the patient may benefit from discharge to home  PT Discharge Recommendation: Home with outpatient rehabilitation          See flowsheet documentation for full assessment

## 2022-02-07 NOTE — TELEPHONE ENCOUNTER
[employer] - denying ONDANSETRON, the dx for covid pneumonia not a diagnosis for coverage  ( will only accept CA like diagnosis)    Pls advise

## 2022-02-07 NOTE — PLAN OF CARE
Problem: Potential for Falls  Goal: Patient will remain free of falls  Description: INTERVENTIONS:  - Educate patient/family on patient safety including physical limitations  - Instruct patient to call for assistance with activity   - Consult OT/PT to assist with strengthening/mobility   - Keep Call bell within reach  - Keep bed low and locked with side rails adjusted as appropriate  - Keep care items and personal belongings within reach  - Initiate and maintain comfort rounds  - Make Fall Risk Sign visible to staff  - Offer Toileting every 2 Hours, in advance of need  - Initiate/Maintain bed alarm  - Obtain necessary fall risk management equipment  - Apply yellow socks and bracelet for high fall risk patients  - Consider moving patient to room near nurses station  Outcome: Progressing     Problem: Nutrition/Hydration-ADULT  Goal: Nutrient/Hydration intake appropriate for improving, restoring or maintaining nutritional needs  Description: Monitor and assess patient's nutrition/hydration status for malnutrition  Collaborate with interdisciplinary team and initiate plan and interventions as ordered  Monitor patient's weight and dietary intake as ordered or per policy  Utilize nutrition screening tool and intervene as necessary  Determine patient's food preferences and provide high-protein, high-caloric foods as appropriate       INTERVENTIONS:  - Monitor oral intake, urinary output, labs, and treatment plans  - Assess nutrition and hydration status and recommend course of action  - Evaluate amount of meals eaten  - Assist patient with eating if necessary   - Allow adequate time for meals  - Recommend/ encourage appropriate diets, oral nutritional supplements, and vitamin/mineral supplements  - Order, calculate, and assess calorie counts as needed  - Recommend, monitor, and adjust tube feedings and TPN/PPN based on assessed needs  - Assess need for intravenous fluids  - Provide specific nutrition/hydration education as appropriate  - Include patient/family/caregiver in decisions related to nutrition  Outcome: Progressing     Problem: PAIN - ADULT  Goal: Verbalizes/displays adequate comfort level or baseline comfort level  Description: Interventions:  - Encourage patient to monitor pain and request assistance  - Assess pain using appropriate pain scale  - Administer analgesics based on type and severity of pain and evaluate response  - Implement non-pharmacological measures as appropriate and evaluate response  - Consider cultural and social influences on pain and pain management  - Notify physician/advanced practitioner if interventions unsuccessful or patient reports new pain  Outcome: Progressing     Problem: INFECTION - ADULT  Goal: Absence or prevention of progression during hospitalization  Description: INTERVENTIONS:  - Assess and monitor for signs and symptoms of infection  - Monitor lab/diagnostic results  - Monitor all insertion sites, i e  indwelling lines, tubes, and drains  - Monitor endotracheal if appropriate and nasal secretions for changes in amount and color  - [place] appropriate cooling/warming therapies per order  - Administer medications as ordered  - Instruct and encourage patient and family to use good hand hygiene technique  - Identify and instruct in appropriate isolation precautions for identified infection/condition  Outcome: Progressing     Problem: SAFETY ADULT  Goal: Patient will remain free of falls  Description: INTERVENTIONS:  - Educate patient/family on patient safety including physical limitations  - Instruct patient to call for assistance with activity   - Consult OT/PT to assist with strengthening/mobility   - Keep Call bell within reach  - Keep bed low and locked with side rails adjusted as appropriate  - Keep care items and personal belongings within reach  - Initiate and maintain comfort rounds  - Make Fall Risk Sign visible to staff  - Offer Toileting every 2 Hours, in advance of need  - Initiate/Maintain bed alarm  - Obtain necessary fall risk management equipment  - Apply yellow socks and bracelet for high fall risk patients  - Consider moving patient to room near nurses station  Outcome: Progressing  Goal: Maintain or return to baseline ADL function  Description: INTERVENTIONS:  -  Assess patient's ability to carry out ADLs; assess patient's baseline for ADL function and identify physical deficits which impact ability to perform ADLs (bathing, care of mouth/teeth, toileting, grooming, dressing, etc )  - Assess/evaluate cause of self-care deficits   - Assess range of motion  - Assess patient's mobility; develop plan if impaired  - Assess patient's need for assistive devices and provide as appropriate  - Encourage maximum independence but intervene and supervise when necessary  - Involve family in performance of ADLs  - Assess for home care needs following discharge   - Consider OT consult to assist with ADL evaluation and planning for discharge  - Provide patient education as appropriate  Outcome: Progressing  Goal: Maintains/Returns to pre admission functional level  Description: INTERVENTIONS:  - Perform BMAT or MOVE assessment daily    - Set and communicate daily mobility goal to care team and patient/family/caregiver  - Collaborate with rehabilitation services on mobility goals if consulted  - Perform Range of Motion 3 times a day  - Reposition patient every 2 hours    - Dangle patient 3 times a day  - Stand patient 3 times a day  - Ambulate patient 3 times a day  - Out of bed to chair 3 times a day   - Out of bed for meals 3 times a day  - Out of bed for toileting  - Record patient progress and toleration of activity level   Outcome: Progressing     Problem: DISCHARGE PLANNING  Goal: Discharge to home or other facility with appropriate resources  Description: INTERVENTIONS:  - Identify barriers to discharge w/patient and caregiver  - Arrange for needed discharge resources and transportation as appropriate  - Identify discharge learning needs (meds, wound care, etc )  - Arrange for interpretive services to assist at discharge as needed  - Refer to Case Management Department for coordinating discharge planning if the patient needs post-hospital services based on physician/advanced practitioner order or complex needs related to functional status, cognitive ability, or social support system  Outcome: Progressing     Problem: Knowledge Deficit  Goal: Patient/family/caregiver demonstrates understanding of disease process, treatment plan, medications, and discharge instructions  Description: Complete learning assessment and assess knowledge base    Interventions:  - Provide teaching at level of understanding  - Provide teaching via preferred learning methods  Outcome: Progressing     Problem: CARDIOVASCULAR - ADULT  Goal: Maintains optimal cardiac output and hemodynamic stability  Description: INTERVENTIONS:  - Monitor I/O, vital signs and rhythm  - Monitor for S/S and trends of decreased cardiac output  - Administer and titrate ordered vasoactive medications to optimize hemodynamic stability  - Assess quality of pulses, skin color and temperature  - Assess for signs of decreased coronary artery perfusion  - Instruct patient to report change in severity of symptoms  Outcome: Progressing  Goal: Absence of cardiac dysrhythmias or at baseline rhythm  Description: INTERVENTIONS:  - Continuous cardiac monitoring, vital signs, obtain 12 lead EKG if ordered  - Administer antiarrhythmic and heart rate control medications as ordered  - Monitor electrolytes and administer replacement therapy as ordered  Outcome: Progressing     Problem: RESPIRATORY - ADULT  Goal: Achieves optimal ventilation and oxygenation  Description: INTERVENTIONS:  - Assess for changes in respiratory status  - Assess for changes in mentation and behavior  - Position to facilitate oxygenation and minimize respiratory effort  - Oxygen administered by appropriate delivery if ordered  - Initiate smoking cessation education as indicated  - Encourage broncho-pulmonary hygiene including cough, deep breathe, Incentive Spirometry  - Assess the need for suctioning and aspirate as needed  - Assess and instruct to report SOB or any respiratory difficulty  - Respiratory Therapy support as indicated  Outcome: Progressing     Problem: METABOLIC, FLUID AND ELECTROLYTES - ADULT  Goal: Electrolytes maintained within normal limits  Description: INTERVENTIONS:  - Monitor labs and assess patient for signs and symptoms of electrolyte imbalances  - Administer electrolyte replacement as ordered  - Monitor response to electrolyte replacements, including repeat lab results as appropriate  - Instruct patient on fluid and nutrition as appropriate  Outcome: Progressing  Goal: Fluid balance maintained  Description: INTERVENTIONS:  - Monitor labs   - Monitor I/O and WT  - Instruct patient on fluid and nutrition as appropriate  - Assess for signs & symptoms of volume excess or deficit  Outcome: Progressing  Goal: Glucose maintained within target range  Description: INTERVENTIONS:  - Monitor Blood Glucose as ordered  - Assess for signs and symptoms of hyperglycemia and hypoglycemia  - Administer ordered medications to maintain glucose within target range  - Assess nutritional intake and initiate nutrition service referral as needed  Outcome: Progressing     Problem: MOBILITY - ADULT  Goal: Maintain or return to baseline ADL function  Description: INTERVENTIONS:  -  Assess patient's ability to carry out ADLs; assess patient's baseline for ADL function and identify physical deficits which impact ability to perform ADLs (bathing, care of mouth/teeth, toileting, grooming, dressing, etc )  - Assess/evaluate cause of self-care deficits   - Assess range of motion  - Assess patient's mobility; develop plan if impaired  - Assess patient's need for assistive devices and provide as appropriate  - Encourage maximum independence but intervene and supervise when necessary  - Involve family in performance of ADLs  - Assess for home care needs following discharge   - Consider OT consult to assist with ADL evaluation and planning for discharge  - Provide patient education as appropriate  Outcome: Progressing  Goal: Maintains/Returns to pre admission functional level  Description: INTERVENTIONS:  - Perform BMAT or MOVE assessment daily    - Set and communicate daily mobility goal to care team and patient/family/caregiver  - Collaborate with rehabilitation services on mobility goals if consulted  - Perform Range of Motion 3 times a day  - Reposition patient every 2 hours    - Dangle patient 3 times a day  - Stand patient 3 times a day  - Ambulate patient 3 times a day  - Out of bed to chair 3 times a day   - Out of bed for meals 3 times a day  - Out of bed for toileting  - Record patient progress and toleration of activity level   Outcome: Progressing     Problem: Prexisting or High Potential for Compromised Skin Integrity  Goal: Skin integrity is maintained or improved  Description: INTERVENTIONS:  - Identify patients at risk for skin breakdown  - Assess and monitor skin integrity  - Assess and monitor nutrition and hydration status  - Monitor labs   - Assess for incontinence   - Turn and reposition patient  - Assist with mobility/ambulation  - Relieve pressure over bony prominences  - Avoid friction and shearing  - Provide appropriate hygiene as needed including keeping skin clean and dry  - Evaluate need for skin moisturizer/barrier cream  - Collaborate with interdisciplinary team   - Patient/family teaching  - Consider wound care consult   Outcome: Progressing

## 2022-02-08 VITALS
SYSTOLIC BLOOD PRESSURE: 121 MMHG | DIASTOLIC BLOOD PRESSURE: 71 MMHG | RESPIRATION RATE: 18 BRPM | HEIGHT: 68 IN | OXYGEN SATURATION: 99 % | BODY MASS INDEX: 16.37 KG/M2 | TEMPERATURE: 97.8 F | HEART RATE: 89 BPM | WEIGHT: 108.03 LBS

## 2022-02-08 LAB
ATRIAL RATE: 74 BPM
DME PARACHUTE DELIVERY DATE ACTUAL: NORMAL
DME PARACHUTE DELIVERY DATE REQUESTED: NORMAL
DME PARACHUTE ITEM DESCRIPTION: NORMAL
DME PARACHUTE ORDER STATUS: NORMAL
DME PARACHUTE SUPPLIER NAME: NORMAL
DME PARACHUTE SUPPLIER PHONE: NORMAL
P AXIS: 65 DEGREES
PR INTERVAL: 140 MS
QRS AXIS: 47 DEGREES
QRSD INTERVAL: 78 MS
QT INTERVAL: 402 MS
QTC INTERVAL: 446 MS
T WAVE AXIS: 86 DEGREES
VENTRICULAR RATE: 74 BPM

## 2022-02-08 PROCEDURE — 97116 GAIT TRAINING THERAPY: CPT

## 2022-02-08 PROCEDURE — 99239 HOSP IP/OBS DSCHRG MGMT >30: CPT | Performed by: FAMILY MEDICINE

## 2022-02-08 PROCEDURE — 93010 ELECTROCARDIOGRAM REPORT: CPT | Performed by: INTERNAL MEDICINE

## 2022-02-08 PROCEDURE — 99233 SBSQ HOSP IP/OBS HIGH 50: CPT | Performed by: INTERNAL MEDICINE

## 2022-02-08 PROCEDURE — 97110 THERAPEUTIC EXERCISES: CPT

## 2022-02-08 RX ORDER — PANTOPRAZOLE SODIUM 40 MG/1
40 TABLET, DELAYED RELEASE ORAL DAILY
Qty: 30 TABLET | Refills: 0 | Status: SHIPPED | OUTPATIENT
Start: 2022-02-08 | End: 2022-02-28 | Stop reason: SDUPTHER

## 2022-02-08 RX ORDER — ONDANSETRON 4 MG/1
4 TABLET, ORALLY DISINTEGRATING ORAL EVERY 6 HOURS PRN
Qty: 20 TABLET | Refills: 0 | Status: SHIPPED | OUTPATIENT
Start: 2022-02-08 | End: 2022-02-28

## 2022-02-08 RX ORDER — IBUPROFEN 600 MG/1
600 TABLET ORAL EVERY 6 HOURS SCHEDULED
Qty: 30 TABLET | Refills: 0 | Status: SHIPPED | OUTPATIENT
Start: 2022-02-08 | End: 2022-02-28 | Stop reason: ALTCHOICE

## 2022-02-08 RX ORDER — IBUPROFEN 600 MG/1
600 TABLET ORAL EVERY 6 HOURS SCHEDULED
Status: DISCONTINUED | OUTPATIENT
Start: 2022-02-08 | End: 2022-02-08 | Stop reason: HOSPADM

## 2022-02-08 RX ORDER — COLCHICINE 0.6 MG/1
0.6 TABLET ORAL 2 TIMES DAILY
Qty: 60 TABLET | Refills: 0 | Status: SHIPPED | OUTPATIENT
Start: 2022-02-08 | End: 2022-02-28 | Stop reason: ALTCHOICE

## 2022-02-08 RX ADMIN — NIFEDIPINE 10 MG: 10 CAPSULE ORAL at 05:29

## 2022-02-08 RX ADMIN — OXYCODONE HYDROCHLORIDE AND ACETAMINOPHEN 1000 MG: 500 TABLET ORAL at 08:30

## 2022-02-08 RX ADMIN — COLCHICINE 0.6 MG: 0.6 TABLET, FILM COATED ORAL at 08:30

## 2022-02-08 RX ADMIN — PANTOPRAZOLE SODIUM 40 MG: 40 TABLET, DELAYED RELEASE ORAL at 06:09

## 2022-02-08 RX ADMIN — Medication 2000 UNITS: at 08:30

## 2022-02-08 RX ADMIN — POLYETHYLENE GLYCOL 3350 17 G: 17 POWDER, FOR SOLUTION ORAL at 16:00

## 2022-02-08 RX ADMIN — IBUPROFEN 600 MG: 600 TABLET, FILM COATED ORAL at 12:13

## 2022-02-08 RX ADMIN — IBUPROFEN 400 MG: 400 TABLET ORAL at 00:08

## 2022-02-08 RX ADMIN — HEPARIN SODIUM 5000 UNITS: 5000 INJECTION INTRAVENOUS; SUBCUTANEOUS at 05:29

## 2022-02-08 RX ADMIN — ONDANSETRON 4 MG: 2 INJECTION INTRAMUSCULAR; INTRAVENOUS at 12:12

## 2022-02-08 RX ADMIN — SUCRALFATE 1 G: 1 TABLET ORAL at 06:10

## 2022-02-08 RX ADMIN — LIDOCAINE 5% 1 PATCH: 700 PATCH TOPICAL at 08:30

## 2022-02-08 RX ADMIN — ASPIRIN 81 MG: 81 TABLET, COATED ORAL at 08:30

## 2022-02-08 RX ADMIN — IBUPROFEN 400 MG: 400 TABLET ORAL at 05:30

## 2022-02-08 RX ADMIN — ONDANSETRON 4 MG: 2 INJECTION INTRAMUSCULAR; INTRAVENOUS at 05:29

## 2022-02-08 RX ADMIN — SUCRALFATE 1 G: 1 TABLET ORAL at 12:13

## 2022-02-08 NOTE — PHYSICAL THERAPY NOTE
PHYSICAL THERAPY NOTE          Patient Name: Bjorn DOMINIQUEL'I Date: 2/8/2022 02/08/22 1213   Note Type   Note Type Treatment   Pain Assessment   Pain Score No Pain   Restrictions/Precautions   Weight Bearing Precautions Per Order No   Other Precautions Telemetry   General   Chart Reviewed Yes   Cognition   Overall Cognitive Status WFL   Following Commands Follows all commands and directions without difficulty   Subjective   Subjective Pt  reports he is worried about going home and being alone  Agreeable to therapy  Bed Mobility   Additional Comments seated EOB at start and end of PT session   Transfers   Sit to Stand 5  Supervision   Additional items Assist x 1   Stand to Sit 5  Supervision   Stand pivot 5  Supervision   Ambulation/Elevation   Gait pattern   (decreased gait speed)   Gait Assistance 5  Supervision   Additional items Assist x 1   Assistive Device Rolling walker   Distance 250'   Balance   Static Sitting Good   Dynamic Sitting Good   Static Standing Fair +   Dynamic Standing Fair +   Ambulatory Fair   Endurance Deficit   Endurance Deficit Yes   Activity Tolerance   Activity Tolerance Patient limited by fatigue   Exercises   Hip Flexion Sitting;15 reps   Hip Abduction Sitting;15 reps   Hip Adduction Sitting;15 reps   Knee AROM Long Arc Quad Sitting;15 reps   Ankle Pumps Sitting;15 reps   Assessment   Prognosis Good   Problem List Decreased strength;Decreased endurance; Impaired balance;Decreased mobility   Assessment Pt  seen for PT treatment session this date with interventions consisting of  therapeutic exercises, bed mobility, transfers and  gait training w/ emphasis on improving pt's ability to ambulate  Pt  With no episodes LOB  Limited by fatigue  Vitals WFL's  In comparison to previous session, Pt  With improvements in activity tolerance     Pt is in need of continued activity in PT to improve strength balance endurance mobility transfers and ambulation with return to maximize LOF  From PT/mobility standpoint, recommendation at time of d/c would be OP PT in order to promote return to PLOF and independence  The patient's AM-PAC Basic Mobility Inpatient Short Form Raw Score is 23  A Raw score of greater than 16 suggests the patient may benefit from discharge to home  Please also refer to physical therapy recommendation for safe DC planning  Goals   LTG Expiration Date 02/19/22   Plan   Treatment/Interventions Functional transfer training;LE strengthening/ROM; Therapeutic exercise; Endurance training;Bed mobility;Gait training   Progress Progressing toward goals   Recommendation   PT Discharge Recommendation Home with outpatient rehabilitation   AM-PAC Basic Mobility Inpatient   Turning in Bed Without Bedrails 4   Lying on Back to Sitting on Edge of Flat Bed 4   Moving Bed to Chair 4   Standing Up From Chair 4   Walk in Room 4   Climb 3-5 Stairs 3   Basic Mobility Inpatient Raw Score 23   Basic Mobility Standardized Score 50 88   Highest Level Of Mobility   JH-HLM Goal 7: Walk 25 feet or more   JH-HLM Highest Level of Mobility 8: Walk 250 feet ot more   JH-HLM Goal Achieved Yes   Education   Education Provided Mobility training   Patient Demonstrates acceptance/verbal understanding   End of Consult   Patient Position at End of Consult Seated edge of bed; All needs within reach   End of Consult Comments discussed POC with PT

## 2022-02-08 NOTE — PLAN OF CARE
Problem: Potential for Falls  Goal: Patient will remain free of falls  Description: INTERVENTIONS:  - Educate patient/family on patient safety including physical limitations  - Instruct patient to call for assistance with activity   - Consult OT/PT to assist with strengthening/mobility   - Keep Call bell within reach  - Keep bed low and locked with side rails adjusted as appropriate  - Keep care items and personal belongings within reach  - Initiate and maintain comfort rounds  - Make Fall Risk Sign visible to staff  - Offer Toileting every 2 Hours, in advance of need  - Initiate/Maintain bed alarm  - Obtain necessary fall risk management equipment  - Apply yellow socks and bracelet for high fall risk patients  - Consider moving patient to room near nurses station  Outcome: Progressing     Problem: Nutrition/Hydration-ADULT  Goal: Nutrient/Hydration intake appropriate for improving, restoring or maintaining nutritional needs  Description: Monitor and assess patient's nutrition/hydration status for malnutrition  Collaborate with interdisciplinary team and initiate plan and interventions as ordered  Monitor patient's weight and dietary intake as ordered or per policy  Utilize nutrition screening tool and intervene as necessary  Determine patient's food preferences and provide high-protein, high-caloric foods as appropriate       INTERVENTIONS:  - Monitor oral intake, urinary output, labs, and treatment plans  - Assess nutrition and hydration status and recommend course of action  - Evaluate amount of meals eaten  - Assist patient with eating if necessary   - Allow adequate time for meals  - Recommend/ encourage appropriate diets, oral nutritional supplements, and vitamin/mineral supplements  - Order, calculate, and assess calorie counts as needed  - Recommend, monitor, and adjust tube feedings and TPN/PPN based on assessed needs  - Assess need for intravenous fluids  - Provide specific nutrition/hydration education as appropriate  - Include patient/family/caregiver in decisions related to nutrition  Outcome: Progressing     Problem: PAIN - ADULT  Goal: Verbalizes/displays adequate comfort level or baseline comfort level  Description: Interventions:  - Encourage patient to monitor pain and request assistance  - Assess pain using appropriate pain scale  - Administer analgesics based on type and severity of pain and evaluate response  - Implement non-pharmacological measures as appropriate and evaluate response  - Consider cultural and social influences on pain and pain management  - Notify physician/advanced practitioner if interventions unsuccessful or patient reports new pain  Outcome: Progressing     Problem: INFECTION - ADULT  Goal: Absence or prevention of progression during hospitalization  Description: INTERVENTIONS:  - Assess and monitor for signs and symptoms of infection  - Monitor lab/diagnostic results  - Monitor all insertion sites, i e  indwelling lines, tubes, and drains  - Monitor endotracheal if appropriate and nasal secretions for changes in amount and color  - [place] appropriate cooling/warming therapies per order  - Administer medications as ordered  - Instruct and encourage patient and family to use good hand hygiene technique  - Identify and instruct in appropriate isolation precautions for identified infection/condition  Outcome: Progressing     Problem: SAFETY ADULT  Goal: Patient will remain free of falls  Description: INTERVENTIONS:  - Educate patient/family on patient safety including physical limitations  - Instruct patient to call for assistance with activity   - Consult OT/PT to assist with strengthening/mobility   - Keep Call bell within reach  - Keep bed low and locked with side rails adjusted as appropriate  - Keep care items and personal belongings within reach  - Initiate and maintain comfort rounds  - Make Fall Risk Sign visible to staff  - Offer Toileting every 2 Hours, in advance of need  - Initiate/Maintain bed alarm  - Obtain necessary fall risk management equipment  - Apply yellow socks and bracelet for high fall risk patients  - Consider moving patient to room near nurses station  Outcome: Progressing  Goal: Maintain or return to baseline ADL function  Description: INTERVENTIONS:  -  Assess patient's ability to carry out ADLs; assess patient's baseline for ADL function and identify physical deficits which impact ability to perform ADLs (bathing, care of mouth/teeth, toileting, grooming, dressing, etc )  - Assess/evaluate cause of self-care deficits   - Assess range of motion  - Assess patient's mobility; develop plan if impaired  - Assess patient's need for assistive devices and provide as appropriate  - Encourage maximum independence but intervene and supervise when necessary  - Involve family in performance of ADLs  - Assess for home care needs following discharge   - Consider OT consult to assist with ADL evaluation and planning for discharge  - Provide patient education as appropriate  Outcome: Progressing  Goal: Maintains/Returns to pre admission functional level  Description: INTERVENTIONS:  - Perform BMAT or MOVE assessment daily    - Set and communicate daily mobility goal to care team and patient/family/caregiver  - Collaborate with rehabilitation services on mobility goals if consulted  - Perform Range of Motion 3 times a day  - Reposition patient every 2 hours    - Dangle patient 3 times a day  - Stand patient 3 times a day  - Ambulate patient 3 times a day  - Out of bed to chair 3 times a day   - Out of bed for meals 3 times a day  - Out of bed for toileting  - Record patient progress and toleration of activity level   Outcome: Progressing     Problem: DISCHARGE PLANNING  Goal: Discharge to home or other facility with appropriate resources  Description: INTERVENTIONS:  - Identify barriers to discharge w/patient and caregiver  - Arrange for needed discharge resources and transportation as appropriate  - Identify discharge learning needs (meds, wound care, etc )  - Arrange for interpretive services to assist at discharge as needed  - Refer to Case Management Department for coordinating discharge planning if the patient needs post-hospital services based on physician/advanced practitioner order or complex needs related to functional status, cognitive ability, or social support system  Outcome: Progressing     Problem: Knowledge Deficit  Goal: Patient/family/caregiver demonstrates understanding of disease process, treatment plan, medications, and discharge instructions  Description: Complete learning assessment and assess knowledge base    Interventions:  - Provide teaching at level of understanding  - Provide teaching via preferred learning methods  Outcome: Progressing     Problem: CARDIOVASCULAR - ADULT  Goal: Maintains optimal cardiac output and hemodynamic stability  Description: INTERVENTIONS:  - Monitor I/O, vital signs and rhythm  - Monitor for S/S and trends of decreased cardiac output  - Administer and titrate ordered vasoactive medications to optimize hemodynamic stability  - Assess quality of pulses, skin color and temperature  - Assess for signs of decreased coronary artery perfusion  - Instruct patient to report change in severity of symptoms  Outcome: Progressing  Goal: Absence of cardiac dysrhythmias or at baseline rhythm  Description: INTERVENTIONS:  - Continuous cardiac monitoring, vital signs, obtain 12 lead EKG if ordered  - Administer antiarrhythmic and heart rate control medications as ordered  - Monitor electrolytes and administer replacement therapy as ordered  Outcome: Progressing     Problem: RESPIRATORY - ADULT  Goal: Achieves optimal ventilation and oxygenation  Description: INTERVENTIONS:  - Assess for changes in respiratory status  - Assess for changes in mentation and behavior  - Position to facilitate oxygenation and minimize respiratory effort  - Oxygen administered by appropriate delivery if ordered  - Initiate smoking cessation education as indicated  - Encourage broncho-pulmonary hygiene including cough, deep breathe, Incentive Spirometry  - Assess the need for suctioning and aspirate as needed  - Assess and instruct to report SOB or any respiratory difficulty  - Respiratory Therapy support as indicated  Outcome: Progressing     Problem: METABOLIC, FLUID AND ELECTROLYTES - ADULT  Goal: Electrolytes maintained within normal limits  Description: INTERVENTIONS:  - Monitor labs and assess patient for signs and symptoms of electrolyte imbalances  - Administer electrolyte replacement as ordered  - Monitor response to electrolyte replacements, including repeat lab results as appropriate  - Instruct patient on fluid and nutrition as appropriate  Outcome: Progressing  Goal: Fluid balance maintained  Description: INTERVENTIONS:  - Monitor labs   - Monitor I/O and WT  - Instruct patient on fluid and nutrition as appropriate  - Assess for signs & symptoms of volume excess or deficit  Outcome: Progressing  Goal: Glucose maintained within target range  Description: INTERVENTIONS:  - Monitor Blood Glucose as ordered  - Assess for signs and symptoms of hyperglycemia and hypoglycemia  - Administer ordered medications to maintain glucose within target range  - Assess nutritional intake and initiate nutrition service referral as needed  Outcome: Progressing     Problem: MOBILITY - ADULT  Goal: Maintain or return to baseline ADL function  Description: INTERVENTIONS:  -  Assess patient's ability to carry out ADLs; assess patient's baseline for ADL function and identify physical deficits which impact ability to perform ADLs (bathing, care of mouth/teeth, toileting, grooming, dressing, etc )  - Assess/evaluate cause of self-care deficits   - Assess range of motion  - Assess patient's mobility; develop plan if impaired  - Assess patient's need for assistive devices and provide as appropriate  - Encourage maximum independence but intervene and supervise when necessary  - Involve family in performance of ADLs  - Assess for home care needs following discharge   - Consider OT consult to assist with ADL evaluation and planning for discharge  - Provide patient education as appropriate  Outcome: Progressing  Goal: Maintains/Returns to pre admission functional level  Description: INTERVENTIONS:  - Perform BMAT or MOVE assessment daily    - Set and communicate daily mobility goal to care team and patient/family/caregiver  - Collaborate with rehabilitation services on mobility goals if consulted  - Perform Range of Motion 3 times a day  - Reposition patient every 2 hours    - Dangle patient 3 times a day  - Stand patient 3 times a day  - Ambulate patient 3 times a day  - Out of bed to chair 3 times a day   - Out of bed for meals 3 times a day  - Out of bed for toileting  - Record patient progress and toleration of activity level   Outcome: Progressing     Problem: Prexisting or High Potential for Compromised Skin Integrity  Goal: Skin integrity is maintained or improved  Description: INTERVENTIONS:  - Identify patients at risk for skin breakdown  - Assess and monitor skin integrity  - Assess and monitor nutrition and hydration status  - Monitor labs   - Assess for incontinence   - Turn and reposition patient  - Assist with mobility/ambulation  - Relieve pressure over bony prominences  - Avoid friction and shearing  - Provide appropriate hygiene as needed including keeping skin clean and dry  - Evaluate need for skin moisturizer/barrier cream  - Collaborate with interdisciplinary team   - Patient/family teaching  - Consider wound care consult   Outcome: Progressing

## 2022-02-08 NOTE — DISCHARGE SUMMARY
Discharge Summary - Luciano Chandler 64 y o  male MRN: [id number]    Unit/Bed#: 423-01 Encounter: [id number]    Admission Date:   Admission Orders (From admission, onward)     Ordered        02/05/22 1629  Inpatient Admission  Once            02/05/22 0037  Place in Observation  Once                        Admitting Diagnosis: Shortness of breath [R06 02]  Lactic acidosis [E87 2]  Nausea [R11 0]  Chest pain [R07 9]  Dyspnea [R06 00]  COVID-19 virus infection [U07 1]    HPI: Niko Toriboi is a 64 y o  male with a PMH of CAD, hyperlipidemia, Lyme disease who presents tot he ER with complaints of chest pain  He states that he also has been having intermittent shortness of breath which seemed to be better by pacing back and forth  He states that he has also been feeling very weak and that his symptoms started since he tested positive for COVID-19  He was admitted from 01/28 to 1/29 for COVID pneumonia  He had no oxygen requirement at that time and was subsequently discharged  Labs completed in the ER with results as shown below, PE study with CT abdomen and pelvis completed with results as shown below  In the ER prior to admission he received  Zofran 4 mg IV, lactated Ringer's 1 L, fentanyl 25 mcg IV and lorazepam 0 5 mg IV  At bedside in ER room 3 patient is awake and alert, he is quite ill appearing, he reports that he his breathing is better but his chest still feel tight  He also states that he feels a bit anxious  Patient is being admitted on Observation status med surg level care for further management of chest pain, elevated D-dimer, lactic acidosis        Procedures Performed:   Orders Placed This Encounter   Procedures    ED ECG Documentation Only       Summary of Hospital Course:     Chest pain due to pericarditis    The patient is a 75-year-old male who presented to the emergency room with complaint of chest pain associated with shortness of breath    Underwent echocardiogram, seen in consultation by Cardiology, and troponins were negative  Symptoms were felt due to pericarditis, initiated on oral Motrin and PPI  Chest pain resolved after 1 dose of Toradol  Echo was unremarkable  Patient will be discharged on Motrin 600 t i d  And colchicine 0 6 b i d  For 30 days, will follow-up with his cardiologist within 2 weeks  Roxana EAGLEID-19:  Patient was asymptomatic, no oxygen requirements, complained of fatigue    Significant Findings, Care, Treatment and Services Provided:     CTA    1   No evidence of pulmonary embolism  2   No focal consolidation of the lungs  3   Coronary artery calcifications  4   Enlarged prostate  Complications: none    Discharge Diagnosis: see above    Medical Problems             Resolved Problems  Date Reviewed: 2/6/2022    None                Condition at Discharge: good         Discharge instructions/Information to patient and family:   See after visit summary for information provided to patient and family  Provisions for Follow-Up Care:  See after visit summary for information related to follow-up care and any pertinent home health orders  PCP: Belinda Carrero DO    Disposition: Home    Planned Readmission: No      Discharge Statement   I spent 39 minutes discharging the patient  This time was spent on the day of discharge  I had direct contact with the patient on the day of discharge  Additional documentation is required if more than 30 minutes were spent on discharge  Discharge Medications:  See after visit summary for reconciled discharge medications provided to patient and family

## 2022-02-08 NOTE — CASE MANAGEMENT
Case Management Discharge Planning Note    Patient name Awa Beltran  Location /251-29 MRN [id number]  : 1945 Date 2022       Current Admission Date: 2022  Current Admission Diagnosis:Other chest pain   Patient Active Problem List    Diagnosis Date Noted    Other chest pain 2022    Lactic acidosis 2022    Elevated d-dimer 2022    COVID-19 virus infection 2022    Hyponatremia 2022    Pneumonia due to COVID-19 virus 2022    Sepsis ([address] ) 2022    Dyslipidemia 2022      LOS (days): 3  Geometric Mean LOS (GMLOS) (days): 4 80  Days to GMLOS:2 1     OBJECTIVE:  Risk of Unplanned Readmission Score: 9         Current admission status: Inpatient   Preferred Pharmacy:   [facility] [id number]  Phone: [phone] Fax: [phone]    [address]  Phone: [phone] Fax: [phone] ([place]) [facility], [address] [place] [address]  Phone: [phone] Fax: [phone]    Primary Care Provider: Jasbir Yost DO    Primary Insurance: [facility] REP  Secondary Insurance:     DISCHARGE DETAILS:Pt is being dc'd home on this date with OP follow up with PCP and Cardiology

## 2022-02-08 NOTE — PROGRESS NOTES
Cardiology Progress Note - Shweta Chandler 64 y o  male MRN: [id number]    Unit/Bed#: 423-01 Encounter: [id number]      Assessment:  1  Acute pericarditis secondary to #2  2  COVID-19 pneumonia   3  Mitral valve prolapse with trace mitral regurgitation - 4/2021  4  Dyslipidemia     Plan:  1  No further chest pain s/p Toradol   2  Continue ibuprofen 600 mg TID for one month then taper  3  Colchicine 0 6 mg BID for three months  4  Continue PPI  5   Echo unchanged from prior, no effusion   6  Follow-up with Dr Kenton Metcalf upon discharge - [facility] will be available, please call with questions     Subjective:   Patient seen and examined  No significant events overnight  Objective:     Vitals: Blood pressure 125/72, pulse 67, temperature 97 7 °F (36 5 °C), temperature source Oral, resp  rate 16, height 5' 8" (1 727 m), weight 49 kg (108 lb 0 4 oz), SpO2 98 %  , Body mass index is 16 43 kg/m² ,   Orthostatic Blood Pressures      Most Recent Value   Blood Pressure 125/72 filed at 02/08/2022 3818   Patient Position - Orthostatic VS Lying filed at 02/07/2022 2250            Intake/Output Summary (Last 24 hours) at 2/8/2022 0740  Last data filed at 2/8/2022 0006  Gross per 24 hour   Intake 640 ml   Output 725 ml   Net -85 ml           Physical Exam:    GEN: Raphael Soda appears well, alert and oriented x 3, pleasant and cooperative   HEENT: pupils equal, round, and reactive to light; extraocular muscles intact  NECK: supple, no carotid bruits   HEART: regular rhythm, normal S1 and S2, no murmurs, clicks, gallops or rubs   LUNGS: coarse breath sounds bilaterally   ABDOMEN: normal bowel sounds, soft, no tenderness, no distention  EXTREMITIES: peripheral pulses normal; no clubbing, cyanosis, or edema  NEURO: no focal findings   SKIN: normal without suspicious lesions on exposed skin    Medications:      Current Facility-Administered Medications:     acetaminophen (TYLENOL) tablet 650 mg, 650 mg, Oral, Q6H PRN, Brayan MATTHEW Damian    albuterol (PROVENTIL HFA,VENTOLIN HFA) inhaler 2 puff, 2 puff, Inhalation, Q4H PRN, Trupti Tucker MD    ascorbic acid (VITAMIN C) tablet 1,000 mg, 1,000 mg, Oral, Daily, Brayan Damian PA-C, 1,000 mg at 02/07/22 0814    aspirin (ECOTRIN LOW STRENGTH) EC tablet 81 mg, 81 mg, Oral, Daily, Brayan Damian PA-C, 81 mg at 02/07/22 0814    cholecalciferol (VITAMIN D3) tablet 2,000 Units, 2,000 Units, Oral, Daily, Brayan Damian PA-C, 2,000 Units at 02/07/22 0814    colchicine (COLCRYS) tablet 0 6 mg, 0 6 mg, Oral, BID, Robertson Clonts, DO, 0 6 mg at 02/07/22 1720    heparin (porcine) subcutaneous injection 5,000 Units, 5,000 Units, Subcutaneous, Q8H [address], 5,000 Units at 02/08/22 0529 **AND** [CANCELED] Platelet count, , , Once, Brayan Damian PA-C    ibuprofen (MOTRIN) tablet 400 mg, 400 mg, Oral, Q6H [address], Abi Mckinnon MD, 400 mg at 02/08/22 0530    lidocaine (LIDODERM) 5 % patch 1 patch, 1 patch, Topical, Daily, Trupti Tucker MD, 1 patch at 02/07/22 0818    NIFEdipine (PROCARDIA) capsule 10 mg, 10 mg, Oral, Q8H [address], Elda Triplett MD, 10 mg at 02/08/22 0529    ondansetron (ZOFRAN) injection 4 mg, 4 mg, Intravenous, Q6H PRN, Brayan Damian PA-C, 4 mg at 02/08/22 0529    pantoprazole (PROTONIX) EC tablet 40 mg, 40 mg, Oral, BID AC, Abi Mckinnon MD, 40 mg at 02/08/22 0609    polyethylene glycol (MIRALAX) packet 17 g, 17 g, Oral, Daily PRN, Kat Durham PA-C, 17 g at 02/07/22 2059    sucralfate (CARAFATE) tablet 1 g, 1 g, Oral, 4x Daily (AC & HS), Shayla De Leon MD, 1 g at 02/08/22 0610     Labs & Results:        Results from last 7 days   Lab Units 02/07/22  0430 02/06/22  0638 02/05/22  0433   WBC Thousand/uL 10 28* 8 85 11 82*   HEMOGLOBIN g/dL 14 4 13 8 13 7   HEMATOCRIT % 43 3 42 0 41 2   PLATELETS Thousands/uL 273 241 273         Results from last 7 days   Lab Units 02/07/22  0430 02/06/22  0638 02/05/22  0433 02/04/22  2115 02/04/22  2115   POTASSIUM mmol/L 3 6 4 0 3 8   < > 4 1   CHLORIDE mmol/L 101 103 100 < > 96*   CO2 mmol/L 28 28 28   < > 28   BUN mg/dL 22 24 25   < > 29*   CREATININE mg/dL 0 86 0 93 0 86   < > 1 18   CALCIUM mg/dL 8 3 8 1* 8 1*   < > 9 3   ALK PHOS U/L  --  47 48  --  63   ALT U/L  --  30 27  --  44   AST U/L  --  21 21  --  28    < > = values in this interval not displayed  Results from last 7 days   Lab Units 02/04/22  2115   INR  1 02   PTT seconds 23     Results from last 7 days   Lab Units 02/05/22  0433 02/04/22  2115   MAGNESIUM mg/dL 2 0 2 1       Counseling / Coordination of Care  Total floor / unit time spent today 25 minutes  Greater than 50% of total time was spent with the patient and / or family counseling and / or coordination of care

## 2022-02-08 NOTE — RESPIRATORY THERAPY NOTE
As per patient's nurse this evening, patient remains on room air, his oxygen saturations are in the 90's  He is not having any respiratory distress   At this time, no respiratory intervention needed

## 2022-02-08 NOTE — PLAN OF CARE
Problem: Potential for Falls  Goal: Patient will remain free of falls  Description: INTERVENTIONS:  - Educate patient/family on patient safety including physical limitations  - Instruct patient to call for assistance with activity   - Consult OT/PT to assist with strengthening/mobility   - Keep Call bell within reach  - Keep bed low and locked with side rails adjusted as appropriate  - Keep care items and personal belongings within reach  - Initiate and maintain comfort rounds  - Make Fall Risk Sign visible to staff  - Offer Toileting every 2 Hours, in advance of need  - Initiate/Maintain bed alarm  - Obtain necessary fall risk management equipment  - Apply yellow socks and bracelet for high fall risk patients  - Consider moving patient to room near nurses station  Outcome: Progressing     Problem: Nutrition/Hydration-ADULT  Goal: Nutrient/Hydration intake appropriate for improving, restoring or maintaining nutritional needs  Description: Monitor and assess patient's nutrition/hydration status for malnutrition  Collaborate with interdisciplinary team and initiate plan and interventions as ordered  Monitor patient's weight and dietary intake as ordered or per policy  Utilize nutrition screening tool and intervene as necessary  Determine patient's food preferences and provide high-protein, high-caloric foods as appropriate       INTERVENTIONS:  - Monitor oral intake, urinary output, labs, and treatment plans  - Assess nutrition and hydration status and recommend course of action  - Evaluate amount of meals eaten  - Assist patient with eating if necessary   - Allow adequate time for meals  - Recommend/ encourage appropriate diets, oral nutritional supplements, and vitamin/mineral supplements  - Order, calculate, and assess calorie counts as needed  - Recommend, monitor, and adjust tube feedings and TPN/PPN based on assessed needs  - Assess need for intravenous fluids  - Provide specific nutrition/hydration education as appropriate  - Include patient/family/caregiver in decisions related to nutrition  Outcome: Progressing     Problem: PAIN - ADULT  Goal: Verbalizes/displays adequate comfort level or baseline comfort level  Description: Interventions:  - Encourage patient to monitor pain and request assistance  - Assess pain using appropriate pain scale  - Administer analgesics based on type and severity of pain and evaluate response  - Implement non-pharmacological measures as appropriate and evaluate response  - Consider cultural and social influences on pain and pain management  - Notify physician/advanced practitioner if interventions unsuccessful or patient reports new pain  Outcome: Progressing     Problem: INFECTION - ADULT  Goal: Absence or prevention of progression during hospitalization  Description: INTERVENTIONS:  - Assess and monitor for signs and symptoms of infection  - Monitor lab/diagnostic results  - Monitor all insertion sites, i e  indwelling lines, tubes, and drains  - Monitor endotracheal if appropriate and nasal secretions for changes in amount and color  - Pratt appropriate cooling/warming therapies per order  - Administer medications as ordered  - Instruct and encourage patient and family to use good hand hygiene technique  - Identify and instruct in appropriate isolation precautions for identified infection/condition  Outcome: Progressing     Problem: SAFETY ADULT  Goal: Patient will remain free of falls  Description: INTERVENTIONS:  - Educate patient/family on patient safety including physical limitations  - Instruct patient to call for assistance with activity   - Consult OT/PT to assist with strengthening/mobility   - Keep Call bell within reach  - Keep bed low and locked with side rails adjusted as appropriate  - Keep care items and personal belongings within reach  - Initiate and maintain comfort rounds  - Make Fall Risk Sign visible to staff  - Offer Toileting every 2 Hours, in advance of need  - Initiate/Maintain bed alarm  - Obtain necessary fall risk management equipment  - Apply yellow socks and bracelet for high fall risk patients  - Consider moving patient to room near nurses station  Outcome: Progressing  Goal: Maintain or return to baseline ADL function  Description: INTERVENTIONS:  -  Assess patient's ability to carry out ADLs; assess patient's baseline for ADL function and identify physical deficits which impact ability to perform ADLs (bathing, care of mouth/teeth, toileting, grooming, dressing, etc )  - Assess/evaluate cause of self-care deficits   - Assess range of motion  - Assess patient's mobility; develop plan if impaired  - Assess patient's need for assistive devices and provide as appropriate  - Encourage maximum independence but intervene and supervise when necessary  - Involve family in performance of ADLs  - Assess for home care needs following discharge   - Consider OT consult to assist with ADL evaluation and planning for discharge  - Provide patient education as appropriate  Outcome: Progressing  Goal: Maintains/Returns to pre admission functional level  Description: INTERVENTIONS:  - Perform BMAT or MOVE assessment daily    - Set and communicate daily mobility goal to care team and patient/family/caregiver  - Collaborate with rehabilitation services on mobility goals if consulted  - Perform Range of Motion 4 times a day  - Reposition patient every 4 hours    - Dangle patient 4 times a day  - Stand patient 4 times a day  - Ambulate patient 4 times a day  - Out of bed to chair 4 times a day   - Out of bed for meals 3 times a day  - Out of bed for toileting  - Record patient progress and toleration of activity level   Outcome: Progressing     Problem: DISCHARGE PLANNING  Goal: Discharge to home or other facility with appropriate resources  Description: INTERVENTIONS:  - Identify barriers to discharge w/patient and caregiver  - Arrange for needed discharge resources and transportation as appropriate  - Identify discharge learning needs (meds, wound care, etc )  - Arrange for interpretive services to assist at discharge as needed  - Refer to Case Management Department for coordinating discharge planning if the patient needs post-hospital services based on physician/advanced practitioner order or complex needs related to functional status, cognitive ability, or social support system  Outcome: Progressing     Problem: Knowledge Deficit  Goal: Patient/family/caregiver demonstrates understanding of disease process, treatment plan, medications, and discharge instructions  Description: Complete learning assessment and assess knowledge base    Interventions:  - Provide teaching at level of understanding  - Provide teaching via preferred learning methods  Outcome: Progressing     Problem: CARDIOVASCULAR - ADULT  Goal: Maintains optimal cardiac output and hemodynamic stability  Description: INTERVENTIONS:  - Monitor I/O, vital signs and rhythm  - Monitor for S/S and trends of decreased cardiac output  - Administer and titrate ordered vasoactive medications to optimize hemodynamic stability  - Assess quality of pulses, skin color and temperature  - Assess for signs of decreased coronary artery perfusion  - Instruct patient to report change in severity of symptoms  Outcome: Progressing  Goal: Absence of cardiac dysrhythmias or at baseline rhythm  Description: INTERVENTIONS:  - Continuous cardiac monitoring, vital signs, obtain 12 lead EKG if ordered  - Administer antiarrhythmic and heart rate control medications as ordered  - Monitor electrolytes and administer replacement therapy as ordered  Outcome: Progressing     Problem: RESPIRATORY - ADULT  Goal: Achieves optimal ventilation and oxygenation  Description: INTERVENTIONS:  - Assess for changes in respiratory status  - Assess for changes in mentation and behavior  - Position to facilitate oxygenation and minimize respiratory effort  - Oxygen administered by appropriate delivery if ordered  - Initiate smoking cessation education as indicated  - Encourage broncho-pulmonary hygiene including cough, deep breathe, Incentive Spirometry  - Assess the need for suctioning and aspirate as needed  - Assess and instruct to report SOB or any respiratory difficulty  - Respiratory Therapy support as indicated  Outcome: Progressing     Problem: METABOLIC, FLUID AND ELECTROLYTES - ADULT  Goal: Electrolytes maintained within normal limits  Description: INTERVENTIONS:  - Monitor labs and assess patient for signs and symptoms of electrolyte imbalances  - Administer electrolyte replacement as ordered  - Monitor response to electrolyte replacements, including repeat lab results as appropriate  - Instruct patient on fluid and nutrition as appropriate  Outcome: Progressing  Goal: Fluid balance maintained  Description: INTERVENTIONS:  - Monitor labs   - Monitor I/O and WT  - Instruct patient on fluid and nutrition as appropriate  - Assess for signs & symptoms of volume excess or deficit  Outcome: Progressing  Goal: Glucose maintained within target range  Description: INTERVENTIONS:  - Monitor Blood Glucose as ordered  - Assess for signs and symptoms of hyperglycemia and hypoglycemia  - Administer ordered medications to maintain glucose within target range  - Assess nutritional intake and initiate nutrition service referral as needed  Outcome: Progressing     Problem: MOBILITY - ADULT  Goal: Maintain or return to baseline ADL function  Description: INTERVENTIONS:  -  Assess patient's ability to carry out ADLs; assess patient's baseline for ADL function and identify physical deficits which impact ability to perform ADLs (bathing, care of mouth/teeth, toileting, grooming, dressing, etc )  - Assess/evaluate cause of self-care deficits   - Assess range of motion  - Assess patient's mobility; develop plan if impaired  - Assess patient's need for assistive devices and provide as appropriate  - Encourage maximum independence but intervene and supervise when necessary  - Involve family in performance of ADLs  - Assess for home care needs following discharge   - Consider OT consult to assist with ADL evaluation and planning for discharge  - Provide patient education as appropriate  Outcome: Progressing  Goal: Maintains/Returns to pre admission functional level  Description: INTERVENTIONS:  - Perform BMAT or MOVE assessment daily    - Set and communicate daily mobility goal to care team and patient/family/caregiver  - Collaborate with rehabilitation services on mobility goals if consulted  - Perform Range of Motion 4 times a day  - Reposition patient every 4 hours    - Dangle patient 4 times a day  - Stand patient 4 times a day  - Ambulate patient 4 times a day  - Out of bed to chair 4 times a day   - Out of bed for meals 3 times a day  - Out of bed for toileting  - Record patient progress and toleration of activity level   Outcome: Progressing     Problem: Prexisting or High Potential for Compromised Skin Integrity  Goal: Skin integrity is maintained or improved  Description: INTERVENTIONS:  - Identify patients at risk for skin breakdown  - Assess and monitor skin integrity  - Assess and monitor nutrition and hydration status  - Monitor labs   - Assess for incontinence   - Turn and reposition patient  - Assist with mobility/ambulation  - Relieve pressure over bony prominences  - Avoid friction and shearing  - Provide appropriate hygiene as needed including keeping skin clean and dry  - Evaluate need for skin moisturizer/barrier cream  - Collaborate with interdisciplinary team   - Patient/family teaching  - Consider wound care consult   Outcome: Progressing

## 2022-02-08 NOTE — PLAN OF CARE
Problem: PHYSICAL THERAPY ADULT  Goal: Performs mobility at highest level of function for planned discharge setting  See evaluation for individualized goals  Description: PT CARE PLAN [id number]  Outcome: Progressing  Note: Prognosis: Good  Problem List: Decreased strength,Decreased endurance,Impaired balance,Decreased mobility  Assessment: Pt  seen for PT treatment session this date with interventions consisting of  therapeutic exercises, bed mobility, transfers and  gait training w/ emphasis on improving pt's ability to ambulate  Pt  With no episodes LOB  Limited by fatigue  Vitals WFL's  In comparison to previous session, Pt  With improvements in activity tolerance  Pt is in need of continued activity in PT to improve strength balance endurance mobility transfers and ambulation with return to maximize LOF  From PT/mobility standpoint, recommendation at time of d/c would be OP PT in order to promote return to PLOF and independence  The patient's AM-PAC Basic Mobility Inpatient Short Form Raw Score is 23  A Raw score of greater than 16 suggests the patient may benefit from discharge to home  Please also refer to physical therapy recommendation for safe DC planning  PT Discharge Recommendation: Home with outpatient rehabilitation          See flowsheet documentation for full assessment

## 2022-02-09 ENCOUNTER — TELEPHONE (OUTPATIENT)
Dept: FAMILY MEDICINE CLINIC | Facility: CLINIC | Age: 77
End: 2022-02-09

## 2022-02-09 ENCOUNTER — TRANSITIONAL CARE MANAGEMENT (OUTPATIENT)
Dept: FAMILY MEDICINE CLINIC | Facility: CLINIC | Age: 77
End: 2022-02-09

## 2022-02-09 NOTE — TELEPHONE ENCOUNTER
Hospital  D/C 2/8/22 - Unable to do virtual call for TCM - Not feeling well enough at this time to come in for appt  Pt was told to ask PCP regarding anxiety - He's not sure if he needs Rx or what Dr would think -     Still feeling nauseas but not vomiting - NO appetite - Adding Ensure to diet - Not moving bowels - Constipation - Miralax while in hospital - Picked up some to try today       Not sure what to do about his medications - Not sure if he's eating enough to be able to take medication     Not sleeping due to the nausea

## 2022-02-09 NOTE — UTILIZATION REVIEW
Notification of Discharge   This is a Notification of Discharge from our facility [address]  Please be advised that this patient has been discharge from our facility  Below you will find the admission and discharge date and time including the patients disposition  UTILIZATION REVIEW CONTACT:  Evita Salvador  Utilization   Network Utilization Review Department  Phone: [phone] x carefully listen to the prompts  All voicemails are confidential   Email: [email or web address]     PHYSICIAN ADVISORY SERVICES:  FOR SMTB-SH-[facility]A REVIEW - MEDICAL NECESSITY DENIAL  Phone: [phone]  Fax: [phone]  Email: [email or web address]     PRESENTATION DATE: 2/4/2022  8:54 PM  OBERVATION ADMISSION DATE:   INPATIENT ADMISSION DATE: 2/5/22  4:29 PM   DISCHARGE DATE: 2/8/2022  4:42 PM  DISPOSITION: Home with [place] with [address] [place] [address] Road INFORMATION:  Send all requests for admission clinical reviews, approved or denied determinations and any other requests to dedicated fax number below belonging to the campus where the patient is receiving treatment   List of dedicated fax numbers:  [address] DENIALS (Administrative/Medical Necessity) [phone]   [address] (Maternity/NICU/Pediatrics) [phone]   Vannesa Handing [phone] [address] [facility] [address] [phone]   [address] [place] [address] [phone]   2000 [facility] [address] [place] [address],[address] [phone]   [facility]  [phone]   220[address] [facility]  [address] [facility] [address] [facility] [phone]

## 2022-02-10 DIAGNOSIS — F41.9 ANXIETY: Primary | ICD-10-CM

## 2022-02-10 RX ORDER — ALPRAZOLAM 0.25 MG/1
0.25 TABLET ORAL 2 TIMES DAILY PRN
Qty: 14 TABLET | Refills: 0 | Status: SHIPPED | OUTPATIENT
Start: 2022-02-10 | End: 2022-03-28

## 2022-02-11 LAB
BACTERIA BLD CULT: NORMAL
BACTERIA BLD CULT: NORMAL

## 2022-02-14 ENCOUNTER — TELEPHONE (OUTPATIENT)
Dept: FAMILY MEDICINE CLINIC | Facility: CLINIC | Age: 77
End: 2022-02-14

## 2022-02-14 NOTE — TELEPHONE ENCOUNTER
[facility] Case Management - Asking for D/C Summary of recent hospitalization    Asking if Pt had his TCM appt?  (Pt unable to be in for appt & unable to do virtual call)    Phone [phone]  Fax [phone]    D/C Summary faxed - NO TCM Note available - Case Management was called with message

## 2022-02-18 ENCOUNTER — TELEPHONE (OUTPATIENT)
Dept: FAMILY MEDICINE CLINIC | Facility: CLINIC | Age: 77
End: 2022-02-18

## 2022-02-18 NOTE — TELEPHONE ENCOUNTER
Feeling unwell all week - asking for an order to get [place] into the home    Pt is scheduled for a phone visit with  Monday 2/21 with Dr Lilliana Moore for 10am

## 2022-02-21 ENCOUNTER — TELEMEDICINE (OUTPATIENT)
Dept: FAMILY MEDICINE CLINIC | Facility: CLINIC | Age: 77
End: 2022-02-21
Payer: COMMERCIAL

## 2022-02-21 VITALS — HEIGHT: 68 IN | WEIGHT: 108 LBS | BODY MASS INDEX: 16.37 KG/M2

## 2022-02-21 DIAGNOSIS — U07.1 PNEUMONIA DUE TO COVID-19 VIRUS: Primary | ICD-10-CM

## 2022-02-21 DIAGNOSIS — J12.82 PNEUMONIA DUE TO COVID-19 VIRUS: Primary | ICD-10-CM

## 2022-02-21 DIAGNOSIS — R07.89 OTHER CHEST PAIN: ICD-10-CM

## 2022-02-21 PROCEDURE — 3725F SCREEN DEPRESSION PERFORMED: CPT | Performed by: FAMILY MEDICINE

## 2022-02-21 PROCEDURE — 3288F FALL RISK ASSESSMENT DOCD: CPT | Performed by: FAMILY MEDICINE

## 2022-02-21 PROCEDURE — 99214 OFFICE O/P EST MOD 30 MIN: CPT | Performed by: FAMILY MEDICINE

## 2022-02-21 PROCEDURE — 1111F DSCHRG MED/CURRENT MED MERGE: CPT | Performed by: FAMILY MEDICINE

## 2022-02-21 PROCEDURE — 1101F PT FALLS ASSESS-DOCD LE1/YR: CPT | Performed by: FAMILY MEDICINE

## 2022-02-21 NOTE — PROGRESS NOTES
COVID-19 Outpatient Progress Note    Assessment/Plan:  Patient has chest pressure he this is been worked up with his most recent hospitalization he is not sleeping well he has insomnia I am going to ask him to take his Xanax every night at bedtime to try to get some normal sleep were going to see him on Monday here in the office    Problem List Items Addressed This Visit        Respiratory    Pneumonia due to COVID-19 virus - Primary       Other    Other chest pain         Disposition:     I recommended patient come to the office for further evaluation  Patient has COVID-19 infection  Based off CDC guidelines, they were recommended to isolate for 5 days from the date of the positive test  If they remain asymptomatic, isolation may be ended followed by 5 days of wearing a mask when around othes to minimize risk of infecting others  If they have a fever, continue to stay home until fever resolves for at least 24 hours  I recommended continued isolation until at least 24 hours have passed since recovery defined as resolution of fever without the use of fever-reducing medications AND improvement in COVID symptoms AND 10 days have passed since onset of symptoms (or 10 days have passed since date of first positive viral diagnostic test for asymptomatic patients)  I have spent 10 minutes directly with the patient  Greater than 50% of this time was spent in counseling/coordination of care regarding: diagnostic results, prognosis, risks and benefits of treatment options, instructions for management, patient and family education, importance of treatment compliance, risk factor reductions and impressions        Encounter provider Renny Kussmaul, DO    Provider located at [address] [address]    Recent Visits  Date Type Provider Dept   02/18/22 Telephone Ashely Dillon P[facility]) Primary Care   02/14/22 [facility]   Showing recent visits within past 7 days and meeting all other requirements  Today's Visits  Date Type Provider Dept   02/21/22 Telemedicine Sean Maloney DO Pg [address] Primary Care   Showing today's visits and meeting all other requirements  Future Appointments  No visits were found meeting these conditions  Showing future appointments within next 150 days and meeting all other requirements     This virtual check-in was done via telephone and he agrees to proceed  Patient agrees to participate in a virtual check in via telephone or video visit instead of presenting to the office to address urgent/immediate medical needs  Patient is aware this is a billable service  After connecting through Telephone, the patient was identified by name and date of birth  Renetta Le was informed that this was a telemedicine visit and that the exam was being conducted confidentially over secure lines  My office door was closed  No one else was in the room  Renetta Le acknowledged consent and understanding of privacy and security of the telemedicine visit  I informed the patient that I have reviewed his record in Epic and presented the opportunity for him to ask any questions regarding the visit today  The patient agreed to participate  It was my intent to perform this visit via video technology but the patient was not able to do a video connection so the visit was completed via audio telephone only  Verification of patient location:  Patient is located in the following state in which I hold an active license: [place]    Subjective:   Renetta Le is a 64 y o  male who has been screened for COVID-19  Symptom change since last report: unchanged  Patient is currently asymptomatic  Patient's symptoms include fatigue and chest tightness   Patient denies fever, chills, malaise, congestion, rhinorrhea, sore throat, anosmia, loss of taste, cough, shortness of breath, abdominal pain, nausea, vomiting, diarrhea, myalgias and headaches  COVID-19 vaccination status: Fully vaccinated (primary series)        Staying home and isolating themselves?: has not      Taking care not to share personal items?: is not      Lab Results   Component Value Date    SARSCOV2 Positive (A) 01/28/2022     Past Medical History:   Diagnosis Date    Coronary artery disease     History of echocardiogram 03/22/2018    Normal EF, normal LVSF  Thick and rebundent MV leaflets w/ mild posterior leaflet prolapse w/ trace regurg   Hyperlipidemia     Lyme disease     Mitral valve prolapse     Shingles      Past Surgical History:   Procedure Laterality Date    APPENDECTOMY      EYE SURGERY      HERNIA REPAIR      TONSILLECTOMY       Current Outpatient Medications   Medication Sig Dispense Refill    ascorbic acid (VITAMIN C) 1000 MG tablet Take 1 tablet (1,000 mg total) by mouth daily 15 tablet 0    aspirin (ECOTRIN LOW STRENGTH) 81 mg EC tablet Take 81 mg by mouth daily      cholecalciferol (VITAMIN D3) 1,000 units tablet Take 2 tablets (2,000 Units total) by mouth daily 15 tablet 0    colchicine (COLCRYS) 0 6 mg tablet Take 1 tablet (0 6 mg total) by mouth 2 (two) times a day 60 tablet 0    ibuprofen (MOTRIN) 600 mg tablet Take 1 tablet (600 mg total) by mouth every 6 (six) hours 30 tablet 0    ondansetron (Zofran ODT) 4 mg disintegrating tablet Take 1 tablet (4 mg total) by mouth every 6 (six) hours as needed for nausea or vomiting 20 tablet 0    pantoprazole (PROTONIX) 40 mg tablet Take 1 tablet (40 mg total) by mouth daily 30 tablet 0    simvastatin (ZOCOR) 5 MG tablet Take 5 mg by mouth daily at bedtime      ALPRAZolam (XANAX) 0 25 mg tablet Take 1 tablet (0 25 mg total) by mouth 2 (two) times a day as needed for anxiety (Patient not taking: Reported on 2/21/2022 ) 14 tablet 0     No current facility-administered medications for this visit  No Known Allergies    Review of Systems   Constitutional: Positive for fatigue   Negative for chills and fever  HENT: Negative for congestion, rhinorrhea and sore throat  Respiratory: Positive for chest tightness  Negative for cough and shortness of breath  Gastrointestinal: Negative for abdominal pain, diarrhea, nausea and vomiting  Genitourinary: Positive for difficulty urinating  Musculoskeletal: Negative for myalgias  Neurological: Negative for headaches  Objective:    Vitals:    02/21/22 1012   Weight: 49 kg (108 lb)   Height: 5' 8" (1 727 m)       Physical Exam  HENT:      Nose: Congestion present  Pulmonary:      Effort: Pulmonary effort is normal    Abdominal:      General: There is no distension  Tenderness: There is no abdominal tenderness  Musculoskeletal:      Cervical back: Normal range of motion  Neurological:      Mental Status: He is alert  VIRTUAL VISIT DISCLAIMER    Katy Chandler verbally agrees to participate in [facility]  Pt is aware that [facility] could be limited without vital signs or the ability to perform a full hands-on physical exam  Vincent Chandler understands he or the provider may request at any time to terminate the video visit and request the patient to seek care or treatment in person

## 2022-02-28 ENCOUNTER — OFFICE VISIT (OUTPATIENT)
Dept: FAMILY MEDICINE CLINIC | Facility: CLINIC | Age: 77
End: 2022-02-28
Payer: COMMERCIAL

## 2022-02-28 VITALS
BODY MASS INDEX: 16.67 KG/M2 | SYSTOLIC BLOOD PRESSURE: 122 MMHG | HEIGHT: 68 IN | WEIGHT: 110 LBS | TEMPERATURE: 97.3 F | DIASTOLIC BLOOD PRESSURE: 82 MMHG

## 2022-02-28 DIAGNOSIS — E44.0 MODERATE PROTEIN-CALORIE MALNUTRITION (HCC): ICD-10-CM

## 2022-02-28 DIAGNOSIS — U07.1 PNEUMONIA DUE TO COVID-19 VIRUS: Primary | ICD-10-CM

## 2022-02-28 DIAGNOSIS — R07.89 OTHER CHEST PAIN: ICD-10-CM

## 2022-02-28 DIAGNOSIS — F33.9 DEPRESSION, RECURRENT (HCC): ICD-10-CM

## 2022-02-28 DIAGNOSIS — J12.82 PNEUMONIA DUE TO COVID-19 VIRUS: Primary | ICD-10-CM

## 2022-02-28 DIAGNOSIS — K52.9 GASTROENTERITIS: ICD-10-CM

## 2022-02-28 PROBLEM — E87.2 LACTIC ACIDOSIS: Status: RESOLVED | Noted: 2022-02-05 | Resolved: 2022-02-28

## 2022-02-28 PROBLEM — E87.20 LACTIC ACIDOSIS: Status: RESOLVED | Noted: 2022-02-05 | Resolved: 2022-02-28

## 2022-02-28 PROCEDURE — 1160F RVW MEDS BY RX/DR IN RCRD: CPT | Performed by: FAMILY MEDICINE

## 2022-02-28 PROCEDURE — 99214 OFFICE O/P EST MOD 30 MIN: CPT | Performed by: FAMILY MEDICINE

## 2022-02-28 PROCEDURE — 1111F DSCHRG MED/CURRENT MED MERGE: CPT | Performed by: FAMILY MEDICINE

## 2022-02-28 PROCEDURE — 1036F TOBACCO NON-USER: CPT | Performed by: FAMILY MEDICINE

## 2022-02-28 RX ORDER — PANTOPRAZOLE SODIUM 40 MG/1
40 TABLET, DELAYED RELEASE ORAL DAILY
Qty: 30 TABLET | Refills: 0 | Status: SHIPPED | OUTPATIENT
Start: 2022-02-28 | End: 2022-03-28

## 2022-02-28 NOTE — PROGRESS NOTES
BMI Counseling: Body mass index is 16 73 kg/m²  The BMI is below normal  Patient advised to gain weight  Rationale for BMI follow-up plan is due to patient being underweight  BMI Counseling: Body mass index is 16 73 kg/m²  Follow-up plan was not completed due to elderly patient (70 years old) where weight reduction/weight gain would complicate underlying health condition such as: illness or physical disability  Assessment/Plan:  Patient will discontinue his colchicine since she is already near the end of his planned treatment he will continue his pantoprazole patient will also  a bottle of key for and start small amounts of that every day to replenish is normal bowel tab will see him again in 1 month    Problem List Items Addressed This Visit        Respiratory    Pneumonia due to COVID-19 virus - Primary      Other Visit Diagnoses     Gastroenteritis               Diagnoses and all orders for this visit:    Pneumonia due to COVID-19 virus    Gastroenteritis        No problem-specific Assessment & Plan notes found for this encounter  Subjective:      Patient ID: Carolin Nunes is a 64 y o  male  Mr Shun watson she is here with the chief complaint that he has a vague substernal chest pressure and he is currently taking colchicine 0 6 mg twice daily for this the 2nd thing is he has an upset stomach he is not able to eat well he is having stools that her non characteristic for him and patient is not sleeping well he sleeping in an upright position on a recliner on his bed      The following portions of the patient's history were reviewed and updated as appropriate:   He has a past medical history of Coronary artery disease, History of echocardiogram (03/22/2018), Hyperlipidemia, Lyme disease, Mitral valve prolapse, and Shingles  ,  does not have any pertinent problems on file  ,   has a past surgical history that includes Appendectomy; Tonsillectomy; Hernia repair; and Eye surgery  ,  family history includes Heart disease in his mother; No Known Problems in his father  ,   reports that he has never smoked  He has never used smokeless tobacco  He reports that he does not drink alcohol and does not use drugs  ,  has No Known Allergies     Current Outpatient Medications   Medication Sig Dispense Refill    pantoprazole (PROTONIX) 40 mg tablet Take 1 tablet (40 mg total) by mouth daily 30 tablet 0    ALPRAZolam (XANAX) 0 25 mg tablet Take 1 tablet (0 25 mg total) by mouth 2 (two) times a day as needed for anxiety (Patient not taking: Reported on 2/21/2022 ) 14 tablet 0    aspirin (ECOTRIN LOW STRENGTH) 81 mg EC tablet Take 81 mg by mouth daily (Patient not taking: Reported on 2/28/2022 )       No current facility-administered medications for this visit  Review of Systems   Constitutional: Negative for activity change, appetite change, diaphoresis, fatigue and fever  HENT: Negative  Eyes: Negative  Respiratory: Positive for shortness of breath  Negative for apnea, cough, chest tightness and wheezing  Cardiovascular: Negative for chest pain, palpitations and leg swelling  Gastrointestinal: Positive for diarrhea  Negative for abdominal distention, abdominal pain, anal bleeding, constipation, nausea and vomiting  Loss of appetite weight loss   Endocrine: Negative for cold intolerance, heat intolerance, polydipsia, polyphagia and polyuria  Genitourinary: Negative for difficulty urinating, dysuria, flank pain, hematuria and urgency  Musculoskeletal: Negative for arthralgias, back pain, gait problem, joint swelling and myalgias  Skin: Negative for color change, rash and wound  Allergic/Immunologic: Negative for environmental allergies, food allergies and immunocompromised state  Neurological: Positive for weakness  Negative for dizziness, seizures, syncope, speech difficulty, numbness and headaches  Hematological: Negative for adenopathy  Does not bruise/bleed easily  Psychiatric/Behavioral: Negative for agitation, behavioral problems, hallucinations, sleep disturbance and suicidal ideas  The patient is nervous/anxious  Objective:  Vitals:    02/28/22 1249   BP: 122/82   BP Location: Left arm   Patient Position: Sitting   Cuff Size: Standard   Temp: (!) 97 3 °F (36 3 °C)   TempSrc: Temporal   Weight: 49 9 kg (110 lb)   Height: 5' 8" (1 727 m)     Body mass index is 16 73 kg/m²  Physical Exam  Constitutional:       Appearance: He is obese  He is ill-appearing  HENT:      Head: Normocephalic and atraumatic  Nose: Nose normal       Mouth/Throat:      Mouth: Mucous membranes are moist       Pharynx: Oropharynx is clear  Eyes:      Extraocular Movements: Extraocular movements intact  Conjunctiva/sclera: Conjunctivae normal       Pupils: Pupils are equal, round, and reactive to light  Cardiovascular:      Rate and Rhythm: Normal rate and regular rhythm  Pulses: Normal pulses  Heart sounds: Normal heart sounds  Pulmonary:      Effort: Pulmonary effort is normal    Abdominal:      General: Abdomen is flat  Palpations: Abdomen is soft  Musculoskeletal:         General: Normal range of motion  Cervical back: Normal range of motion  Skin:     General: Skin is warm  Capillary Refill: Capillary refill takes less than 2 seconds  Neurological:      Mental Status: He is alert  Psychiatric:         Mood and Affect: Mood normal          Behavior: Behavior normal          Thought Content:  Thought content normal          Judgment: Judgment normal

## 2022-02-28 NOTE — ASSESSMENT & PLAN NOTE
Patient has lost about 27 lb since been getting COVID he has looks very emaciated Malnutrition Findings:           BMI Findings: Body mass index is 16 73 kg/m²

## 2022-03-17 ENCOUNTER — OFFICE VISIT (OUTPATIENT)
Dept: CARDIOLOGY CLINIC | Facility: CLINIC | Age: 77
End: 2022-03-17
Payer: COMMERCIAL

## 2022-03-17 VITALS
HEIGHT: 68 IN | OXYGEN SATURATION: 98 % | BODY MASS INDEX: 17.58 KG/M2 | DIASTOLIC BLOOD PRESSURE: 80 MMHG | WEIGHT: 116 LBS | SYSTOLIC BLOOD PRESSURE: 110 MMHG | HEART RATE: 96 BPM

## 2022-03-17 DIAGNOSIS — J12.82 PNEUMONIA DUE TO COVID-19 VIRUS: ICD-10-CM

## 2022-03-17 DIAGNOSIS — U07.1 PNEUMONIA DUE TO COVID-19 VIRUS: ICD-10-CM

## 2022-03-17 DIAGNOSIS — I30.1 ACUTE VIRAL PERICARDITIS: Primary | ICD-10-CM

## 2022-03-17 PROCEDURE — 99214 OFFICE O/P EST MOD 30 MIN: CPT | Performed by: INTERNAL MEDICINE

## 2022-03-17 RX ORDER — SIMVASTATIN 5 MG
5 TABLET ORAL
COMMUNITY

## 2022-03-17 NOTE — PATIENT INSTRUCTIONS
COVID-19 and Chronic Health Conditions   WHAT YOU NEED TO KNOW:   Your chronic condition may increase your risk for COVID-19 or serious problems it can cause  Healthcare providers might need to make changes that affect how you usually manage your chronic health condition  Providers may change hours of operation or not have patients come in to be seen  You may not be able to make appointments to get blood drawn or to have tests or procedures  This may continue until the virus that causes COVID-19 is controlled  Until then, you can take steps to manage your condition  The steps will also lower your risk for COVID-19 or the serious problems it causes  If you do develop COVID-19, healthcare providers will tell you when it is okay to be around others after you recover  This depends on your chronic condition, any symptoms of COVID-19 that developed, and how severe the symptoms were  DISCHARGE INSTRUCTIONS:   Call your local emergency number (911 in the [address] [place] [address],[address]) or an emergency department if:   · You have trouble breathing or shortness of breath  · You have chest pain or pressure that lasts longer than 5 minutes  · You become confused or hard to wake  · Your lips or face are blue  Return to the emergency department if:   · You have a fever of 104°F (40°C) or higher  Call your doctor or healthcare provider if:   · You have symptoms of COVID-19  · You have questions or concerns about COVID-19 or your chronic condition  What you need to know about serious problems from the virus:  You may develop long-term health problems caused by the virus  Your risk is higher if you are 65 or older  A weak immune system, obesity, diabetes, chronic kidney disease, or a heart or lung condition can also increase your risk  Your risk is also higher if you are a current or former cigarette smoker   COVID-19 can lead to any of the following:  · Multisymptom inflammatory syndrome in adults (MIS-A) or in children (MIS-C), causing inflammation in the heart, digestive system, skin, or brain    · Shortness of breath, serious lower respiratory conditions, such as pneumonia or acute respiratory distress syndrome (ARDS)    · Blood clots or blood vessel damage    · Organ damage from a lack of oxygen or from blood clots    · Sleep problems    · Problems thinking clearly, remembering information, or concentrating    · Mood changes, depression, or anxiety    · Long-term problems tasting or smelling    · Loss of appetite and weight loss    · Nerve pain    · Fatigue (feeling mentally and physically tired)    How the 2019 coronavirus spreads: The virus spreads quickly and easily  The virus can be passed starting 2 to 3 days before symptoms begin or before a positive test if symptoms never begin  · Droplets are the main way all coronaviruses spread  The virus travels in droplets that form when a person talks, sings, coughs, or sneezes  The droplets can also float in the air for minutes or hours  Infection happens when you breathe in the droplets or get them in your eyes or nose  Close personal contact with an infected person increases your risk for infection  This means being within 6 feet (2 meters) of the person for at least 15 minutes over 24 hours  · Person-to-person contact can spread the virus  For example, a person with the virus on his or her hands can spread it by shaking hands with someone  · The virus can stay on objects and surfaces for up to 3 days  You may become infected by touching the object or surface and then touching your eyes or mouth  Manage your chronic health condition during this time:  If you do not have a regular healthcare provider, experts recommend you contact a local [facility] health center or health department  The following can help you manage your condition and prevent COVID-19:  · Get emergency care for your condition if needed    Talk to your healthcare providers about symptoms of your chronic condition that need immediate care  Your providers can help you create a plan or add exacerbation management to your plan  The plan will include when to go to an emergency department and when to call your local emergency number  This will depend on where you live and the services that are available during this time  · Go to dialysis appointments as scheduled  It is important to stay on schedule  You will need to have enough food to be able to follow the emergency diet plan if you must miss a session  The emergency diet needs to be part of the management plan for your condition  · Reschedule any upcoming appointments as needed  Medical facilities may be closed until the coronavirus is better controlled  This means you may need to reschedule a surgery, procedure, or check-up appointment  If you cannot have a phone or video appointment, you will need to make a new appointment  Some providers may be scheduling appointments several months in advance  Some surgeries and procedures will happen as scheduled  This depends on the medical condition and the reason for the surgery or procedure  You may need to have extra testing for COVID-19 several days before  · Follow any regular management plan you use  Your healthcare provider will tell you if you need to make any changes to your regular management plan  For example, if you have asthma, continue to follow your asthma action plan  If you have diabetes, you may need to check your blood sugar level more often  Stress and illness can make blood sugar levels go up  You may need to adjust medicine such as insulin  If you have a heart condition or high blood pressure, you may need to check your blood pressure more often  Stress and illness can also raise your blood pressure  · Talk to your healthcare providers about your medicines  You may be able to get more than 1 month of medicine at a time   This will lower the number of times you need to go to a pharmacy to get your medicines  Make sure you have enough medicine if you have a condition that can lead to an emergency  Examples include asthma medicines, insulin, or an epinephrine pen  Check the expiration dates on the medicines you currently have  Ask for refills as soon as possible, if needed  If it is not time to refill prescriptions, you may be able to get an emergency supply of some medicines  Medicine plans vary, so ask your healthcare provider or pharmacist for options  · Have supplies available in your home  If possible, get extra supplies you use regularly  Examples include absorbent pads, syringes, and wound cleaning solutions  This will limit the number of trips out of your home to get supplies  · Know the signs and symptoms of COVID-19  Signs and symptoms usually start about 5 days after infection but can take 2 to 14 days  Signs and symptoms range from mild to severe  You may feel like you have the flu or a bad cold  Your chronic health condition may cause some of the same symptoms COVID-19 causes  This can make it hard to know if a symptom is from COVID-19 or your chronic condition  Keep a record of any new or worsening symptom you have  This is especially important if you have a condition that often causes shortness of breath  Your provider can tell you if you should be tested for COVID-19  Tell your healthcare provider if you think you were infected but develop signs or symptoms not listed below:    ? A cough    ? Shortness of breath or trouble breathing that may become severe    ? A fever of at least 100 4°F, or 38°C (may be lower in adults 65 or older)    ? Chills that might include shaking    ? Muscle pain, body aches, or a headache    ? A sore throat    ? Suddenly not being able to taste or smell anything    ? Feeling very tired (fatigue)    ? Congestion (stuffy head and nose), or a runny nose    ?  Diarrhea, nausea, or vomiting    What you can do to prevent having to go out of your home during this time: · Ask your healthcare provider for other ways to have appointments  Some providers offer phone, video, or other types of appointments  · Have food, medicines, and other supplies delivered  Some pharmacies can send certain medicines to you through the mail  Grocery stores and restaurants may be able to deliver food and other items  If possible, have delivered items placed somewhere  Try not to have someone hand you an item  You will be so close to the person that the virus can spread between you  · Ask someone to get items you need  The person can get groceries, medicines, or other needed items for you  Choose a person who does not have signs or symptoms of COVID-19 or has tested negative for it  The person should not be waiting for test results  He or she should not have a condition that increases the risk for COVID-19 or serious problems it causes  What you need to know about COVID-19 vaccines: Your healthcare provider can give you more information about what to expect, depending on your chronic condition  You are considered fully vaccinated against COVID-19 two weeks after the final dose of any COVID-19 vaccine  Let your healthcare provider know when you have received the final dose of the vaccine  Make a copy of your vaccination card  Keep the original with you in case you need to show it  Keep the copy in a safe place  · COVID-19 vaccines are given as a shot in 1 or 2 doses  Vaccination is recommended for everyone 5 years or older  One 2-dose vaccine is fully approved for those 12 or older  This vaccine also has an emergency use authorization (EUA) for children 11to 13years old  No vaccine is currently available for children younger than 5 years  A booster (additional) dose is given to help the immune system continue to protect against severe COVID-19     ? A booster is recommended for all adults 18 or older    The booster can be a different brand of the COVID-19 vaccine than you originally received  The timing for the booster depends on the type of vaccine you received:    § 1-dose vaccine: The booster is given at least 2 months after you received the vaccine  § 2-dose vaccine: The booster is given at least 6 months after the second dose   ? A booster can be given to adolescents 12to 16years old  Only 1 COVID-19 vaccine has an EUA for adolescent boosters  The booster is given at least 6 months after the second dose of the original vaccine series  Continue social distancing and other measures, even after you get the vaccine  Although it is not common, you can become infected after you get the vaccine  You may also be able to pass the virus to others without knowing you are infected  After you get the vaccine, check local, national, and international travel rules  You may need to be tested before you travel  Some countries require proof of a negative test before you travel  You may also need to quarantine after you return  Lower your risk for COVID-19:   · Wash your hands often throughout the day  Use soap and water  Rub your soapy hands together, lacing your fingers, for at least 20 seconds  Rinse with warm, running water  Dry your hands with a clean towel or paper towel  Use hand  that contains alcohol if soap and water are not available  Teach children how to wash their hands and use hand   · Cover sneezes and coughs  Turn your face away and cover your mouth and nose with a tissue  Throw the tissue away  Use the bend of your arm if a tissue is not available  Then wash your hands with soap and water or use hand   Teach children how to cover a cough or sneeze  · Follow worldwide, national, and local social distancing guidelines  Keep at least 6 feet (2 meters) between you and others  · Wear a face covering (mask) around anyone who does not live in your home  Use a cloth covering with at least 2 layers   You can also create layers by putting a cloth covering over a disposable non-medical mask  Cover your mouth and your nose  · Try not to touch your face  If you get the virus on your hands, you can transfer it to your eyes, nose, or mouth and become infected  You can also transfer it to objects, surfaces, or people  · Clean and disinfect high-touch surfaces and objects in your home often  Use disinfecting wipes, or make a solution by mixing 4 teaspoons of bleach with 1 quart (4 cups) of water  Do not  use any cleaning or disinfecting products that can trigger an asthma attack or other breathing problems  Open windows or have circulating air as you clean  Do not  mix ammonia with bleach  This will create toxic fumes  How to follow social distancing guidelines:  National and local social distancing rules vary  Rules and restrictions may change over time as restrictions are lifted  The following are general guidelines:  · Stay home if you are sick or think you may have COVID-19  It is important to stay home if you are waiting for a testing appointment or for test results  · Avoid close physical contact with anyone who does not live in your home  Do not shake hands with, hug, or kiss a person as a greeting  If you must use public transportation (such as a bus or subway), try to sit or stand away from others  Wear your face covering  · Avoid in-person gatherings and crowds  Attend virtually if possible  Help strengthen your immune system:   · Ask about other vaccines you may need  Get the influenza (flu) vaccine as soon as recommended each year, usually starting in September or October  Get the pneumonia vaccine if recommended  Your healthcare provider can tell you if you should also get other vaccines, and when to get them  · Do not smoke  Nicotine and other chemicals in cigarettes and cigars can increase your risk for infection and for serious COVID-19 effects   Ask your healthcare provider for information if you currently smoke and need help to quit  E-cigarettes or smokeless tobacco still contain nicotine  Talk to your healthcare provider before you use these products  · Eat a variety of healthy foods  Examples include vegetables, fruits, whole-grain breads and cereals, lean meats and poultry, fish, low-fat dairy products, and cooked beans  Healthy foods contain nutrients that help keep your immune system strong  · Find ways to manage stress  You may be feeling more stressed than usual because of the COVID-19 outbreak  The situation is very stressful to many people  Talk to your healthcare providers about ways to manage stress during this time  Stress can lead to breathing problems or make the problems worse  Stress can trigger an attack or exacerbation of many health conditions  It is important to do things that help you feel more relaxed, such as the following:     ? Pick 1 or 2 times a day to watch the news  Constant news watching can increase your stress levels  ? Talk to a friend on the phone or through a video chat  ? Take a warm, soothing bath  ? Listen to music  ? Exercise can also help relieve stress  This may be hard if your regular gym or outdoor exercise area is closed  If you do not have exercise equipment at home, try walking inside your home  You can walk quickly or turn on music and dance  Follow up with your doctor or healthcare provider as directed: Your providers will tell you when you can come in for tests, procedures, or check-ups  Bring your symptom record with you to all appointments  Write down your questions so you remember to ask them during your visits    For more information:   · Centers for Disease Control and Prevention  [address] , [address]  Phone: 5- 172 - 9464670  Phone: 3- 707 - 9272896  Web Address: [employer] com br    © Copyright [employer] 2022 Information is for End User's use only and may not be sold, redistributed or otherwise used for commercial purposes  All illustrations and images included in CareNotes® are the copyrighted property of [employer] D A M [employer] Inc  or Keegan Hale  The above information is an  only  It is not intended as medical advice for individual conditions or treatments  Talk to your doctor, nurse or pharmacist before following any medical regimen to see if it is safe and effective for you

## 2022-03-17 NOTE — PROGRESS NOTES
Subjective:        Patient ID: Hetal Collado is a 76 y o  male  Chief Complaint:  Heidi George is here for COVID pericarditis follow-up  Recent hospitalization reviewed, records reviewed, your no reviewed  On questioning it does not sound like Heidi George took his Motrin/Advil for anywhere near a month  He believes he was getting nauseous on the colchicine so this was also discontinued early, but questioning suggest he was nauseous on presentation with his COVID  He is on a PPI  His nausea has completely resolved  He is slowly improving getting better  He is weak he is fatigued he is tired but this is also slowly improving he reports  He still gets a vague chest heaviness, it just does not feel right, but only when he lays down  That he feels if he is getting short of breath and can not breathe and needs to sit upright  He is not getting any exertional chest pains or tightness  There is no pain with a deep breath  He is not having any fevers rigors or chills  The following portions of the patient's history were reviewed and updated as appropriate: allergies, current medications, past family history, past medical history, past social history, past surgical history and problem list   Review of Systems   Constitutional: Negative for chills, diaphoresis, malaise/fatigue and weight gain  HENT: Negative for nosebleeds and stridor  Eyes: Negative for double vision, vision loss in left eye, vision loss in right eye and visual disturbance  Cardiovascular: Positive for chest pain (Positional when lying down)  Negative for claudication, cyanosis, dyspnea on exertion, irregular heartbeat, leg swelling, near-syncope, orthopnea, palpitations, paroxysmal nocturnal dyspnea and syncope  Respiratory: Positive for shortness of breath ( when lying down)  Negative for cough, snoring and wheezing  Endocrine: Negative for polydipsia, polyphagia and polyuria  Hematologic/Lymphatic: Negative for bleeding problem  Does not bruise/bleed easily  Skin: Negative for flushing and rash  Musculoskeletal: Negative for falls and myalgias  Gastrointestinal: Negative for abdominal pain, heartburn, hematemesis, hematochezia, melena and nausea  Genitourinary: Negative for hematuria  Neurological: Negative for brief paralysis, dizziness, focal weakness, headaches, light-headedness, loss of balance and vertigo  Psychiatric/Behavioral: Negative for altered mental status and substance abuse  Allergic/Immunologic: Negative for hives  Objective:      /80   Pulse 96   Ht 5' 8" (1 727 m)   Wt 52 6 kg (116 lb)   SpO2 98%   BMI 17 64 kg/m²   Physical Exam  Constitutional:       General: He is not in acute distress  Appearance: He is well-developed  He is not diaphoretic  HENT:      Head: Normocephalic and atraumatic  Eyes:      General: No scleral icterus  Pupils: Pupils are equal, round, and reactive to light  Neck:      Thyroid: No thyromegaly  Vascular: No JVD  Cardiovascular:      Rate and Rhythm: Normal rate and regular rhythm  Heart sounds: Normal heart sounds  No murmur heard  No friction rub  No gallop  Pulmonary:      Effort: Pulmonary effort is normal  No respiratory distress  Breath sounds: Normal breath sounds  No stridor  No wheezing or rales  Chest:      Chest wall: No mass or deformity  Abdominal:      General: Bowel sounds are normal  There is no distension  Palpations: Abdomen is soft  There is no mass  Tenderness: There is no abdominal tenderness  Musculoskeletal:         General: No deformity  Normal range of motion  Cervical back: Normal range of motion and neck supple  Right lower leg: No edema  Left lower leg: No edema  Skin:     General: Skin is warm and dry  Coloration: Skin is not pale  Findings: No erythema  Neurological:      Mental Status: He is alert and oriented to person, place, and time  Coordination: Coordination normal    Psychiatric:         Behavior: Behavior normal          Lab Review:   No results displayed because visit has over 200 results  Admission on 01/28/2022, Discharged on 01/29/2022   Component Date Value    WBC 01/28/2022 9 85     RBC 01/28/2022 5 66*    Hemoglobin 01/28/2022 16 4     Hematocrit 01/28/2022 48 3     MCV 01/28/2022 85     MCH 01/28/2022 29 0     MCHC 01/28/2022 34 0     RDW 01/28/2022 12 3     MPV 01/28/2022 10 7     Platelets 95/36/7481 184     nRBC 01/28/2022 0     Neutrophils Relative 01/28/2022 77*    Immat GRANS % 01/28/2022 0     Lymphocytes Relative 01/28/2022 16     Monocytes Relative 01/28/2022 7     Eosinophils Relative 01/28/2022 0     Basophils Relative 01/28/2022 0     Neutrophils Absolute 01/28/2022 7 53     Immature Grans Absolute 01/28/2022 0 03     Lymphocytes Absolute 01/28/2022 1 54     Monocytes Absolute 01/28/2022 0 73     Eosinophils Absolute 01/28/2022 0 00     Basophils Absolute 01/28/2022 0 02     Sodium 01/28/2022 134*    Potassium 01/28/2022 3 5     Chloride 01/28/2022 95*    CO2 01/28/2022 26     ANION GAP 01/28/2022 13     BUN 01/28/2022 23     Creatinine 01/28/2022 1 09     Glucose 01/28/2022 134     Calcium 01/28/2022 9 6     AST 01/28/2022 31     ALT 01/28/2022 25     Alkaline Phosphatase 01/28/2022 62     Total Protein 01/28/2022 8 1     Albumin 01/28/2022 3 6     Total Bilirubin 01/28/2022 1 03*    eGFR 01/28/2022 64     hs TnI 0hr 01/28/2022 9     NT-proBNP 01/28/2022 312     Blood Culture 01/28/2022 No Growth After 5 Days   Blood Culture 01/28/2022 No Growth After 5 Days       hs TnI 2hr 01/28/2022 8     Delta 2hr hsTnI 01/28/2022 -1     hs TnI 4hr 01/28/2022 8     Delta 4hr hsTnI 01/28/2022 -1     SARS-CoV-2 01/28/2022 Positive*    INFLUENZA A PCR 01/28/2022 Negative     INFLUENZA B PCR 01/28/2022 Negative     RSV PCR 01/28/2022 Negative     LACTIC ACID 01/28/2022 1 9     D-Dimer, Quant 01/28/2022 0 61*    Ventricular Rate 01/28/2022 101     Atrial Rate 01/28/2022 101     IL Interval 01/28/2022 146     QRSD Interval 01/28/2022 92     QT Interval 01/28/2022 358     QTC Interval 01/28/2022 464     P Axis 01/28/2022 76     QRS Axis 01/28/2022 67     T Wave Axis 01/28/2022 73     PTT 01/28/2022 30     Protime 01/28/2022 13 6     INR 01/28/2022 1 09     Procalcitonin 01/28/2022 0 20     Procalcitonin 01/29/2022 0 16     PTT 01/29/2022 44*    PTT 01/29/2022 >210*    Sodium 01/29/2022 141     Potassium 01/29/2022 3 7     Chloride 01/29/2022 103     CO2 01/29/2022 25     ANION GAP 01/29/2022 13     BUN 01/29/2022 17     Creatinine 01/29/2022 0 89     Glucose 01/29/2022 145*    Calcium 01/29/2022 8 4     Corrected Calcium 01/29/2022 9 4     AST 01/29/2022 37     ALT 01/29/2022 23     Alkaline Phosphatase 01/29/2022 55     Total Protein 01/29/2022 6 6     Albumin 01/29/2022 2 8*    Total Bilirubin 01/29/2022 0 51     eGFR 01/29/2022 [address] Supplier Name 01/29/2022 [employer] [facility]     Supplier Phone Number 01/29/2022 [phone]     Order Status 01/29/2022 Delivery Successful     Delivery Request Date 01/29/2022 01/29/2022     Date Delivered  01/29/2022 01/29/2022     Item Description 01/29/2022 Shannon Constant, Adult      No results found  Assessment:       1  Acute viral pericarditis  High sensitivity CRP    Sedimentation rate, automated    NT-BNP PRO   2  Pneumonia due to COVID-19 virus          Plan:       I ordered his CRP and ESR if elevated I will need to retreat him  For completeness BNP ordered  Auscultation exam essentially unremarkable so I do not think any repeat cardiac testing at this time is warranted  His symptoms are not consistent with coronary insufficiency though coronary atherosclerosis was seen on a CT scan  Lungs are clear, oxygenation normal at 98% on room air today  He is in no distress      I am hoping there is just a lag in his recovery here, I am going to see him back in a month, I told him being get the blood work done today or tomorrow nonfasting at any time  I will address this as soon as a returns to me

## 2022-03-21 ENCOUNTER — APPOINTMENT (OUTPATIENT)
Dept: LAB | Facility: MEDICAL CENTER | Age: 77
End: 2022-03-21
Payer: COMMERCIAL

## 2022-03-21 DIAGNOSIS — I30.1 ACUTE VIRAL PERICARDITIS: ICD-10-CM

## 2022-03-21 LAB
ALBUMIN SERPL BCP-MCNC: 3.7 G/DL (ref 3.5–5)
ALP SERPL-CCNC: 55 U/L (ref 46–116)
ALT SERPL W P-5'-P-CCNC: 26 U/L (ref 12–78)
ANION GAP SERPL CALCULATED.3IONS-SCNC: 5 MMOL/L (ref 4–13)
AST SERPL W P-5'-P-CCNC: 24 U/L (ref 5–45)
BASOPHILS # BLD AUTO: 0.09 THOUSANDS/ΜL (ref 0–0.1)
BASOPHILS NFR BLD AUTO: 1 % (ref 0–1)
BILIRUB SERPL-MCNC: 0.58 MG/DL (ref 0.2–1)
BUN SERPL-MCNC: 22 MG/DL (ref 5–25)
CALCIUM SERPL-MCNC: 9 MG/DL (ref 8.3–10.1)
CHLORIDE SERPL-SCNC: 105 MMOL/L (ref 100–108)
CHOLEST SERPL-MCNC: 197 MG/DL
CO2 SERPL-SCNC: 30 MMOL/L (ref 21–32)
CREAT SERPL-MCNC: 0.82 MG/DL (ref 0.6–1.3)
CRP SERPL HS-MCNC: <0.9 MG/L
EOSINOPHIL # BLD AUTO: 0.34 THOUSAND/ΜL (ref 0–0.61)
EOSINOPHIL NFR BLD AUTO: 5 % (ref 0–6)
ERYTHROCYTE [DISTWIDTH] IN BLOOD BY AUTOMATED COUNT: 14.2 % (ref 11.6–15.1)
ERYTHROCYTE [SEDIMENTATION RATE] IN BLOOD: 21 MM/HOUR (ref 0–19)
GFR SERPL CREATININE-BSD FRML MDRD: 84 ML/MIN/1.73SQ M
GLUCOSE P FAST SERPL-MCNC: 106 MG/DL (ref 65–99)
HCT VFR BLD AUTO: 38.8 % (ref 36.5–49.3)
HDLC SERPL-MCNC: 71 MG/DL
HGB BLD-MCNC: 12.5 G/DL (ref 12–17)
IMM GRANULOCYTES # BLD AUTO: 0.04 THOUSAND/UL (ref 0–0.2)
IMM GRANULOCYTES NFR BLD AUTO: 1 % (ref 0–2)
LDLC SERPL CALC-MCNC: 107 MG/DL (ref 0–100)
LYMPHOCYTES # BLD AUTO: 2.28 THOUSANDS/ΜL (ref 0.6–4.47)
LYMPHOCYTES NFR BLD AUTO: 31 % (ref 14–44)
MCH RBC QN AUTO: 29.7 PG (ref 26.8–34.3)
MCHC RBC AUTO-ENTMCNC: 32.2 G/DL (ref 31.4–37.4)
MCV RBC AUTO: 92 FL (ref 82–98)
MONOCYTES # BLD AUTO: 0.71 THOUSAND/ΜL (ref 0.17–1.22)
MONOCYTES NFR BLD AUTO: 10 % (ref 4–12)
NEUTROPHILS # BLD AUTO: 4.01 THOUSANDS/ΜL (ref 1.85–7.62)
NEUTS SEG NFR BLD AUTO: 52 % (ref 43–75)
NONHDLC SERPL-MCNC: 126 MG/DL
NRBC BLD AUTO-RTO: 0 /100 WBCS
NT-PROBNP SERPL-MCNC: 148 PG/ML
PLATELET # BLD AUTO: 260 THOUSANDS/UL (ref 149–390)
PMV BLD AUTO: 11.3 FL (ref 8.9–12.7)
POTASSIUM SERPL-SCNC: 3.9 MMOL/L (ref 3.5–5.3)
PROT SERPL-MCNC: 6.7 G/DL (ref 6.4–8.2)
RBC # BLD AUTO: 4.21 MILLION/UL (ref 3.88–5.62)
SODIUM SERPL-SCNC: 140 MMOL/L (ref 136–145)
TRIGL SERPL-MCNC: 97 MG/DL
WBC # BLD AUTO: 7.47 THOUSAND/UL (ref 4.31–10.16)

## 2022-03-21 PROCEDURE — 36415 COLL VENOUS BLD VENIPUNCTURE: CPT | Performed by: INTERNAL MEDICINE

## 2022-03-21 PROCEDURE — 83880 ASSAY OF NATRIURETIC PEPTIDE: CPT

## 2022-03-21 PROCEDURE — 85652 RBC SED RATE AUTOMATED: CPT | Performed by: INTERNAL MEDICINE

## 2022-03-21 PROCEDURE — 86141 C-REACTIVE PROTEIN HS: CPT | Performed by: INTERNAL MEDICINE

## 2022-03-21 PROCEDURE — 80053 COMPREHEN METABOLIC PANEL: CPT | Performed by: INTERNAL MEDICINE

## 2022-03-21 PROCEDURE — 80061 LIPID PANEL: CPT | Performed by: INTERNAL MEDICINE

## 2022-03-21 PROCEDURE — 85025 COMPLETE CBC W/AUTO DIFF WBC: CPT | Performed by: INTERNAL MEDICINE

## 2022-03-28 ENCOUNTER — OFFICE VISIT (OUTPATIENT)
Dept: FAMILY MEDICINE CLINIC | Facility: CLINIC | Age: 77
End: 2022-03-28
Payer: COMMERCIAL

## 2022-03-28 VITALS
SYSTOLIC BLOOD PRESSURE: 132 MMHG | WEIGHT: 119.6 LBS | HEIGHT: 68 IN | DIASTOLIC BLOOD PRESSURE: 96 MMHG | TEMPERATURE: 96.8 F | BODY MASS INDEX: 18.13 KG/M2

## 2022-03-28 DIAGNOSIS — K21.9 GASTROESOPHAGEAL REFLUX DISEASE WITHOUT ESOPHAGITIS: Primary | ICD-10-CM

## 2022-03-28 PROCEDURE — 1036F TOBACCO NON-USER: CPT | Performed by: FAMILY MEDICINE

## 2022-03-28 PROCEDURE — 1160F RVW MEDS BY RX/DR IN RCRD: CPT | Performed by: FAMILY MEDICINE

## 2022-03-28 PROCEDURE — 99213 OFFICE O/P EST LOW 20 MIN: CPT | Performed by: FAMILY MEDICINE

## 2022-03-28 RX ORDER — PANTOPRAZOLE SODIUM 20 MG/1
20 TABLET, DELAYED RELEASE ORAL
Qty: 15 TABLET | Refills: 0 | Status: ON HOLD | OUTPATIENT
Start: 2022-03-28 | End: 2022-04-14 | Stop reason: SDUPTHER

## 2022-03-28 NOTE — PROGRESS NOTES
Assessment/Plan:    Problem List Items Addressed This Visit     None      Visit Diagnoses     Gastroesophageal reflux disease without esophagitis    -  Primary    Relevant Medications    pantoprazole (PROTONIX) 20 mg tablet           Diagnoses and all orders for this visit:    Gastroesophageal reflux disease without esophagitis  -     pantoprazole (PROTONIX) 20 mg tablet; Take 1 tablet (20 mg total) by mouth daily before breakfast        No problem-specific Assessment & Plan notes found for this encounter  Subjective:      Patient ID: Lady Peck is a 76 y o  male  Follow up on covid  patient doing pretty well his appetite is better he is eating better looks much better than he did a month ago so I think he is on the mend now he has some very mild BPH symptoms no treatment necessary at the present time      The following portions of the patient's history were reviewed and updated as appropriate:   He has a past medical history of Coronary artery disease, COVID-19 (01/28/2022), History of echocardiogram (03/22/2018), Hyperlipidemia, Lyme disease, Mitral valve prolapse, and Shingles  ,  does not have any pertinent problems on file  ,   has a past surgical history that includes Appendectomy; Tonsillectomy; Hernia repair; and Eye surgery  ,  family history includes Heart disease in his mother; No Known Problems in his father  ,   reports that he has never smoked  He has never used smokeless tobacco  He reports that he does not drink alcohol and does not use drugs  ,  has No Known Allergies     Current Outpatient Medications   Medication Sig Dispense Refill    aspirin (ECOTRIN LOW STRENGTH) 81 mg EC tablet Take 81 mg by mouth daily        simvastatin (ZOCOR) 5 MG tablet Take 5 mg by mouth daily at bedtime      pantoprazole (PROTONIX) 20 mg tablet Take 1 tablet (20 mg total) by mouth daily before breakfast 15 tablet 0     No current facility-administered medications for this visit         Review of Systems Constitutional: Negative for activity change, appetite change, diaphoresis, fatigue and fever  HENT: Negative  Eyes: Negative  Respiratory: Negative for apnea, cough, chest tightness, shortness of breath and wheezing  Cardiovascular: Negative for chest pain, palpitations and leg swelling  Gastrointestinal: Negative for abdominal distention, abdominal pain, anal bleeding, constipation, diarrhea, nausea and vomiting  Endocrine: Negative for cold intolerance, heat intolerance, polydipsia, polyphagia and polyuria  Genitourinary: Negative for difficulty urinating, dysuria, flank pain, hematuria and urgency  Musculoskeletal: Negative for arthralgias, back pain, gait problem, joint swelling and myalgias  Skin: Negative for color change, rash and wound  Allergic/Immunologic: Negative for environmental allergies, food allergies and immunocompromised state  Neurological: Negative for dizziness, seizures, syncope, speech difficulty, numbness and headaches  Hematological: Negative for adenopathy  Does not bruise/bleed easily  Psychiatric/Behavioral: Negative for agitation, behavioral problems, hallucinations, sleep disturbance and suicidal ideas  Objective:  Vitals:    03/28/22 1419   BP: 132/96   BP Location: Left arm   Patient Position: Sitting   Cuff Size: Standard   Temp: (!) 96 8 °F (36 °C)   TempSrc: Temporal   Weight: 54 3 kg (119 lb 9 6 oz)   Height: 5' 8" (1 727 m)     Body mass index is 18 19 kg/m²  Physical Exam  Constitutional:       General: He is not in acute distress  Appearance: He is well-developed  He is not diaphoretic  HENT:      Head: Normocephalic  Right Ear: External ear normal       Left Ear: External ear normal       Nose: Nose normal    Eyes:      General: No scleral icterus  Right eye: No discharge  Left eye: No discharge  Conjunctiva/sclera: Conjunctivae normal       Pupils: Pupils are equal, round, and reactive to light  Neck:      Thyroid: No thyromegaly  Trachea: No tracheal deviation  Cardiovascular:      Rate and Rhythm: Normal rate and regular rhythm  Heart sounds: Normal heart sounds  No murmur heard  No friction rub  No gallop  Pulmonary:      Effort: Pulmonary effort is normal  No respiratory distress  Breath sounds: Normal breath sounds  No wheezing  Abdominal:      General: Bowel sounds are normal       Palpations: Abdomen is soft  There is no mass  Tenderness: There is no abdominal tenderness  There is no guarding  Musculoskeletal:         General: No deformity  Cervical back: Normal range of motion  Lymphadenopathy:      Cervical: No cervical adenopathy  Skin:     General: Skin is warm and dry  Findings: No erythema or rash  Neurological:      Mental Status: He is alert and oriented to person, place, and time  Cranial Nerves: No cranial nerve deficit  Psychiatric:         Thought Content:  Thought content normal

## 2022-04-05 ENCOUNTER — TELEPHONE (OUTPATIENT)
Dept: FAMILY MEDICINE CLINIC | Facility: CLINIC | Age: 77
End: 2022-04-05

## 2022-04-05 DIAGNOSIS — K21.9 GASTROESOPHAGEAL REFLUX DISEASE WITHOUT ESOPHAGITIS: Primary | ICD-10-CM

## 2022-04-05 RX ORDER — SUCRALFATE ORAL 1 G/10ML
1 SUSPENSION ORAL
Qty: 420 ML | Refills: 0 | Status: SHIPPED | OUTPATIENT
Start: 2022-04-05 | End: 2022-04-14 | Stop reason: HOSPADM

## 2022-04-05 NOTE — TELEPHONE ENCOUNTER
Called Pt with 's message - do you think or recommend that Pt be scoped in the future if things don't resolve

## 2022-04-05 NOTE — TELEPHONE ENCOUNTER
Post covid symptoms; He said his symptoms are coming back, he has chest tight/soreness and feels very weak  He also has constant burping and the only relief is to walk around    He can hardly stand it

## 2022-04-08 ENCOUNTER — TELEPHONE (OUTPATIENT)
Dept: FAMILY MEDICINE CLINIC | Facility: CLINIC | Age: 77
End: 2022-04-08

## 2022-04-08 NOTE — TELEPHONE ENCOUNTER
Tightness in chest - a lot of indigestion - taking Gas X in between doses - only minimal relief - feeling weak & almost staggering around - feel best after he belches but it only provides relief for short periods    Looking for direction & ask if you want to order any testing - Pt is scheduled with Dr Hernandez Lyles - Mon 4/11/22 @ 9:30    Pt was told to go to UR or ER if S/S increase or get worse over the weekend

## 2022-04-11 ENCOUNTER — HOSPITAL ENCOUNTER (INPATIENT)
Facility: HOSPITAL | Age: 77
LOS: 3 days | Discharge: HOME | DRG: 391 | End: 2022-04-14
Attending: EMERGENCY MEDICINE | Admitting: INTERNAL MEDICINE
Payer: COMMERCIAL

## 2022-04-11 ENCOUNTER — OFFICE VISIT (OUTPATIENT)
Dept: CARDIOLOGY CLINIC | Facility: CLINIC | Age: 77
End: 2022-04-11
Payer: COMMERCIAL

## 2022-04-11 ENCOUNTER — TELEPHONE (OUTPATIENT)
Dept: FAMILY MEDICINE CLINIC | Facility: CLINIC | Age: 77
End: 2022-04-11

## 2022-04-11 ENCOUNTER — APPOINTMENT (INPATIENT)
Dept: CT IMAGING | Facility: HOSPITAL | Age: 77
DRG: 391 | End: 2022-04-11
Payer: COMMERCIAL

## 2022-04-11 ENCOUNTER — APPOINTMENT (EMERGENCY)
Dept: RADIOLOGY | Facility: HOSPITAL | Age: 77
DRG: 391 | End: 2022-04-11
Payer: COMMERCIAL

## 2022-04-11 VITALS
HEIGHT: 68 IN | WEIGHT: 113 LBS | BODY MASS INDEX: 17.13 KG/M2 | DIASTOLIC BLOOD PRESSURE: 70 MMHG | HEART RATE: 93 BPM | SYSTOLIC BLOOD PRESSURE: 116 MMHG

## 2022-04-11 DIAGNOSIS — R10.13 EPIGASTRIC PAIN: ICD-10-CM

## 2022-04-11 DIAGNOSIS — R11.0 NAUSEA: ICD-10-CM

## 2022-04-11 DIAGNOSIS — R07.9 CHEST PAIN, UNSPECIFIED TYPE: Primary | ICD-10-CM

## 2022-04-11 DIAGNOSIS — R63.0 ANOREXIA: ICD-10-CM

## 2022-04-11 DIAGNOSIS — I31.9 PERICARDITIS, UNSPECIFIED CHRONICITY, UNSPECIFIED TYPE: ICD-10-CM

## 2022-04-11 DIAGNOSIS — R07.9 CHEST PAIN, UNSPECIFIED: Primary | ICD-10-CM

## 2022-04-11 DIAGNOSIS — K21.9 GASTROESOPHAGEAL REFLUX DISEASE WITHOUT ESOPHAGITIS: ICD-10-CM

## 2022-04-11 DIAGNOSIS — F41.9 ANXIETY: ICD-10-CM

## 2022-04-11 DIAGNOSIS — K21.9 GERD (GASTROESOPHAGEAL REFLUX DISEASE): ICD-10-CM

## 2022-04-11 DIAGNOSIS — Z86.16 HISTORY OF COVID-19: ICD-10-CM

## 2022-04-11 DIAGNOSIS — R06.00 DYSPNEA ON EXERTION: ICD-10-CM

## 2022-04-11 DIAGNOSIS — E44.0 MODERATE PROTEIN-CALORIE MALNUTRITION (HCC): ICD-10-CM

## 2022-04-11 PROBLEM — J12.82 PNEUMONIA DUE TO COVID-19 VIRUS: Status: RESOLVED | Noted: 2022-01-28 | Resolved: 2022-04-11

## 2022-04-11 PROBLEM — U07.1 COVID-19 VIRUS INFECTION: Status: RESOLVED | Noted: 2022-02-05 | Resolved: 2022-04-11

## 2022-04-11 PROBLEM — A41.9 SEPSIS (HCC): Status: RESOLVED | Noted: 2022-01-28 | Resolved: 2022-04-11

## 2022-04-11 PROBLEM — E87.1 HYPONATREMIA: Status: RESOLVED | Noted: 2022-01-28 | Resolved: 2022-04-11

## 2022-04-11 PROBLEM — U07.1 PNEUMONIA DUE TO COVID-19 VIRUS: Status: RESOLVED | Noted: 2022-01-28 | Resolved: 2022-04-11

## 2022-04-11 PROBLEM — R79.89 ELEVATED D-DIMER: Status: RESOLVED | Noted: 2022-02-05 | Resolved: 2022-04-11

## 2022-04-11 LAB
2HR DELTA HS TROPONIN: 0 NG/L
4HR DELTA HS TROPONIN: 1 NG/L
ALBUMIN SERPL BCP-MCNC: 4.1 G/DL (ref 3.5–5)
ALP SERPL-CCNC: 60 U/L (ref 46–116)
ALT SERPL W P-5'-P-CCNC: 27 U/L (ref 12–78)
ANION GAP SERPL CALCULATED.3IONS-SCNC: 9 MMOL/L (ref 4–13)
AST SERPL W P-5'-P-CCNC: 27 U/L (ref 5–45)
ATRIAL RATE: 82 BPM
BASOPHILS # BLD AUTO: 0.07 THOUSANDS/ΜL (ref 0–0.1)
BASOPHILS NFR BLD AUTO: 1 % (ref 0–1)
BILIRUB SERPL-MCNC: 0.96 MG/DL (ref 0.2–1)
BUN SERPL-MCNC: 18 MG/DL (ref 5–25)
CALCIUM SERPL-MCNC: 9.4 MG/DL (ref 8.3–10.1)
CARDIAC TROPONIN I PNL SERPL HS: 4 NG/L
CARDIAC TROPONIN I PNL SERPL HS: 4 NG/L
CARDIAC TROPONIN I PNL SERPL HS: 5 NG/L
CHLORIDE SERPL-SCNC: 101 MMOL/L (ref 100–108)
CO2 SERPL-SCNC: 30 MMOL/L (ref 21–32)
CREAT SERPL-MCNC: 0.97 MG/DL (ref 0.6–1.3)
CRP SERPL QL: 0.5 MG/L
D DIMER PPP FEU-MCNC: 0.38 UG/ML FEU
EOSINOPHIL # BLD AUTO: 0.02 THOUSAND/ΜL (ref 0–0.61)
EOSINOPHIL NFR BLD AUTO: 0 % (ref 0–6)
ERYTHROCYTE [DISTWIDTH] IN BLOOD BY AUTOMATED COUNT: 13.7 % (ref 11.6–15.1)
FLUAV RNA RESP QL NAA+PROBE: NEGATIVE
FLUBV RNA RESP QL NAA+PROBE: NEGATIVE
GFR SERPL CREATININE-BSD FRML MDRD: 73 ML/MIN/1.73SQ M
GLUCOSE SERPL-MCNC: 118 MG/DL (ref 65–140)
HCT VFR BLD AUTO: 39.7 % (ref 36.5–49.3)
HGB BLD-MCNC: 13.7 G/DL (ref 12–17)
IMM GRANULOCYTES # BLD AUTO: 0.01 THOUSAND/UL (ref 0–0.2)
IMM GRANULOCYTES NFR BLD AUTO: 0 % (ref 0–2)
LYMPHOCYTES # BLD AUTO: 1.28 THOUSANDS/ΜL (ref 0.6–4.47)
LYMPHOCYTES NFR BLD AUTO: 24 % (ref 14–44)
MAGNESIUM SERPL-MCNC: 1.9 MG/DL (ref 1.6–2.6)
MCH RBC QN AUTO: 29.9 PG (ref 26.8–34.3)
MCHC RBC AUTO-ENTMCNC: 34.5 G/DL (ref 31.4–37.4)
MCV RBC AUTO: 87 FL (ref 82–98)
MONOCYTES # BLD AUTO: 0.44 THOUSAND/ΜL (ref 0.17–1.22)
MONOCYTES NFR BLD AUTO: 8 % (ref 4–12)
NEUTROPHILS # BLD AUTO: 3.44 THOUSANDS/ΜL (ref 1.85–7.62)
NEUTS SEG NFR BLD AUTO: 67 % (ref 43–75)
NRBC BLD AUTO-RTO: 0 /100 WBCS
NT-PROBNP SERPL-MCNC: 129 PG/ML
P AXIS: 86 DEGREES
PHOSPHATE SERPL-MCNC: 3.2 MG/DL (ref 2.3–4.1)
PLATELET # BLD AUTO: 248 THOUSANDS/UL (ref 149–390)
PMV BLD AUTO: 10.7 FL (ref 8.9–12.7)
POTASSIUM SERPL-SCNC: 3.4 MMOL/L (ref 3.5–5.3)
PR INTERVAL: 158 MS
PROT SERPL-MCNC: 7.2 G/DL (ref 6.4–8.2)
QRS AXIS: 80 DEGREES
QRSD INTERVAL: 88 MS
QT INTERVAL: 374 MS
QTC INTERVAL: 436 MS
RBC # BLD AUTO: 4.58 MILLION/UL (ref 3.88–5.62)
RSV RNA RESP QL NAA+PROBE: NEGATIVE
SARS-COV-2 RNA RESP QL NAA+PROBE: NEGATIVE
SODIUM SERPL-SCNC: 140 MMOL/L (ref 136–145)
T WAVE AXIS: 83 DEGREES
VENTRICULAR RATE: 82 BPM
WBC # BLD AUTO: 5.26 THOUSAND/UL (ref 4.31–10.16)

## 2022-04-11 PROCEDURE — 83735 ASSAY OF MAGNESIUM: CPT | Performed by: INTERNAL MEDICINE

## 2022-04-11 PROCEDURE — 96375 TX/PRO/DX INJ NEW DRUG ADDON: CPT

## 2022-04-11 PROCEDURE — 93000 ELECTROCARDIOGRAM COMPLETE: CPT | Performed by: INTERNAL MEDICINE

## 2022-04-11 PROCEDURE — 96365 THER/PROPH/DIAG IV INF INIT: CPT

## 2022-04-11 PROCEDURE — 96366 THER/PROPH/DIAG IV INF ADDON: CPT

## 2022-04-11 PROCEDURE — G1004 CDSM NDSC: HCPCS

## 2022-04-11 PROCEDURE — 83880 ASSAY OF NATRIURETIC PEPTIDE: CPT | Performed by: EMERGENCY MEDICINE

## 2022-04-11 PROCEDURE — 84100 ASSAY OF PHOSPHORUS: CPT | Performed by: INTERNAL MEDICINE

## 2022-04-11 PROCEDURE — 85025 COMPLETE CBC W/AUTO DIFF WBC: CPT | Performed by: EMERGENCY MEDICINE

## 2022-04-11 PROCEDURE — 80053 COMPREHEN METABOLIC PANEL: CPT | Performed by: EMERGENCY MEDICINE

## 2022-04-11 PROCEDURE — 71045 X-RAY EXAM CHEST 1 VIEW: CPT

## 2022-04-11 PROCEDURE — 99285 EMERGENCY DEPT VISIT HI MDM: CPT

## 2022-04-11 PROCEDURE — 1160F RVW MEDS BY RX/DR IN RCRD: CPT | Performed by: INTERNAL MEDICINE

## 2022-04-11 PROCEDURE — 1036F TOBACCO NON-USER: CPT | Performed by: INTERNAL MEDICINE

## 2022-04-11 PROCEDURE — 36415 COLL VENOUS BLD VENIPUNCTURE: CPT | Performed by: EMERGENCY MEDICINE

## 2022-04-11 PROCEDURE — 84484 ASSAY OF TROPONIN QUANT: CPT | Performed by: EMERGENCY MEDICINE

## 2022-04-11 PROCEDURE — 86140 C-REACTIVE PROTEIN: CPT | Performed by: EMERGENCY MEDICINE

## 2022-04-11 PROCEDURE — 85379 FIBRIN DEGRADATION QUANT: CPT | Performed by: EMERGENCY MEDICINE

## 2022-04-11 PROCEDURE — C9113 INJ PANTOPRAZOLE SODIUM, VIA: HCPCS | Performed by: INTERNAL MEDICINE

## 2022-04-11 PROCEDURE — 93005 ELECTROCARDIOGRAM TRACING: CPT

## 2022-04-11 PROCEDURE — 74176 CT ABD & PELVIS W/O CONTRAST: CPT

## 2022-04-11 PROCEDURE — 99285 EMERGENCY DEPT VISIT HI MDM: CPT | Performed by: EMERGENCY MEDICINE

## 2022-04-11 PROCEDURE — 0241U HB NFCT DS VIR RESP RNA 4 TRGT: CPT | Performed by: EMERGENCY MEDICINE

## 2022-04-11 PROCEDURE — 71250 CT THORAX DX C-: CPT

## 2022-04-11 PROCEDURE — 93010 ELECTROCARDIOGRAM REPORT: CPT | Performed by: INTERNAL MEDICINE

## 2022-04-11 PROCEDURE — 99214 OFFICE O/P EST MOD 30 MIN: CPT | Performed by: INTERNAL MEDICINE

## 2022-04-11 PROCEDURE — 99222 1ST HOSP IP/OBS MODERATE 55: CPT | Performed by: INTERNAL MEDICINE

## 2022-04-11 RX ORDER — CALCIUM CARBONATE 200(500)MG
1000 TABLET,CHEWABLE ORAL 3 TIMES DAILY PRN
Status: DISCONTINUED | OUTPATIENT
Start: 2022-04-11 | End: 2022-04-14 | Stop reason: HOSPADM

## 2022-04-11 RX ORDER — ONDANSETRON 2 MG/ML
4 INJECTION INTRAMUSCULAR; INTRAVENOUS ONCE
Status: COMPLETED | OUTPATIENT
Start: 2022-04-11 | End: 2022-04-11

## 2022-04-11 RX ORDER — MAGNESIUM HYDROXIDE/ALUMINUM HYDROXICE/SIMETHICONE 120; 1200; 1200 MG/30ML; MG/30ML; MG/30ML
30 SUSPENSION ORAL ONCE
Status: COMPLETED | OUTPATIENT
Start: 2022-04-11 | End: 2022-04-11

## 2022-04-11 RX ORDER — SODIUM CHLORIDE 9 MG/ML
100 INJECTION, SOLUTION INTRAVENOUS CONTINUOUS
Status: DISCONTINUED | OUTPATIENT
Start: 2022-04-11 | End: 2022-04-13

## 2022-04-11 RX ORDER — PANTOPRAZOLE SODIUM 40 MG/1
40 INJECTION, POWDER, FOR SOLUTION INTRAVENOUS EVERY 12 HOURS SCHEDULED
Status: DISCONTINUED | OUTPATIENT
Start: 2022-04-11 | End: 2022-04-14 | Stop reason: HOSPADM

## 2022-04-11 RX ORDER — SODIUM CHLORIDE 9 MG/ML
3 INJECTION INTRAVENOUS
Status: DISCONTINUED | OUTPATIENT
Start: 2022-04-11 | End: 2022-04-14 | Stop reason: HOSPADM

## 2022-04-11 RX ADMIN — ONDANSETRON 4 MG: 2 INJECTION INTRAMUSCULAR; INTRAVENOUS at 14:16

## 2022-04-11 RX ADMIN — SODIUM CHLORIDE, SODIUM LACTATE, POTASSIUM CHLORIDE, AND CALCIUM CHLORIDE 1000 ML: .6; .31; .03; .02 INJECTION, SOLUTION INTRAVENOUS at 11:10

## 2022-04-11 RX ADMIN — ALUMINA, MAGNESIA, AND SIMETHICONE ORAL SUSPENSION REGULAR STRENGTH 30 ML: 1200; 1200; 120 SUSPENSION ORAL at 14:16

## 2022-04-11 RX ADMIN — CALCIUM CARBONATE 1000 MG: 500 TABLET, CHEWABLE ORAL at 21:26

## 2022-04-11 RX ADMIN — SODIUM CHLORIDE 100 ML/HR: 0.9 INJECTION, SOLUTION INTRAVENOUS at 18:16

## 2022-04-11 RX ADMIN — PANTOPRAZOLE SODIUM 40 MG: 40 INJECTION, POWDER, FOR SOLUTION INTRAVENOUS at 21:24

## 2022-04-11 NOTE — TELEPHONE ENCOUNTER
Called to let Dr Evalene Collet know him know that he was seen by Dr Reggie Flowers today & now being sent to ER    Pt wants Dr Evalene Collet to know what's going on      Dr was told in between Pts

## 2022-04-11 NOTE — PROGRESS NOTES
Subjective:        Patient ID: Bia Thomas is a 76 y o  male  Chief Complaint:  Araceli Miller presents with a multitude of complaints, says he has been very sick for the last week, actually says he has 1 live like this anymore, think she should go to the emergency room  Was a bit difficult to get hip did narrow down complaints specifically but he appears to be in mild distress  Certainly very anxious and apprehensive about how he is feeling  Says he has anorexia, can eat or drink much, has intermittent nausea and just stomach ill feeling, does not have any diaphoresis nor has he vomited  Denies any major abdominal pains  Has some vague chest pains, rather tightness in his chest particularly when lying down or taking a deep breath  Feels generally short of breath but not severely  Denies any sustained or tachy palpitations  No loss of consciousness but feels lightheaded and weak  Says his mouth is dry and lips are stuck together when he wakes up in the morning  No fevers or shaking chills no rigors  Appears a little pale  EKG shows sinus rhythm with subtle nonspecific inferior ST depressions, poor R-wave progression/cannot rule out a ASMI, no ST elevation  Very similar to prior electrocardiograms  The following portions of the patient's history were reviewed and updated as appropriate: allergies, current medications, past family history, past medical history, past social history, past surgical history and problem list   Review of Systems   Constitutional: Positive for decreased appetite, malaise/fatigue and weight loss  Negative for chills, diaphoresis and weight gain  HENT: Negative for nosebleeds and stridor  Eyes: Negative for double vision, vision loss in left eye, vision loss in right eye and visual disturbance  Cardiovascular: Positive for chest pain, dyspnea on exertion and paroxysmal nocturnal dyspnea   Negative for claudication, cyanosis, irregular heartbeat, leg swelling, near-syncope, orthopnea, palpitations and syncope  Respiratory: Positive for shortness of breath  Negative for cough, snoring and wheezing  Endocrine: Negative for polydipsia, polyphagia and polyuria  Hematologic/Lymphatic: Negative for bleeding problem  Does not bruise/bleed easily  Skin: Negative for flushing and rash  Musculoskeletal: Negative for falls and myalgias  Gastrointestinal: Positive for anorexia, heartburn and nausea  Negative for abdominal pain, hematemesis, hematochezia, melena and vomiting  Genitourinary: Negative for hematuria  Neurological: Positive for light-headedness, loss of balance and weakness  Negative for brief paralysis, dizziness, focal weakness, headaches and vertigo  Psychiatric/Behavioral: Negative for altered mental status and substance abuse  The patient has insomnia and is nervous/anxious  Allergic/Immunologic: Negative for hives  Objective:      /70   Pulse 93   Ht 5' 8" (1 727 m)   Wt 51 3 kg (113 lb)   BMI 17 18 kg/m²   Physical Exam  Constitutional:       General: He is in acute distress (Mildly uncomfortable, more apprehensive appearing)  Appearance: He is well-developed  He is ill-appearing  He is not toxic-appearing or diaphoretic  HENT:      Head: Normocephalic and atraumatic  Eyes:      General: No scleral icterus  Conjunctiva/sclera: Conjunctivae normal       Pupils: Pupils are equal, round, and reactive to light  Neck:      Thyroid: No thyromegaly  Vascular: No carotid bruit or JVD  Cardiovascular:      Rate and Rhythm: Normal rate and regular rhythm  Heart sounds: Normal heart sounds  No murmur heard  No friction rub  No gallop  Pulmonary:      Effort: Pulmonary effort is normal  No respiratory distress  Breath sounds: Normal breath sounds  No stridor  No wheezing, rhonchi or rales  Chest:      Chest wall: No tenderness     Abdominal:      General: Bowel sounds are normal  There is no distension  Palpations: Abdomen is soft  There is no mass  Tenderness: There is no abdominal tenderness  There is no guarding or rebound  Musculoskeletal:         General: No swelling or deformity  Normal range of motion  Cervical back: Normal range of motion and neck supple  No rigidity or tenderness  Right lower leg: No edema  Left lower leg: No edema  Lymphadenopathy:      Cervical: No cervical adenopathy  Skin:     General: Skin is warm and dry  Coloration: Skin is not jaundiced or pale  Findings: No bruising, erythema or rash  Neurological:      Mental Status: He is alert and oriented to person, place, and time  Coordination: Coordination normal       Gait: Gait normal    Psychiatric:      Comments: Seems mildly anxious, mildly apprehensive, generally uneasy, pacing in the room when I walked in         Lab Review:   Appointment on 03/21/2022   Component Date Value    NT-proBNP 03/21/2022 148    Office Visit on 03/17/2022   Component Date Value    CRP, High Sensitivity 03/21/2022 <0 90     Sed Rate 03/21/2022 21*     No results found  Assessment:       1  Chest pain, unspecified type  POCT ECG   2  Dyspnea on exertion     3  Anorexia     4  Anxiety     5  History of COVID-19     6  Pericarditis, unspecified chronicity, unspecified type          Plan:       Cheyanne Mccartney declined offering of calling the ambulance, he says his son is here and can take him anywhere  I was going to order stat labs but Ave Martinez felt it best to go to the ER, I did not argue as I feel this is likely the best course of action to cope with a quick diagnosis  I was clear with him that I am not certain as to what is going on  I am concerned about incompletely treated COVID pericarditis, dehydration, less likely acute coronary syndrome a does need some stat blood work at a minimum, likely best evaluated in the ER setting      I called the ER physician today and discuss the above case MI above concerns  Discussed this with the patient's son as well who will take him directly to the emergency room  I will see him on follow-up afterwards

## 2022-04-11 NOTE — ASSESSMENT & PLAN NOTE
Malnutrition Findings:           BMI of 16 21, nutrition consult requested, clear liquid diet, will hopefully advance tomorrow, PPI twice daily, follow-up CT chest abdomen pelvis as noted above  BMI Findings: Body mass index is 16 21 kg/m²

## 2022-04-11 NOTE — ED PROVIDER NOTES
History  Chief Complaint   Patient presents with    Chest Pain     Presents to ED from cardioligist with c/o sob and chest tightness  Patient also reports concern for acid reflux  Pian is relieved with antacid  +nausea  Patient is a 22-year-old male complaining reflux type symptoms, chest tightness  He states symptoms are relieved with Tums  He states symptoms have been present for several weeks  Patient was admitted for COVID with subsequent pericarditis  Denies fevers or chills but states he feels nauseated and had burning discomfort in his chest   His resolved with walking around and taking over-the-counter Tums medication  Has primary care physician had called in Carafate and Protonix but he states he feels too nauseated to take these medications  Prior to Admission Medications   Prescriptions Last Dose Informant Patient Reported? Taking?   aspirin (ECOTRIN LOW STRENGTH) 81 mg EC tablet Past Week at Unknown time Self Yes Yes   Sig: Take 81 mg by mouth daily     pantoprazole (PROTONIX) 20 mg tablet Past Week at Unknown time  No Yes   Sig: Take 1 tablet (20 mg total) by mouth daily before breakfast   simvastatin (ZOCOR) 5 MG tablet Past Week at Unknown time  Yes Yes   Sig: Take 5 mg by mouth daily at bedtime   sucralfate (CARAFATE) 1 g/10 mL suspension Not Taking at Unknown time  No No   Sig: Take 10 mL (1 g total) by mouth 4 (four) times a day (with meals and at bedtime)   Patient not taking: Reported on 4/11/2022       Facility-Administered Medications: None       Past Medical History:   Diagnosis Date    Coronary artery disease     COVID-19 01/28/2022    History of echocardiogram 03/22/2018    Normal EF, normal LVSF  Thick and rebundent MV leaflets w/ mild posterior leaflet prolapse w/ trace regurg      Hyperlipidemia     Lyme disease     Mitral valve prolapse     Shingles        Past Surgical History:   Procedure Laterality Date    APPENDECTOMY      EYE SURGERY      HERNIA REPAIR  TONSILLECTOMY         Family History   Problem Relation Age of Onset    Heart disease Mother     No Known Problems Father      I have reviewed and agree with the history as documented  E-Cigarette/Vaping    E-Cigarette Use Never User      E-Cigarette/Vaping Substances    Nicotine No     THC No     CBD No     Flavoring No     Other No     Unknown No      Social History     Tobacco Use    Smoking status: Never Smoker    Smokeless tobacco: Never Used   Vaping Use    Vaping Use: Never used   Substance Use Topics    Alcohol use: Never     Alcohol/week: 0 0 standard drinks    Drug use: Never       Review of Systems   Constitutional: Positive for appetite change  Negative for activity change, chills and fever  HENT: Negative for ear pain, hearing loss, rhinorrhea, sneezing, sore throat and trouble swallowing  Eyes: Negative for pain and visual disturbance  Respiratory: Positive for chest tightness  Negative for cough, choking, shortness of breath, wheezing and stridor  Cardiovascular: Negative for chest pain, palpitations and leg swelling  Gastrointestinal: Positive for nausea  Negative for abdominal pain, constipation, diarrhea and vomiting  Genitourinary: Negative for difficulty urinating, dysuria, frequency, hematuria and testicular pain  Musculoskeletal: Negative for arthralgias, back pain, gait problem and neck pain  Skin: Negative for color change and rash  Allergic/Immunologic: Negative for immunocompromised state  Neurological: Negative for dizziness, seizures, syncope, speech difficulty, weakness, light-headedness, numbness and headaches  Psychiatric/Behavioral: Negative for agitation, confusion and hallucinations  All other systems reviewed and are negative  Physical Exam  Physical Exam  Vitals and nursing note reviewed  Constitutional:       General: He is not in acute distress  Appearance: He is well-developed   He is not ill-appearing, toxic-appearing or diaphoretic  Comments: Appears slightly cachectic   HENT:      Head: Normocephalic and atraumatic  Nose: Nose normal    Eyes:      General: No scleral icterus  Extraocular Movements: Extraocular movements intact  Conjunctiva/sclera: Conjunctivae normal    Cardiovascular:      Rate and Rhythm: Normal rate and regular rhythm  Pulses:           Carotid pulses are 2+ on the right side and 2+ on the left side  Radial pulses are 2+ on the right side and 2+ on the left side  Dorsalis pedis pulses are 2+ on the right side and 2+ on the left side  Posterior tibial pulses are 2+ on the right side and 2+ on the left side  Heart sounds: Normal heart sounds  No murmur heard  Pulmonary:      Effort: Pulmonary effort is normal  No respiratory distress  Breath sounds: Normal breath sounds  No decreased breath sounds, wheezing, rhonchi or rales  Chest:      Chest wall: No tenderness  Abdominal:      General: Bowel sounds are normal  There is no distension  Palpations: Abdomen is soft  There is no mass  Tenderness: There is no abdominal tenderness  There is no guarding or rebound  Musculoskeletal:         General: No tenderness or deformity  Normal range of motion  Cervical back: Normal range of motion and neck supple  Right lower leg: No tenderness  No edema  Left lower leg: No tenderness  No edema  Lymphadenopathy:      Cervical: No cervical adenopathy  Skin:     General: Skin is warm and dry  Capillary Refill: Capillary refill takes less than 2 seconds  Findings: No erythema or rash  Neurological:      General: No focal deficit present  Mental Status: He is alert and oriented to person, place, and time  Motor: No abnormal muscle tone     Psychiatric:         Mood and Affect: Mood normal          Behavior: Behavior normal          Vital Signs  ED Triage Vitals [04/11/22 1053]   Temperature Pulse Respirations Blood Pressure SpO2   (!) 96 4 °F (35 8 °C) 80 20 153/86 99 %      Temp Source Heart Rate Source Patient Position - Orthostatic VS BP Location FiO2 (%)   Tympanic Monitor Sitting Left arm --      Pain Score       --           Vitals:    04/11/22 1053 04/11/22 1230 04/11/22 1300 04/11/22 1430   BP: 153/86 153/72 141/69 145/69   Pulse: 80 68 69 71   Patient Position - Orthostatic VS: Sitting Sitting Sitting Lying         Visual Acuity      ED Medications  Medications   sodium chloride (PF) 0 9 % injection 3 mL (has no administration in time range)   lactated ringers bolus 1,000 mL (0 mL Intravenous Stopped 4/11/22 1311)   ondansetron (ZOFRAN) injection 4 mg (4 mg Intravenous Given 4/11/22 1416)   aluminum-magnesium hydroxide-simethicone (MYLANTA) oral suspension 30 mL (30 mL Oral Given 4/11/22 1416)       Diagnostic Studies  Results Reviewed     Procedure Component Value Units Date/Time    D-Dimer [[id number]]     Lab Status: No result Specimen: Blood     COVID/FLU/RSV [[id number]]     Lab Status: No result Specimen: Nasopharyngeal Swab     HS Troponin I 2hr [[id number]]  (Normal) Collected: 04/11/22 1324    Lab Status: Final result Specimen: Blood from Arm, Left Updated: 04/11/22 1356     hs TnI 2hr 4 ng/L      Delta 2hr hsTnI 0 ng/L     HS Troponin I 4hr [[id number]]     Lab Status: No result Specimen: Blood     HS Troponin 0hr (reflex protocol) [[id number]]  (Normal) Collected: 04/11/22 1111    Lab Status: Final result Specimen: Blood from Arm, Left Updated: 04/11/22 1211     hs TnI 0hr 4 ng/L     NT-BNP PRO [[id number]]  (Normal) Collected: 04/11/22 1111    Lab Status: Final result Specimen: Blood from Arm, Left Updated: 04/11/22 1140     NT-proBNP 129 pg/mL     C-reactive protein [[id number]]  (Normal) Collected: 04/11/22 1111    Lab Status: Final result Specimen: Blood from Arm, Left Updated: 04/11/22 1140     CRP 0 5 mg/L     Narrative:      Note: Unit of Measure change to mg/L at [facility] will show at 10 fold increase in CRP result to match network standards      Comprehensive metabolic panel [[id number]]  (Abnormal) Collected: 04/11/22 1111    Lab Status: Final result Specimen: Blood from Arm, Left Updated: 04/11/22 1134     Sodium 140 mmol/L      Potassium 3 4 mmol/L      Chloride 101 mmol/L      CO2 30 mmol/L      ANION GAP 9 mmol/L      BUN 18 mg/dL      Creatinine 0 97 mg/dL      Glucose 118 mg/dL      Calcium 9 4 mg/dL      AST 27 U/L      ALT 27 U/L      Alkaline Phosphatase 60 U/L      Total Protein 7 2 g/dL      Albumin 4 1 g/dL      Total Bilirubin 0 96 mg/dL      eGFR 73 ml/min/1 73sq m     Narrative:      National Kidney Disease Foundation guidelines for Chronic Kidney Disease (CKD):     Stage 1 with normal or high GFR (GFR > 90 mL/min/1 73 square meters)    Stage 2 Mild CKD (GFR = 60-89 mL/min/1 73 square meters)    Stage 3A Moderate CKD (GFR = 45-59 mL/min/1 73 square meters)    Stage 3B Moderate CKD (GFR = 30-44 mL/min/1 73 square meters)    Stage 4 Severe CKD (GFR = 15-29 mL/min/1 73 square meters)    Stage 5 End Stage CKD (GFR <15 mL/min/1 73 square meters)  Note: GFR calculation is accurate only with a steady state creatinine    CBC and differential [[id number]] Collected: 04/11/22 1111    Lab Status: Final result Specimen: Blood from Arm, Left Updated: 04/11/22 1118     WBC 5 26 Thousand/uL      RBC 4 58 Million/uL      Hemoglobin 13 7 g/dL      Hematocrit 39 7 %      MCV 87 fL      MCH 29 9 pg      MCHC 34 5 g/dL      RDW 13 7 %      MPV 10 7 fL      Platelets 662 Thousands/uL      nRBC 0 /100 WBCs      Neutrophils Relative 67 %      Immat GRANS % 0 %      Lymphocytes Relative 24 %      Monocytes Relative 8 %      Eosinophils Relative 0 %      Basophils Relative 1 %      Neutrophils Absolute 3 44 Thousands/µL      Immature Grans Absolute 0 01 Thousand/uL      Lymphocytes Absolute 1 28 Thousands/µL      Monocytes Absolute 0 44 Thousand/µL      Eosinophils Absolute 0 02 Thousand/µL      Basophils Absolute 0 07 Thousands/µL                  X-ray chest 1 view portable    (Results Pending)          Wet read:  No acute disease, no infiltrate    Procedures  ECG 12 Lead Documentation Only    Date/Time: 4/11/2022 12:37 PM  Performed by: Mike Sanchez DO  Authorized by: Mike Sanchez DO     Indications / Diagnosis:  Sob  ECG reviewed by me, the ED Provider: yes    Patient location:  ED  Rate:     ECG rate:  82    ECG rate assessment: normal    Rhythm:     Rhythm: sinus rhythm    Ectopy:     Ectopy: none    QRS:     QRS axis:  Normal    QRS intervals:  Normal  Conduction:     Conduction: normal    ST segments:     ST segments:  Normal  T waves:     T waves: non-specific               ED Course             HEART Risk Score      Most Recent Value   Heart Score Risk Calculator    History 0 Filed at: 04/11/2022 1336   ECG 0 Filed at: 04/11/2022 1336   Age 2 Filed at: 04/11/2022 1336   Risk Factors 2 Filed at: 04/11/2022 1336   Troponin 0 Filed at: 04/11/2022 1336   HEART Score 4 Filed at: 04/11/2022 1336                        SBIRT 20yo+      Most Recent Value   SBIRT (25 yo +)    In order to provide better care to our patients, we are screening all of our patients for alcohol and drug use  Would it be okay to ask you these screening questions? Yes Filed at: 04/11/2022 1105   Initial Alcohol Screen: US AUDIT-C     1  How often do you have a drink containing alcohol? 0 Filed at: 04/11/2022 1105   2  How many drinks containing alcohol do you have on a typical day you are drinking? 0 Filed at: 04/11/2022 1105   3a  Male UNDER 65: How often do you have five or more drinks on one occasion? 0 Filed at: 04/11/2022 1105   3b  FEMALE Any Age, or MALE 65+: How often do you have 4 or more drinks on one occassion? 0 Filed at: 04/11/2022 1105   Audit-C Score 0 Filed at: 04/11/2022 1105   JEFFREY: How many times in the past year have you    Used an illegal drug or used a prescription medication for non-medical reasons?  Never Cesar Crown at: 04/11/2022 1105                    MDM    Disposition  Final diagnoses:   Chest pain, unspecified   GERD (gastroesophageal reflux disease)   Nausea     Time reflects when diagnosis was documented in both MDM as applicable and the Disposition within this note     Time User Action Codes Description Comment    4/11/2022  3:45 PM Natanaelvidal Brooks T Add [R07 9] Chest pain, unspecified     4/11/2022  3:46 PM Brian Avitia T Add [K21 9] GERD (gastroesophageal reflux disease)     4/11/2022  3:46 PM Natanaeljeison Brooks T Add [R11 0] Nausea       ED Disposition     ED Disposition Condition Date/Time Comment    Admit Stable Mon Apr 11, 2022  3:45 PM Case was discussed with ANASTACIO and the patient's admission status was agreed to be Admission Status: observation status to the service of Dr Adarsh Thomas  Follow-up Information    None         Patient's Medications   Discharge Prescriptions    No medications on file       No discharge procedures on file      PDMP Review       Value Time User    PDMP Reviewed  Yes 2/10/2022  5:02 PM Celia Tee DO          ED Provider  Electronically Signed by           Mele Traore DO  04/11/22 1557

## 2022-04-11 NOTE — PATIENT INSTRUCTIONS
Chest Pain   AMBULATORY CARE:   Chest pain  can be caused by a range of conditions, from not serious to life-threatening  It may be caused by a heart attack or a blood clot in your lungs  Sometimes chest pain or pressure is caused by poor blood flow to your heart (angina)  Infection, inflammation, or a fracture in the bones or cartilage in your chest can cause pain or discomfort  Chest pain can also be a symptom of a digestive problem, such as acid reflux or a stomach ulcer  An anxiety attack or a strong emotion such as anger can also cause chest pain  It is important to follow up with your healthcare provider to find the cause of your chest pain  Common symptoms you may have with chest pain:   · Fever or sweating    · Nausea or vomiting    · Shortness of breath    · Discomfort or pressure that spreads from your chest to your back, jaw, or arm    · A racing or slow heartbeat    · Feeling weak, tired, or faint    Call your local emergency number (911 in the [address] [place] [address],[address]) or have someone call if:   · You have any of the following signs of a heart attack:      ? Squeezing, pressure, or pain in your chest    ? You may  also have any of the following:     § Discomfort or pain in your back, neck, jaw, stomach, or arm    § Shortness of breath    § Nausea or vomiting    § Lightheadedness or a sudden cold sweat      Seek care immediately if:   · You have chest discomfort that gets worse, even with medicine  · You cough or vomit blood  · Your bowel movements are black or bloody  · You cannot stop vomiting, or it hurts to swallow  Call your doctor if:   · You have questions or concerns about your condition or care  Treatment for chest pain  may include medicine to treat your symptoms while your healthcare provider finds the cause of your chest pain  · Medicines  may be given to treat the cause of your chest pain   Examples include pain medicine, anxiety medicine, or medicines to increase blood flow to your heart     · Do not take certain medicines without asking your healthcare provider first   These include NSAIDs, herbal or vitamin supplements, or hormones (estrogen or progestin)  · One or more stents  may need to be placed in your heart if pain was caused by blockage  A stent is a wire mesh tube that helps hold your artery open  Healthy living tips: The following are general healthy guidelines  If the cause of your chest pain is known, your healthcare provider will give you specific guidelines to follow  · Do not smoke  Nicotine and other chemicals in cigarettes and cigars can cause lung and heart damage  Ask your healthcare provider for information if you currently smoke and need help to quit  E-cigarettes or smokeless tobacco still contain nicotine  Talk to your healthcare provider before you use these products  · Choose a variety of healthy foods as often as possible  Include fresh, frozen, or canned fruits and vegetables  Also include low-fat dairy products, fish, chicken (without skin), and lean meats  Your healthcare provider or a dietitian can help you create meal plans  You may need to avoid certain foods or drinks if your pain is caused by a digestion problem  · Lower your sodium (salt) intake  Limit foods that are high in sodium, such as canned foods, salty snacks, and cold cuts  If you add salt when you cook food, do not add more at the table  Choose low-sodium canned foods as much as possible  · Drink plenty of water every day  Water helps your body to control your temperature and blood pressure  Ask your healthcare provider how much water you should drink every day  · Ask about activity  Your healthcare provider will tell you which activities to limit or avoid  Ask when you can drive, return to work, and have sex  Ask about the best exercise plan for you  · Maintain a healthy weight  Ask your healthcare provider what a healthy weight is for you   Ask him or her to help you create a safe weight loss plan if you are overweight  · Ask about vaccines you may need  Get the influenza (flu) vaccine every year as soon as recommended, usually in September or October  You may also need a pneumococcal vaccine to prevent pneumonia  The vaccine is usually given every 5 years, starting at age 72  Your healthcare provider can tell you if should get other vaccines, and when to get them  Follow up with your healthcare provider within 72 hours, or as directed: You may need to return for more tests to find the cause of your chest pain  You may be referred to a specialist, such as a cardiologist or gastroenterologist  Write down your questions so you remember to ask them during your visits  © Copyright [employer] 2022 Information is for End User's use only and may not be sold, redistributed or otherwise used for commercial purposes  All illustrations and images included in CareNotes® are the copyrighted property of A D A M , Inc  or Keegan Healy   The above information is an  only  It is not intended as medical advice for individual conditions or treatments  Talk to your doctor, nurse or pharmacist before following any medical regimen to see if it is safe and effective for you

## 2022-04-11 NOTE — ASSESSMENT & PLAN NOTE
Likely GI associated symptoms including chest tightness, shortness of breath, likely component of anxiety as well  Start Protonix 40 mg IV q 12, will give clear liquid diet, gradually advance, check CT chest abdomen pelvis especially given ongoing weight loss  IV fluid as patient appears somewhat volume depleted

## 2022-04-11 NOTE — PLAN OF CARE
Problem: Potential for Falls  Goal: Patient will remain free of falls  Description: INTERVENTIONS:  - Educate patient/family on patient safety including physical limitations  - Instruct patient to call for assistance with activity   - Consult OT/PT to assist with strengthening/mobility   - Keep Call bell within reach  - Keep bed low and locked with side rails adjusted as appropriate  - Keep care items and personal belongings within reach  - Initiate and maintain comfort rounds  - Make Fall Risk Sign visible to staff  - Offer Toileting every 2 Hours, in advance of need  - Apply yellow socks and bracelet for high fall risk patients  - Consider moving patient to room near nurses station  Outcome: Progressing     Problem: CARDIOVASCULAR - ADULT  Goal: Maintains optimal cardiac output and hemodynamic stability  Description: INTERVENTIONS:  - Monitor I/O, vital signs and rhythm  - Monitor for S/S and trends of decreased cardiac output  - Administer and titrate ordered vasoactive medications to optimize hemodynamic stability  - Assess quality of pulses, skin color and temperature  - Assess for signs of decreased coronary artery perfusion  - Instruct patient to report change in severity of symptoms  Outcome: Progressing  Goal: Absence of cardiac dysrhythmias or at baseline rhythm  Description: INTERVENTIONS:  - Continuous cardiac monitoring, vital signs, obtain 12 lead EKG if ordered  - Administer antiarrhythmic and heart rate control medications as ordered  - Monitor electrolytes and administer replacement therapy as ordered  Outcome: Progressing     Problem: RESPIRATORY - ADULT  Goal: Achieves optimal ventilation and oxygenation  Description: INTERVENTIONS:  - Assess for changes in respiratory status  - Assess for changes in mentation and behavior  - Position to facilitate oxygenation and minimize respiratory effort  - Oxygen administered by appropriate delivery if ordered  - Initiate smoking cessation education as indicated  - Encourage broncho-pulmonary hygiene including cough, deep breathe, Incentive Spirometry  - Assess the need for suctioning and aspirate as needed  - Assess and instruct to report SOB or any respiratory difficulty  - Respiratory Therapy support as indicated  Outcome: Progressing     Problem: GASTROINTESTINAL - ADULT  Goal: Minimal or absence of nausea and/or vomiting  Description: INTERVENTIONS:  - Administer IV fluids if ordered to ensure adequate hydration  - Maintain NPO status until nausea and vomiting are resolved  - Nasogastric tube if ordered  - Administer ordered antiemetic medications as needed  - Provide nonpharmacologic comfort measures as appropriate  - Advance diet as tolerated, if ordered  - Consider nutrition services referral to assist patient with adequate nutrition and appropriate food choices  Outcome: Progressing  Goal: Maintains or returns to baseline bowel function  Description: INTERVENTIONS:  - Assess bowel function  - Encourage oral fluids to ensure adequate hydration  - Administer IV fluids if ordered to ensure adequate hydration  - Administer ordered medications as needed  - Encourage mobilization and activity  - Consider nutritional services referral to assist patient with adequate nutrition and appropriate food choices  Outcome: Progressing  Goal: Maintains adequate nutritional intake  Description: INTERVENTIONS:  - Monitor percentage of each meal consumed  - Identify factors contributing to decreased intake, treat as appropriate  - Assist with meals as needed  - Monitor I&O, weight, and lab values if indicated  - Obtain nutrition services referral as needed  Outcome: Progressing  Goal: Establish and maintain optimal ostomy function  Description: INTERVENTIONS:  - Assess bowel function  - Encourage oral fluids to ensure adequate hydration  - Administer IV fluids if ordered to ensure adequate hydration   - Administer ordered medications as needed  - Encourage mobilization and activity  - Nutrition services referral to assist patient with appropriate food choices  - Assess stoma site  - Consider wound care consult   Outcome: Progressing  Goal: Oral mucous membranes remain intact  Description: INTERVENTIONS  - Assess oral mucosa and hygiene practices  - Implement preventative oral hygiene regimen  - Implement oral medicated treatments as ordered  - Initiate Nutrition services referral as needed  Outcome: Progressing     Problem: PAIN - ADULT  Goal: Verbalizes/displays adequate comfort level or baseline comfort level  Description: Interventions:  - Encourage patient to monitor pain and request assistance  - Assess pain using appropriate pain scale  - Administer analgesics based on type and severity of pain and evaluate response  - Implement non-pharmacological measures as appropriate and evaluate response  - Consider cultural and social influences on pain and pain management  - Notify physician/advanced practitioner if interventions unsuccessful or patient reports new pain  Outcome: Progressing     Problem: INFECTION - ADULT  Goal: Absence or prevention of progression during hospitalization  Description: INTERVENTIONS:  - Assess and monitor for signs and symptoms of infection  - Monitor lab/diagnostic results  - Monitor all insertion sites, i e  indwelling lines, tubes, and drains  - Monitor endotracheal if appropriate and nasal secretions for changes in amount and color  - [place] appropriate cooling/warming therapies per order  - Administer medications as ordered  - Instruct and encourage patient and family to use good hand hygiene technique  - Identify and instruct in appropriate isolation precautions for identified infection/condition  Outcome: Progressing  Goal: Absence of fever/infection during neutropenic period  Description: INTERVENTIONS:  - Monitor WBC    Outcome: Progressing     Problem: SAFETY ADULT  Goal: Patient will remain free of falls  Description: INTERVENTIONS:  - Educate patient/family on patient safety including physical limitations  - Instruct patient to call for assistance with activity   - Consult OT/PT to assist with strengthening/mobility   - Keep Call bell within reach  - Keep bed low and locked with side rails adjusted as appropriate  - Keep care items and personal belongings within reach  - Initiate and maintain comfort rounds  - Make Fall Risk Sign visible to staff  - Offer Toileting every 2 Hours, in advance of need  - Apply yellow socks and bracelet for high fall risk patients  - Consider moving patient to room near nurses station  Outcome: Progressing  Goal: Maintain or return to baseline ADL function  Description: INTERVENTIONS:  -  Assess patient's ability to carry out ADLs; assess patient's baseline for ADL function and identify physical deficits which impact ability to perform ADLs (bathing, care of mouth/teeth, toileting, grooming, dressing, etc )  - Assess/evaluate cause of self-care deficits   - Assess range of motion  - Assess patient's mobility; develop plan if impaired  - Assess patient's need for assistive devices and provide as appropriate  - Encourage maximum independence but intervene and supervise when necessary  - Involve family in performance of ADLs  - Assess for home care needs following discharge   - Consider OT consult to assist with ADL evaluation and planning for discharge  - Provide patient education as appropriate  Outcome: Progressing  Goal: Maintains/Returns to pre admission functional level  Description: INTERVENTIONS:  - Perform BMAT or MOVE assessment daily    - Set and communicate daily mobility goal to care team and patient/family/caregiver  - Collaborate with rehabilitation services on mobility goals if consulted  - Perform Range of Motion 3 times a day  - Reposition patient every 2 hours    - Dangle patient 3 times a day  - Stand patient 3 times a day  - Ambulate patient 3 times a day  - Out of bed to chair 3 times a day   - Out of bed for meals 3 times a day  - Out of bed for toileting  - Record patient progress and toleration of activity level   Outcome: Progressing     Problem: Knowledge Deficit  Goal: Patient/family/caregiver demonstrates understanding of disease process, treatment plan, medications, and discharge instructions  Description: Complete learning assessment and assess knowledge base  Interventions:  - Provide teaching at level of understanding  - Provide teaching via preferred learning methods  Outcome: Progressing     Problem: MOBILITY - ADULT  Goal: Maintain or return to baseline ADL function  Description: INTERVENTIONS:  -  Assess patient's ability to carry out ADLs; assess patient's baseline for ADL function and identify physical deficits which impact ability to perform ADLs (bathing, care of mouth/teeth, toileting, grooming, dressing, etc )  - Assess/evaluate cause of self-care deficits   - Assess range of motion  - Assess patient's mobility; develop plan if impaired  - Assess patient's need for assistive devices and provide as appropriate  - Encourage maximum independence but intervene and supervise when necessary  - Involve family in performance of ADLs  - Assess for home care needs following discharge   - Consider OT consult to assist with ADL evaluation and planning for discharge  - Provide patient education as appropriate  Outcome: Progressing  Goal: Maintains/Returns to pre admission functional level  Description: INTERVENTIONS:  - Perform BMAT or MOVE assessment daily    - Set and communicate daily mobility goal to care team and patient/family/caregiver  - Collaborate with rehabilitation services on mobility goals if consulted  - Perform Range of Motion 3 times a day  - Reposition patient every 2 hours    - Dangle patient 3 times a day  - Stand patient 3 times a day  - Ambulate patient 3 times a day  - Out of bed to chair 3 times a day   - Out of bed for meals 3 times a day  - Out of bed for toileting  - Record patient progress and toleration of activity level   Outcome: Progressing

## 2022-04-11 NOTE — H&P
[facility] [address]  H&P- Jose Chandler 1945, 76 y o  male MRN: [id number]  Unit/Bed#: 425-01 Encounter: [id number]  Primary Care Provider: Ольга Shankar DO   Date and time admitted to hospital: 4/11/2022 10:46 AM    * Other chest pain  Assessment & Plan  Likely GI associated symptoms including chest tightness, shortness of breath, likely component of anxiety as well  Start Protonix 40 mg IV q 12, will give clear liquid diet, gradually advance, check CT chest abdomen pelvis especially given ongoing weight loss  IV fluid as patient appears somewhat volume depleted  Dyslipidemia  Assessment & Plan  Hold statin    Moderate protein-calorie malnutrition (HCC)  Assessment & Plan  Malnutrition Findings:           BMI of 16 21, nutrition consult requested, clear liquid diet, will hopefully advance tomorrow, PPI twice daily, follow-up CT chest abdomen pelvis as noted above  BMI Findings: Body mass index is 16 21 kg/m²  VTE Pharmacologic Prophylaxis:   Moderate Risk (Score 3-4) - Pharmacological DVT Prophylaxis Ordered: enoxaparin (Lovenox)  Code Status:  Full code  Discussion with family: Attempted to update  (son) via phone  Unable to contact  Anticipated Length of Stay: Patient will be admitted on an inpatient basis with an anticipated length of stay of greater than 2 midnights secondary to Chest tightness with meals and very limited PO intake, needs IVF and assurance of appropriate Po intake  Total Time for Visit, including Counseling / Coordination of Care: 60 minutes Greater than 50% of this total time spent on direct patient counseling and coordination of care      Chief Complaint:  Chest tightness, associated with shortness of breath    History of Present Illness:  Cecy Barahona is a 76 y o  male  who presents with 8 days of ongoing chest tightness, heartburn, which occurs at rest   Patient also reports significant associated dyspnea, symptoms worse at night when lying flat, patient reports very poor p o  Intake, ongoing loss of appetite, patient also reports significant nausea, no vomiting  Patient does report weight loss, denies any bright red blood per rectum, no dark tarry stool, normal bowel movements  Patient denies any pleuritic pain, no lightheadedness or dizziness, patient reports the symptoms are relieved with ambulation, also relieved with belching  Review of Systems:  Review of Systems   All other systems reviewed and are negative  Past Medical and Surgical History:   Past Medical History:   Diagnosis Date    Coronary artery disease     COVID-19 01/28/2022    History of echocardiogram 03/22/2018    Normal EF, normal LVSF  Thick and rebundent MV leaflets w/ mild posterior leaflet prolapse w/ trace regurg   Hyperlipidemia     Lyme disease     Mitral valve prolapse     Shingles        Past Surgical History:   Procedure Laterality Date    APPENDECTOMY      EYE SURGERY      HERNIA REPAIR      TONSILLECTOMY         Meds/Allergies:  Prior to Admission medications    Medication Sig Start Date End Date Taking? Authorizing Provider   aspirin (ECOTRIN LOW STRENGTH) 81 mg EC tablet Take 81 mg by mouth daily     Yes Historical Provider, MD   pantoprazole (PROTONIX) 20 mg tablet Take 1 tablet (20 mg total) by mouth daily before breakfast 3/28/22  Yes Marcia Romero DO   simvastatin (ZOCOR) 5 MG tablet Take 5 mg by mouth daily at bedtime   Yes Historical Provider, MD   sucralfate (CARAFATE) 1 g/10 mL suspension Take 10 mL (1 g total) by mouth 4 (four) times a day (with meals and at bedtime)  Patient not taking: Reported on 4/11/2022 4/5/22   Marcia Romero DO     I have reviewed home medications with patient personally  Allergies: No Known Allergies    Social History:  Marital Status:     Substance Use History:   Social History     Substance and Sexual Activity   Alcohol Use Never    Alcohol/week: 0 0 standard drinks     Social History     Tobacco Use   Smoking Status Never Smoker   Smokeless Tobacco Never Used     Social History     Substance and Sexual Activity   Drug Use Never       Family History:  Family History   Problem Relation Age of Onset    Heart disease Mother     No Known Problems Father        Physical Exam:     Vitals:   Blood Pressure: 129/81 (04/11/22 1641)  Pulse: 74 (04/11/22 1641)  Temperature: 97 9 °F (36 6 °C) (04/11/22 1641)  Temp Source: Temporal (04/11/22 1430)  Respirations: 18 (04/11/22 1641)  Height: 5' 9" (175 3 cm) (04/11/22 1637)  Weight - Scale: 49 8 kg (109 lb 12 6 oz) (04/11/22 1637)  SpO2: 98 % (04/11/22 1641)    Physical Exam  Vitals and nursing note reviewed  Constitutional:       General: He is not in acute distress  Appearance: He is not ill-appearing, toxic-appearing or diaphoretic  Comments: Cachectic elderly appearing male who appears older than stated age   HENT:      Head: Normocephalic and atraumatic  Right Ear: External ear normal       Left Ear: External ear normal    Cardiovascular:      Rate and Rhythm: Normal rate  Pulses: Normal pulses  Pulmonary:      Effort: Pulmonary effort is normal       Breath sounds: Normal breath sounds  No rales  Chest:      Chest wall: No tenderness  Abdominal:      General: Abdomen is flat  There is no distension  Palpations: Abdomen is soft  Tenderness: There is no abdominal tenderness  Musculoskeletal:         General: Normal range of motion  Cervical back: Normal range of motion  Skin:     General: Skin is warm and dry  Neurological:      General: No focal deficit present  Psychiatric:         Mood and Affect: Mood normal          Behavior: Behavior normal          Thought Content:  Thought content normal          Judgment: Judgment normal           Additional Data:     Lab Results:  Results from last 7 days   Lab Units 04/11/22  1111   WBC Thousand/uL 5 26   HEMOGLOBIN g/dL 13 7 HEMATOCRIT % 39 7   PLATELETS Thousands/uL 248   NEUTROS PCT % 67   LYMPHS PCT % 24   MONOS PCT % 8   EOS PCT % 0     Results from last 7 days   Lab Units 04/11/22  1111   SODIUM mmol/L 140   POTASSIUM mmol/L 3 4*   CHLORIDE mmol/L 101   CO2 mmol/L 30   BUN mg/dL 18   CREATININE mg/dL 0 97   ANION GAP mmol/L 9   CALCIUM mg/dL 9 4   ALBUMIN g/dL 4 1   TOTAL BILIRUBIN mg/dL 0 96   ALK PHOS U/L 60   ALT U/L 27   AST U/L 27   GLUCOSE RANDOM mg/dL 118                       Imaging: Reviewed radiology reports from this admission including: xray(s)  X-ray chest 1 view portable   Final Result by Tiera Jiang MD (04/11 1655)      No acute cardiopulmonary findings or significant change from priors  Workstation performed: [id number]         CT chest abdomen pelvis wo contrast    (Results Pending)       ** Please Note: This note has been constructed using a voice recognition system   **

## 2022-04-12 LAB
ALBUMIN SERPL BCP-MCNC: 3.5 G/DL (ref 3.5–5)
ALP SERPL-CCNC: 51 U/L (ref 46–116)
ALT SERPL W P-5'-P-CCNC: 22 U/L (ref 12–78)
ANION GAP SERPL CALCULATED.3IONS-SCNC: 7 MMOL/L (ref 4–13)
AST SERPL W P-5'-P-CCNC: 22 U/L (ref 5–45)
BASOPHILS # BLD AUTO: 0.07 THOUSANDS/ΜL (ref 0–0.1)
BASOPHILS NFR BLD AUTO: 1 % (ref 0–1)
BILIRUB SERPL-MCNC: 1.12 MG/DL (ref 0.2–1)
BUN SERPL-MCNC: 13 MG/DL (ref 5–25)
CALCIUM SERPL-MCNC: 8.9 MG/DL (ref 8.3–10.1)
CHLORIDE SERPL-SCNC: 105 MMOL/L (ref 100–108)
CO2 SERPL-SCNC: 30 MMOL/L (ref 21–32)
CREAT SERPL-MCNC: 0.9 MG/DL (ref 0.6–1.3)
EOSINOPHIL # BLD AUTO: 0.13 THOUSAND/ΜL (ref 0–0.61)
EOSINOPHIL NFR BLD AUTO: 2 % (ref 0–6)
ERYTHROCYTE [DISTWIDTH] IN BLOOD BY AUTOMATED COUNT: 13.8 % (ref 11.6–15.1)
GFR SERPL CREATININE-BSD FRML MDRD: 80 ML/MIN/1.73SQ M
GLUCOSE SERPL-MCNC: 86 MG/DL (ref 65–140)
HCT VFR BLD AUTO: 37.6 % (ref 36.5–49.3)
HGB BLD-MCNC: 12.1 G/DL (ref 12–17)
IMM GRANULOCYTES # BLD AUTO: 0.02 THOUSAND/UL (ref 0–0.2)
IMM GRANULOCYTES NFR BLD AUTO: 0 % (ref 0–2)
INR PPP: 1.31 (ref 0.84–1.19)
LYMPHOCYTES # BLD AUTO: 2 THOUSANDS/ΜL (ref 0.6–4.47)
LYMPHOCYTES NFR BLD AUTO: 35 % (ref 14–44)
MAGNESIUM SERPL-MCNC: 1.9 MG/DL (ref 1.6–2.6)
MCH RBC QN AUTO: 29.2 PG (ref 26.8–34.3)
MCHC RBC AUTO-ENTMCNC: 32.2 G/DL (ref 31.4–37.4)
MCV RBC AUTO: 91 FL (ref 82–98)
MONOCYTES # BLD AUTO: 0.65 THOUSAND/ΜL (ref 0.17–1.22)
MONOCYTES NFR BLD AUTO: 11 % (ref 4–12)
NEUTROPHILS # BLD AUTO: 2.93 THOUSANDS/ΜL (ref 1.85–7.62)
NEUTS SEG NFR BLD AUTO: 51 % (ref 43–75)
NRBC BLD AUTO-RTO: 0 /100 WBCS
PHOSPHATE SERPL-MCNC: 3.3 MG/DL (ref 2.3–4.1)
PLATELET # BLD AUTO: 224 THOUSANDS/UL (ref 149–390)
PMV BLD AUTO: 11.5 FL (ref 8.9–12.7)
POTASSIUM SERPL-SCNC: 3.5 MMOL/L (ref 3.5–5.3)
PROCALCITONIN SERPL-MCNC: <0.05 NG/ML
PROT SERPL-MCNC: 6.2 G/DL (ref 6.4–8.2)
PROTHROMBIN TIME: 15.6 SECONDS (ref 11.6–14.5)
RBC # BLD AUTO: 4.15 MILLION/UL (ref 3.88–5.62)
SODIUM SERPL-SCNC: 142 MMOL/L (ref 136–145)
WBC # BLD AUTO: 5.8 THOUSAND/UL (ref 4.31–10.16)

## 2022-04-12 PROCEDURE — 84100 ASSAY OF PHOSPHORUS: CPT | Performed by: INTERNAL MEDICINE

## 2022-04-12 PROCEDURE — 99223 1ST HOSP IP/OBS HIGH 75: CPT | Performed by: INTERNAL MEDICINE

## 2022-04-12 PROCEDURE — 85610 PROTHROMBIN TIME: CPT | Performed by: INTERNAL MEDICINE

## 2022-04-12 PROCEDURE — 84145 PROCALCITONIN (PCT): CPT | Performed by: INTERNAL MEDICINE

## 2022-04-12 PROCEDURE — 99232 SBSQ HOSP IP/OBS MODERATE 35: CPT | Performed by: INTERNAL MEDICINE

## 2022-04-12 PROCEDURE — C9113 INJ PANTOPRAZOLE SODIUM, VIA: HCPCS | Performed by: INTERNAL MEDICINE

## 2022-04-12 PROCEDURE — 85025 COMPLETE CBC W/AUTO DIFF WBC: CPT | Performed by: INTERNAL MEDICINE

## 2022-04-12 PROCEDURE — 80053 COMPREHEN METABOLIC PANEL: CPT | Performed by: INTERNAL MEDICINE

## 2022-04-12 PROCEDURE — 83735 ASSAY OF MAGNESIUM: CPT | Performed by: INTERNAL MEDICINE

## 2022-04-12 RX ADMIN — POLYETHYLENE GLYCOL 3350, SODIUM SULFATE ANHYDROUS, SODIUM BICARBONATE, SODIUM CHLORIDE, POTASSIUM CHLORIDE 4000 ML: 236; 22.74; 6.74; 5.86; 2.97 POWDER, FOR SOLUTION ORAL at 16:43

## 2022-04-12 RX ADMIN — SODIUM CHLORIDE 100 ML/HR: 0.9 INJECTION, SOLUTION INTRAVENOUS at 04:33

## 2022-04-12 RX ADMIN — PANTOPRAZOLE SODIUM 40 MG: 40 INJECTION, POWDER, FOR SOLUTION INTRAVENOUS at 09:03

## 2022-04-12 RX ADMIN — SODIUM CHLORIDE 100 ML/HR: 0.9 INJECTION, SOLUTION INTRAVENOUS at 14:07

## 2022-04-12 RX ADMIN — PANTOPRAZOLE SODIUM 40 MG: 40 INJECTION, POWDER, FOR SOLUTION INTRAVENOUS at 21:11

## 2022-04-12 NOTE — PROGRESS NOTES
[facility] [address]  Progress Note Shant Chandler 1945, 76 y o  male MRN: [id number]  Unit/Bed#: 424-01 Encounter: [id number]  Primary Care Provider: Misha Cortez DO   Date and time admitted to hospital: 2022 10:46 AM    * Other chest pain  Assessment & Plan  Likely GI associated symptoms including chest tightness, shortness of breath, likely component of anxiety as well  Start Protonix 40 mg IV q 12, will give clear liquid diet, gradually advance, CT abdomen pelvis reviewed  Continue IV fluid, consult GI for EGD  Dyslipidemia  Assessment & Plan  Hold statin    Moderate protein-calorie malnutrition (HCC)  Assessment & Plan  Malnutrition Findings:           BMI of 16 21, nutrition consult requested, clear liquid diet, will hopefully advance tomorrow, PPI twice daily, follow-up CT chest abdomen pelvis as noted above  BMI Findings: Body mass index is 16 21 kg/m²  Code Status: Prior    Subjective:   Patient still has pain after p o  Intake, no lightheadedness no dizziness, no bleeding, no nausea or vomiting  Objective:     Vitals:   Temp (24hrs), Av 9 °F (36 6 °C), Min:97 6 °F (36 4 °C), Max:98 °F (36 7 °C)    Temp:  [97 6 °F (36 4 °C)-98 °F (36 7 °C)] 97 6 °F (36 4 °C)  HR:  [68-75] 73  Resp:  [18-22] 18  BP: (124-153)/(69-81) 124/77  SpO2:  [98 %-100 %] 100 %  Body mass index is 16 21 kg/m²  Input and Output Summary (last 24 hours): Intake/Output Summary (Last 24 hours) at 2022 1127  Last data filed at 2022 5719  Gross per 24 hour   Intake 2310 ml   Output 900 ml   Net 1410 ml       Physical Exam:   Physical Exam  Vitals and nursing note reviewed  Constitutional:       Comments: Cachectic chronically ill-appearing male, he appears older than stated age   HENT:      Head: Normocephalic and atraumatic        Right Ear: External ear normal       Left Ear: External ear normal    Cardiovascular:      Rate and Rhythm: Normal rate  Pulses: Normal pulses  Pulmonary:      Effort: Pulmonary effort is normal    Abdominal:      General: Abdomen is flat  Bowel sounds are normal  There is no distension  Palpations: Abdomen is soft  There is no mass  Musculoskeletal:         General: Normal range of motion  Cervical back: Normal range of motion  Skin:     General: Skin is warm and dry  Neurological:      General: No focal deficit present  Mental Status: He is alert and oriented to person, place, and time  Mental status is at baseline  Cranial Nerves: No cranial nerve deficit  Psychiatric:         Mood and Affect: Mood normal          Behavior: Behavior normal          Thought Content:  Thought content normal          Judgment: Judgment normal           Additional Data:     Labs:  Results from last 7 days   Lab Units 04/12/22  0414   WBC Thousand/uL 5 80   HEMOGLOBIN g/dL 12 1   HEMATOCRIT % 37 6   PLATELETS Thousands/uL 224   NEUTROS PCT % 51   LYMPHS PCT % 35   MONOS PCT % 11   EOS PCT % 2     Results from last 7 days   Lab Units 04/12/22  0414   SODIUM mmol/L 142   POTASSIUM mmol/L 3 5   CHLORIDE mmol/L 105   CO2 mmol/L 30   BUN mg/dL 13   CREATININE mg/dL 0 90   ANION GAP mmol/L 7   CALCIUM mg/dL 8 9   ALBUMIN g/dL 3 5   TOTAL BILIRUBIN mg/dL 1 12*   ALK PHOS U/L 51   ALT U/L 22   AST U/L 22   GLUCOSE RANDOM mg/dL 86     Results from last 7 days   Lab Units 04/12/22  0414   INR  1 31*             Results from last 7 days   Lab Units 04/12/22  0414   PROCALCITONIN ng/ml <0 05       Lines/Drains:  Invasive Devices  Report    Peripheral Intravenous Line            Peripheral IV 04/11/22 Left Forearm 1 day                      Imaging: Reviewed radiology reports from this admission including: abdominal/pelvic CT    Recent Cultures (last 7 days):         Last 24 Hours Medication List:   Current Facility-Administered Medications   Medication Dose Route Frequency Provider Last Rate    calcium carbonate 1,000 mg Oral TID PRN Adrienne Carrillo DO      influenza vaccine  0 7 mL [facility]amuscular [facility]      io[facility]ol  50 [facility]      pantoprazole  40 mg Intravenous Q12H [address] [facility] [address]      sodium chloride (PF)  3 mL Intravenous Q1H PRN Earnestine Murrell, DO      sodium chloride  100 mL/hr Intravenous Continuous [facility],  mL/hr (04/12/22 0433)        Today, Patient Was Seen By: Adrienne Carrillo DO    **Please Note: This note may have been constructed using a voice recognition system  **

## 2022-04-12 NOTE — CONSULTS
Consult received d/t malnutrition  Full nutrition assessment viewable in the nutrition documentation  See also malnutrition note  Patient assessed at high nutrition risk  Provided patient with Ensure Clear this evening at dinner  Will add Ensure Enlive when diet advanced post EGD tomorrow  Will follow status  Thank you for the consult

## 2022-04-12 NOTE — CONSULTS
Consultation - [facility]  Garcia Sergeant Chandler 76 y o  male MRN: [id number]  Unit/Bed#: 424-01 Encounter: [id number]        Consults    Reason for Consult / Principal Problem:     1  Epigastric pain      ASSESSMENT AND PLAN:      1  Epigastric pain   2  Dyspepsia   3  Nausea  4  Poor appetite    Patient presented to the emergency department with epigastric and chest pain  He states that he has been having epigastric discomfort, dyspepsia and nausea since having COVID in January  He reports that taking Tums has been the only thing that has been somewhat helpful bringing down the discomfort from a 7 or 8-2 or 3  He reports never having an EGD in the past   His PCP had ordered Protonix and Carafate however patient states that he could not take due to a poor appetite and nausea  Lab work on admission revealed a normal hemoglobin and normal liver functions  Bilirubin slightly elevated this morning  CT scan revealed no calcified gallstones or pericholecystic inflammatory changes, liver appeared unremarkable and mild thickening of the wall the stomach in the region of the cardia that is unchanged from the previous study in 2015 was noted  Differentials include EOE, esophagitis, gastritis, H pylori or peptic ulcer disease  Would recommend EGD at this time  Process, risks and benefits discussed with the patient, he is agreeable  - EGD tomorrow   - continue pantoprazole 40 milligrams IV b i d   - antiemetics as needed  ______________________________________________________________________    HPI:  Obi Carvajal is a 49-year-old male that presented to the emergency department with epigastric and chest pain  He states since he had COVID in January he has been having worsening epigastric discomfort that radiates into his chest and causes shortness of breath, dyspepsia and nausea  He does report that taking Tums is the only thing that has been somewhat helpful  He denies any vomiting or dysphagia  He reports that he has never had an EGD in the past   Patient denies any lower abdominal discomfort, significant diarrhea or constipation  He denies bloody stools  He does report taking Pepto-Bismol at 1 point for the indigestion that caused his stools to be a little darker but denies melena  He reports that he has never had a colonoscopy in the past       REVIEW OF SYSTEMS:    CONSTITUTIONAL: Denies any fever, chills, rigors, and weight loss  HEENT: No earache or tinnitus  Denies hearing loss or visual disturbances  CARDIOVASCULAR: No chest pain or palpitations  RESPIRATORY: Denies any cough, hemoptysis, shortness of breath or dyspnea on exertion  GASTROINTESTINAL: As noted in the History of Present Illness  GENITOURINARY: No problems with urination  Denies any hematuria or dysuria  NEUROLOGIC: No dizziness or vertigo, denies headaches  MUSCULOSKELETAL: Denies any muscle or joint pain  SKIN: Denies skin rashes or itching  ENDOCRINE: Denies excessive thirst  Denies intolerance to heat or cold  PSYCHOSOCIAL: Denies depression or anxiety  Denies any recent memory loss  Historical Information   Past Medical History:   Diagnosis Date    Coronary artery disease     COVID-19 01/28/2022    History of echocardiogram 03/22/2018    Normal EF, normal LVSF  Thick and rebundent MV leaflets w/ mild posterior leaflet prolapse w/ trace regurg      Hyperlipidemia     Lyme disease     Mitral valve prolapse     Shingles      Past Surgical History:   Procedure Laterality Date    APPENDECTOMY      EYE SURGERY      HERNIA REPAIR      TONSILLECTOMY       Social History   Social History     Substance and Sexual Activity   Alcohol Use Never    Alcohol/week: 0 0 standard drinks     Social History     Substance and Sexual Activity   Drug Use Never     Social History     Tobacco Use   Smoking Status Never Smoker   Smokeless Tobacco Never Used     Family History   Problem Relation Age of Onset    Heart disease Mother     No Known Problems Father        Meds/Allergies     Medications Prior to Admission   Medication    aspirin (ECOTRIN LOW STRENGTH) 81 mg EC tablet    pantoprazole (PROTONIX) 20 mg tablet    simvastatin (ZOCOR) 5 MG tablet    sucralfate (CARAFATE) 1 g/10 mL suspension     Current Facility-Administered Medications   Medication Dose Route Frequency    calcium carbonate (TUMS) chewable tablet 1,000 mg  1,000 mg Oral TID PRN    influenza vaccine, high-dose (FLUZONE HIGH-DOSE) IM injection CHANELLE 0 7 mL  0 7 mL Intramuscular Once    iohexol (OMNIPAQUE) 240 MG/ML solution 50 mL  50 mL Oral Once in imaging    pantoprazole (PROTONIX) injection 40 mg  40 mg Intravenous Q12H [facility]    sodium chloride (PF) 0 9 % injection 3 mL  3 mL Intravenous Q1H PRN    sodium chloride 0 9 % infusion  100 mL/hr Intravenous Continuous       No Known Allergies        Objective     Blood pressure 124/77, pulse 73, temperature 97 6 °F (36 4 °C), resp  rate 18, height 5' 9" (1 753 m), weight 49 8 kg (109 lb 12 6 oz), SpO2 100 %  Body mass index is 16 21 kg/m²  Intake/Output Summary (Last 24 hours) at 4/12/2022 1249  Last data filed at 4/12/2022 9439  Gross per 24 hour   Intake 2310 ml   Output 900 ml   Net 1410 ml         PHYSICAL EXAM:      General Appearance:   Alert, cooperative, no distress   HEENT:   Normocephalic, atraumatic, anicteric      Neck:  Supple, symmetrical, trachea midline   Lungs:   Clear to auscultation bilaterally; no rales, rhonchi or wheezing; respirations unlabored    Heart[de-identified]   Regular rate and rhythm; no murmur, rub, or gallop     Abdomen:   Soft, non-tender, non-distended; normal bowel sounds; no masses, no organomegaly    Genitalia:   Deferred    Rectal:   Deferred    Extremities:  No cyanosis, clubbing or edema    Pulses:  2+ and symmetric all extremities    Skin:  No jaundice, rashes, or lesions             Lab Results:   Admission on 04/11/2022   Component Date Value    WBC 04/11/2022 5 26  RBC 04/11/2022 4 58     Hemoglobin 04/11/2022 13 7     Hematocrit 04/11/2022 39 7     MCV 04/11/2022 87     MCH 04/11/2022 29 9     MCHC 04/11/2022 34 5     RDW 04/11/2022 13 7     MPV 04/11/2022 10 7     Platelets 31/31/1178 248     nRBC 04/11/2022 0     Neutrophils Relative 04/11/2022 67     Immat GRANS % 04/11/2022 0     Lymphocytes Relative 04/11/2022 24     Monocytes Relative 04/11/2022 8     Eosinophils Relative 04/11/2022 0     Basophils Relative 04/11/2022 1     Neutrophils Absolute 04/11/2022 3 44     Immature Grans Absolute 04/11/2022 0 01     Lymphocytes Absolute 04/11/2022 1 28     Monocytes Absolute 04/11/2022 0 44     Eosinophils Absolute 04/11/2022 0 02     Basophils Absolute 04/11/2022 0 07     hs TnI 0hr 04/11/2022 4     NT-proBNP 04/11/2022 129     Sodium 04/11/2022 140     Potassium 04/11/2022 3 4*    Chloride 04/11/2022 101     CO2 04/11/2022 30     ANION GAP 04/11/2022 9     BUN 04/11/2022 18     Creatinine 04/11/2022 0 97     Glucose 04/11/2022 118     Calcium 04/11/2022 9 4     AST 04/11/2022 27     ALT 04/11/2022 27     Alkaline Phosphatase 04/11/2022 60     Total Protein 04/11/2022 7 2     Albumin 04/11/2022 4 1     Total Bilirubin 04/11/2022 0 96     eGFR 04/11/2022 73     CRP 04/11/2022 0 5     Ventricular Rate 04/11/2022 82     Atrial Rate 04/11/2022 82     TN Interval 04/11/2022 158     QRSD Interval 04/11/2022 88     QT Interval 04/11/2022 374     QTC Interval 04/11/2022 436     P Axis 04/11/2022 86     QRS Axis 04/11/2022 80     T Wave Axis 04/11/2022 83     hs TnI 2hr 04/11/2022 4     Delta 2hr hsTnI 04/11/2022 0     hs TnI 4hr 04/11/2022 5     Delta 4hr hsTnI 04/11/2022 1     D-Dimer, Quant 04/11/2022 0 38     SARS-CoV-2 04/11/2022 Negative     INFLUENZA A PCR 04/11/2022 Negative     INFLUENZA B PCR 04/11/2022 Negative     RSV PCR 04/11/2022 Negative     Magnesium 04/11/2022 1 9     Phosphorus 04/11/2022 3 2     WBC 04/12/2022 5 80     RBC 04/12/2022 4 15     Hemoglobin 04/12/2022 12 1     Hematocrit 04/12/2022 37 6     MCV 04/12/2022 91     MCH 04/12/2022 29 2     MCHC 04/12/2022 32 2     RDW 04/12/2022 13 8     MPV 04/12/2022 11 5     Platelets 92/73/6151 224     nRBC 04/12/2022 0     Neutrophils Relative 04/12/2022 51     Immat GRANS % 04/12/2022 0     Lymphocytes Relative 04/12/2022 35     Monocytes Relative 04/12/2022 11     Eosinophils Relative 04/12/2022 2     Basophils Relative 04/12/2022 1     Neutrophils Absolute 04/12/2022 2 93     Immature Grans Absolute 04/12/2022 0 02     Lymphocytes Absolute 04/12/2022 2 00     Monocytes Absolute 04/12/2022 0 65     Eosinophils Absolute 04/12/2022 0 13     Basophils Absolute 04/12/2022 0 07     Sodium 04/12/2022 142     Potassium 04/12/2022 3 5     Chloride 04/12/2022 105     CO2 04/12/2022 30     ANION GAP 04/12/2022 7     BUN 04/12/2022 13     Creatinine 04/12/2022 0 90     Glucose 04/12/2022 86     Calcium 04/12/2022 8 9     AST 04/12/2022 22     ALT 04/12/2022 22     Alkaline Phosphatase 04/12/2022 51     Total Protein 04/12/2022 6 2*    Albumin 04/12/2022 3 5     Total Bilirubin 04/12/2022 1 12*    eGFR 04/12/2022 80     Magnesium 04/12/2022 1 9     Phosphorus 04/12/2022 3 3     Procalcitonin 04/12/2022 <0 05     Protime 04/12/2022 15 6*    INR 04/12/2022 1 31*       Imaging Studies: I have personally reviewed pertinent imaging studies

## 2022-04-12 NOTE — UTILIZATION REVIEW
Initial Clinical Review    Admission: Date/Time/Statement:   Admission Orders (From admission, onward)     Ordered        04/11/22 1728  Inpatient Admission  Once                      Orders Placed This Encounter   Procedures    Inpatient Admission     Standing Status:   Standing     Number of Occurrences:   1     Order Specific Question:   Level of Care     Answer:   Med Surg [16]     Order Specific Question:   Estimated length of stay     Answer:   More than 2 Midnights     Order Specific Question:   Certification     Answer:   I certify that inpatient services are medically necessary for this patient for a duration of greater than two midnights  See H&P and MD Progress Notes for additional information about the patient's course of treatment  ED Arrival Information     Expected Arrival Acuity    - 4/11/2022 10:44 Urgent         Means of arrival Escorted by Service Admission type    [facility] Urgent         Arrival complaint    sob,chest pain        Chief Complaint   Patient presents with    Chest Pain     Presents to ED from cardioligist with c/o sob and chest tightness  Patient also reports concern for acid reflux  Pian is relieved with antacid  +nausea  Initial Presentation: 76 y o  male to the ED from cardiology office with complaints of chest pain, shortness of breath, chest tightness for several weeks  Admitted to inpatient for other chest pain, dyslipidemia  Arrives cachetic appearing older than stated age  Pain resolved after taking OTC tums at  Home  EKG shows no acute abnormalities  CXR without abnormalities  H/O COVID with subsequent pericarditis  Given IV zofran, mylanta, IV fluids in the ED  Date:4/12     Day 2:  Likely GI symptoms  Start Protonix IV  Clear liquid diet  Continue with IV fluids  CT C/A/P shows no acute abnormality in GI tract        ED Triage Vitals   Temperature Pulse Respirations Blood Pressure SpO2   04/11/22 1053 04/11/22 1053 04/11/22 1053 04/11/22 1053 04/11/22 1053   (!) 96 4 °F (35 8 °C) 80 20 153/86 99 %      Temp Source Heart Rate Source Patient Position - Orthostatic VS BP Location FiO2 (%)   04/11/22 1053 04/11/22 1053 04/11/22 1053 04/11/22 1053 --   Tympanic Monitor Sitting Left arm       Pain Score       04/11/22 1755       No Pain          Wt Readings from Last 1 Encounters:   04/11/22 49 8 kg (109 lb 12 6 oz)     Additional Vital Signs:   Time Temp Pulse Resp BP MAP (mmHg) SpO2 O2 Device Patient Position - Orthostatic VS   04/12/22 07:31:10 97 6 °F (36 4 °C) 73 18 124/77 93 100 % -- --   04/11/22 21:49:17 97 9 °F (36 6 °C) 75 18 136/79 98 99 % None (Room air) Sitting   04/11/22 16:41:46 97 9 °F (36 6 °C) 74 18 129/81 97 98 % None (Room air) --   04/11/22 1430 98 °F (36 7 °C) 71 20 145/69 99 100 % None (Room air) Lying   04/11/22 1300 -- 69 22 141/69 99 99 % None (Room air) Sitting   04/11/22 1230 -- 68 20 153/72 104 100 % None (Room air) Sitting   04/11/22 1053 96 4 °F (35 8 °C) Abnormal  80 20 153/86 -- 99 % None (Room air) Sitting       Pertinent Labs/Diagnostic Test Results:   CT chest abdomen pelvis wo contrast   Final Result by Edwige Le MD (04/12 2822)   No acute inflammatory stranding   No bowel obstruction   Mild thickening of the wall of the stomach in the region of the cardia and fundus, unchanged      Less than 6 mm lung nodules in the both upper lobe, Based on current Fleischner Society 2017 Guidelines on incidental pulmonary nodule,  follow-up CT at 12 months can be considered  Workstation performed: [id number]         X-ray chest 1 view portable   Final Result by Reynaldo Brandon MD (04/11 3453)      No acute cardiopulmonary findings or significant change from priors                    Workstation performed: [id number]           Results from last 7 days   Lab Units 04/11/22  1553   SARS-COV-2  Negative     Results from last 7 days   Lab Units 04/12/22  0414 04/11/22  1111   WBC Thousand/uL 5 80 5 26   HEMOGLOBIN g/dL 12 1 13 7   HEMATOCRIT % 37 6 39 7   PLATELETS Thousands/uL 224 248   NEUTROS ABS Thousands/µL 2 93 3 44         Results from last 7 days   Lab Units 04/12/22  0414 04/11/22  1740 04/11/22  1111   SODIUM mmol/L 142  --  140   POTASSIUM mmol/L 3 5  --  3 4*   CHLORIDE mmol/L 105  --  101   CO2 mmol/L 30  --  30   ANION GAP mmol/L 7  --  9   BUN mg/dL 13  --  18   CREATININE mg/dL 0 90  --  0 97   EGFR ml/min/1 73sq m 80  --  73   CALCIUM mg/dL 8 9  --  9 4   MAGNESIUM mg/dL 1 9 1 9  --    PHOSPHORUS mg/dL 3 3 3 2  --      Results from last 7 days   Lab Units 04/12/22  0414 04/11/22  1111   AST U/L 22 27   ALT U/L 22 27   ALK PHOS U/L 51 60   TOTAL PROTEIN g/dL 6 2* 7 2   ALBUMIN g/dL 3 5 4 1   TOTAL BILIRUBIN mg/dL 1 12* 0 96         Results from last 7 days   Lab Units 04/12/22  0414 04/11/22  1111   GLUCOSE RANDOM mg/dL 86 118         Results from last 7 days   Lab Units 04/11/22  1634 04/11/22  1324 04/11/22  1111   HS TNI 0HR ng/L  --   --  4   HS TNI 2HR ng/L  --  4  --    HSTNI D2 ng/L  --  0  --    HS TNI 4HR ng/L 5  --   --    HSTNI D4 ng/L 1  --   --      Results from last 7 days   Lab Units 04/11/22  1553   D-DIMER QUANTITATIVE ug/ml FEU 0 38     Results from last 7 days   Lab Units 04/12/22  0414   PROTIME seconds 15 6*   INR  1 31*         Results from last 7 days   Lab Units 04/12/22  0414   PROCALCITONIN ng/ml <0 05       Results from last 7 days   Lab Units 04/11/22  1111   NT-PRO BNP pg/mL 129       Results from last 7 days   Lab Units 04/11/22  1111   CRP mg/L 0 5         Results from last 7 days   Lab Units 04/11/22  1553   INFLUENZA A PCR  Negative   INFLUENZA B PCR  Negative   RSV PCR  Negative       ED Treatment:   Medication Administration from 04/11/2022 1044 to 04/11/2022 1627       Date/Time Order Dose Route Action     04/11/2022 1110 lactated ringers bolus 1,000 mL 1,000 mL Intravenous New Bag     04/11/2022 1416 ondansetron (ZOFRAN) injection 4 mg 4 mg Intravenous Given     04/11/2022 1416 aluminum-magnesium hydroxide-simethicone (MYLANTA) oral suspension 30 mL 30 mL Oral Given        Past Medical History:   Diagnosis Date    Coronary artery disease     COVID-19 01/28/2022    History of echocardiogram 03/22/2018    Normal EF, normal LVSF  Thick and rebundent MV leaflets w/ mild posterior leaflet prolapse w/ trace regurg   Hyperlipidemia     Lyme disease     Mitral valve prolapse     Shingles        Admitting Diagnosis: Chest pain, unspecified [R07 9]  Nausea [R11 0]  GERD (gastroesophageal reflux disease) [K21 9]  Chest pain [R07 9]  Age/Sex: 76 y o  male  Admission Orders:  CBC, H pylori, CMP   Clear liquid diet    Scheduled Medications:  influenza vaccine, 0 7 mL, Intramuscular, Once  pantoprazole, 40 mg, Intravenous, Q12H [address]      Continuous IV Infusions:  sodium chloride, 100 mL/hr, Intravenous, Continuous      PRN Meds:  calcium carbonate, 1,000 mg, Oral, TID PRN  iohexol, 50 mL, Oral, Once in imaging  sodium chloride (PF), 3 mL, Intravenous, Q1H PRN        IP CONSULT TO NUTRITION SERVICES  IP CONSULT TO GASTROENTEROLOGY    Network Utilization Review Department  ATTENTION: Please call with any questions or concerns to [phone] and carefully listen to the prompts so that you are directed to the right person  All voicemails are confidential   Tanisha Cobb all requests for admission clinical reviews, approved or denied determinations and any other requests to dedicated fax number below belonging to the campus where the patient is receiving treatment   List of dedicated fax numbers for the Facilities:  [address] [place] [address] DENIALS (Administrative/Medical Necessity) [phone]   [address] [place] [address] (Maternity/NICU/Pediatrics) [address] [place] [address],[address] [place] [address] [address] [place] [address] [address] [place] [address] [phone] [place] [address] [phone]

## 2022-04-12 NOTE — PLAN OF CARE
Problem: Potential for Falls  Goal: Patient will remain free of falls  Description: INTERVENTIONS:  - Educate patient/family on patient safety including physical limitations  - Instruct patient to call for assistance with activity   - Consult OT/PT to assist with strengthening/mobility   - Keep Call bell within reach  - Keep bed low and locked with side rails adjusted as appropriate  - Keep care items and personal belongings within reach  - Initiate and maintain comfort rounds  - Make Fall Risk Sign visible to staff  - Offer Toileting every 1-2 Hours, in advance of need  - Initiate/Maintain bed and chair alarm  - Obtain necessary fall risk management equipment: fall socks and call bell within reach  - Apply yellow socks and bracelet for high fall risk patients  - Consider moving patient to room near nurses station  Outcome: Progressing     Problem: CARDIOVASCULAR - ADULT  Goal: Maintains optimal cardiac output and hemodynamic stability  Description: INTERVENTIONS:  - Monitor I/O, vital signs and rhythm  - Monitor for S/S and trends of decreased cardiac output  - Administer and titrate ordered vasoactive medications to optimize hemodynamic stability  - Assess quality of pulses, skin color and temperature  - Assess for signs of decreased coronary artery perfusion  - Instruct patient to report change in severity of symptoms  Outcome: Progressing  Goal: Absence of cardiac dysrhythmias or at baseline rhythm  Description: INTERVENTIONS:  - Continuous cardiac monitoring, vital signs, obtain 12 lead EKG if ordered  - Administer antiarrhythmic and heart rate control medications as ordered  - Monitor electrolytes and administer replacement therapy as ordered  Outcome: Progressing     Problem: RESPIRATORY - ADULT  Goal: Achieves optimal ventilation and oxygenation  Description: INTERVENTIONS:  - Assess for changes in respiratory status  - Assess for changes in mentation and behavior  - Position to facilitate oxygenation and minimize respiratory effort  - Oxygen administered by appropriate delivery if ordered  - Initiate smoking cessation education as indicated  - Encourage broncho-pulmonary hygiene including cough, deep breathe, Incentive Spirometry  - Assess the need for suctioning and aspirate as needed  - Assess and instruct to report SOB or any respiratory difficulty  - Respiratory Therapy support as indicated  Outcome: Progressing     Problem: GASTROINTESTINAL - ADULT  Goal: Minimal or absence of nausea and/or vomiting  Description: INTERVENTIONS:  - Administer IV fluids if ordered to ensure adequate hydration  - Maintain NPO status until nausea and vomiting are resolved  - Nasogastric tube if ordered  - Administer ordered antiemetic medications as needed  - Provide nonpharmacologic comfort measures as appropriate  - Advance diet as tolerated, if ordered  - Consider nutrition services referral to assist patient with adequate nutrition and appropriate food choices  Outcome: Progressing  Goal: Maintains or returns to baseline bowel function  Description: INTERVENTIONS:  - Assess bowel function  - Encourage oral fluids to ensure adequate hydration  - Administer IV fluids if ordered to ensure adequate hydration  - Administer ordered medications as needed  - Encourage mobilization and activity  - Consider nutritional services referral to assist patient with adequate nutrition and appropriate food choices  Outcome: Progressing  Goal: Maintains adequate nutritional intake  Description: INTERVENTIONS:  - Monitor percentage of each meal consumed  - Identify factors contributing to decreased intake, treat as appropriate  - Assist with meals as needed  - Monitor I&O, weight, and lab values if indicated  - Obtain nutrition services referral as needed  Outcome: Progressing  Goal: Establish and maintain optimal ostomy function  Description: INTERVENTIONS:  - Assess bowel function  - Encourage oral fluids to ensure adequate hydration  - Administer IV fluids if ordered to ensure adequate hydration   - Administer ordered medications as needed  - Encourage mobilization and activity  - Nutrition services referral to assist patient with appropriate food choices    Outcome: Progressing  Goal: Oral mucous membranes remain intact  Description: INTERVENTIONS  - Assess oral mucosa and hygiene practices  - Implement preventative oral hygiene regimen  - Implement oral medicated treatments as ordered  - Initiate Nutrition services referral as needed  Outcome: Progressing     Problem: PAIN - ADULT  Goal: Verbalizes/displays adequate comfort level or baseline comfort level  Description: Interventions:  - Encourage patient to monitor pain and request assistance  - Assess pain using appropriate pain scale  - Administer analgesics based on type and severity of pain and evaluate response  - Implement non-pharmacological measures as appropriate and evaluate response  - Consider cultural and social influences on pain and pain management  - Notify physician/advanced practitioner if interventions unsuccessful or patient reports new pain  Outcome: Progressing     Problem: INFECTION - ADULT  Goal: Absence or prevention of progression during hospitalization  Description: INTERVENTIONS:  - Assess and monitor for signs and symptoms of infection  - Monitor lab/diagnostic results  - Monitor all insertion sites, i e  indwelling lines, tubes, and drains  - Monitor endotracheal if appropriate and nasal secretions for changes in amount and color  - Bates appropriate cooling/warming therapies per order  - Administer medications as ordered  - Instruct and encourage patient and family to use good hand hygiene technique  - Identify and instruct in appropriate isolation precautions for identified infection/condition  Outcome: Progressing  Goal: Absence of fever/infection during neutropenic period  Description: INTERVENTIONS:  - Monitor WBC    Outcome: Progressing     Problem: SAFETY ADULT  Goal: Patient will remain free of falls  Description: INTERVENTIONS:  - Educate patient/family on patient safety including physical limitations  - Instruct patient to call for assistance with activity   - Consult OT/PT to assist with strengthening/mobility   - Keep Call bell within reach  - Keep bed low and locked with side rails adjusted as appropriate  - Keep care items and personal belongings within reach  - Initiate and maintain comfort rounds  - Make Fall Risk Sign visible to staff  - Offer Toileting every 1-2 Hours, in advance of need  - Initiate/Maintain bed and chair alarm  - Obtain necessary fall risk management equipment: fall socks and call bell within reach  - Apply yellow socks and bracelet for high fall risk patients  - Consider moving patient to room near nurses station  Outcome: Progressing  Goal: Maintain or return to baseline ADL function  Description: INTERVENTIONS:  - Educate patient/family on patient safety including physical limitations  - Instruct patient to call for assistance with activity   - Consult OT/PT to assist with strengthening/mobility   - Keep Call bell within reach  - Keep bed low and locked with side rails adjusted as appropriate  - Keep care items and personal belongings within reach  - Initiate and maintain comfort rounds  - Make Fall Risk Sign visible to staff  - Offer Toileting every 1-2 Hours, in advance of need  - Initiate/Maintain bed and chair alarm  - Obtain necessary fall risk management equipment: fall socks call bell within reach  - Apply yellow socks and bracelet for high fall risk patients  - Consider moving patient to room near nurses station  Outcome: Progressing  Goal: Maintains/Returns to pre admission functional level  Description: INTERVENTIONS:  - Perform BMAT or MOVE assessment daily    - Set and communicate daily mobility goal to care team and patient/family/caregiver     - Collaborate with rehabilitation services on mobility goals if consulted  - Perform Range of Motion 3 times a day  - Reposition patient every 2 hours  - Dangle patient 3 times a day  - Stand patient 3 times a day  - Ambulate patient 3 times a day  - Out of bed to chair 3 times a day   - Out of bed for meals 3 times a day  - Out of bed for toileting  - Record patient progress and toleration of activity level   Outcome: Progressing     Problem: Knowledge Deficit  Goal: Patient/family/caregiver demonstrates understanding of disease process, treatment plan, medications, and discharge instructions  Description: Complete learning assessment and assess knowledge base  Interventions:  - Provide teaching at level of understanding  - Provide teaching via preferred learning methods  Outcome: Progressing     Problem: MOBILITY - ADULT  Goal: Maintain or return to baseline ADL function  Description: INTERVENTIONS:  - Educate patient/family on patient safety including physical limitations  - Instruct patient to call for assistance with activity   - Consult OT/PT to assist with strengthening/mobility   - Keep Call bell within reach  - Keep bed low and locked with side rails adjusted as appropriate  - Keep care items and personal belongings within reach  - Initiate and maintain comfort rounds  - Make Fall Risk Sign visible to staff  - Offer Toileting every 1-2 Hours, in advance of need  - Initiate/Maintain bed and chair alarm  - Obtain necessary fall risk management equipment: fall socks call bell within reach  - Apply yellow socks and bracelet for high fall risk patients  - Consider moving patient to room near nurses station  Outcome: Progressing  Goal: Maintains/Returns to pre admission functional level  Description: INTERVENTIONS:  - Perform BMAT or MOVE assessment daily    - Set and communicate daily mobility goal to care team and patient/family/caregiver  - Collaborate with rehabilitation services on mobility goals if consulted  - Perform Range of Motion 3 times a day    - Reposition patient every 2 hours  - Dangle patient 3 times a day  - Stand patient 3 times a day  - Ambulate patient 3 times a day  - Out of bed to chair 3 times a day   - Out of bed for meals 3 times a day  - Out of bed for toileting  - Record patient progress and toleration of activity level   Outcome: Progressing     Problem: Nutrition/Hydration-ADULT  Goal: Nutrient/Hydration intake appropriate for improving, restoring or maintaining nutritional needs  Description: Monitor and assess patient's nutrition/hydration status for malnutrition  Collaborate with interdisciplinary team and initiate plan and interventions as ordered  Monitor patient's weight and dietary intake as ordered or per policy  Utilize nutrition screening tool and intervene as necessary  Determine patient's food preferences and provide high-protein, high-caloric foods as appropriate       INTERVENTIONS:  - Monitor oral intake, urinary output, labs, and treatment plans  - Assess nutrition and hydration status and recommend course of action  - Evaluate amount of meals eaten  - Assist patient with eating if necessary   - Allow adequate time for meals  - Recommend/ encourage appropriate diets, oral nutritional supplements, and vitamin/mineral supplements  - Order, calculate, and assess calorie counts as needed  - Recommend, monitor, and adjust tube feedings and TPN/PPN based on assessed needs  - Assess need for intravenous fluids  - Provide specific nutrition/hydration education as appropriate  - Include patient/family/caregiver in decisions related to nutrition  Outcome: Progressing     Problem: Prexisting or High Potential for Compromised Skin Integrity  Goal: Skin integrity is maintained or improved  Description: INTERVENTIONS:  - Identify patients at risk for skin breakdown  - Assess and monitor skin integrity  - Assess and monitor nutrition and hydration status  - Monitor labs   - Assess for incontinence   - Turn and reposition patient  - Assist with mobility/ambulation  - Relieve pressure over bony prominences  - Avoid friction and shearing  - Provide appropriate hygiene as needed including keeping skin clean and dry  - Evaluate need for skin moisturizer/barrier cream  - Collaborate with interdisciplinary team   - Patient/family teaching  - Consider wound care consult   Outcome: Progressing

## 2022-04-12 NOTE — CASE MANAGEMENT
Case Management Assessment & Discharge Planning Note    Patient name Onur Villanueva  Location /145-76 MRN [id number]  : 1945 Date 2022       Current Admission Date: 2022  Current Admission Diagnosis:Other chest pain   Patient Active Problem List    Diagnosis Date Noted    Moderate protein-calorie malnutrition ([facility] [address] ) 2022    Depression, recurrent ([facility] [address] ) 2022    Other chest pain 2022    Dyslipidemia 2022      LOS (days): 1  Geometric Mean LOS (GMLOS) (days): 2 60  Days to GMLOS:1 9     OBJECTIVE:    Risk of Unplanned Readmission Score: 11         Current admission status: Inpatient    Preferred Pharmacy:   [facility]EL[facility] Foster   [address] [place] [address]  Phone: [phone] Fax: [phone]     [address]  [place] [id number]  Phone: [phone] Fax: [phone] ([facility])   [address] [facility] [address]  Phone: [phone] Fax: [phone]    Primary Care Provider: Claire Rose DO    Primary Insurance: Татьяна Roberson [facility]  Secondary Insurance:     ASSESSMENT:  Jennifer Espinoza Proxies     [facility] - Son   Primary Phone: [phone] (Mobile)  Home Phone: [phone]               Advance Directives  Does patient have a [address]?: No  Was patient offered paperwork?: Yes (declines)  Does patient currently have a Health Care decision maker?: Yes, please see Health Care Proxy section  Does patient have Advance Directives?: No  Was patient offered paperwork?: Yes (declines)  Primary Contact: son:Javier [phone]         Readmission Root Cause  30 Day Readmission: No    Patient Information  Admitted from[de-identified] Home  Mental Status: Alert  During Assessment patient was accompanied by: Not accompanied during assessment  Assessment information provided by[de-identified] Patient,Son  Primary Caregiver: Self  Support Systems: Self  [place]idence[place] [facility] do you live in?: [address]   Select all that apply : Stairs (from the side has a 2inch step then is in the house)  Type of Current Residence: 2 story home  Upon entering residence, is there a bedroom on the main floor (no further steps)?: No  A bedroom is located on the following floor levels of residence (select all that apply):: 2nd Floor  Upon entering residence, is there a bathroom on the main floor (no further steps)?: Yes  Number of steps to 2nd floor from main floor: One Flight  In the last 12 months, was there a time when you were not able to pay the mortgage or rent on time?: No  In the last 12 months, was there a time when you did not have a steady place to sleep or slept in a shelter (including now)?: No  Homeless/housing insecurity resource given?: N/A  Living Arrangements: Lives w/ Son    Activities of Daily Living Prior to Admission  Functional Status: Independent  Completes ADLs independently?: Yes  Ambulates independently?: Yes  Does patient use assisted devices?: No  Does patient currently own DME?: Yes  What DME does the patient currently own?: Yajaira Sims  Does patient have a history of Outpatient Therapy (PT/OT)?: No  Does the patient have a history of Short-Term Rehab?: No  Does patient have a history of HHC?: No  Does patient currently have [address]?: No         Patient Information Continued  Does patient have prescription coverage?: Yes  Within the past 12 months, you worried that your food would run out before you got the money to buy more : Never true  Within the past 12 months, the food you bought just didnt last and you didnt have money to get more : Never true  Food insecurity resource given?: N/A  Does patient receive dialysis treatments?: No  Does patient have a history of substance abuse?: No  Does patient have a history of Mental Health Diagnosis?: No    PHQ 2/9 Screening   Reviewed PHQ 2/9 Depression Screening Score?: No    Means of Transportation  Means of Transport to Appts[de-identified] Drives Self  In the past 12 months, has lack of transportation kept you from medical appointments or from getting medications?: No  In the past 12 months, has lack of transportation kept you from meetings, work, or from getting things needed for daily living?: No  Was application for public transport provided?: N/A        DISCHARGE DETAILS:    Discharge planning discussed with[de-identified] patient and son: Myrtle Tamra        CM contacted family/caregiver?: Yes  Were Treatment Team discharge recommendations reviewed with patient/caregiver?: Yes  Did patient/caregiver verbalize understanding of patient care needs?: Yes  Were patient/caregiver advised of the risks associated with not following Treatment Team discharge recommendations?: Yes    Contacts  Patient Contacts: Ambrosechele Wallisr: Son  Relationship to Patient[de-identified] Family  Contact Method: Phone  Phone Number: [phone]  Reason/Outcome: Discharge [address]         Is the patient interested in [place] [address] at discharge?: No    DME Referral Provided  Referral made for DME?: No    Discharge Destination Plan[de-identified] Home  Transport at Discharge : Family   Plans at this time are home on dc with OP follow up  Pt's son to transport pt home on dc  CM will follow and assist in dc planning

## 2022-04-12 NOTE — PLAN OF CARE
Problem: Potential for Falls  Goal: Patient will remain free of falls  Description: INTERVENTIONS:  - Educate patient/family on patient safety including physical limitations  - Instruct patient to call for assistance with activity   - Consult OT/PT to assist with strengthening/mobility   - Keep Call bell within reach  - Keep bed low and locked with side rails adjusted as appropriate  - Keep care items and personal belongings within reach  - Initiate and maintain comfort rounds  - Make Fall Risk Sign visible to staff  - Offer Toileting every 2 Hours, in advance of need  - Initiate/Maintain bed alarm  - Obtain necessary fall risk management equipment: bed alarm  - Apply yellow socks and bracelet for high fall risk patients  - Consider moving patient to room near nurses station  Outcome: Progressing     Problem: CARDIOVASCULAR - ADULT  Goal: Maintains optimal cardiac output and hemodynamic stability  Description: INTERVENTIONS:  - Monitor I/O, vital signs and rhythm  - Monitor for S/S and trends of decreased cardiac output  - Administer and titrate ordered vasoactive medications to optimize hemodynamic stability  - Assess quality of pulses, skin color and temperature  - Assess for signs of decreased coronary artery perfusion  - Instruct patient to report change in severity of symptoms  Outcome: Progressing  Goal: Absence of cardiac dysrhythmias or at baseline rhythm  Description: INTERVENTIONS:  - Continuous cardiac monitoring, vital signs, obtain 12 lead EKG if ordered  - Administer antiarrhythmic and heart rate control medications as ordered  - Monitor electrolytes and administer replacement therapy as ordered  Outcome: Progressing     Problem: RESPIRATORY - ADULT  Goal: Achieves optimal ventilation and oxygenation  Description: INTERVENTIONS:  - Assess for changes in respiratory status  - Assess for changes in mentation and behavior  - Position to facilitate oxygenation and minimize respiratory effort  - Oxygen administered by appropriate delivery if ordered  - Initiate smoking cessation education as indicated  - Encourage broncho-pulmonary hygiene including cough, deep breathe, Incentive Spirometry  - Assess the need for suctioning and aspirate as needed  - Assess and instruct to report SOB or any respiratory difficulty  - Respiratory Therapy support as indicated  Outcome: Progressing     Problem: GASTROINTESTINAL - ADULT  Goal: Minimal or absence of nausea and/or vomiting  Description: INTERVENTIONS:  - Administer IV fluids if ordered to ensure adequate hydration  - Maintain NPO status until nausea and vomiting are resolved  - Nasogastric tube if ordered  - Administer ordered antiemetic medications as needed  - Provide nonpharmacologic comfort measures as appropriate  - Advance diet as tolerated, if ordered  - Consider nutrition services referral to assist patient with adequate nutrition and appropriate food choices  Outcome: Progressing  Goal: Maintains or returns to baseline bowel function  Description: INTERVENTIONS:  - Assess bowel function  - Encourage oral fluids to ensure adequate hydration  - Administer IV fluids if ordered to ensure adequate hydration  - Administer ordered medications as needed  - Encourage mobilization and activity  - Consider nutritional services referral to assist patient with adequate nutrition and appropriate food choices  Outcome: Progressing  Goal: Maintains adequate nutritional intake  Description: INTERVENTIONS:  - Monitor percentage of each meal consumed  - Identify factors contributing to decreased intake, treat as appropriate  - Assist with meals as needed  - Monitor I&O, weight, and lab values if indicated  - Obtain nutrition services referral as needed  Outcome: Progressing  Goal: Establish and maintain optimal ostomy function  Description: INTERVENTIONS:  - Assess bowel function  - Encourage oral fluids to ensure adequate hydration  - Administer IV fluids if ordered to ensure adequate hydration   - Administer ordered medications as needed  - Encourage mobilization and activity  - Nutrition services referral to assist patient with appropriate food choices  - Assess stoma site  - Consider wound care consult   Outcome: Progressing  Goal: Oral mucous membranes remain intact  Description: INTERVENTIONS  - Assess oral mucosa and hygiene practices  - Implement preventative oral hygiene regimen  - Implement oral medicated treatments as ordered  - Initiate Nutrition services referral as needed  Outcome: Progressing     Problem: PAIN - ADULT  Goal: Verbalizes/displays adequate comfort level or baseline comfort level  Description: Interventions:  - Encourage patient to monitor pain and request assistance  - Assess pain using appropriate pain scale  - Administer analgesics based on type and severity of pain and evaluate response  - Implement non-pharmacological measures as appropriate and evaluate response  - Consider cultural and social influences on pain and pain management  - Notify physician/advanced practitioner if interventions unsuccessful or patient reports new pain  Outcome: Progressing     Problem: INFECTION - ADULT  Goal: Absence or prevention of progression during hospitalization  Description: INTERVENTIONS:  - Assess and monitor for signs and symptoms of infection  - Monitor lab/diagnostic results  - Monitor all insertion sites, i e  indwelling lines, tubes, and drains  - Monitor endotracheal if appropriate and nasal secretions for changes in amount and color  - [place] appropriate cooling/warming therapies per order  - Administer medications as ordered  - Instruct and encourage patient and family to use good hand hygiene technique  - Identify and instruct in appropriate isolation precautions for identified infection/condition  Outcome: Progressing  Goal: Absence of fever/infection during neutropenic period  Description: INTERVENTIONS:  - Monitor WBC    Outcome: Progressing Problem: SAFETY ADULT  Goal: Patient will remain free of falls  Description: INTERVENTIONS:  - Educate patient/family on patient safety including physical limitations  - Instruct patient to call for assistance with activity   - Consult OT/PT to assist with strengthening/mobility   - Keep Call bell within reach  - Keep bed low and locked with side rails adjusted as appropriate  - Keep care items and personal belongings within reach  - Initiate and maintain comfort rounds  - Make Fall Risk Sign visible to staff  - Offer Toileting every 2 Hours, in advance of need  - Initiate/Maintain bed alarm  - Obtain necessary fall risk management equipment: bed alarm  - Apply yellow socks and bracelet for high fall risk patients  - Consider moving patient to room near nurses station  Outcome: Progressing  Goal: Maintain or return to baseline ADL function  Description: INTERVENTIONS:  -  Assess patient's ability to carry out ADLs; assess patient's baseline for ADL function and identify physical deficits which impact ability to perform ADLs (bathing, care of mouth/teeth, toileting, grooming, dressing, etc )  - Assess/evaluate cause of self-care deficits   - Assess range of motion  - Assess patient's mobility; develop plan if impaired  - Assess patient's need for assistive devices and provide as appropriate  - Encourage maximum independence but intervene and supervise when necessary  - Involve family in performance of ADLs  - Assess for home care needs following discharge   - Consider OT consult to assist with ADL evaluation and planning for discharge  - Provide patient education as appropriate  Outcome: Progressing  Goal: Maintains/Returns to pre admission functional level  Description: INTERVENTIONS:  - Perform BMAT or MOVE assessment daily    - Set and communicate daily mobility goal to care team and patient/family/caregiver     - Collaborate with rehabilitation services on mobility goals if consulted  - Perform Range of Motion 3 times a day  - Reposition patient every 2 hours  - Dangle patient 2 times a day  - Stand patient 2 times a day  - Ambulate patient 2 times a day  - Out of bed to chair 2 times a day   - Out of bed for meals 2 times a day  - Out of bed for toileting  - Record patient progress and toleration of activity level   Outcome: Progressing     Problem: Knowledge Deficit  Goal: Patient/family/caregiver demonstrates understanding of disease process, treatment plan, medications, and discharge instructions  Description: Complete learning assessment and assess knowledge base  Interventions:  - Provide teaching at level of understanding  - Provide teaching via preferred learning methods  Outcome: Progressing     Problem: MOBILITY - ADULT  Goal: Maintain or return to baseline ADL function  Description: INTERVENTIONS:  -  Assess patient's ability to carry out ADLs; assess patient's baseline for ADL function and identify physical deficits which impact ability to perform ADLs (bathing, care of mouth/teeth, toileting, grooming, dressing, etc )  - Assess/evaluate cause of self-care deficits   - Assess range of motion  - Assess patient's mobility; develop plan if impaired  - Assess patient's need for assistive devices and provide as appropriate  - Encourage maximum independence but intervene and supervise when necessary  - Involve family in performance of ADLs  - Assess for home care needs following discharge   - Consider OT consult to assist with ADL evaluation and planning for discharge  - Provide patient education as appropriate  Outcome: Progressing  Goal: Maintains/Returns to pre admission functional level  Description: INTERVENTIONS:  - Perform BMAT or MOVE assessment daily    - Set and communicate daily mobility goal to care team and patient/family/caregiver  - Collaborate with rehabilitation services on mobility goals if consulted  - Perform Range of Motion 3 times a day  - Reposition patient every 2 hours    - Dangle patient 2 times a day  - Stand patient 2 times a day  - Ambulate patient 2 times a day  - Out of bed to chair 2 times a day   - Out of bed for meals 2 times a day  - Out of bed for toileting  - Record patient progress and toleration of activity level   Outcome: Progressing     Problem: Nutrition/Hydration-ADULT  Goal: Nutrient/Hydration intake appropriate for improving, restoring or maintaining nutritional needs  Description: Monitor and assess patient's nutrition/hydration status for malnutrition  Collaborate with interdisciplinary team and initiate plan and interventions as ordered  Monitor patient's weight and dietary intake as ordered or per policy  Utilize nutrition screening tool and intervene as necessary  Determine patient's food preferences and provide high-protein, high-caloric foods as appropriate       INTERVENTIONS:  - Monitor oral intake, urinary output, labs, and treatment plans  - Assess nutrition and hydration status and recommend course of action  - Evaluate amount of meals eaten  - Assist patient with eating if necessary   - Allow adequate time for meals  - Recommend/ encourage appropriate diets, oral nutritional supplements, and vitamin/mineral supplements  - Order, calculate, and assess calorie counts as needed  - Recommend, monitor, and adjust tube feedings and TPN/PPN based on assessed needs  - Assess need for intravenous fluids  - Provide specific nutrition/hydration education as appropriate  - Include patient/family/caregiver in decisions related to nutrition  Outcome: Progressing     Problem: Prexisting or High Potential for Compromised Skin Integrity  Goal: Skin integrity is maintained or improved  Description: INTERVENTIONS:  - Identify patients at risk for skin breakdown  - Assess and monitor skin integrity  - Assess and monitor nutrition and hydration status  - Monitor labs   - Assess for incontinence   - Turn and reposition patient  - Assist with mobility/ambulation  - Relieve pressure over bony prominences  - Avoid friction and shearing  - Provide appropriate hygiene as needed including keeping skin clean and dry  - Evaluate need for skin moisturizer/barrier cream  - Collaborate with interdisciplinary team   - Patient/family teaching  - Consider wound care consult   Outcome: Progressing

## 2022-04-12 NOTE — MALNUTRITION/BMI
This medical record reflects one or more clinical indicators suggestive of malnutrition  Malnutrition Findings:   Adult Malnutrition type: Chronic illness  Adult Degree of Malnutrition: Other severe protein calorie malnutrition  Malnutrition Characteristics: Fat loss,Muscle loss,Weight loss                  360 Statement: Chronic, severe protein-calorie malnutrition r/t decreased appetite/intakes as evidenced by significant wt  loss of 11 5% x 6 months (109 8# 4/11/22, 124# 10/11/21), muscle wasting in temples and clavicle region, and fat loss in orbitals and extremities; treat with po diet and supplements  BMI Findings: Body mass index is 16 21 kg/m²  Underweight    See Nutrition note dated 4/12/22 for additional details  Completed nutrition assessment is viewable in the nutrition documentation

## 2022-04-12 NOTE — ASSESSMENT & PLAN NOTE
Likely GI associated symptoms including chest tightness, shortness of breath, likely component of anxiety as well  Start Protonix 40 mg IV q 12, will give clear liquid diet, gradually advance, CT abdomen pelvis reviewed  Continue IV fluid, consult GI for EGD

## 2022-04-13 ENCOUNTER — APPOINTMENT (INPATIENT)
Dept: PERIOP | Facility: HOSPITAL | Age: 77
DRG: 391 | End: 2022-04-13
Payer: COMMERCIAL

## 2022-04-13 ENCOUNTER — ANESTHESIA (INPATIENT)
Dept: PERIOP | Facility: HOSPITAL | Age: 77
DRG: 391 | End: 2022-04-13
Payer: COMMERCIAL

## 2022-04-13 ENCOUNTER — ANESTHESIA EVENT (INPATIENT)
Dept: PERIOP | Facility: HOSPITAL | Age: 77
DRG: 391 | End: 2022-04-13
Payer: COMMERCIAL

## 2022-04-13 PROBLEM — E43 SEVERE PROTEIN-CALORIE MALNUTRITION (HCC): Status: ACTIVE | Noted: 2022-04-13

## 2022-04-13 LAB
ALBUMIN SERPL BCP-MCNC: 3.2 G/DL (ref 3.5–5)
ALP SERPL-CCNC: 45 U/L (ref 46–116)
ALT SERPL W P-5'-P-CCNC: 19 U/L (ref 12–78)
ANION GAP SERPL CALCULATED.3IONS-SCNC: 7 MMOL/L (ref 4–13)
AST SERPL W P-5'-P-CCNC: 19 U/L (ref 5–45)
BASOPHILS # BLD AUTO: 0.08 THOUSANDS/ΜL (ref 0–0.1)
BASOPHILS NFR BLD AUTO: 1 % (ref 0–1)
BILIRUB SERPL-MCNC: 0.89 MG/DL (ref 0.2–1)
BUN SERPL-MCNC: 12 MG/DL (ref 5–25)
CALCIUM ALBUM COR SERPL-MCNC: 9.1 MG/DL (ref 8.3–10.1)
CALCIUM SERPL-MCNC: 8.5 MG/DL (ref 8.3–10.1)
CHLORIDE SERPL-SCNC: 108 MMOL/L (ref 100–108)
CO2 SERPL-SCNC: 29 MMOL/L (ref 21–32)
CREAT SERPL-MCNC: 0.8 MG/DL (ref 0.6–1.3)
EOSINOPHIL # BLD AUTO: 0.27 THOUSAND/ΜL (ref 0–0.61)
EOSINOPHIL NFR BLD AUTO: 4 % (ref 0–6)
ERYTHROCYTE [DISTWIDTH] IN BLOOD BY AUTOMATED COUNT: 13.8 % (ref 11.6–15.1)
GFR SERPL CREATININE-BSD FRML MDRD: 84 ML/MIN/1.73SQ M
GLUCOSE SERPL-MCNC: 95 MG/DL (ref 65–140)
HCT VFR BLD AUTO: 35.1 % (ref 36.5–49.3)
HGB BLD-MCNC: 11.6 G/DL (ref 12–17)
IMM GRANULOCYTES # BLD AUTO: 0.04 THOUSAND/UL (ref 0–0.2)
IMM GRANULOCYTES NFR BLD AUTO: 1 % (ref 0–2)
LYMPHOCYTES # BLD AUTO: 1.92 THOUSANDS/ΜL (ref 0.6–4.47)
LYMPHOCYTES NFR BLD AUTO: 29 % (ref 14–44)
MCH RBC QN AUTO: 29.5 PG (ref 26.8–34.3)
MCHC RBC AUTO-ENTMCNC: 33 G/DL (ref 31.4–37.4)
MCV RBC AUTO: 89 FL (ref 82–98)
MONOCYTES # BLD AUTO: 0.76 THOUSAND/ΜL (ref 0.17–1.22)
MONOCYTES NFR BLD AUTO: 11 % (ref 4–12)
NEUTROPHILS # BLD AUTO: 3.57 THOUSANDS/ΜL (ref 1.85–7.62)
NEUTS SEG NFR BLD AUTO: 54 % (ref 43–75)
NRBC BLD AUTO-RTO: 0 /100 WBCS
PLATELET # BLD AUTO: 212 THOUSANDS/UL (ref 149–390)
PMV BLD AUTO: 11.2 FL (ref 8.9–12.7)
POTASSIUM SERPL-SCNC: 3.1 MMOL/L (ref 3.5–5.3)
PROT SERPL-MCNC: 5.7 G/DL (ref 6.4–8.2)
RBC # BLD AUTO: 3.93 MILLION/UL (ref 3.88–5.62)
SODIUM SERPL-SCNC: 144 MMOL/L (ref 136–145)
WBC # BLD AUTO: 6.64 THOUSAND/UL (ref 4.31–10.16)

## 2022-04-13 PROCEDURE — 0DBP8ZZ EXCISION OF RECTUM, VIA NATURAL OR ARTIFICIAL OPENING ENDOSCOPIC: ICD-10-PCS | Performed by: INTERNAL MEDICINE

## 2022-04-13 PROCEDURE — 99232 SBSQ HOSP IP/OBS MODERATE 35: CPT | Performed by: INTERNAL MEDICINE

## 2022-04-13 PROCEDURE — 45385 COLONOSCOPY W/LESION REMOVAL: CPT | Performed by: INTERNAL MEDICINE

## 2022-04-13 PROCEDURE — 80053 COMPREHEN METABOLIC PANEL: CPT | Performed by: INTERNAL MEDICINE

## 2022-04-13 PROCEDURE — 88305 TISSUE EXAM BY PATHOLOGIST: CPT | Performed by: PATHOLOGY

## 2022-04-13 PROCEDURE — 0DB98ZX EXCISION OF DUODENUM, VIA NATURAL OR ARTIFICIAL OPENING ENDOSCOPIC, DIAGNOSTIC: ICD-10-PCS | Performed by: INTERNAL MEDICINE

## 2022-04-13 PROCEDURE — 0DB68ZX EXCISION OF STOMACH, VIA NATURAL OR ARTIFICIAL OPENING ENDOSCOPIC, DIAGNOSTIC: ICD-10-PCS | Performed by: INTERNAL MEDICINE

## 2022-04-13 PROCEDURE — 88342 IMHCHEM/IMCYTCHM 1ST ANTB: CPT | Performed by: PATHOLOGY

## 2022-04-13 PROCEDURE — C9113 INJ PANTOPRAZOLE SODIUM, VIA: HCPCS | Performed by: INTERNAL MEDICINE

## 2022-04-13 PROCEDURE — 43239 EGD BIOPSY SINGLE/MULTIPLE: CPT | Performed by: INTERNAL MEDICINE

## 2022-04-13 PROCEDURE — 85025 COMPLETE CBC W/AUTO DIFF WBC: CPT | Performed by: INTERNAL MEDICINE

## 2022-04-13 PROCEDURE — 0DBH8ZZ EXCISION OF CECUM, VIA NATURAL OR ARTIFICIAL OPENING ENDOSCOPIC: ICD-10-PCS | Performed by: INTERNAL MEDICINE

## 2022-04-13 RX ORDER — ONDANSETRON 2 MG/ML
4 INJECTION INTRAMUSCULAR; INTRAVENOUS ONCE AS NEEDED
Status: DISCONTINUED | OUTPATIENT
Start: 2022-04-13 | End: 2022-04-14 | Stop reason: HOSPADM

## 2022-04-13 RX ORDER — PROPOFOL 10 MG/ML
INJECTION, EMULSION INTRAVENOUS CONTINUOUS PRN
Status: DISCONTINUED | OUTPATIENT
Start: 2022-04-13 | End: 2022-04-13

## 2022-04-13 RX ORDER — SODIUM CHLORIDE, SODIUM LACTATE, POTASSIUM CHLORIDE, CALCIUM CHLORIDE 600; 310; 30; 20 MG/100ML; MG/100ML; MG/100ML; MG/100ML
125 INJECTION, SOLUTION INTRAVENOUS CONTINUOUS
Status: DISCONTINUED | OUTPATIENT
Start: 2022-04-13 | End: 2022-04-13

## 2022-04-13 RX ORDER — EPHEDRINE SULFATE 50 MG/ML
INJECTION INTRAVENOUS AS NEEDED
Status: DISCONTINUED | OUTPATIENT
Start: 2022-04-13 | End: 2022-04-13

## 2022-04-13 RX ORDER — PROPOFOL 10 MG/ML
INJECTION, EMULSION INTRAVENOUS AS NEEDED
Status: DISCONTINUED | OUTPATIENT
Start: 2022-04-13 | End: 2022-04-13

## 2022-04-13 RX ORDER — LIDOCAINE HYDROCHLORIDE 20 MG/ML
INJECTION, SOLUTION EPIDURAL; INFILTRATION; INTRACAUDAL; PERINEURAL AS NEEDED
Status: DISCONTINUED | OUTPATIENT
Start: 2022-04-13 | End: 2022-04-13

## 2022-04-13 RX ORDER — SODIUM CHLORIDE, SODIUM LACTATE, POTASSIUM CHLORIDE, CALCIUM CHLORIDE 600; 310; 30; 20 MG/100ML; MG/100ML; MG/100ML; MG/100ML
INJECTION, SOLUTION INTRAVENOUS CONTINUOUS PRN
Status: DISCONTINUED | OUTPATIENT
Start: 2022-04-13 | End: 2022-04-13

## 2022-04-13 RX ADMIN — PANTOPRAZOLE SODIUM 40 MG: 40 INJECTION, POWDER, FOR SOLUTION INTRAVENOUS at 21:22

## 2022-04-13 RX ADMIN — PROPOFOL 40 MG: 10 INJECTION, EMULSION INTRAVENOUS at 12:11

## 2022-04-13 RX ADMIN — PROPOFOL 110 MCG/KG/MIN: 10 INJECTION, EMULSION INTRAVENOUS at 12:13

## 2022-04-13 RX ADMIN — LIDOCAINE HYDROCHLORIDE 80 MG: 20 INJECTION, SOLUTION EPIDURAL; INFILTRATION; INTRACAUDAL; PERINEURAL at 12:07

## 2022-04-13 RX ADMIN — PANTOPRAZOLE SODIUM 40 MG: 40 INJECTION, POWDER, FOR SOLUTION INTRAVENOUS at 10:01

## 2022-04-13 RX ADMIN — PROPOFOL 50 MG: 10 INJECTION, EMULSION INTRAVENOUS at 12:09

## 2022-04-13 RX ADMIN — PROPOFOL 60 MG: 10 INJECTION, EMULSION INTRAVENOUS at 12:07

## 2022-04-13 RX ADMIN — PROPOFOL 30 MG: 10 INJECTION, EMULSION INTRAVENOUS at 12:13

## 2022-04-13 RX ADMIN — EPHEDRINE SULFATE 5 MG: 50 INJECTION, SOLUTION INTRAVENOUS at 12:15

## 2022-04-13 RX ADMIN — SODIUM CHLORIDE, SODIUM LACTATE, POTASSIUM CHLORIDE, AND CALCIUM CHLORIDE 125 ML/HR: .6; .31; .03; .02 INJECTION, SOLUTION INTRAVENOUS at 14:29

## 2022-04-13 RX ADMIN — SODIUM CHLORIDE, SODIUM LACTATE, POTASSIUM CHLORIDE, AND CALCIUM CHLORIDE: .6; .31; .03; .02 INJECTION, SOLUTION INTRAVENOUS at 12:00

## 2022-04-13 RX ADMIN — SODIUM CHLORIDE 100 ML/HR: 0.9 INJECTION, SOLUTION INTRAVENOUS at 08:06

## 2022-04-13 NOTE — PROGRESS NOTES
[facility] [address]  Progress Note David Chandler 1945, 76 y o  male MRN: [id number]  Unit/Bed#: 424-01 Encounter: [id number]  Primary Care Provider: Payam Pope DO   Date and time admitted to hospital: 2022 10:46 AM    * Other chest pain  Assessment & Plan  Likely GI associated symptoms including chest tightness, shortness of breath, likely component of anxiety as well  EGD and colonoscopy today    Severe protein-calorie malnutrition ([address] )  Assessment & Plan  Malnutrition Findings:   Adult Malnutrition type: Chronic illness  Adult Degree of Malnutrition: Other severe protein calorie malnutrition  Malnutrition Characteristics: Fat loss,Muscle loss,Weight loss                  360 Statement: Chronic, severe protein-calorie malnutrition r/t decreased appetite/intakes as evidenced by significant wt  loss of 11 5% x 6 months (109 8# 22, 124# 10/11/21), muscle wasting in temples and clavicle region, and fat loss in orbitals and extremities; treat with po diet and supplements  BMI Findings:  Adult BMI Classifications: Underweight < 18 5        Body mass index is 16 21 kg/m²  Severe protein calorie malnutrition in the setting of chronic dyspepsia and ongoing severe nausea, patient has had significant weight loss of 11 5% over the last 6 months, patient has significant muscle wasting in temples and clavicle region, accompanied by fat loss in the orbital zone extremities, BMI is very low at 16 21, follow-up with nutritional recommendations    Dyslipidemia  Assessment & Plan  Hold statin        Code Status: Prior    Subjective:       Objective:     Vitals:   Temp (24hrs), Av 5 °F (36 4 °C), Min:97 3 °F (36 3 °C), Max:97 6 °F (36 4 °C)    Temp:  [97 3 °F (36 3 °C)-97 6 °F (36 4 °C)] 97 6 °F (36 4 °C)  HR:  [67-81] 69  Resp:  [14-18] 14  BP: ()/(52-87) 110/59  SpO2:  [98 %-100 %] 100 %  Body mass index is 16 21 kg/m²       Input and Output Summary (last 24 hours): Intake/Output Summary (Last 24 hours) at 4/13/2022 1311  Last data filed at 4/13/2022 1229  Gross per 24 hour   Intake 1010 ml   Output --   Net 1010 ml       Physical Exam:   Physical Exam  Vitals and nursing note reviewed  Constitutional:       General: He is not in acute distress  Appearance: He is not ill-appearing, toxic-appearing or diaphoretic  HENT:      Head: Normocephalic and atraumatic  Cardiovascular:      Rate and Rhythm: Normal rate  Pulses: Normal pulses  Pulmonary:      Effort: Pulmonary effort is normal    Musculoskeletal:         General: Normal range of motion  Skin:     General: Skin is warm and dry  Neurological:      General: No focal deficit present  Mental Status: He is alert  Additional Data:     Labs:  Results from last 7 days   Lab Units 04/13/22  0420   WBC Thousand/uL 6 64   HEMOGLOBIN g/dL 11 6*   HEMATOCRIT % 35 1*   PLATELETS Thousands/uL 212   NEUTROS PCT % 54   LYMPHS PCT % 29   MONOS PCT % 11   EOS PCT % 4     Results from last 7 days   Lab Units 04/13/22  0420   SODIUM mmol/L 144   POTASSIUM mmol/L 3 1*   CHLORIDE mmol/L 108   CO2 mmol/L 29   BUN mg/dL 12   CREATININE mg/dL 0 80   ANION GAP mmol/L 7   CALCIUM mg/dL 8 5   ALBUMIN g/dL 3 2*   TOTAL BILIRUBIN mg/dL 0 89   ALK PHOS U/L 45*   ALT U/L 19   AST U/L 19   GLUCOSE RANDOM mg/dL 95     Results from last 7 days   Lab Units 04/12/22  0414   INR  1 31*             Results from last 7 days   Lab Units 04/12/22  0414   PROCALCITONIN ng/ml <0 05       Lines/Drains:  Invasive Devices  Report    Peripheral Intravenous Line            Peripheral IV 04/12/22 Distal;Left;Ventral (anterior) Forearm <1 day                      Imaging: No pertinent imaging reviewed      Recent Cultures (last 7 days):         Last 24 Hours Medication List:   Current Facility-Administered Medications   Medication Dose Route Frequency Provider Last Rate    calcium carbonate  1,000 mg Oral TID PRN Anjana Hernandes DO  influenza vaccine  0 7 mL Intramuscular Once Isaura Azevedo Alberto, DO      iohexol  50 mL Oral Once [facility],       pantoprazole  40 mg Intravenous Q12H [facility] [place], [place]      sodium chloride (PF)  3 mL Intravenous Q1H PRN Keshia Reece DO      sodium chloride  100 mL/hr Intravenous Continuous [facility] estiven[facility] 100 mL/hr (04/13/22 7639)        Today, Patient Was Seen By: Martina Eddy DO    **Please Note: This note may have been constructed using a voice recognition system  **

## 2022-04-13 NOTE — UTILIZATION REVIEW
Inpatient Admission Authorization Request   NOTIFICATION OF INPATIENT ADMISSION/INPATIENT AUTHORIZATION REQUEST   SERVICING FACILITY:   [address] [place] [address]  [address]  Tax ID:  [id number]  NPI: [id number]  Place of Service: Inpatient [facility]  [address]  Place of Service Code: 24     ATTENDING PROVIDER:  Attending Name and NPI#: Lonnie Bloch [[id number]]  Address: [address]  Phone: [phone]     UTILIZATION REVIEW CONTACT:  Giovanni Larsen, Utilization   Network Utilization Review Department  Phone: [phone]  Fax [phone]  Email: [email or web address]     PHYSICIAN ADVISORY SERVICES:  FOR [facility]PM-[facility] REVIEW - MEDICAL NECESSITY DENIAL  Phone: [phone]  Fax: [phone]  Email: [email or web address]     TYPE OF REQUEST:  Inpatient Status     ADMISSION INFORMATION:  ADMISSION DATE/TIME: 4/11/22  5:28 PM  PATIENT DIAGNOSIS CODE/DESCRIPTION:  Chest pain, unspecified [R07 9]  Nausea [R11 0]  GERD (gastroesophageal reflux disease) [K21 9]  Chest pain [R07 9]  DISCHARGE DATE/TIME: No discharge date for patient encounter  IMPORTANT INFORMATION:  Please contact the Giovanni Larsen directly with any questions or concerns regarding this request  Department voicemails are confidential     Send requests for admission clinical reviews, concurrent reviews, approvals, and administrative denials due to lack of clinical to fax [phone]

## 2022-04-13 NOTE — PLAN OF CARE
Problem: Potential for Falls  Goal: Patient will remain free of falls  Description: INTERVENTIONS:  - Educate patient/family on patient safety including physical limitations  - Instruct patient to call for assistance with activity   - Consult OT/PT to assist with strengthening/mobility   - Keep Call bell within reach  - Keep bed low and locked with side rails adjusted as appropriate  - Keep care items and personal belongings within reach  - Initiate and maintain comfort rounds  - Make Fall Risk Sign visible to staff  - Offer Toileting every 1-2 Hours, in advance of need  - Initiate/Maintain bed and chair alarm  - Obtain necessary fall risk management equipment: fall socks and call bell within reach  - Apply yellow socks and bracelet for high fall risk patients  - Consider moving patient to room near nurses station  Outcome: Progressing     Problem: CARDIOVASCULAR - ADULT  Goal: Maintains optimal cardiac output and hemodynamic stability  Description: INTERVENTIONS:  - Monitor I/O, vital signs and rhythm  - Monitor for S/S and trends of decreased cardiac output  - Administer and titrate ordered vasoactive medications to optimize hemodynamic stability  - Assess quality of pulses, skin color and temperature  - Assess for signs of decreased coronary artery perfusion  - Instruct patient to report change in severity of symptoms  Outcome: Progressing  Goal: Absence of cardiac dysrhythmias or at baseline rhythm  Description: INTERVENTIONS:  - Continuous cardiac monitoring, vital signs, obtain 12 lead EKG if ordered  - Administer antiarrhythmic and heart rate control medications as ordered  - Monitor electrolytes and administer replacement therapy as ordered  Outcome: Progressing     Problem: RESPIRATORY - ADULT  Goal: Achieves optimal ventilation and oxygenation  Description: INTERVENTIONS:  - Assess for changes in respiratory status  - Assess for changes in mentation and behavior  - Position to facilitate oxygenation and minimize respiratory effort  - Oxygen administered by appropriate delivery if ordered  - Initiate smoking cessation education as indicated  - Encourage broncho-pulmonary hygiene including cough, deep breathe, Incentive Spirometry  - Assess the need for suctioning and aspirate as needed  - Assess and instruct to report SOB or any respiratory difficulty  - Respiratory Therapy support as indicated  Outcome: Progressing     Problem: GASTROINTESTINAL - ADULT  Goal: Minimal or absence of nausea and/or vomiting  Description: INTERVENTIONS:  - Administer IV fluids if ordered to ensure adequate hydration  - Maintain NPO status until nausea and vomiting are resolved  - Nasogastric tube if ordered  - Administer ordered antiemetic medications as needed  - Provide nonpharmacologic comfort measures as appropriate  - Advance diet as tolerated, if ordered  - Consider nutrition services referral to assist patient with adequate nutrition and appropriate food choices  Outcome: Progressing  Goal: Maintains or returns to baseline bowel function  Description: INTERVENTIONS:  - Assess bowel function  - Encourage oral fluids to ensure adequate hydration  - Administer IV fluids if ordered to ensure adequate hydration  - Administer ordered medications as needed  - Encourage mobilization and activity  - Consider nutritional services referral to assist patient with adequate nutrition and appropriate food choices  Outcome: Progressing  Goal: Maintains adequate nutritional intake  Description: INTERVENTIONS:  - Monitor percentage of each meal consumed  - Identify factors contributing to decreased intake, treat as appropriate  - Assist with meals as needed  - Monitor I&O, weight, and lab values if indicated  - Obtain nutrition services referral as needed  Outcome: Progressing  Goal: Establish and maintain optimal ostomy function  Description: INTERVENTIONS:  - Assess bowel function  - Encourage oral fluids to ensure adequate hydration  - Administer IV fluids if ordered to ensure adequate hydration   - Administer ordered medications as needed  - Encourage mobilization and activity  - Nutrition services referral to assist patient with appropriate food choices    Outcome: Progressing  Goal: Oral mucous membranes remain intact  Description: INTERVENTIONS  - Assess oral mucosa and hygiene practices  - Implement preventative oral hygiene regimen  - Implement oral medicated treatments as ordered  - Initiate Nutrition services referral as needed  Outcome: Progressing     Problem: PAIN - ADULT  Goal: Verbalizes/displays adequate comfort level or baseline comfort level  Description: Interventions:  - Encourage patient to monitor pain and request assistance  - Assess pain using appropriate pain scale  - Administer analgesics based on type and severity of pain and evaluate response  - Implement non-pharmacological measures as appropriate and evaluate response  - Consider cultural and social influences on pain and pain management  - Notify physician/advanced practitioner if interventions unsuccessful or patient reports new pain  Outcome: Progressing     Problem: INFECTION - ADULT  Goal: Absence or prevention of progression during hospitalization  Description: INTERVENTIONS:  - Assess and monitor for signs and symptoms of infection  - Monitor lab/diagnostic results  - Monitor all insertion sites, i e  indwelling lines, tubes, and drains  - Monitor endotracheal if appropriate and nasal secretions for changes in amount and color  - [place] appropriate cooling/warming therapies per order  - Administer medications as ordered  - Instruct and encourage patient and family to use good hand hygiene technique  - Identify and instruct in appropriate isolation precautions for identified infection/condition  Outcome: Progressing  Goal: Absence of fever/infection during neutropenic period  Description: INTERVENTIONS:  - Monitor WBC    Outcome: Progressing     Problem: SAFETY ADULT  Goal: Patient will remain free of falls  Description: INTERVENTIONS:  - Educate patient/family on patient safety including physical limitations  - Instruct patient to call for assistance with activity   - Consult OT/PT to assist with strengthening/mobility   - Keep Call bell within reach  - Keep bed low and locked with side rails adjusted as appropriate  - Keep care items and personal belongings within reach  - Initiate and maintain comfort rounds  - Make Fall Risk Sign visible to staff  - Offer Toileting every 1-2 Hours, in advance of need  - Initiate/Maintain bed and chair alarm  - Obtain necessary fall risk management equipment: fall socks and call bell within reach  - Apply yellow socks and bracelet for high fall risk patients  - Consider moving patient to room near nurses station  Outcome: Progressing  Goal: Maintain or return to baseline ADL function  Description: INTERVENTIONS:  - Educate patient/family on patient safety including physical limitations  - Instruct patient to call for assistance with activity   - Consult OT/PT to assist with strengthening/mobility   - Keep Call bell within reach  - Keep bed low and locked with side rails adjusted as appropriate  - Keep care items and personal belongings within reach  - Initiate and maintain comfort rounds  - Make Fall Risk Sign visible to staff  - Offer Toileting every 1-2 Hours, in advance of need  - Initiate/Maintain bed and chair alarm  - Obtain necessary fall risk management equipment: fall socks call bell within reach  - Apply yellow socks and bracelet for high fall risk patients  - Consider moving patient to room near nurses station  Outcome: Progressing  Goal: Maintains/Returns to pre admission functional level  Description: INTERVENTIONS:  - Perform BMAT or MOVE assessment daily    - Set and communicate daily mobility goal to care team and patient/family/caregiver     - Collaborate with rehabilitation services on mobility goals if consulted  - Perform Range of Motion 3 times a day  - Reposition patient every 2 hours  - Dangle patient 3 times a day  - Stand patient 3 times a day  - Ambulate patient 3 times a day  - Out of bed to chair 3 times a day   - Out of bed for meals 3 times a day  - Out of bed for toileting  - Record patient progress and toleration of activity level   Outcome: Progressing     Problem: Knowledge Deficit  Goal: Patient/family/caregiver demonstrates understanding of disease process, treatment plan, medications, and discharge instructions  Description: Complete learning assessment and assess knowledge base  Interventions:  - Provide teaching at level of understanding  - Provide teaching via preferred learning methods  Outcome: Progressing     Problem: MOBILITY - ADULT  Goal: Maintain or return to baseline ADL function  Description: INTERVENTIONS:  - Educate patient/family on patient safety including physical limitations  - Instruct patient to call for assistance with activity   - Consult OT/PT to assist with strengthening/mobility   - Keep Call bell within reach  - Keep bed low and locked with side rails adjusted as appropriate  - Keep care items and personal belongings within reach  - Initiate and maintain comfort rounds  - Make Fall Risk Sign visible to staff  - Offer Toileting every 1-2 Hours, in advance of need  - Initiate/Maintain bed and chair alarm  - Obtain necessary fall risk management equipment: fall socks call bell within reach  - Apply yellow socks and bracelet for high fall risk patients  - Consider moving patient to room near nurses station  Outcome: Progressing  Goal: Maintains/Returns to pre admission functional level  Description: INTERVENTIONS:  - Perform BMAT or MOVE assessment daily    - Set and communicate daily mobility goal to care team and patient/family/caregiver  - Collaborate with rehabilitation services on mobility goals if consulted  - Perform Range of Motion 3 times a day    - Reposition patient every 2 hours  - Dangle patient 3 times a day  - Stand patient 3 times a day  - Ambulate patient 3 times a day  - Out of bed to chair 3 times a day   - Out of bed for meals 3 times a day  - Out of bed for toileting  - Record patient progress and toleration of activity level   Outcome: Progressing     Problem: Nutrition/Hydration-ADULT  Goal: Nutrient/Hydration intake appropriate for improving, restoring or maintaining nutritional needs  Description: Monitor and assess patient's nutrition/hydration status for malnutrition  Collaborate with interdisciplinary team and initiate plan and interventions as ordered  Monitor patient's weight and dietary intake as ordered or per policy  Utilize nutrition screening tool and intervene as necessary  Determine patient's food preferences and provide high-protein, high-caloric foods as appropriate       INTERVENTIONS:  - Monitor oral intake, urinary output, labs, and treatment plans  - Assess nutrition and hydration status and recommend course of action  - Evaluate amount of meals eaten  - Assist patient with eating if necessary   - Allow adequate time for meals  - Recommend/ encourage appropriate diets, oral nutritional supplements, and vitamin/mineral supplements  - Order, calculate, and assess calorie counts as needed  - Recommend, monitor, and adjust tube feedings and TPN/PPN based on assessed needs  - Assess need for intravenous fluids  - Provide specific nutrition/hydration education as appropriate  - Include patient/family/caregiver in decisions related to nutrition  Outcome: Progressing     Problem: Prexisting or High Potential for Compromised Skin Integrity  Goal: Skin integrity is maintained or improved  Description: INTERVENTIONS:  - Identify patients at risk for skin breakdown  - Assess and monitor skin integrity  - Assess and monitor nutrition and hydration status  - Monitor labs   - Assess for incontinence   - Turn and reposition patient  - Assist with mobility/ambulation  - Relieve pressure over bony prominences  - Avoid friction and shearing  - Provide appropriate hygiene as needed including keeping skin clean and dry  - Evaluate need for skin moisturizer/barrier cream  - Collaborate with interdisciplinary team   - Patient/family teaching  - Consider wound care consult   Outcome: Progressing

## 2022-04-13 NOTE — QUICK NOTE
Patient reporting that he can not tolerate anymore of the go-malka drink, patient drank approx  Half of 4L bottle  Patient reporting output to be a greenish mucousy liquid  This RN explained the indications for the flush and possible complications with procedure from not finishing flush  Patient informed and understands and still is refusing flush

## 2022-04-13 NOTE — ANESTHESIA POSTPROCEDURE EVALUATION
Post-Op Assessment Note    CV Status:  Stable    Pain management: adequate     Mental Status:  Sleepy   Hydration Status:  Euvolemic   PONV Controlled:  Controlled   Airway Patency:  Patent      Post Op Vitals Reviewed: Yes      Staff: CRNA         No complications documented      BP   90/52   Temp      Pulse 69 (04/13/22 1233)   Resp   18   SpO2 100 % (04/13/22 1233)

## 2022-04-13 NOTE — ASSESSMENT & PLAN NOTE
Likely GI associated symptoms including chest tightness, shortness of breath, likely component of anxiety as well    EGD and colonoscopy today

## 2022-04-13 NOTE — ANESTHESIA PREPROCEDURE EVALUATION
Procedure:  EGD  COLONOSCOPY    Relevant Problems   CARDIO   (+) Other chest pain      NEURO/PSYCH   (+) Depression, recurrent (HCC)      Other   (+) Dyslipidemia   (+) Moderate protein-calorie malnutrition (HCC)      Mitral valve prolapse  Physical Exam    Airway      TM Distance: >3 FB  Neck ROM: full     Dental       Cardiovascular  Cardiovascular exam normal    Pulmonary  Pulmonary exam normal     Other Findings        Anesthesia Plan  ASA Score- 2     Anesthesia Type- IV sedation with anesthesia with ASA Monitors  Additional Monitors:   Airway Plan:           Plan Factors-    Chart reviewed  Patient summary reviewed  Induction- intravenous  Postoperative Plan-     Informed Consent- Anesthetic plan and risks discussed with patient  I personally reviewed this patient with the CRNA  Discussed and agreed on the Anesthesia Plan with the CRNA  Becca Console

## 2022-04-13 NOTE — PLAN OF CARE
Problem: Potential for Falls  Goal: Patient will remain free of falls  Description: INTERVENTIONS:  - Educate patient/family on patient safety including physical limitations  - Instruct patient to call for assistance with activity   - Consult OT/PT to assist with strengthening/mobility   - Keep Call bell within reach  - Keep bed low and locked with side rails adjusted as appropriate  - Keep care items and personal belongings within reach  - Initiate and maintain comfort rounds  - Make Fall Risk Sign visible to staff  - Offer Toileting every 1-2 Hours, in advance of need  - Initiate/Maintain bed and chair alarm  - Obtain necessary fall risk management equipment: fall socks and call bell within reach  - Apply yellow socks and bracelet for high fall risk patients  - Consider moving patient to room near nurses station  Outcome: Progressing     Problem: CARDIOVASCULAR - ADULT  Goal: Maintains optimal cardiac output and hemodynamic stability  Description: INTERVENTIONS:  - Monitor I/O, vital signs and rhythm  - Monitor for S/S and trends of decreased cardiac output  - Administer and titrate ordered vasoactive medications to optimize hemodynamic stability  - Assess quality of pulses, skin color and temperature  - Assess for signs of decreased coronary artery perfusion  - Instruct patient to report change in severity of symptoms  Outcome: Progressing  Goal: Absence of cardiac dysrhythmias or at baseline rhythm  Description: INTERVENTIONS:  - Continuous cardiac monitoring, vital signs, obtain 12 lead EKG if ordered  - Administer antiarrhythmic and heart rate control medications as ordered  - Monitor electrolytes and administer replacement therapy as ordered  Outcome: Progressing     Problem: RESPIRATORY - ADULT  Goal: Achieves optimal ventilation and oxygenation  Description: INTERVENTIONS:  - Assess for changes in respiratory status  - Assess for changes in mentation and behavior  - Position to facilitate oxygenation and minimize respiratory effort  - Oxygen administered by appropriate delivery if ordered  - Initiate smoking cessation education as indicated  - Encourage broncho-pulmonary hygiene including cough, deep breathe, Incentive Spirometry  - Assess the need for suctioning and aspirate as needed  - Assess and instruct to report SOB or any respiratory difficulty  - Respiratory Therapy support as indicated  Outcome: Progressing     Problem: GASTROINTESTINAL - ADULT  Goal: Minimal or absence of nausea and/or vomiting  Description: INTERVENTIONS:  - Administer IV fluids if ordered to ensure adequate hydration  - Maintain NPO status until nausea and vomiting are resolved  - Nasogastric tube if ordered  - Administer ordered antiemetic medications as needed  - Provide nonpharmacologic comfort measures as appropriate  - Advance diet as tolerated, if ordered  - Consider nutrition services referral to assist patient with adequate nutrition and appropriate food choices  Outcome: Progressing  Goal: Maintains or returns to baseline bowel function  Description: INTERVENTIONS:  - Assess bowel function  - Encourage oral fluids to ensure adequate hydration  - Administer IV fluids if ordered to ensure adequate hydration  - Administer ordered medications as needed  - Encourage mobilization and activity  - Consider nutritional services referral to assist patient with adequate nutrition and appropriate food choices  Outcome: Progressing  Goal: Maintains adequate nutritional intake  Description: INTERVENTIONS:  - Monitor percentage of each meal consumed  - Identify factors contributing to decreased intake, treat as appropriate  - Assist with meals as needed  - Monitor I&O, weight, and lab values if indicated  - Obtain nutrition services referral as needed  Outcome: Progressing  Goal: Establish and maintain optimal ostomy function  Description: INTERVENTIONS:  - Assess bowel function  - Encourage oral fluids to ensure adequate hydration  - Administer IV fluids if ordered to ensure adequate hydration   - Administer ordered medications as needed  - Encourage mobilization and activity  - Nutrition services referral to assist patient with appropriate food choices    Outcome: Progressing  Goal: Oral mucous membranes remain intact  Description: INTERVENTIONS  - Assess oral mucosa and hygiene practices  - Implement preventative oral hygiene regimen  - Implement oral medicated treatments as ordered  - Initiate Nutrition services referral as needed  Outcome: Progressing     Problem: PAIN - ADULT  Goal: Verbalizes/displays adequate comfort level or baseline comfort level  Description: Interventions:  - Encourage patient to monitor pain and request assistance  - Assess pain using appropriate pain scale  - Administer analgesics based on type and severity of pain and evaluate response  - Implement non-pharmacological measures as appropriate and evaluate response  - Consider cultural and social influences on pain and pain management  - Notify physician/advanced practitioner if interventions unsuccessful or patient reports new pain  Outcome: Progressing     Problem: INFECTION - ADULT  Goal: Absence or prevention of progression during hospitalization  Description: INTERVENTIONS:  - Assess and monitor for signs and symptoms of infection  - Monitor lab/diagnostic results  - Monitor all insertion sites, i e  indwelling lines, tubes, and drains  - Monitor endotracheal if appropriate and nasal secretions for changes in amount and color  - Stow appropriate cooling/warming therapies per order  - Administer medications as ordered  - Instruct and encourage patient and family to use good hand hygiene technique  - Identify and instruct in appropriate isolation precautions for identified infection/condition  Outcome: Progressing  Goal: Absence of fever/infection during neutropenic period  Description: INTERVENTIONS:  - Monitor WBC    Outcome: Progressing     Problem: SAFETY ADULT  Goal: Patient will remain free of falls  Description: INTERVENTIONS:  - Educate patient/family on patient safety including physical limitations  - Instruct patient to call for assistance with activity   - Consult OT/PT to assist with strengthening/mobility   - Keep Call bell within reach  - Keep bed low and locked with side rails adjusted as appropriate  - Keep care items and personal belongings within reach  - Initiate and maintain comfort rounds  - Make Fall Risk Sign visible to staff  - Offer Toileting every 1-2 Hours, in advance of need  - Initiate/Maintain bed and chair alarm  - Obtain necessary fall risk management equipment: fall socks and call bell within reach  - Apply yellow socks and bracelet for high fall risk patients  - Consider moving patient to room near nurses station  Outcome: Progressing  Goal: Maintain or return to baseline ADL function  Description: INTERVENTIONS:  - Educate patient/family on patient safety including physical limitations  - Instruct patient to call for assistance with activity   - Consult OT/PT to assist with strengthening/mobility   - Keep Call bell within reach  - Keep bed low and locked with side rails adjusted as appropriate  - Keep care items and personal belongings within reach  - Initiate and maintain comfort rounds  - Make Fall Risk Sign visible to staff  - Offer Toileting every 1-2 Hours, in advance of need  - Initiate/Maintain bed and chair alarm  - Obtain necessary fall risk management equipment: fall socks call bell within reach  - Apply yellow socks and bracelet for high fall risk patients  - Consider moving patient to room near nurses station  Outcome: Progressing  Goal: Maintains/Returns to pre admission functional level  Description: INTERVENTIONS:  - Perform BMAT or MOVE assessment daily    - Set and communicate daily mobility goal to care team and patient/family/caregiver     - Collaborate with rehabilitation services on mobility goals if consulted  - Perform Range of Motion 3 times a day  - Reposition patient every 2 hours  - Dangle patient 3 times a day  - Stand patient 3 times a day  - Ambulate patient 3 times a day  - Out of bed to chair 3 times a day   - Out of bed for meals 3 times a day  - Out of bed for toileting  - Record patient progress and toleration of activity level   Outcome: Progressing     Problem: Knowledge Deficit  Goal: Patient/family/caregiver demonstrates understanding of disease process, treatment plan, medications, and discharge instructions  Description: Complete learning assessment and assess knowledge base  Interventions:  - Provide teaching at level of understanding  - Provide teaching via preferred learning methods  Outcome: Progressing     Problem: MOBILITY - ADULT  Goal: Maintain or return to baseline ADL function  Description: INTERVENTIONS:  - Educate patient/family on patient safety including physical limitations  - Instruct patient to call for assistance with activity   - Consult OT/PT to assist with strengthening/mobility   - Keep Call bell within reach  - Keep bed low and locked with side rails adjusted as appropriate  - Keep care items and personal belongings within reach  - Initiate and maintain comfort rounds  - Make Fall Risk Sign visible to staff  - Offer Toileting every 1-2 Hours, in advance of need  - Initiate/Maintain bed and chair alarm  - Obtain necessary fall risk management equipment: fall socks call bell within reach  - Apply yellow socks and bracelet for high fall risk patients  - Consider moving patient to room near nurses station  Outcome: Progressing  Goal: Maintains/Returns to pre admission functional level  Description: INTERVENTIONS:  - Perform BMAT or MOVE assessment daily    - Set and communicate daily mobility goal to care team and patient/family/caregiver  - Collaborate with rehabilitation services on mobility goals if consulted  - Perform Range of Motion 3 times a day    - Reposition patient every 2 hours  - Dangle patient 3 times a day  - Stand patient 3 times a day  - Ambulate patient 3 times a day  - Out of bed to chair 3 times a day   - Out of bed for meals 3 times a day  - Out of bed for toileting  - Record patient progress and toleration of activity level   Outcome: Progressing     Problem: Nutrition/Hydration-ADULT  Goal: Nutrient/Hydration intake appropriate for improving, restoring or maintaining nutritional needs  Description: Monitor and assess patient's nutrition/hydration status for malnutrition  Collaborate with interdisciplinary team and initiate plan and interventions as ordered  Monitor patient's weight and dietary intake as ordered or per policy  Utilize nutrition screening tool and intervene as necessary  Determine patient's food preferences and provide high-protein, high-caloric foods as appropriate       INTERVENTIONS:  - Monitor oral intake, urinary output, labs, and treatment plans  - Assess nutrition and hydration status and recommend course of action  - Evaluate amount of meals eaten  - Assist patient with eating if necessary   - Allow adequate time for meals  - Recommend/ encourage appropriate diets, oral nutritional supplements, and vitamin/mineral supplements  - Order, calculate, and assess calorie counts as needed  - Recommend, monitor, and adjust tube feedings and TPN/PPN based on assessed needs  - Assess need for intravenous fluids  - Provide specific nutrition/hydration education as appropriate  - Include patient/family/caregiver in decisions related to nutrition  Outcome: Progressing     Problem: Prexisting or High Potential for Compromised Skin Integrity  Goal: Skin integrity is maintained or improved  Description: INTERVENTIONS:  - Identify patients at risk for skin breakdown  - Assess and monitor skin integrity  - Assess and monitor nutrition and hydration status  - Monitor labs   - Assess for incontinence   - Turn and reposition patient  - Assist with mobility/ambulation  - Relieve pressure over bony prominences  - Avoid friction and shearing  - Provide appropriate hygiene as needed including keeping skin clean and dry  - Evaluate need for skin moisturizer/barrier cream  - Collaborate with interdisciplinary team   - Patient/family teaching  - Consider wound care consult   Outcome: Progressing

## 2022-04-13 NOTE — ASSESSMENT & PLAN NOTE
Malnutrition Findings:   Adult Malnutrition type: Chronic illness  Adult Degree of Malnutrition: Other severe protein calorie malnutrition  Malnutrition Characteristics: Fat loss,Muscle loss,Weight loss                  360 Statement: Chronic, severe protein-calorie malnutrition r/t decreased appetite/intakes as evidenced by significant wt  loss of 11 5% x 6 months (109 8# 4/11/22, 124# 10/11/21), muscle wasting in temples and clavicle region, and fat loss in orbitals and extremities; treat with po diet and supplements  BMI Findings:  Adult BMI Classifications: Underweight < 18 5        Body mass index is 16 21 kg/m²     Severe protein calorie malnutrition in the setting of chronic dyspepsia and ongoing severe nausea, patient has had significant weight loss of 11 5% over the last 6 months, patient has significant muscle wasting in temples and clavicle region, accompanied by fat loss in the orbital zone extremities, BMI is very low at 16 21, follow-up with nutritional recommendations

## 2022-04-14 ENCOUNTER — TRANSITIONAL CARE MANAGEMENT (OUTPATIENT)
Dept: FAMILY MEDICINE CLINIC | Facility: CLINIC | Age: 77
End: 2022-04-14

## 2022-04-14 ENCOUNTER — TELEPHONE (OUTPATIENT)
Dept: FAMILY MEDICINE CLINIC | Facility: CLINIC | Age: 77
End: 2022-04-14

## 2022-04-14 VITALS
TEMPERATURE: 98.4 F | WEIGHT: 109.79 LBS | BODY MASS INDEX: 16.26 KG/M2 | OXYGEN SATURATION: 100 % | HEART RATE: 73 BPM | SYSTOLIC BLOOD PRESSURE: 138 MMHG | DIASTOLIC BLOOD PRESSURE: 74 MMHG | HEIGHT: 69 IN | RESPIRATION RATE: 19 BRPM

## 2022-04-14 PROCEDURE — C9113 INJ PANTOPRAZOLE SODIUM, VIA: HCPCS | Performed by: INTERNAL MEDICINE

## 2022-04-14 PROCEDURE — 99239 HOSP IP/OBS DSCHRG MGMT >30: CPT | Performed by: INTERNAL MEDICINE

## 2022-04-14 RX ORDER — PANTOPRAZOLE SODIUM 20 MG/1
40 TABLET, DELAYED RELEASE ORAL
Qty: 15 TABLET | Refills: 0 | Status: SHIPPED | OUTPATIENT
Start: 2022-04-14 | End: 2022-04-14 | Stop reason: SDUPTHER

## 2022-04-14 RX ORDER — PANTOPRAZOLE SODIUM 40 MG/1
40 TABLET, DELAYED RELEASE ORAL
Qty: 30 TABLET | Refills: 0 | Status: SHIPPED | OUTPATIENT
Start: 2022-04-14 | End: 2022-05-11 | Stop reason: ALTCHOICE

## 2022-04-14 RX ORDER — CALCIUM CARBONATE 200(500)MG
1000 TABLET,CHEWABLE ORAL 3 TIMES DAILY PRN
Refills: 0
Start: 2022-04-14

## 2022-04-14 RX ADMIN — PANTOPRAZOLE SODIUM 40 MG: 40 INJECTION, POWDER, FOR SOLUTION INTRAVENOUS at 08:41

## 2022-04-14 NOTE — PLAN OF CARE
Problem: Potential for Falls  Goal: Patient will remain free of falls  Description: INTERVENTIONS:  - Educate patient/family on patient safety including physical limitations  - Instruct patient to call for assistance with activity   - Consult OT/PT to assist with strengthening/mobility   - Keep Call bell within reach  - Keep bed low and locked with side rails adjusted as appropriate  - Keep care items and personal belongings within reach  - Initiate and maintain comfort rounds  - Make Fall Risk Sign visible to staff  - Offer Toileting every 1-2 Hours, in advance of need  - Initiate/Maintain bed and chair alarm  - Obtain necessary fall risk management equipment: fall socks and call bell within reach  - Apply yellow socks and bracelet for high fall risk patients  - Consider moving patient to room near nurses station  Outcome: Progressing     Problem: CARDIOVASCULAR - ADULT  Goal: Maintains optimal cardiac output and hemodynamic stability  Description: INTERVENTIONS:  - Monitor I/O, vital signs and rhythm  - Monitor for S/S and trends of decreased cardiac output  - Administer and titrate ordered vasoactive medications to optimize hemodynamic stability  - Assess quality of pulses, skin color and temperature  - Assess for signs of decreased coronary artery perfusion  - Instruct patient to report change in severity of symptoms  Outcome: Progressing  Goal: Absence of cardiac dysrhythmias or at baseline rhythm  Description: INTERVENTIONS:  - Continuous cardiac monitoring, vital signs, obtain 12 lead EKG if ordered  - Administer antiarrhythmic and heart rate control medications as ordered  - Monitor electrolytes and administer replacement therapy as ordered  Outcome: Progressing     Problem: RESPIRATORY - ADULT  Goal: Achieves optimal ventilation and oxygenation  Description: INTERVENTIONS:  - Assess for changes in respiratory status  - Assess for changes in mentation and behavior  - Position to facilitate oxygenation and minimize respiratory effort  - Oxygen administered by appropriate delivery if ordered  - Initiate smoking cessation education as indicated  - Encourage broncho-pulmonary hygiene including cough, deep breathe, Incentive Spirometry  - Assess the need for suctioning and aspirate as needed  - Assess and instruct to report SOB or any respiratory difficulty  - Respiratory Therapy support as indicated  Outcome: Progressing     Problem: GASTROINTESTINAL - ADULT  Goal: Minimal or absence of nausea and/or vomiting  Description: INTERVENTIONS:  - Administer IV fluids if ordered to ensure adequate hydration  - Maintain NPO status until nausea and vomiting are resolved  - Nasogastric tube if ordered  - Administer ordered antiemetic medications as needed  - Provide nonpharmacologic comfort measures as appropriate  - Advance diet as tolerated, if ordered  - Consider nutrition services referral to assist patient with adequate nutrition and appropriate food choices  Outcome: Progressing  Goal: Maintains or returns to baseline bowel function  Description: INTERVENTIONS:  - Assess bowel function  - Encourage oral fluids to ensure adequate hydration  - Administer IV fluids if ordered to ensure adequate hydration  - Administer ordered medications as needed  - Encourage mobilization and activity  - Consider nutritional services referral to assist patient with adequate nutrition and appropriate food choices  Outcome: Progressing  Goal: Maintains adequate nutritional intake  Description: INTERVENTIONS:  - Monitor percentage of each meal consumed  - Identify factors contributing to decreased intake, treat as appropriate  - Assist with meals as needed  - Monitor I&O, weight, and lab values if indicated  - Obtain nutrition services referral as needed  Outcome: Progressing  Goal: Establish and maintain optimal ostomy function  Description: INTERVENTIONS:  - Assess bowel function  - Encourage oral fluids to ensure adequate hydration  - Administer IV fluids if ordered to ensure adequate hydration   - Administer ordered medications as needed  - Encourage mobilization and activity  - Nutrition services referral to assist patient with appropriate food choices    Outcome: Progressing  Goal: Oral mucous membranes remain intact  Description: INTERVENTIONS  - Assess oral mucosa and hygiene practices  - Implement preventative oral hygiene regimen  - Implement oral medicated treatments as ordered  - Initiate Nutrition services referral as needed  Outcome: Progressing     Problem: PAIN - ADULT  Goal: Verbalizes/displays adequate comfort level or baseline comfort level  Description: Interventions:  - Encourage patient to monitor pain and request assistance  - Assess pain using appropriate pain scale  - Administer analgesics based on type and severity of pain and evaluate response  - Implement non-pharmacological measures as appropriate and evaluate response  - Consider cultural and social influences on pain and pain management  - Notify physician/advanced practitioner if interventions unsuccessful or patient reports new pain  Outcome: Progressing     Problem: INFECTION - ADULT  Goal: Absence or prevention of progression during hospitalization  Description: INTERVENTIONS:  - Assess and monitor for signs and symptoms of infection  - Monitor lab/diagnostic results  - Monitor all insertion sites, i e  indwelling lines, tubes, and drains  - Monitor endotracheal if appropriate and nasal secretions for changes in amount and color  - [place] appropriate cooling/warming therapies per order  - Administer medications as ordered  - Instruct and encourage patient and family to use good hand hygiene technique  - Identify and instruct in appropriate isolation precautions for identified infection/condition  Outcome: Progressing  Goal: Absence of fever/infection during neutropenic period  Description: INTERVENTIONS:  - Monitor WBC    Outcome: Progressing     Problem: SAFETY ADULT  Goal: Patient will remain free of falls  Description: INTERVENTIONS:  - Educate patient/family on patient safety including physical limitations  - Instruct patient to call for assistance with activity   - Consult OT/PT to assist with strengthening/mobility   - Keep Call bell within reach  - Keep bed low and locked with side rails adjusted as appropriate  - Keep care items and personal belongings within reach  - Initiate and maintain comfort rounds  - Make Fall Risk Sign visible to staff  - Offer Toileting every 1-2 Hours, in advance of need  - Initiate/Maintain bed and chair alarm  - Obtain necessary fall risk management equipment: fall socks and call bell within reach  - Apply yellow socks and bracelet for high fall risk patients  - Consider moving patient to room near nurses station  Outcome: Progressing  Goal: Maintain or return to baseline ADL function  Description: INTERVENTIONS:  - Educate patient/family on patient safety including physical limitations  - Instruct patient to call for assistance with activity   - Consult OT/PT to assist with strengthening/mobility   - Keep Call bell within reach  - Keep bed low and locked with side rails adjusted as appropriate  - Keep care items and personal belongings within reach  - Initiate and maintain comfort rounds  - Make Fall Risk Sign visible to staff  - Offer Toileting every 1-2 Hours, in advance of need  - Initiate/Maintain bed and chair alarm  - Obtain necessary fall risk management equipment: fall socks call bell within reach  - Apply yellow socks and bracelet for high fall risk patients  - Consider moving patient to room near nurses station  Outcome: Progressing  Goal: Maintains/Returns to pre admission functional level  Description: INTERVENTIONS:  - Perform BMAT or MOVE assessment daily    - Set and communicate daily mobility goal to care team and patient/family/caregiver     - Collaborate with rehabilitation services on mobility goals if consulted  - Perform Range of Motion 3 times a day  - Dangle patient 3 times a day  - Stand patient 3 times a day  - Ambulate patient 3 times a day  - Out of bed to chair 3 times a day   - Out of bed for meals 3 times a day  - Out of bed for toileting  - Record patient progress and toleration of activity level   Outcome: Progressing     Problem: Knowledge Deficit  Goal: Patient/family/caregiver demonstrates understanding of disease process, treatment plan, medications, and discharge instructions  Description: Complete learning assessment and assess knowledge base  Interventions:  - Provide teaching at level of understanding  - Provide teaching via preferred learning methods  Outcome: Progressing     Problem: MOBILITY - ADULT  Goal: Maintain or return to baseline ADL function  Description: INTERVENTIONS:  - Educate patient/family on patient safety including physical limitations  - Instruct patient to call for assistance with activity   - Consult OT/PT to assist with strengthening/mobility   - Keep Call bell within reach  - Keep bed low and locked with side rails adjusted as appropriate  - Keep care items and personal belongings within reach  - Initiate and maintain comfort rounds  - Make Fall Risk Sign visible to staff  - Offer Toileting every 1-2 Hours, in advance of need  - Initiate/Maintain bed and chair alarm  - Obtain necessary fall risk management equipment: fall socks call bell within reach  - Apply yellow socks and bracelet for high fall risk patients  - Consider moving patient to room near nurses station  Outcome: Progressing  Goal: Maintains/Returns to pre admission functional level  Description: INTERVENTIONS:  - Perform BMAT or MOVE assessment daily    - Set and communicate daily mobility goal to care team and patient/family/caregiver  - Collaborate with rehabilitation services on mobility goals if consulted  - Perform Range of Motion 3 times a day    - Dangle patient 3 times a day  - Stand patient 3 times a day  - Ambulate patient 3 times a day  - Out of bed to chair 3 times a day   - Out of bed for meals 3 times a day  - Out of bed for toileting  - Record patient progress and toleration of activity level   Outcome: Progressing     Problem: Nutrition/Hydration-ADULT  Goal: Nutrient/Hydration intake appropriate for improving, restoring or maintaining nutritional needs  Description: Monitor and assess patient's nutrition/hydration status for malnutrition  Collaborate with interdisciplinary team and initiate plan and interventions as ordered  Monitor patient's weight and dietary intake as ordered or per policy  Utilize nutrition screening tool and intervene as necessary  Determine patient's food preferences and provide high-protein, high-caloric foods as appropriate       INTERVENTIONS:  - Monitor oral intake, urinary output, labs, and treatment plans  - Assess nutrition and hydration status and recommend course of action  - Evaluate amount of meals eaten  - Assist patient with eating if necessary   - Allow adequate time for meals  - Recommend/ encourage appropriate diets, oral nutritional supplements, and vitamin/mineral supplements  - Order, calculate, and assess calorie counts as needed  - Recommend, monitor, and adjust tube feedings and TPN/PPN based on assessed needs  - Assess need for intravenous fluids  - Provide specific nutrition/hydration education as appropriate  - Include patient/family/caregiver in decisions related to nutrition  Outcome: Progressing     Problem: Prexisting or High Potential for Compromised Skin Integrity  Goal: Skin integrity is maintained or improved  Description: INTERVENTIONS:  - Identify patients at risk for skin breakdown  - Assess and monitor skin integrity  - Assess and monitor nutrition and hydration status  - Monitor labs   - Assess for incontinence   - Turn and reposition patient  - Assist with mobility/ambulation  - Relieve pressure over bony prominences  - Avoid friction and shearing  - Provide appropriate hygiene as needed including keeping skin clean and dry  - Evaluate need for skin moisturizer/barrier cream  - Collaborate with interdisciplinary team   - Patient/family teaching  - Consider wound care consult   Outcome: Progressing

## 2022-04-14 NOTE — CASE MANAGEMENT
Case Management Discharge Planning Note    Patient name Yaneth Chacon  Location /920-86 MRN [id number]  : 1945 Date 2022       Current Admission Date: 2022  Current Admission Diagnosis:Other chest pain   Patient Active Problem List    Diagnosis Date Noted    Severe protein-calorie malnutrition ([facility] [address] ) 2022    Depression, recurrent ([facility] [address] ) 2022    Other chest pain 2022    Dyslipidemia 2022      LOS (days): 3  Geometric Mean LOS (GMLOS) (days): 3 70  Days to GMLOS:1     OBJECTIVE:  Risk of Unplanned Readmission Score: 13         Current admission status: Inpatient   Preferred Pharmacy:   [facility], [place] - [address] [facility], [address] [facility] [place] [address]  Phone: [phone] Fax: [phone]    [address]  Phone: [phone] Fax: [phone] ([facility]) [facility], [address] [facility] [address]  Phone: [phone] Fax: [phone]    Primary Care Provider: Heber Cuevas DO    Primary Insurance: [facility] [employer] [facility] [employer]  Secondary Insurance:     DISCHARGE DETAILS:Pt is being dc'd to home on this date with OP follow up  Has appt on  with Dr Cosme Aaron (Cardiology)

## 2022-04-14 NOTE — PLAN OF CARE
Problem: Potential for Falls  Goal: Patient will remain free of falls  Description: INTERVENTIONS:  - Educate patient/family on patient safety including physical limitations  - Instruct patient to call for assistance with activity   - Consult OT/PT to assist with strengthening/mobility   - Keep Call bell within reach  - Keep bed low and locked with side rails adjusted as appropriate  - Keep care items and personal belongings within reach  - Initiate and maintain comfort rounds  - Make Fall Risk Sign visible to staff  - Offer Toileting every 1-2 Hours, in advance of need  - Initiate/Maintain bed and chair alarm  - Obtain necessary fall risk management equipment: fall socks and call bell within reach  - Apply yellow socks and bracelet for high fall risk patients  - Consider moving patient to room near nurses station  4/14/2022 1131 by Armando Maguire  Outcome: Adequate for Discharge  4/14/2022 0721 by Armando Maguire  Outcome: Progressing     Problem: CARDIOVASCULAR - ADULT  Goal: Maintains optimal cardiac output and hemodynamic stability  Description: INTERVENTIONS:  - Monitor I/O, vital signs and rhythm  - Monitor for S/S and trends of decreased cardiac output  - Administer and titrate ordered vasoactive medications to optimize hemodynamic stability  - Assess quality of pulses, skin color and temperature  - Assess for signs of decreased coronary artery perfusion  - Instruct patient to report change in severity of symptoms  4/14/2022 1131 by Armando Maguire  Outcome: Adequate for Discharge  4/14/2022 0721 by Armando Maguire  Outcome: Progressing  Goal: Absence of cardiac dysrhythmias or at baseline rhythm  Description: INTERVENTIONS:  - Continuous cardiac monitoring, vital signs, obtain 12 lead EKG if ordered  - Administer antiarrhythmic and heart rate control medications as ordered  - Monitor electrolytes and administer replacement therapy as ordered  4/14/2022 1131 by Armando Maguire  Outcome: Adequate for Discharge  4/14/2022 3121 ebenezer Chaudhari  Outcome: Progressing     Problem: RESPIRATORY - ADULT  Goal: Achieves optimal ventilation and oxygenation  Description: INTERVENTIONS:  - Assess for changes in respiratory status  - Assess for changes in mentation and behavior  - Position to facilitate oxygenation and minimize respiratory effort  - Oxygen administered by appropriate delivery if ordered  - Initiate smoking cessation education as indicated  - Encourage broncho-pulmonary hygiene including cough, deep breathe, Incentive Spirometry  - Assess the need for suctioning and aspirate as needed  - Assess and instruct to report SOB or any respiratory difficulty  - Respiratory Therapy support as indicated  4/14/2022 1131 by Dia Maguire  Outcome: Adequate for Discharge  4/14/2022 0721 by Dia Maguire  Outcome: Progressing     Problem: GASTROINTESTINAL - ADULT  Goal: Minimal or absence of nausea and/or vomiting  Description: INTERVENTIONS:  - Administer IV fluids if ordered to ensure adequate hydration  - Maintain NPO status until nausea and vomiting are resolved  - Nasogastric tube if ordered  - Administer ordered antiemetic medications as needed  - Provide nonpharmacologic comfort measures as appropriate  - Advance diet as tolerated, if ordered  - Consider nutrition services referral to assist patient with adequate nutrition and appropriate food choices  4/14/2022 1131 by Dia Maguire  Outcome: Adequate for Discharge  4/14/2022 0721 by Dia Maguire  Outcome: Progressing  Goal: Maintains or returns to baseline bowel function  Description: INTERVENTIONS:  - Assess bowel function  - Encourage oral fluids to ensure adequate hydration  - Administer IV fluids if ordered to ensure adequate hydration  - Administer ordered medications as needed  - Encourage mobilization and activity  - Consider nutritional services referral to assist patient with adequate nutrition and appropriate food choices  4/14/2022 1131 by Dia Maguire  Outcome: Adequate for Discharge  4/14/2022 0721 by Kristi Gomez  Outcome: Progressing  Goal: Maintains adequate nutritional intake  Description: INTERVENTIONS:  - Monitor percentage of each meal consumed  - Identify factors contributing to decreased intake, treat as appropriate  - Assist with meals as needed  - Monitor I&O, weight, and lab values if indicated  - Obtain nutrition services referral as needed  4/14/2022 1131 by Gwyn Maguire  Outcome: Adequate for Discharge  4/14/2022 0721 by Gwyn Maguire  Outcome: Progressing  Goal: Establish and maintain optimal ostomy function  Description: INTERVENTIONS:  - Assess bowel function  - Encourage oral fluids to ensure adequate hydration  - Administer IV fluids if ordered to ensure adequate hydration   - Administer ordered medications as needed  - Encourage mobilization and activity  - Nutrition services referral to assist patient with appropriate food choices    4/14/2022 1131 by Gwyn Maguire  Outcome: Adequate for Discharge  4/14/2022 0721 by Gwyn Maguire  Outcome: Progressing  Goal: Oral mucous membranes remain intact  Description: INTERVENTIONS  - Assess oral mucosa and hygiene practices  - Implement preventative oral hygiene regimen  - Implement oral medicated treatments as ordered  - Initiate Nutrition services referral as needed  4/14/2022 1131 by Gwyn Maguire  Outcome: Adequate for Discharge  4/14/2022 0721 by Gwyn Maguire  Outcome: Progressing     Problem: PAIN - ADULT  Goal: Verbalizes/displays adequate comfort level or baseline comfort level  Description: Interventions:  - Encourage patient to monitor pain and request assistance  - Assess pain using appropriate pain scale  - Administer analgesics based on type and severity of pain and evaluate response  - Implement non-pharmacological measures as appropriate and evaluate response  - Consider cultural and social influences on pain and pain management  - Notify physician/advanced practitioner if interventions unsuccessful or patient reports new pain  4/14/2022 1131 by Yael Chaudhari  Outcome: Adequate for Discharge  4/14/2022 0721 by Yael Chaudhari  Outcome: Progressing     Problem: INFECTION - ADULT  Goal: Absence or prevention of progression during hospitalization  Description: INTERVENTIONS:  - Assess and monitor for signs and symptoms of infection  - Monitor lab/diagnostic results  - Monitor all insertion sites, i e  indwelling lines, tubes, and drains  - Monitor endotracheal if appropriate and nasal secretions for changes in amount and color  - Griffin appropriate cooling/warming therapies per order  - Administer medications as ordered  - Instruct and encourage patient and family to use good hand hygiene technique  - Identify and instruct in appropriate isolation precautions for identified infection/condition  4/14/2022 1131 by Dia Maguire  Outcome: Adequate for Discharge  4/14/2022 0721 by Dia aMguire  Outcome: Progressing  Goal: Absence of fever/infection during neutropenic period  Description: INTERVENTIONS:  - Monitor WBC    4/14/2022 1131 by Dia Maguire  Outcome: Adequate for Discharge  4/14/2022 0721 by Yael Chaudhari  Outcome: Progressing     Problem: SAFETY ADULT  Goal: Patient will remain free of falls  Description: INTERVENTIONS:  - Educate patient/family on patient safety including physical limitations  - Instruct patient to call for assistance with activity   - Consult OT/PT to assist with strengthening/mobility   - Keep Call bell within reach  - Keep bed low and locked with side rails adjusted as appropriate  - Keep care items and personal belongings within reach  - Initiate and maintain comfort rounds  - Make Fall Risk Sign visible to staff  - Offer Toileting every 1-2 Hours, in advance of need  - Initiate/Maintain bed and chair alarm  - Obtain necessary fall risk management equipment: fall socks and call bell within reach  - Apply yellow socks and bracelet for high fall risk patients  - Consider moving patient to room near nurses station  4/14/2022 1131 by Jose Larsen  Outcome: Adequate for Discharge  4/14/2022 0721 by Jose Larsen  Outcome: Progressing  Goal: Maintain or return to baseline ADL function  Description: INTERVENTIONS:  - Educate patient/family on patient safety including physical limitations  - Instruct patient to call for assistance with activity   - Consult OT/PT to assist with strengthening/mobility   - Keep Call bell within reach  - Keep bed low and locked with side rails adjusted as appropriate  - Keep care items and personal belongings within reach  - Initiate and maintain comfort rounds  - Make Fall Risk Sign visible to staff  - Offer Toileting every 1-2 Hours, in advance of need  - Initiate/Maintain bed and chair alarm  - Obtain necessary fall risk management equipment: fall socks call bell within reach  - Apply yellow socks and bracelet for high fall risk patients  - Consider moving patient to room near nurses station  4/14/2022 1131 by Esau Maguire  Outcome: Adequate for Discharge  4/14/2022 0721 by Esau Maguire  Outcome: Progressing  Goal: Maintains/Returns to pre admission functional level  Description: INTERVENTIONS:  - Perform BMAT or MOVE assessment daily    - Set and communicate daily mobility goal to care team and patient/family/caregiver  - Collaborate with rehabilitation services on mobility goals if consulted  - Perform Range of Motion 3 times a day    - Dangle patient 3 times a day  - Stand patient 3 times a day  - Ambulate patient 3 times a day  - Out of bed to chair 3 times a day   - Out of bed for meals 3 times a day  - Out of bed for toileting  - Record patient progress and toleration of activity level   4/14/2022 1131 by Esau Maguire  Outcome: Adequate for Discharge  4/14/2022 0721 by Jose Larsen  Outcome: Progressing     Problem: Knowledge Deficit  Goal: Patient/family/caregiver demonstrates understanding of disease process, treatment plan, medications, and discharge instructions  Description: Complete learning assessment and assess knowledge base  Interventions:  - Provide teaching at level of understanding  - Provide teaching via preferred learning methods  4/14/2022 1131 by Katherine Maguire  Outcome: Adequate for Discharge  4/14/2022 0721 by Katherine Maguire  Outcome: Progressing     Problem: MOBILITY - ADULT  Goal: Maintain or return to baseline ADL function  Description: INTERVENTIONS:  - Educate patient/family on patient safety including physical limitations  - Instruct patient to call for assistance with activity   - Consult OT/PT to assist with strengthening/mobility   - Keep Call bell within reach  - Keep bed low and locked with side rails adjusted as appropriate  - Keep care items and personal belongings within reach  - Initiate and maintain comfort rounds  - Make Fall Risk Sign visible to staff  - Offer Toileting every 1-2 Hours, in advance of need  - Initiate/Maintain bed and chair alarm  - Obtain necessary fall risk management equipment: fall socks call bell within reach  - Apply yellow socks and bracelet for high fall risk patients  - Consider moving patient to room near nurses station  4/14/2022 1131 by Katherine Maguire  Outcome: Adequate for Discharge  4/14/2022 0721 by Katherine Maguire  Outcome: Progressing  Goal: Maintains/Returns to pre admission functional level  Description: INTERVENTIONS:  - Perform BMAT or MOVE assessment daily    - Set and communicate daily mobility goal to care team and patient/family/caregiver  - Collaborate with rehabilitation services on mobility goals if consulted  - Perform Range of Motion 3 times a day    - Dangle patient 3 times a day  - Stand patient 3 times a day  - Ambulate patient 3 times a day  - Out of bed to chair 3 times a day   - Out of bed for meals 3 times a day  - Out of bed for toileting  - Record patient progress and toleration of activity level   4/14/2022 1131 by Katherine Maguire  Outcome: Adequate for Discharge  4/14/2022 0721 by Katherine Maguire  Outcome: Progressing Problem: Nutrition/Hydration-ADULT  Goal: Nutrient/Hydration intake appropriate for improving, restoring or maintaining nutritional needs  Description: Monitor and assess patient's nutrition/hydration status for malnutrition  Collaborate with interdisciplinary team and initiate plan and interventions as ordered  Monitor patient's weight and dietary intake as ordered or per policy  Utilize nutrition screening tool and intervene as necessary  Determine patient's food preferences and provide high-protein, high-caloric foods as appropriate       INTERVENTIONS:  - Monitor oral intake, urinary output, labs, and treatment plans  - Assess nutrition and hydration status and recommend course of action  - Evaluate amount of meals eaten  - Assist patient with eating if necessary   - Allow adequate time for meals  - Recommend/ encourage appropriate diets, oral nutritional supplements, and vitamin/mineral supplements  - Order, calculate, and assess calorie counts as needed  - Recommend, monitor, and adjust tube feedings and TPN/PPN based on assessed needs  - Assess need for intravenous fluids  - Provide specific nutrition/hydration education as appropriate  - Include patient/family/caregiver in decisions related to nutrition  4/14/2022 1131 by Jose Maguire  Outcome: Adequate for Discharge  4/14/2022 0721 by Jose Maguire  Outcome: Progressing     Problem: Prexisting or High Potential for Compromised Skin Integrity  Goal: Skin integrity is maintained or improved  Description: INTERVENTIONS:  - Identify patients at risk for skin breakdown  - Assess and monitor skin integrity  - Assess and monitor nutrition and hydration status  - Monitor labs   - Assess for incontinence   - Turn and reposition patient  - Assist with mobility/ambulation  - Relieve pressure over bony prominences  - Avoid friction and shearing  - Provide appropriate hygiene as needed including keeping skin clean and dry  - Evaluate need for skin moisturizer/barrier cream  - Collaborate with interdisciplinary team   - Patient/family teaching  - Consider wound care consult   4/14/2022 1131 by Christy Maguire  Outcome: Adequate for Discharge  4/14/2022 0721 by Christy Maguire  Outcome: Progressing

## 2022-04-14 NOTE — DISCHARGE INSTRUCTIONS
GERD (Gastroesophageal Reflux Disease)   WHAT YOU NEED TO KNOW:   Gastroesophageal reflux disease (GERD) is reflux that happens more than 2 times a week for a few weeks  Reflux means acid and food in your stomach back up into your esophagus  GERD can cause other health problems over time if it is not treated  DISCHARGE INSTRUCTIONS:   Call your local emergency number (911 in the [address] [facility]) if:   · You have severe chest pain and sudden trouble breathing  Seek care immediately if:   · You have trouble breathing after you vomit  · You have trouble swallowing, or pain with swallowing  · Your bowel movements are black, bloody, or tarry-looking  · Your vomit looks like coffee grounds or has blood in it  Call your doctor or gastroenterologist if:   · You feel full and cannot burp or vomit  · You vomit large amounts, or you vomit often  · You are losing weight without trying  · Your symptoms get worse or do not improve with treatment  · You have questions or concerns about your condition or care  Medicines:   · Medicines  are used to decrease stomach acid  Medicine may also be used to help your lower esophageal sphincter and stomach contract (tighten) more  · Take your medicine as directed  Contact your healthcare provider if you think your medicine is not helping or if you have side effects  Tell him or her if you are allergic to any medicine  Keep a list of the medicines, vitamins, and herbs you take  Include the amounts, and when and why you take them  Bring the list or the pill bottles to follow-up visits  Carry your medicine list with you in case of an emergency  Manage GERD:       · Do not have foods or drinks that may increase heartburn  These include chocolate, peppermint, fried or fatty foods, drinks that contain caffeine, or carbonated drinks (soda)  Other foods include spicy foods, onions, tomatoes, and tomato-based foods   Do not have foods or drinks that can irritate your esophagus, such as citrus fruits, juices, and alcohol  · Do not eat large meals  When you eat a lot of food at one time, your stomach needs more acid to digest it  Eat 6 small meals each day instead of 3 large ones, and eat slowly  Do not eat meals 2 to 3 hours before bedtime  · Elevate the head of your bed  Place 6-inch blocks under the head of your bed frame  You may also use more than one pillow under your head and shoulders while you sleep  · Maintain a healthy weight  If you are overweight, weight loss may help relieve symptoms of GERD  · Do not smoke  Smoking weakens the lower esophageal sphincter and increases the risk of GERD  Ask your healthcare provider for information if you currently smoke and need help to quit  E-cigarettes or smokeless tobacco still contain nicotine  Talk to your healthcare provider before you use these products  · Do not put pressure on your abdomen  Pressure pushes acid up into your esophagus  Do not wear clothing that is tight around your waist  Do not bend over  Bend at the knees if you need to pick something up  Follow up with your doctor or gastroenterologist as directed:  Write down your questions so you remember to ask them during your visits  © Copyright [employer] 2022 Information is for End User's use only and may not be sold, redistributed or otherwise used for commercial purposes  All illustrations and images included in CareNotes® are the copyrighted property of [employer]  or Keegan Hale  The above information is an  only  It is not intended as medical advice for individual conditions or treatments  Talk to your doctor, nurse or pharmacist before following any medical regimen to see if it is safe and effective for you

## 2022-04-14 NOTE — DISCHARGE SUMMARY
[address] [facility] [address]  Discharge- Lila Chandler 1945, 76 y o  male MRN: [id number]  Unit/Bed#: 424-01 Encounter: [id number]  Primary Care Provider: Leander Heller DO   Date and time admitted to hospital: 4/11/2022 10:46 AM    * Other chest pain  Assessment & Plan  Suspected gastroesophageal reflux disease, discharged on Protonix 40 mg daily, also with component of generalized anxiety disorder exacerbating symptoms, patient now tolerating diet without any incidence, suspect that he was significantly volume depleted time admission which may have also contributed to severe symptoms  Patient encouraged to maintain adequate p o  Intake  Patient instructed to discontinue daily aspirin use, I do not see any absolute indication for continued daily aspirin use, patient does not appear to have any significant cardiac risk factors, no prior history of coronary disease  Severe protein-calorie malnutrition ([address] )  Assessment & Plan  Malnutrition Findings:   Adult Malnutrition type: Chronic illness  Adult Degree of Malnutrition: Other severe protein calorie malnutrition  Malnutrition Characteristics: Fat loss,Muscle loss,Weight loss                  360 Statement: Chronic, severe protein-calorie malnutrition r/t decreased appetite/intakes as evidenced by significant wt  loss of 11 5% x 6 months (109 8# 4/11/22, 124# 10/11/21), muscle wasting in temples and clavicle region, and fat loss in orbitals and extremities; treat with po diet and supplements  BMI Findings:  Adult BMI Classifications: Underweight < 18 5        Body mass index is 16 21 kg/m²     Severe protein calorie malnutrition in the setting of chronic dyspepsia and ongoing severe nausea, patient has had significant weight loss of 11 5% over the last 6 months, patient has significant muscle wasting in temples and clavicle region, accompanied by fat loss in the orbital zone extremities, BMI is very low at 16 21, follow-up with nutritional recommendations    Dyslipidemia  Assessment & Plan  Resume statin      Medical Problems             Resolved Problems  Date Reviewed: 4/14/2022    None              Discharging Physician / Practitioner: Aidan Boateng DO  PCP: Abhi Johnson DO  Admission Date:   Admission Orders (From admission, onward)     Ordered        04/11/22 1728  Inpatient Admission  Once            04/11/22 1546  Place in Observation  Once                      Discharge Date: 04/14/22    Consultations During Hospital Stay:  · Gastroenterology    Procedures Performed:   · EGD and colonoscopy    Significant Findings / Test Results:   · None    Reason for Admission:  Severe chest discomfort, worsened with p o  Intake, also associated with very poor p o  Intake    Condition at Discharge: good    Discharge Day Visit / Exam:   Subjective:  No acute pain, tolerating regular diet, patient overall feels much much better than he did upon admission  Vitals: Blood Pressure: 138/74 (04/14/22 0820)  Pulse: 73 (04/14/22 0820)  Temperature: 98 4 °F (36 9 °C) (04/14/22 0820)  Temp Source: Oral (04/12/22 2223)  Respirations: 19 (04/14/22 0820)  Height: 5' 9" (175 3 cm) (04/11/22 1637)  Weight - Scale: 49 8 kg (109 lb 12 6 oz) (04/11/22 1637)  SpO2: 100 % (04/14/22 0820)  Exam:   Physical Exam  Vitals and nursing note reviewed  Constitutional:       General: He is not in acute distress  Appearance: He is not ill-appearing, toxic-appearing or diaphoretic  Cardiovascular:      Rate and Rhythm: Normal rate  Pulses: Normal pulses  Heart sounds: Normal heart sounds  Pulmonary:      Effort: Pulmonary effort is normal  No respiratory distress  Breath sounds: Normal breath sounds  No stridor  Skin:     General: Skin is warm and dry  Findings: No lesion or rash  Neurological:      General: No focal deficit present  Mental Status: He is alert     Psychiatric:         Mood and Affect: Mood normal          Behavior: Behavior normal          Thought Content: Thought content normal          Judgment: Judgment normal           Discharge instructions/Information to patient and family:   See after visit summary for information provided to patient and family  Provisions for Follow-Up Care:  See after visit summary for information related to follow-up care and any pertinent home health orders  Disposition:   Home    Planned Readmission: no     Discharge Statement:  I spent 35 minutes discharging the patient  This time was spent on the day of discharge  I had direct contact with the patient on the day of discharge  Greater than 50% of the total time was spent examining patient, answering all patient questions, arranging and discussing plan of care with patient as well as directly providing post-discharge instructions  Additional time then spent on discharge activities  Discharge Medications:  See after visit summary for reconciled discharge medications provided to patient and/or family        **Please Note: This note may have been constructed using a voice recognition system**

## 2022-04-14 NOTE — ASSESSMENT & PLAN NOTE
Suspected gastroesophageal reflux disease, discharged on Protonix 40 mg daily, also with component of generalized anxiety disorder exacerbating symptoms, patient now tolerating diet without any incidence, suspect that he was significantly volume depleted time admission which may have also contributed to severe symptoms  Patient encouraged to maintain adequate p o  Intake  Patient instructed to discontinue daily aspirin use, I do not see any absolute indication for continued daily aspirin use, patient does not appear to have any significant cardiac risk factors, no prior history of coronary disease

## 2022-04-15 NOTE — UTILIZATION REVIEW
Notification of Discharge   This is a Notification of Discharge from our facility [address]  Please be advised that this patient has been discharge from our facility  Below you will find the admission and discharge date and time including the patients disposition  UTILIZATION REVIEW CONTACT:  Molly Amaya  Utilization   Network Utilization Review Department  Phone: [phone] x carefully listen to the prompts  All voicemails are confidential   Email: [email or web address]     PHYSICIAN ADVISORY SERVICES:  FOR [id number] REVIEW - MEDICAL NECESSITY DENIAL  Phone: [phone]  Fax: [phone]  Email: [email or web address]     PRESENTATION DATE: 4/11/2022 10:46 AM  OBERVATION ADMISSION DATE:   INPATIENT ADMISSION DATE: 4/11/22  5:28 PM   DISCHARGE DATE: 4/14/2022 12:32 PM  DISPOSITION: Home/Self Care Home/Self Care      IMPORTANT INFORMATION:  Send all requests for admission clinical reviews, approved or denied determinations and any other requests to dedicated fax number below belonging to the campus where the patient is receiving treatment   List of dedicated fax numbers:  [address] DENIALS (Administrative/Medical Necessity) [phone]   [address] (Maternity/NICU/Pediatrics) [phone]   [facility] [phone]   130 [facility] [phone]   [address] [place] [address] [phone]   [address] [phone]   [facility]  [phone]   [address] [place] [address], [facility]  2401 [facility] 1000 [address] [facility] [phone]

## 2022-04-18 ENCOUNTER — TELEPHONE (OUTPATIENT)
Dept: FAMILY MEDICINE CLINIC | Facility: CLINIC | Age: 77
End: 2022-04-18

## 2022-04-22 ENCOUNTER — OFFICE VISIT (OUTPATIENT)
Dept: FAMILY MEDICINE CLINIC | Facility: CLINIC | Age: 77
End: 2022-04-22
Payer: COMMERCIAL

## 2022-04-22 VITALS
DIASTOLIC BLOOD PRESSURE: 74 MMHG | BODY MASS INDEX: 17.18 KG/M2 | SYSTOLIC BLOOD PRESSURE: 128 MMHG | TEMPERATURE: 97.2 F | WEIGHT: 116 LBS | HEIGHT: 69 IN

## 2022-04-22 DIAGNOSIS — R10.11 RIGHT UPPER QUADRANT ABDOMINAL PAIN: ICD-10-CM

## 2022-04-22 DIAGNOSIS — Z76.89 ENCOUNTER FOR SUPPORT AND COORDINATION OF TRANSITION OF CARE: Primary | ICD-10-CM

## 2022-04-22 PROCEDURE — 1111F DSCHRG MED/CURRENT MED MERGE: CPT | Performed by: FAMILY MEDICINE

## 2022-04-22 PROCEDURE — 99496 TRANSJ CARE MGMT HIGH F2F 7D: CPT | Performed by: FAMILY MEDICINE

## 2022-04-22 NOTE — PROGRESS NOTES
Assessment/Plan: ultrasound of gallbladder ordered  Possible HIDA if no stones     Diagnoses and all orders for this visit:    Encounter for support and coordination of transition of care    Right upper quadrant abdominal pain  -     US abdomen limited; Future         Subjective:     Patient ID: Cecy Barahona is a 76 y o  male  Mr  From her its is here for a follow-up of visit he is a transition of care was hospitalized with protein calorie malnutrition failure to thrive on cardiac dysrhythmia he had an EGD while in the hospital which was negative for ulcers but positive for reflux he also had a colonoscopy which did not reveal any neoplasms he had 2 small polyps he still experiencing discomfort in the right upper quadrant of the abdomen he is belching a lot and he just has a nauseated feeling all the time      Review of Systems   Constitutional: Positive for activity change  Negative for appetite change, diaphoresis, fatigue and fever  HENT: Negative  Eyes: Negative  Respiratory: Negative for apnea, cough, chest tightness, shortness of breath and wheezing  Cardiovascular: Negative for chest pain, palpitations and leg swelling  Gastrointestinal: Positive for abdominal pain and nausea  Negative for abdominal distention, anal bleeding, constipation, diarrhea and vomiting  Endocrine: Negative for cold intolerance, heat intolerance, polydipsia, polyphagia and polyuria  Genitourinary: Negative for difficulty urinating, dysuria, flank pain, hematuria and urgency  Musculoskeletal: Negative for arthralgias, back pain, gait problem, joint swelling and myalgias  Skin: Negative for color change, rash and wound  Allergic/Immunologic: Negative for environmental allergies, food allergies and immunocompromised state  Neurological: Positive for weakness  Negative for dizziness, seizures, syncope, speech difficulty, numbness and headaches  Hematological: Negative for adenopathy   Does not bruise/bleed easily  Psychiatric/Behavioral: Negative for agitation, behavioral problems, hallucinations, sleep disturbance and suicidal ideas  Objective:     Physical Exam  Constitutional:       General: He is not in acute distress  Appearance: He is well-developed  He is not diaphoretic  HENT:      Head: Normocephalic  Right Ear: External ear normal       Left Ear: External ear normal       Nose: Nose normal    Eyes:      General: No scleral icterus  Right eye: No discharge  Left eye: No discharge  Conjunctiva/sclera: Conjunctivae normal       Pupils: Pupils are equal, round, and reactive to light  Neck:      Thyroid: No thyromegaly  Trachea: No tracheal deviation  Cardiovascular:      Rate and Rhythm: Normal rate and regular rhythm  Heart sounds: Normal heart sounds  No murmur heard  No friction rub  No gallop  Pulmonary:      Effort: Pulmonary effort is normal  No respiratory distress  Breath sounds: Normal breath sounds  No wheezing  Abdominal:      General: Bowel sounds are normal       Palpations: Abdomen is soft  There is no mass  Tenderness: There is abdominal tenderness  There is no guarding  Musculoskeletal:         General: No deformity  Cervical back: Normal range of motion  Lymphadenopathy:      Cervical: No cervical adenopathy  Skin:     General: Skin is warm and dry  Findings: No erythema or rash  Neurological:      Mental Status: He is alert and oriented to person, place, and time  Cranial Nerves: No cranial nerve deficit  Psychiatric:         Thought Content:  Thought content normal            Vitals:    04/22/22 0928   BP: 128/74   BP Location: Left arm   Patient Position: Sitting   Cuff Size: Standard   Temp: (!) 97 2 °F (36 2 °C)   TempSrc: Temporal   Weight: 52 6 kg (116 lb)   Height: 5' 9" (1 753 m)       The following portions of the patient's history were reviewed and updated as appropriate:   He  has a past medical history of Coronary artery disease, COVID-19 (01/28/2022), History of echocardiogram (03/22/2018), Hyperlipidemia, Lyme disease, Mitral valve prolapse, and Shingles  He   Patient Active Problem List    Diagnosis Date Noted    Severe protein-calorie malnutrition ([facility] [address] [facility] ) 04/13/2022    Depression, recurrent ([facility] [facility] ) 02/28/2022    Other chest pain 02/05/2022    Dyslipidemia 01/28/2022     He  has a past surgical history that includes Appendectomy; Tonsillectomy; Hernia repair; and Eye surgery  His family history includes Heart disease in his mother; No Known Problems in his father  He  reports that he has never smoked  He has never used smokeless tobacco  He reports that he does not drink alcohol and does not use drugs  Current Outpatient Medications   Medication Sig Dispense Refill    calcium carbonate (TUMS) 500 mg chewable tablet Chew 2 tablets (1,000 mg total) 3 (three) times a day as needed for indigestion or heartburn  0    pantoprazole (PROTONIX) 40 mg tablet Take 1 tablet (40 mg total) by mouth daily before breakfast 30 tablet 0    simvastatin (ZOCOR) 5 MG tablet Take 5 mg by mouth daily at bedtime       No current facility-administered medications for this visit  Current Outpatient Medications on File Prior to Visit   Medication Sig    calcium carbonate (TUMS) 500 mg chewable tablet Chew 2 tablets (1,000 mg total) 3 (three) times a day as needed for indigestion or heartburn    pantoprazole (PROTONIX) 40 mg tablet Take 1 tablet (40 mg total) by mouth daily before breakfast    simvastatin (ZOCOR) 5 MG tablet Take 5 mg by mouth daily at bedtime     No current facility-administered medications on file prior to visit  He has No Known Allergies       Transitional Care Management Review:  Bia Thomas is a 76 y o  male here for TCM follow up       During the TCM phone call patient stated:    TCM Call (since 3/22/2022)     Date and time call was made  4/14/2022  3:00 PM    Hospital care reviewed  Records reviewed        Patient was hospitialized at  [address] [place] [address]        Date of Admission  04/11/22    Date of discharge  04/14/22    Diagnosis  chest pain, decreased appetite, severe malnutrition    Disposition  Home    Were the patients medications reviewed and updated  No    Current Symptoms  --  palpitations      TCM Call (since 3/22/2022)     Post hospital issues  Reduced activity    Scheduled for follow up?   Yes    Patients specialists  Cardiologist    Cardiologist name  Dr Benetta Gaucher, May 9th    Did you obtain your prescribed medications  Yes    Do you need help managing your prescriptions or medications  No    Is transportation to your appointment needed  No    I have advised the patient to call PCP with any new or worsening symptoms  Ney Faith,     Comments  PCP provider sent task on pt complaints since discharged from facility, high priority task              Olga Colbert, DO

## 2022-04-26 ENCOUNTER — HOSPITAL ENCOUNTER (OUTPATIENT)
Dept: ULTRASOUND IMAGING | Facility: HOSPITAL | Age: 77
Discharge: HOME | End: 2022-04-26
Attending: FAMILY MEDICINE
Payer: COMMERCIAL

## 2022-04-26 DIAGNOSIS — R10.11 RIGHT UPPER QUADRANT ABDOMINAL PAIN: ICD-10-CM

## 2022-04-26 PROCEDURE — 76705 ECHO EXAM OF ABDOMEN: CPT

## 2022-04-27 ENCOUNTER — TELEPHONE (OUTPATIENT)
Dept: FAMILY MEDICINE CLINIC | Facility: CLINIC | Age: 77
End: 2022-04-27

## 2022-04-27 NOTE — TELEPHONE ENCOUNTER
OV 4/22/22 - Discuss S/s  S/S Tightness in chest & sick feeling  Told to hold the Pantoprazole 40mg x 4 days & call back with provider update    CC:  Only slight improvement after 4 days off Rx - Still has sick feeling & tightness in chest

## 2022-04-28 DIAGNOSIS — R10.12 LEFT UPPER QUADRANT ABDOMINAL PAIN: Primary | ICD-10-CM

## 2022-05-04 ENCOUNTER — HOSPITAL ENCOUNTER (OUTPATIENT)
Dept: NUCLEAR MEDICINE | Facility: HOSPITAL | Age: 77
Discharge: HOME | End: 2022-05-04
Attending: FAMILY MEDICINE
Payer: COMMERCIAL

## 2022-05-04 DIAGNOSIS — R10.12 LEFT UPPER QUADRANT ABDOMINAL PAIN: ICD-10-CM

## 2022-05-04 PROCEDURE — A9537 TC99M MEBROFENIN: HCPCS

## 2022-05-04 PROCEDURE — G1004 CDSM NDSC: HCPCS

## 2022-05-04 PROCEDURE — 78227 HEPATOBIL SYST IMAGE W/DRUG: CPT

## 2022-05-04 RX ADMIN — SINCALIDE 1.1 MCG: 5 INJECTION, POWDER, LYOPHILIZED, FOR SOLUTION INTRAVENOUS at 12:30

## 2022-05-09 ENCOUNTER — OFFICE VISIT (OUTPATIENT)
Dept: CARDIOLOGY CLINIC | Facility: CLINIC | Age: 77
End: 2022-05-09
Payer: COMMERCIAL

## 2022-05-09 VITALS
WEIGHT: 114 LBS | DIASTOLIC BLOOD PRESSURE: 70 MMHG | BODY MASS INDEX: 16.88 KG/M2 | SYSTOLIC BLOOD PRESSURE: 120 MMHG | HEART RATE: 83 BPM | HEIGHT: 69 IN

## 2022-05-09 DIAGNOSIS — R06.00 DYSPNEA ON EXERTION: ICD-10-CM

## 2022-05-09 DIAGNOSIS — R53.83 OTHER FATIGUE: ICD-10-CM

## 2022-05-09 DIAGNOSIS — R07.9 CHEST PAIN, UNSPECIFIED TYPE: Primary | ICD-10-CM

## 2022-05-09 DIAGNOSIS — I25.119 CORONARY ARTERY DISEASE INVOLVING NATIVE CORONARY ARTERY OF NATIVE HEART WITH ANGINA PECTORIS (HCC): ICD-10-CM

## 2022-05-09 PROCEDURE — 1111F DSCHRG MED/CURRENT MED MERGE: CPT | Performed by: INTERNAL MEDICINE

## 2022-05-09 PROCEDURE — 99214 OFFICE O/P EST MOD 30 MIN: CPT | Performed by: INTERNAL MEDICINE

## 2022-05-09 NOTE — PATIENT INSTRUCTIONS
Chest Pain   AMBULATORY CARE:   Chest pain  can be caused by a range of conditions, from not serious to life-threatening  It may be caused by a heart attack or a blood clot in your lungs  Sometimes chest pain or pressure is caused by poor blood flow to your heart (angina)  Infection, inflammation, or a fracture in the bones or cartilage in your chest can cause pain or discomfort  Chest pain can also be a symptom of a digestive problem, such as acid reflux or a stomach ulcer  An anxiety attack or a strong emotion such as anger can also cause chest pain  It is important to follow up with your healthcare provider to find the cause of your chest pain  Common symptoms you may have with chest pain:   · Fever or sweating    · Nausea or vomiting    · Shortness of breath    · Discomfort or pressure that spreads from your chest to your back, jaw, or arm    · A racing or slow heartbeat    · Feeling weak, tired, or faint    Call your local emergency number (911 in the [address] [place] [address],[address]) or have someone call if:   · You have any of the following signs of a heart attack:      ? Squeezing, pressure, or pain in your chest    ? You may  also have any of the following:     § Discomfort or pain in your back, neck, jaw, stomach, or arm    § Shortness of breath    § Nausea or vomiting    § Lightheadedness or a sudden cold sweat      Seek care immediately if:   · You have chest discomfort that gets worse, even with medicine  · You cough or vomit blood  · Your bowel movements are black or bloody  · You cannot stop vomiting, or it hurts to swallow  Call your doctor if:   · You have questions or concerns about your condition or care  Treatment for chest pain  may include medicine to treat your symptoms while your healthcare provider finds the cause of your chest pain  · Medicines  may be given to treat the cause of your chest pain   Examples include pain medicine, anxiety medicine, or medicines to increase blood flow to your heart     · Do not take certain medicines without asking your healthcare provider first   These include NSAIDs, herbal or vitamin supplements, or hormones (estrogen or progestin)  · One or more stents  may need to be placed in your heart if pain was caused by blockage  A stent is a wire mesh tube that helps hold your artery open  Healthy living tips: The following are general healthy guidelines  If the cause of your chest pain is known, your healthcare provider will give you specific guidelines to follow  · Do not smoke  Nicotine and other chemicals in cigarettes and cigars can cause lung and heart damage  Ask your healthcare provider for information if you currently smoke and need help to quit  E-cigarettes or smokeless tobacco still contain nicotine  Talk to your healthcare provider before you use these products  · Choose a variety of healthy foods as often as possible  Include fresh, frozen, or canned fruits and vegetables  Also include low-fat dairy products, fish, chicken (without skin), and lean meats  Your healthcare provider or a dietitian can help you create meal plans  You may need to avoid certain foods or drinks if your pain is caused by a digestion problem  · Lower your sodium (salt) intake  Limit foods that are high in sodium, such as canned foods, salty snacks, and cold cuts  If you add salt when you cook food, do not add more at the table  Choose low-sodium canned foods as much as possible  · Drink plenty of water every day  Water helps your body to control your temperature and blood pressure  Ask your healthcare provider how much water you should drink every day  · Ask about activity  Your healthcare provider will tell you which activities to limit or avoid  Ask when you can drive, return to work, and have sex  Ask about the best exercise plan for you  · Maintain a healthy weight  Ask your healthcare provider what a healthy weight is for you   Ask him or her to help you create a safe weight loss plan if you are overweight  · Ask about vaccines you may need  Get the influenza (flu) vaccine every year as soon as recommended, usually in September or October  You may also need a pneumococcal vaccine to prevent pneumonia  The vaccine is usually given every 5 years, starting at age 72  Your healthcare provider can tell you if should get other vaccines, and when to get them  Follow up with your healthcare provider within 72 hours, or as directed: You may need to return for more tests to find the cause of your chest pain  You may be referred to a specialist, such as a cardiologist or gastroenterologist  Write down your questions so you remember to ask them during your visits  © Copyright [employer] 2022 Information is for End User's use only and may not be sold, redistributed or otherwise used for commercial purposes  All illustrations and images included in CareNotes® are the copyrighted property of [employer]  or Keegan Healy   The above information is an  only  It is not intended as medical advice for individual conditions or treatments  Talk to your doctor, nurse or pharmacist before following any medical regimen to see if it is safe and effective for you

## 2022-05-09 NOTE — PROGRESS NOTES
Subjective:        Patient ID: Kamila Gil is a 76 y o  male  Chief Complaint:  Vickie Banda is here for follow-up  Most of visit he is pacing in the office says he can sit down, says he can not sleep, feels constant chest pain, constant nausea, heartburn, right upper abdominal quadrant intermittent pain, fatigue, weakness, will ongoing weight loss, apprehensive and anxious  BNP normal, sed rate near normal, CRP normal, troponins time several normal, EKG is been stable  CT scan EGD colonoscopy all relatively unrevealing, did show some severe coronary artery calcifications we discussed today  The following portions of the patient's history were reviewed and updated as appropriate: allergies, current medications, past family history, past medical history, past social history, past surgical history and problem list   Review of Systems   Constitutional: Positive for decreased appetite, malaise/fatigue and weight loss  Negative for chills, diaphoresis and weight gain  HENT: Negative for nosebleeds and stridor  Eyes: Negative for double vision, vision loss in left eye, vision loss in right eye and visual disturbance  Cardiovascular: Positive for chest pain and dyspnea on exertion  Negative for claudication, cyanosis, irregular heartbeat, leg swelling, near-syncope, orthopnea, palpitations, paroxysmal nocturnal dyspnea and syncope  Respiratory: Positive for shortness of breath  Negative for cough, snoring and wheezing  Endocrine: Negative for polydipsia, polyphagia and polyuria  Hematologic/Lymphatic: Negative for bleeding problem  Does not bruise/bleed easily  Skin: Negative for flushing and rash  Musculoskeletal: Positive for muscle weakness  Negative for falls and myalgias  Gastrointestinal: Positive for bloating and abdominal pain ( excess belching)  Negative for heartburn, hematemesis, hematochezia, melena and nausea  Genitourinary: Negative for hematuria     Neurological: Positive for difficulty with concentration and light-headedness  Negative for brief paralysis, dizziness, focal weakness, headaches, loss of balance and vertigo  Psychiatric/Behavioral: Positive for depression  Negative for altered mental status, substance abuse, suicidal ideas and thoughts of violence  The patient is nervous/anxious  Allergic/Immunologic: Negative for hives  Objective:      /70   Pulse 83   Ht 5' 9" (1 753 m)   Wt 51 7 kg (114 lb)   BMI 16 83 kg/m²   Physical Exam  Constitutional:       General: He is not in acute distress  Appearance: He is well-developed  He is not diaphoretic  HENT:      Head: Normocephalic and atraumatic  Eyes:      General: No scleral icterus  Pupils: Pupils are equal, round, and reactive to light  Neck:      Thyroid: No thyromegaly  Vascular: No carotid bruit or JVD  Cardiovascular:      Rate and Rhythm: Normal rate and regular rhythm  Heart sounds: Normal heart sounds  No murmur heard  No friction rub  No gallop  Pulmonary:      Effort: Pulmonary effort is normal  No respiratory distress  Breath sounds: Normal breath sounds  No stridor  No wheezing or rales  Abdominal:      General: Bowel sounds are normal  There is no distension  Palpations: Abdomen is soft  There is no mass  Tenderness: There is no abdominal tenderness  Musculoskeletal:         General: No deformity  Normal range of motion  Cervical back: Normal range of motion and neck supple  Right lower leg: No edema  Left lower leg: No edema  Skin:     General: Skin is warm and dry  Coloration: Skin is not pale  Findings: No erythema  Neurological:      Mental Status: He is alert and oriented to person, place, and time  Coordination: Coordination normal    Psychiatric:         Mood and Affect: Mood normal          Behavior: Behavior normal          Thought Content:  Thought content normal          Judgment: Judgment normal  Lab Review:   Admission on 04/11/2022, Discharged on 04/14/2022   Component Date Value    WBC 04/11/2022 5 26     RBC 04/11/2022 4 58     Hemoglobin 04/11/2022 13 7     Hematocrit 04/11/2022 39 7     MCV 04/11/2022 87     MCH 04/11/2022 29 9     MCHC 04/11/2022 34 5     RDW 04/11/2022 13 7     MPV 04/11/2022 10 7     Platelets 63/88/1598 248     nRBC 04/11/2022 0     Neutrophils Relative 04/11/2022 67     Immat GRANS % 04/11/2022 0     Lymphocytes Relative 04/11/2022 24     Monocytes Relative 04/11/2022 8     Eosinophils Relative 04/11/2022 0     Basophils Relative 04/11/2022 1     Neutrophils Absolute 04/11/2022 3 44     Immature Grans Absolute 04/11/2022 0 01     Lymphocytes Absolute 04/11/2022 1 28     Monocytes Absolute 04/11/2022 0 44     Eosinophils Absolute 04/11/2022 0 02     Basophils Absolute 04/11/2022 0 07     hs TnI 0hr 04/11/2022 4     NT-proBNP 04/11/2022 129     Sodium 04/11/2022 140     Potassium 04/11/2022 3 4*    Chloride 04/11/2022 101     CO2 04/11/2022 30     ANION GAP 04/11/2022 9     BUN 04/11/2022 18     Creatinine 04/11/2022 0 97     Glucose 04/11/2022 118     Calcium 04/11/2022 9 4     AST 04/11/2022 27     ALT 04/11/2022 27     Alkaline Phosphatase 04/11/2022 60     Total Protein 04/11/2022 7 2     Albumin 04/11/2022 4 1     Total Bilirubin 04/11/2022 0 96     eGFR 04/11/2022 73     CRP 04/11/2022 0 5     Ventricular Rate 04/11/2022 82     Atrial Rate 04/11/2022 82     GA Interval 04/11/2022 158     QRSD Interval 04/11/2022 88     QT Interval 04/11/2022 374     QTC Interval 04/11/2022 436     P Axis 04/11/2022 86     QRS Axis 04/11/2022 80     T Wave Axis 04/11/2022 83     hs TnI 2hr 04/11/2022 4     Delta 2hr hsTnI 04/11/2022 0     hs TnI 4hr 04/11/2022 5     Delta 4hr hsTnI 04/11/2022 1     D-Dimer, Quant 04/11/2022 0 38     SARS-CoV-2 04/11/2022 Negative     INFLUENZA A PCR 04/11/2022 Negative     INFLUENZA B PCR 04/11/2022 Negative     RSV PCR 04/11/2022 Negative     Magnesium 04/11/2022 1 9     Phosphorus 04/11/2022 3 2     WBC 04/12/2022 5 80     RBC 04/12/2022 4 15     Hemoglobin 04/12/2022 12 1     Hematocrit 04/12/2022 37 6     MCV 04/12/2022 91     MCH 04/12/2022 29 2     MCHC 04/12/2022 32 2     RDW 04/12/2022 13 8     MPV 04/12/2022 11 5     Platelets 29/25/8809 224     nRBC 04/12/2022 0     Neutrophils Relative 04/12/2022 51     Immat GRANS % 04/12/2022 0     Lymphocytes Relative 04/12/2022 35     Monocytes Relative 04/12/2022 11     Eosinophils Relative 04/12/2022 2     Basophils Relative 04/12/2022 1     Neutrophils Absolute 04/12/2022 2 93     Immature Grans Absolute 04/12/2022 0 02     Lymphocytes Absolute 04/12/2022 2 00     Monocytes Absolute 04/12/2022 0 65     Eosinophils Absolute 04/12/2022 0 13     Basophils Absolute 04/12/2022 0 07     Sodium 04/12/2022 142     Potassium 04/12/2022 3 5     Chloride 04/12/2022 105     CO2 04/12/2022 30     ANION GAP 04/12/2022 7     BUN 04/12/2022 13     Creatinine 04/12/2022 0 90     Glucose 04/12/2022 86     Calcium 04/12/2022 8 9     AST 04/12/2022 22     ALT 04/12/2022 22     Alkaline Phosphatase 04/12/2022 51     Total Protein 04/12/2022 6 2*    Albumin 04/12/2022 3 5     Total Bilirubin 04/12/2022 1 12*    eGFR 04/12/2022 80     Magnesium 04/12/2022 1 9     Phosphorus 04/12/2022 3 3     Procalcitonin 04/12/2022 <0 05     Protime 04/12/2022 15 6*    INR 04/12/2022 1 31*    WBC 04/13/2022 6 64     RBC 04/13/2022 3 93     Hemoglobin 04/13/2022 11 6*    Hematocrit 04/13/2022 35 1*    MCV 04/13/2022 89     MCH 04/13/2022 29 5     MCHC 04/13/2022 33 0     RDW 04/13/2022 13 8     MPV 04/13/2022 11 2     Platelets 72/52/3196 212     nRBC 04/13/2022 0     Neutrophils Relative 04/13/2022 54     Immat GRANS % 04/13/2022 1     Lymphocytes Relative 04/13/2022 29     Monocytes Relative 04/13/2022 11     Eosinophils Relative 04/13/2022 4     Basophils Relative 04/13/2022 1     Neutrophils Absolute 04/13/2022 3 57     Immature Grans Absolute 04/13/2022 0 04     Lymphocytes Absolute 04/13/2022 1 92     Monocytes Absolute 04/13/2022 0 76     Eosinophils Absolute 04/13/2022 0 27     Basophils Absolute 04/13/2022 0 08     Sodium 04/13/2022 144     Potassium 04/13/2022 3 1*    Chloride 04/13/2022 108     CO2 04/13/2022 29     ANION GAP 04/13/2022 7     BUN 04/13/2022 12     Creatinine 04/13/2022 0 80     Glucose 04/13/2022 95     Calcium 04/13/2022 8 5     Corrected Calcium 04/13/2022 9 1     AST 04/13/2022 19     ALT 04/13/2022 19     Alkaline Phosphatase 04/13/2022 45*    Total Protein 04/13/2022 5 7*    Albumin 04/13/2022 3 2*    Total Bilirubin 04/13/2022 0 89     eGFR 04/13/2022 84     Case Report 04/13/2022                      Value:Surgical Pathology Report                         Case: [id number]                                   Authorizing Provider:  Ely Escamilla MD    Collected:           04/13/2022 1209              Ordering Location:     [facility] Received:            04/13/2022 1339                                     Medical Surgical Unit                                                        Pathologist:           Ivana Nevarez MD                                                                Specimens:   A) - Duodenum, cold fcp bx r/o celiacs                                                              B) - Stomach, cold fcp bx r/o h pylori                                                              C) - Large Intestine, Cecum, cold fcp/snare bx cecal polyp x2                                       D) - Rectum, cold snare polyp x2                                                           Final Diagnosis 04/13/2022                      Value: This result contains rich text formatting which cannot be displayed here      Additional Information 04/13/2022 Value:This result contains rich text formatting which cannot be displayed here   Synoptic Checklist 04/13/2022                      Value:                            COLON/RECTUM POLYP FORM - GI - C                                                                                     :    Adenoma(s)      Gross Description 04/13/2022                      Value: This result contains rich text formatting which cannot be displayed here  Appointment on 03/21/2022   Component Date Value    NT-proBNP 03/21/2022 148    Office Visit on 03/17/2022   Component Date Value    CRP, High Sensitivity 03/21/2022 <0 90     Sed Rate 03/21/2022 21*     EGD    Result Date: 4/13/2022  Narrative: [facility] Operating Room [address] [phone] DATE OF SERVICE: 4/13/22 PHYSICIAN(S): Attending: Louise Starks MD Fellow: No Staff Documented INDICATION: Nausea, Epigastric pain, GERD (gastroesophageal reflux disease) POST-OP DIAGNOSIS: See the impression below  PREPROCEDURE: Informed consent was obtained for the procedure, including sedation  Risks of perforation, hemorrhage, adverse drug reaction and aspiration were discussed  The patient was placed in the left lateral decubitus position  Patient was explained about the risks and benefits of the procedure  Risks including but not limited to bleeding, infection, and perforation were explained in detail  Also explained about less than 100% sensitivity with the exam and other alternatives  DETAILS OF PROCEDURE: Patient was taken to the procedure room where a time out was performed to confirm correct patient and correct procedure  The patient underwent monitored anesthesia care, which was administered by an anesthesia professional  The patient's blood pressure, heart rate, level of consciousness, respirations, oxygen and ETCO2 were monitored throughout the procedure   The scope was introduced through the mouth and advanced to the second part of the duodenum  Retroflexion was performed in the fundus  The patient experienced no blood loss  The procedure was not difficult  The patient tolerated the procedure well  There were no apparent complications  ANESTHESIA INFORMATION: ASA: II Anesthesia Type: IV Sedation with Anesthesia MEDICATIONS: No administrations occurring from 1159 to 1213 on 04/13/22 FINDINGS: Z-line 43 cm from the incisors The stomach and duodenum appeared normal  Performed random biopsy  SPECIMENS: ID Type Source Tests Collected by Time Destination 1 : cold fcp bx r/o celiacs Tissue Duodenum TISSUE EXAM Vinnie Castillo MD 4/13/2022 12:09 PM  2 : cold fcp bx r/o h pylori Tissue Stomach TISSUE EXAM Vinnie Castillo MD 4/13/2022 12:11 PM      Impression: Z-line 43 cm from the incisors The stomach and duodenum appeared normal  Performed random biopsy  RECOMMENDATION: Await pathology results   Vinnie Castillo MD     Colonoscopy    Result Date: 4/13/2022  Narrative: [facility] Operating Room [address] [phone] DATE OF SERVICE: 4/13/22 PHYSICIAN(S): Attending: Vinnie Castillo MD Fellow: No Staff Documented INDICATION: Moderate protein-calorie malnutrition ([address] ) POST-OP DIAGNOSIS: See the impression below  HISTORY: Prior colonoscopy: No prior colonoscopy  BOWEL PREPARATION:  PREPROCEDURE: Informed consent was obtained for the procedure, including sedation  Risks including but not limited to bleeding, infection, perforation, adverse drug reaction and aspiration were explained in detail  Also explained about less than 100% sensitivity with the exam and other alternatives  The patient was placed in the left lateral decubitus position  DETAILS OF PROCEDURE: Patient was taken to the procedure room where a time out was performed to confirm correct patient and correct procedure   The patient underwent monitored anesthesia care, which was administered by an anesthesia professional  The patient's blood pressure, heart rate, level of consciousness, oxygen, respirations and ETCO2 were monitored throughout the procedure  A digital rectal exam was performed  A perianal exam was performed  The scope was introduced through the anus and advanced to the terminal ileum  Retroflexion was performed in the rectum  The quality of bowel preparation was evaluated using the [facility] Bowel Preparation Scale with scores of: right colon = 2, transverse colon = 2, left colon = 2  The total BBPS score was 6  Bowel prep was adequate  The patient experienced no blood loss  The procedure was not difficult  The patient tolerated the procedure well  There were no apparent complications   ANESTHESIA INFORMATION: ASA: II Anesthesia Type: IV Sedation with Anesthesia MEDICATIONS: No administrations occurring from 1159 to 1233 on 04/13/22 FINDINGS: Two polyps measuring smaller than 5 mm in the cecum; performed complete en bloc removal by cold forceps biopsy; completely removed en bloc by cold snare and retrieved specimen Two polyps measuring smaller than 5 mm in the rectum; completely removed en bloc by cold snare and retrieved specimen Multiple small diverticula in the sigmoid colon EVENTS: Procedure Events Event Event Time ENDO SCOPE OUT TIME 4/13/2022 12:13 PM ENDO CECUM REACHED 4/13/2022 12:22 PM SPECIMENS: ID Type Source Tests Collected by Time Destination 1 : cold fcp bx r/o celiacs Tissue Duodenum TISSUE EXAM Enrrique Dooley MD 4/13/2022 12:09 PM  2 : cold fcp bx r/o h pylori Tissue Stomach TISSUE EXAM Enrrique Dooley MD 4/13/2022 12:11 PM  3 : cold fcp/snare bx cecal polyp x2 Tissue Large Intestine, Cecum TISSUE EXAM Enrrique Dooley MD 4/13/2022 12:24 PM  4 : cold snare polyp x2 Tissue Rectum TISSUE EXAM Enrrique Dooley MD 4/13/2022 12:30 PM  EQUIPMENT: Colonoscope - [id number]     Impression: Two polyps measuring smaller than 5 mm in the cecum; performed complete en bloc removal by cold forceps biopsy; completely removed en bloc by cold snare and retrieved specimen Two polyps measuring smaller than 5 mm in the rectum; completely removed en bloc by cold snare and retrieved specimen Multiple small diverticula in the sigmoid colon RECOMMENDATION: Await pathology results No further screening colonoscopies necessary due to age (age = 77 or greater)  Gary Cagle MD     CT chest abdomen pelvis wo contrast    Result Date: 4/12/2022  Narrative: CT CHEST, ABDOMEN AND PELVIS WITHOUT IV CONTRAST INDICATION:   abdominal pain, chest pain  Heart burn, chest tightness shortness of breath COMPARISON:  February 4, 2022 TECHNIQUE: CT examination of the chest, abdomen and pelvis was performed without intravenous contrast   Axial, sagittal, and coronal 2D reformatted images were created from the source data and submitted for interpretation  Radiation dose length product (DLP) for this visit:  412 84 mGy-cm   This examination, like all CT scans performed in the [facility], was performed utilizing techniques to minimize radiation dose exposure, including the use of iterative  reconstruction and automated exposure control  Enteric contrast was administered  FINDINGS: CHEST LUNGS:  A left lingular region nodule seen measuring about 3 mm in image 43 series 604, 47 series 3 Additional 3 mm nodule seen in image 43 series 604 in the left upper lobe, image 31 series 3 A right upper lobe 3 mm nodule seen in image 48 series 3 Right lower lobe granuloma seen in image 78 series 604k PLEURA:  Unremarkable  HEART/GREAT VESSELS: Heart is unremarkable for patient's age  Ascending aorta measures 3 4 cm Severe coronary artery calcification MEDIASTINUM AND COTY:  No significant mediastinal lymph node enlargement seen CHEST WALL AND LOWER NECK:   No significant axillary lymph node enlargement ABDOMEN LIVER/BILIARY TREE:  Unenhanced liver appear unremarkable GALLBLADDER:  No calcified gallstones  No pericholecystic inflammatory change  SPLEEN:  Unremarkable  PANCREAS:  Evaluation of the pancreas is limited due to lack of internal fat ADRENAL GLANDS:  Unremarkable  KIDNEYS/URETERS:  No hydronephrosis seen STOMACH AND BOWEL:  No abnormal dilation of the small bowel loops seen Mild thickening of the wall of the stomach in the region of the cardia, unchanged from the previous study of August 24, 2015 APPENDIX:  Appendix not visualized ABDOMINOPELVIC CAVITY:  No ascites  No pneumoperitoneum  No lymphadenopathy  VESSELS:  Unremarkable for patient's age  PELVIS REPRODUCTIVE ORGANS:  Prostate calcification seen URINARY BLADDER:  Unremarkable  ABDOMINAL WALL/INGUINAL REGIONS:  Unremarkable  OSSEOUS STRUCTURES:  No acute compression collapse vertebra No gross lytic lesion seen     Impression: No acute inflammatory stranding No bowel obstruction Mild thickening of the wall of the stomach in the region of the cardia and fundus, unchanged Less than 6 mm lung nodules in the both upper lobe, Based on current Fleischner Society 2017 Guidelines on incidental pulmonary nodule,  follow-up CT at 12 months can be considered  Workstation performed: [id number]     X-ray chest 1 view portable    Result Date: 4/11/2022  Narrative: CHEST INDICATION:   Chest pain  COMPARISON: CT and radiograph dated 2/4/2022 EXAM PERFORMED/VIEWS:  XR CHEST PORTABLE FINDINGS: Cardiomediastinal silhouette appears unremarkable  Normal pulmonary vasculature  Lungs appear mildly hyperinflated, but unchanged from prior  No significant emphysematous changes on prior CT  No acute consolidations  No pneumothorax or pleural effusion  Mild degenerative changes of shoulders  No displaced rib fractures or other acute osseous abnormalities  Impression: No acute cardiopulmonary findings or significant change from priors   Workstation performed: [id number]     NM hepatobiliary w rx    Result Date: 5/4/2022  Narrative: HEPATOBILIARY SCAN WITH CHOLECYSTOKININ ADMINISTRATION INDICATION: Abdominal pain and nausea COMPARISON:  Ultrasound 4/26/2022 TECHNIQUE:   Following the intravenous administration of 5 3 mCi Tc-99m labeled mebrofenin, dynamic abdominal images were obtained up to a 60 minute time period  Images were performed in AP projection  FINDINGS: There is prompt, uniform accumulation with normal clearance of the radiopharmaceutical by the liver  There is normal filling of the intrahepatic ducts, common bile duct and gallbladder with normal excretion of the radiopharmaceutical into the duodenum  In order to evaluate the contractile response of the gallbladder to  cholecystokinin stimulation, 1 1 mcg sincalide (0 02 mcg/kg) was mixed with saline for a total of 60 cc and administered by slow intravenous infusion up to 60 minutes  These images demonstrate normal contraction of the gallbladder  The calculated gallbladder ejection fraction is 79 % (N = >38%)  The patient experienced no symptoms after CCK administration  Impression: 1  Normal hepatobiliary study  2  Normal contractile response of the gallbladder to cholecystokinin infusion   (79%) Workstation performed: [id number]     US abdomen limited    Result Date: 4/27/2022  Narrative: RIGHT UPPER QUADRANT ULTRASOUND INDICATION:    R10 11: Right upper quadrant pain  COMPARISON:  None  TECHNIQUE:   Real-time ultrasound of the right upper quadrant was performed with a curvilinear transducer with both volumetric sweeps and still imaging techniques  FINDINGS: PANCREAS:  Visualized portions of the pancreas are within normal limits  AORTA AND IVC:  Visualized portions are normal for patient age  LIVER: Size:  Within normal range  The liver measures 13 4 cm in the midclavicular line  Contour:  Surface contour is smooth  Parenchyma:  Echogenicity and echotexture are within normal limits  Limited imaging of the main portal vein shows it to be patent and hepatopetal  No liver mass identified  BILIARY: No gallbladder findings   No intrahepatic biliary dilatation  CBD measures 3 0 mm  No choledocholithiasis  Negative Wilson's sign KIDNEY: Right kidney measures 10 3 SAG x 5 1 AP x 4 6 TRV cm  Volume 127 7 mL Kidney within normal limits  ASCITES:   None  Impression: Normal  Workstation performed: [id number]         Assessment:       1  Chest pain, unspecified type  NM myocardial perfusion spect (rx stress and/or rest)   2  Dyspnea on exertion  NM myocardial perfusion spect (rx stress and/or rest)   3  Coronary artery disease involving native coronary artery of native heart with angina pectoris ([address] )  NM myocardial perfusion spect (rx stress and/or rest)   4  Other fatigue  NM myocardial perfusion spect (rx stress and/or rest)        Plan:       As prior discussion I am unclear as to what Og Potter is experiencing  There is no clear objective evidence for pericarditis/pleuritis, unstable angina, heart failure, nor malignant dysrhythmia  Certainly his myriad of symptoms cannot be explained by a singular cardiac problem  That said some coronary artery calcifications have been seen on CT scanning, will make sure there is nothing significant here, pharmacologic nuclear stress test ordered  I am concerned about a primary psychiatric issue here as per brief discussion today on [place] text      I will see him back in 3 months, sooner should there be any abnormal findings on his stress test

## 2022-05-11 ENCOUNTER — OFFICE VISIT (OUTPATIENT)
Dept: FAMILY MEDICINE CLINIC | Facility: CLINIC | Age: 77
End: 2022-05-11
Payer: COMMERCIAL

## 2022-05-11 ENCOUNTER — TELEPHONE (OUTPATIENT)
Dept: FAMILY MEDICINE CLINIC | Facility: CLINIC | Age: 77
End: 2022-05-11

## 2022-05-11 VITALS
SYSTOLIC BLOOD PRESSURE: 120 MMHG | BODY MASS INDEX: 17.03 KG/M2 | WEIGHT: 115 LBS | DIASTOLIC BLOOD PRESSURE: 76 MMHG | HEIGHT: 69 IN | TEMPERATURE: 97.7 F

## 2022-05-11 DIAGNOSIS — U09.9 COVID-19 LONG HAULER MANIFESTING CHRONIC ANXIETY: Primary | ICD-10-CM

## 2022-05-11 DIAGNOSIS — F41.9 COVID-19 LONG HAULER MANIFESTING CHRONIC ANXIETY: Primary | ICD-10-CM

## 2022-05-11 PROCEDURE — 1111F DSCHRG MED/CURRENT MED MERGE: CPT | Performed by: FAMILY MEDICINE

## 2022-05-11 PROCEDURE — 1160F RVW MEDS BY RX/DR IN RCRD: CPT | Performed by: FAMILY MEDICINE

## 2022-05-11 PROCEDURE — 1036F TOBACCO NON-USER: CPT | Performed by: FAMILY MEDICINE

## 2022-05-11 PROCEDURE — 99214 OFFICE O/P EST MOD 30 MIN: CPT | Performed by: FAMILY MEDICINE

## 2022-05-11 RX ORDER — FLUOXETINE 10 MG/1
CAPSULE ORAL
Qty: 15 CAPSULE | Refills: 1 | Status: SHIPPED | OUTPATIENT
Start: 2022-05-11 | End: 2022-05-20

## 2022-05-11 NOTE — TELEPHONE ENCOUNTER
Phone # [phone]  Extension: [phone]    has been in contact with Pt multiple times since hospital D/C - Continues to feel very poorly  Last night Pt called nurse manager -     I reached out to Pt to check on how he's feeling -     Pt states that during the night he couldn't breath @ 4 am feeling like he was suffocating almost went to the ER - He was walking the floor for 2 hr, than began to get some relief & went back to sleep for 45 minutes when he woke up feeling nauseous  S/S Constant tightness in chest, Nausea & episodes of suffocation - that come & go through out the day & night  If Pt walks long enough & is able to belch, he is able to get some temporary relief

## 2022-05-11 NOTE — PROGRESS NOTES
Assessment/Plan:    Problem List Items Addressed This Visit    None     Visit Diagnoses     COVID-19 long hauler manifesting chronic anxiety    -  Primary           Diagnoses and all orders for this visit:    [id number] [phone] manifesting chronic anxiety        No problem-specific Assessment & Plan notes found for this encounter  Subjective:      Patient ID: Claudia Hoffman is a 76 y o  male  I received a text message from Mr  Friend or it sees cardiologist Dr Bonnie De Leon banding he was concerned about the patient's persistent symptoms weight loss and anxiety and inability to sleep patient has been this way ever since he had COVID and he sometimes walks pretty much all night long as it is the only time he feels relief the other evening he was in bed he felt like he was suffocating his weight is down he has protein calorie malnourished so patient has quite a bit of symptoms which are consistent with long haul ir is COVID manifesting as chronic anxiety and insomnia      The following portions of the patient's history were reviewed and updated as appropriate:   He has a past medical history of Coronary artery disease, COVID-19 (01/28/2022), History of echocardiogram (03/22/2018), Hyperlipidemia, Lyme disease, Mitral valve prolapse, and Shingles  ,  does not have any pertinent problems on file  ,   has a past surgical history that includes Appendectomy; Tonsillectomy; Hernia repair; and Eye surgery  ,  family history includes Heart disease in his mother; No Known Problems in his father  ,   reports that he has never smoked  He has never used smokeless tobacco  He reports that he does not drink alcohol and does not use drugs  ,  has No Known Allergies     Current Outpatient Medications   Medication Sig Dispense Refill    calcium carbonate (TUMS) 500 mg chewable tablet Chew 2 tablets (1,000 mg total) 3 (three) times a day as needed for indigestion or heartburn  0    simvastatin (ZOCOR) 5 MG tablet Take 5 mg by mouth daily at bedtime       No current facility-administered medications for this visit  Review of Systems   Constitutional: Positive for fatigue and unexpected weight change  Negative for activity change, appetite change, diaphoresis and fever  HENT: Negative  Negative for dental problem  Eyes: Negative  Negative for visual disturbance  Respiratory: Negative for apnea, cough, chest tightness, shortness of breath and wheezing  Cardiovascular: Positive for chest pain  Negative for palpitations and leg swelling  Gastrointestinal: Positive for nausea  Negative for abdominal distention, abdominal pain, anal bleeding, constipation, diarrhea and vomiting  Endocrine: Negative for cold intolerance, heat intolerance, polydipsia, polyphagia and polyuria  Genitourinary: Negative for difficulty urinating, dysuria, flank pain, hematuria and urgency  Musculoskeletal: Negative for arthralgias, back pain, gait problem, joint swelling and myalgias  Skin: Negative for color change, rash and wound  Allergic/Immunologic: Negative for environmental allergies, food allergies and immunocompromised state  Neurological: Negative for dizziness, seizures, syncope, speech difficulty, numbness and headaches  Hematological: Negative for adenopathy  Does not bruise/bleed easily  Psychiatric/Behavioral: Negative for agitation, behavioral problems, hallucinations, sleep disturbance and suicidal ideas  Objective:  Vitals:    05/11/22 1030   BP: 120/76   BP Location: Left arm   Patient Position: Sitting   Cuff Size: Standard   Temp: 97 7 °F (36 5 °C)   TempSrc: Temporal   Weight: 52 2 kg (115 lb)   Height: 5' 9" (1 753 m)     Body mass index is 16 98 kg/m²  Physical Exam  Constitutional:       General: He is not in acute distress  Appearance: He is well-developed  He is ill-appearing  He is not diaphoretic  HENT:      Head: Normocephalic        Right Ear: External ear normal       Left Ear: External ear normal       Nose: Nose normal    Eyes:      General: No scleral icterus  Right eye: No discharge  Left eye: No discharge  Conjunctiva/sclera: Conjunctivae normal       Pupils: Pupils are equal, round, and reactive to light  Neck:      Thyroid: No thyromegaly  Trachea: No tracheal deviation  Cardiovascular:      Rate and Rhythm: Normal rate and regular rhythm  Heart sounds: Normal heart sounds  No murmur heard  No friction rub  No gallop  Pulmonary:      Effort: Pulmonary effort is normal  No respiratory distress  Breath sounds: Normal breath sounds  No wheezing  Abdominal:      General: Bowel sounds are normal       Palpations: Abdomen is soft  There is no mass  Tenderness: There is no abdominal tenderness  There is no guarding  Musculoskeletal:         General: No deformity  Cervical back: Normal range of motion  Lymphadenopathy:      Cervical: No cervical adenopathy  Skin:     General: Skin is warm and dry  Findings: No erythema or rash  Neurological:      Mental Status: He is alert and oriented to person, place, and time  Cranial Nerves: No cranial nerve deficit  Psychiatric:         Thought Content:  Thought content normal       Comments: Very anxious  Pacing in room

## 2022-05-12 ENCOUNTER — TELEPHONE (OUTPATIENT)
Dept: FAMILY MEDICINE CLINIC | Facility: CLINIC | Age: 77
End: 2022-05-12

## 2022-05-12 ENCOUNTER — TELEPHONE (OUTPATIENT)
Dept: CARDIOLOGY CLINIC | Facility: CLINIC | Age: 77
End: 2022-05-12

## 2022-05-12 DIAGNOSIS — E55.9 VITAMIN D DEFICIENCY: Primary | ICD-10-CM

## 2022-05-13 ENCOUNTER — LAB (OUTPATIENT)
Dept: LAB | Facility: MEDICAL CENTER | Age: 77
End: 2022-05-13
Payer: COMMERCIAL

## 2022-05-13 DIAGNOSIS — E55.9 VITAMIN D DEFICIENCY: ICD-10-CM

## 2022-05-13 LAB — 25(OH)D3 SERPL-MCNC: 31.9 NG/ML (ref 30–100)

## 2022-05-13 PROCEDURE — 36415 COLL VENOUS BLD VENIPUNCTURE: CPT

## 2022-05-13 PROCEDURE — 82306 VITAMIN D 25 HYDROXY: CPT

## 2022-05-14 ENCOUNTER — APPOINTMENT (EMERGENCY)
Dept: RADIOLOGY | Facility: HOSPITAL | Age: 77
DRG: 880 | End: 2022-05-14
Payer: COMMERCIAL

## 2022-05-14 ENCOUNTER — HOSPITAL ENCOUNTER (EMERGENCY)
Facility: HOSPITAL | Age: 77
Discharge: HOME | DRG: 880 | End: 2022-05-15
Attending: EMERGENCY MEDICINE | Admitting: EMERGENCY MEDICINE
Payer: COMMERCIAL

## 2022-05-14 VITALS
BODY MASS INDEX: 17.7 KG/M2 | DIASTOLIC BLOOD PRESSURE: 86 MMHG | SYSTOLIC BLOOD PRESSURE: 153 MMHG | TEMPERATURE: 97.9 F | OXYGEN SATURATION: 100 % | WEIGHT: 119.49 LBS | RESPIRATION RATE: 20 BRPM | HEART RATE: 66 BPM | HEIGHT: 69 IN

## 2022-05-14 DIAGNOSIS — R06.00 DYSPNEA: ICD-10-CM

## 2022-05-14 DIAGNOSIS — K21.9 GERD (GASTROESOPHAGEAL REFLUX DISEASE): Primary | ICD-10-CM

## 2022-05-14 LAB
ALBUMIN SERPL BCP-MCNC: 3.8 G/DL (ref 3.5–5)
ALP SERPL-CCNC: 59 U/L (ref 46–116)
ALT SERPL W P-5'-P-CCNC: 22 U/L (ref 12–78)
ANION GAP SERPL CALCULATED.3IONS-SCNC: 6 MMOL/L (ref 4–13)
AST SERPL W P-5'-P-CCNC: 24 U/L (ref 5–45)
BASOPHILS # BLD AUTO: 0.08 THOUSANDS/ΜL (ref 0–0.1)
BASOPHILS NFR BLD AUTO: 1 % (ref 0–1)
BILIRUB DIRECT SERPL-MCNC: 0.11 MG/DL (ref 0–0.2)
BILIRUB SERPL-MCNC: 0.61 MG/DL (ref 0.2–1)
BILIRUB UR QL STRIP: NEGATIVE
BUN SERPL-MCNC: 18 MG/DL (ref 5–25)
CALCIUM SERPL-MCNC: 9.2 MG/DL (ref 8.3–10.1)
CARDIAC TROPONIN I PNL SERPL HS: 3 NG/L (ref 8–18)
CHLORIDE SERPL-SCNC: 100 MMOL/L (ref 100–108)
CLARITY UR: CLEAR
CO2 SERPL-SCNC: 31 MMOL/L (ref 21–32)
COLOR UR: YELLOW
CREAT SERPL-MCNC: 0.97 MG/DL (ref 0.6–1.3)
D DIMER PPP FEU-MCNC: 0.31 UG/ML FEU
EOSINOPHIL # BLD AUTO: 0.07 THOUSAND/ΜL (ref 0–0.61)
EOSINOPHIL NFR BLD AUTO: 1 % (ref 0–6)
ERYTHROCYTE [DISTWIDTH] IN BLOOD BY AUTOMATED COUNT: 13 % (ref 11.6–15.1)
FLUAV RNA RESP QL NAA+PROBE: NEGATIVE
FLUBV RNA RESP QL NAA+PROBE: NEGATIVE
GFR SERPL CREATININE-BSD FRML MDRD: 73 ML/MIN/1.73SQ M
GLUCOSE SERPL-MCNC: 118 MG/DL (ref 65–140)
GLUCOSE UR STRIP-MCNC: ABNORMAL MG/DL
HCT VFR BLD AUTO: 39.1 % (ref 36.5–49.3)
HGB BLD-MCNC: 12.7 G/DL (ref 12–17)
HGB UR QL STRIP.AUTO: NEGATIVE
IMM GRANULOCYTES # BLD AUTO: 0.03 THOUSAND/UL (ref 0–0.2)
IMM GRANULOCYTES NFR BLD AUTO: 0 % (ref 0–2)
KETONES UR STRIP-MCNC: NEGATIVE MG/DL
LEUKOCYTE ESTERASE UR QL STRIP: NEGATIVE
LIPASE SERPL-CCNC: 82 U/L (ref 73–393)
LYMPHOCYTES # BLD AUTO: 2.37 THOUSANDS/ΜL (ref 0.6–4.47)
LYMPHOCYTES NFR BLD AUTO: 25 % (ref 14–44)
MCH RBC QN AUTO: 29.9 PG (ref 26.8–34.3)
MCHC RBC AUTO-ENTMCNC: 32.5 G/DL (ref 31.4–37.4)
MCV RBC AUTO: 92 FL (ref 82–98)
MONOCYTES # BLD AUTO: 0.82 THOUSAND/ΜL (ref 0.17–1.22)
MONOCYTES NFR BLD AUTO: 9 % (ref 4–12)
NEUTROPHILS # BLD AUTO: 6.09 THOUSANDS/ΜL (ref 1.85–7.62)
NEUTS SEG NFR BLD AUTO: 64 % (ref 43–75)
NITRITE UR QL STRIP: NEGATIVE
NRBC BLD AUTO-RTO: 0 /100 WBCS
NT-PROBNP SERPL-MCNC: 156 PG/ML
PH UR STRIP.AUTO: 8 [PH]
PLATELET # BLD AUTO: 233 THOUSANDS/UL (ref 149–390)
PMV BLD AUTO: 10.7 FL (ref 8.9–12.7)
POTASSIUM SERPL-SCNC: 3.8 MMOL/L (ref 3.5–5.3)
PROT SERPL-MCNC: 7 G/DL (ref 6.4–8.2)
PROT UR STRIP-MCNC: NEGATIVE MG/DL
RBC # BLD AUTO: 4.25 MILLION/UL (ref 3.88–5.62)
RSV RNA RESP QL NAA+PROBE: NEGATIVE
SARS-COV-2 RNA RESP QL NAA+PROBE: NEGATIVE
SODIUM SERPL-SCNC: 137 MMOL/L (ref 136–145)
SP GR UR STRIP.AUTO: 1.01 (ref 1–1.03)
TSH SERPL DL<=0.05 MIU/L-ACNC: 2.13 UIU/ML (ref 0.45–4.5)
UROBILINOGEN UR QL STRIP.AUTO: 0.2 E.U./DL
WBC # BLD AUTO: 9.46 THOUSAND/UL (ref 4.31–10.16)

## 2022-05-14 PROCEDURE — 71045 X-RAY EXAM CHEST 1 VIEW: CPT

## 2022-05-14 PROCEDURE — 85379 FIBRIN DEGRADATION QUANT: CPT | Performed by: EMERGENCY MEDICINE

## 2022-05-14 PROCEDURE — 83880 ASSAY OF NATRIURETIC PEPTIDE: CPT | Performed by: EMERGENCY MEDICINE

## 2022-05-14 PROCEDURE — 0241U HB NFCT DS VIR RESP RNA 4 TRGT: CPT | Performed by: EMERGENCY MEDICINE

## 2022-05-14 PROCEDURE — 83690 ASSAY OF LIPASE: CPT | Performed by: EMERGENCY MEDICINE

## 2022-05-14 PROCEDURE — 81003 URINALYSIS AUTO W/O SCOPE: CPT | Performed by: EMERGENCY MEDICINE

## 2022-05-14 PROCEDURE — 99285 EMERGENCY DEPT VISIT HI MDM: CPT | Performed by: EMERGENCY MEDICINE

## 2022-05-14 PROCEDURE — 80048 BASIC METABOLIC PNL TOTAL CA: CPT | Performed by: EMERGENCY MEDICINE

## 2022-05-14 PROCEDURE — 96361 HYDRATE IV INFUSION ADD-ON: CPT

## 2022-05-14 PROCEDURE — 84484 ASSAY OF TROPONIN QUANT: CPT | Performed by: EMERGENCY MEDICINE

## 2022-05-14 PROCEDURE — 80076 HEPATIC FUNCTION PANEL: CPT | Performed by: EMERGENCY MEDICINE

## 2022-05-14 PROCEDURE — 36415 COLL VENOUS BLD VENIPUNCTURE: CPT | Performed by: EMERGENCY MEDICINE

## 2022-05-14 PROCEDURE — 99285 EMERGENCY DEPT VISIT HI MDM: CPT

## 2022-05-14 PROCEDURE — 96374 THER/PROPH/DIAG INJ IV PUSH: CPT

## 2022-05-14 PROCEDURE — 84443 ASSAY THYROID STIM HORMONE: CPT | Performed by: EMERGENCY MEDICINE

## 2022-05-14 PROCEDURE — 85025 COMPLETE CBC W/AUTO DIFF WBC: CPT | Performed by: EMERGENCY MEDICINE

## 2022-05-14 RX ORDER — FAMOTIDINE 10 MG/ML
40 INJECTION, SOLUTION INTRAVENOUS DAILY
Status: DISCONTINUED | OUTPATIENT
Start: 2022-05-15 | End: 2022-05-15 | Stop reason: HOSPADM

## 2022-05-14 RX ORDER — DEXTROSE AND SODIUM CHLORIDE 5; .9 G/100ML; G/100ML
999 INJECTION, SOLUTION INTRAVENOUS CONTINUOUS
Status: ACTIVE | OUTPATIENT
Start: 2022-05-14 | End: 2022-05-14

## 2022-05-14 RX ORDER — MAGNESIUM HYDROXIDE/ALUMINUM HYDROXICE/SIMETHICONE 120; 1200; 1200 MG/30ML; MG/30ML; MG/30ML
15 SUSPENSION ORAL ONCE
Status: COMPLETED | OUTPATIENT
Start: 2022-05-14 | End: 2022-05-14

## 2022-05-14 RX ORDER — LIDOCAINE HYDROCHLORIDE 20 MG/ML
15 SOLUTION OROPHARYNGEAL ONCE
Status: COMPLETED | OUTPATIENT
Start: 2022-05-14 | End: 2022-05-14

## 2022-05-14 RX ORDER — ONDANSETRON 2 MG/ML
4 INJECTION INTRAMUSCULAR; INTRAVENOUS ONCE
Status: COMPLETED | OUTPATIENT
Start: 2022-05-14 | End: 2022-05-14

## 2022-05-14 RX ADMIN — ALUMINA, MAGNESIA, AND SIMETHICONE ORAL SUSPENSION REGULAR STRENGTH 15 ML: 1200; 1200; 120 SUSPENSION ORAL at 22:43

## 2022-05-14 RX ADMIN — ONDANSETRON 4 MG: 2 INJECTION INTRAMUSCULAR; INTRAVENOUS at 18:50

## 2022-05-14 RX ADMIN — DEXTROSE AND SODIUM CHLORIDE 999 ML/HR: 5; .9 INJECTION, SOLUTION INTRAVENOUS at 18:50

## 2022-05-14 RX ADMIN — LIDOCAINE HYDROCHLORIDE 15 ML: 20 SOLUTION ORAL; TOPICAL at 22:43

## 2022-05-14 NOTE — ED PROVIDER NOTES
History  Chief Complaint   Patient presents with    Chest Pain     Patient states "tightness" in his entire chest, generalized  Non radiating  Patient states "ongoing" issue that nobody can find answers for  Patient states his only diagnosis anyone was able to give him was for acid reflux  Patient appears very anxious during triage and states he cant breath and he feels better sitting up  Patient educated to try and relax the best he can       56-year-old male with history of COVID in January of 2022 presents with a constant burning chest pain nausea fatigued anxiety and insomnia  He was evaluated by his cardiologist last week by Dr Popeye Rivas and recommended a nucleotide stress test which is pending  He is admitted for similar symptoms in mid April at that time he underwent CT EGD colonoscopy ultrasound as well as hepatobiliary scan all were unremarkable except for cardiac calcifications  He was started on Protonix at the time of discharge from hospitalizations but he is no longer taking that medication he saw his family doctor will last week who started him on fluoxetine he has been on the medication for 3 days he feels his symptoms have gotten worse  He complains of a sensation of suffocating if he attempts to lie flat at night therefore he has difficulty sleeping he has a constant chest burning which is anterior and nonradiating he feels nauseated but he has not thrown up his appetite been diminished and he has lost weight he feels better if he paces he has had no lower extremity edema is denying any abdominal or back pain no complaints of diarrhea no lower extremity edema no pain with breathing he felt markedly better after being discharged from the hospital for several weeks but the symptoms have slowly returned  Has worsened considerably over the last 2 days  Prior to Admission Medications   Prescriptions Last Dose Informant Patient Reported? Taking?    FLUoxetine (PROzac) 10 mg capsule   No No Si capsule daily   calcium carbonate (TUMS) 500 mg chewable tablet   No No   Sig: Chew 2 tablets (1,000 mg total) 3 (three) times a day as needed for indigestion or heartburn   simvastatin (ZOCOR) 5 MG tablet   Yes No   Sig: Take 5 mg by mouth daily at bedtime      Facility-Administered Medications: None       Past Medical History:   Diagnosis Date    Coronary artery disease     COVID-19 2022    History of echocardiogram 2018    Normal EF, normal LVSF  Thick and rebundent MV leaflets w/ mild posterior leaflet prolapse w/ trace regurg   Hyperlipidemia     Lyme disease     Mitral valve prolapse     Shingles        Past Surgical History:   Procedure Laterality Date    APPENDECTOMY      EYE SURGERY      HERNIA REPAIR      TONSILLECTOMY         Family History   Problem Relation Age of Onset    Heart disease Mother     No Known Problems Father      I have reviewed and agree with the history as documented  E-Cigarette/Vaping    E-Cigarette Use Never User      E-Cigarette/Vaping Substances    Nicotine No     THC No     CBD No     Flavoring No     Other No     Unknown No      Social History     Tobacco Use    Smoking status: Never Smoker    Smokeless tobacco: Never Used   Vaping Use    Vaping Use: Never used   Substance Use Topics    Alcohol use: Never     Alcohol/week: 0 0 standard drinks    Drug use: Never       Review of Systems   Constitutional: Positive for activity change, appetite change and fatigue  Negative for chills, diaphoresis and fever  HENT: Negative for congestion, ear pain, rhinorrhea, sneezing and sore throat  Eyes: Negative for discharge  Respiratory: Positive for chest tightness and shortness of breath  Negative for cough  Cardiovascular: Positive for chest pain (burning)  Negative for leg swelling  Gastrointestinal: Positive for nausea  Negative for abdominal pain, blood in stool, diarrhea and vomiting  Endocrine: Negative for polyuria  Genitourinary: Negative for difficulty urinating, dysuria, frequency and urgency  Musculoskeletal: Negative for back pain and myalgias  Skin: Negative for rash  Neurological: Positive for weakness (generalized) and light-headedness  Negative for dizziness, numbness and headaches  Hematological: Negative for adenopathy  Psychiatric/Behavioral: Positive for sleep disturbance  Negative for confusion  The patient is nervous/anxious  All other systems reviewed and are negative  Physical Exam  Physical Exam  Vitals and nursing note reviewed  Constitutional:       General: He is not in acute distress  Appearance: He is well-developed  He is not ill-appearing, toxic-appearing or diaphoretic  Comments: Articulate but anxoius; protein calorie malnourished   HENT:      Head: Normocephalic and atraumatic  Right Ear: External ear normal       Left Ear: External ear normal       Nose: Nose normal       Mouth/Throat:      Mouth: Mucous membranes are moist       Pharynx: No oropharyngeal exudate or posterior oropharyngeal erythema  Eyes:      General: No scleral icterus  Right eye: No discharge  Left eye: No discharge  Extraocular Movements: Extraocular movements intact  Conjunctiva/sclera: Conjunctivae normal       Pupils: Pupils are equal, round, and reactive to light  Cardiovascular:      Rate and Rhythm: Normal rate and regular rhythm  Pulses: Normal pulses  Heart sounds: Normal heart sounds  Pulmonary:      Effort: Pulmonary effort is normal  No respiratory distress  Breath sounds: Normal breath sounds  Abdominal:      General: Bowel sounds are normal  There is no distension  Palpations: Abdomen is soft  There is no mass  Tenderness: There is no abdominal tenderness  There is no right CVA tenderness, left CVA tenderness, guarding or rebound        Comments: Back: no mildline T or L spine tenderness   Musculoskeletal:         General: No tenderness or deformity  Normal range of motion  Cervical back: Normal range of motion and neck supple  Right lower leg: No edema  Left lower leg: No edema  Lymphadenopathy:      Cervical: No cervical adenopathy  Skin:     General: Skin is warm and dry  Neurological:      Mental Status: He is alert and oriented to person, place, and time  Cranial Nerves: No cranial nerve deficit  Sensory: No sensory deficit  Motor: No weakness or abnormal muscle tone        Coordination: Coordination normal       Deep Tendon Reflexes: Reflexes normal       Comments: Gait steady   Psychiatric:      Comments: anxoius         Vital Signs  ED Triage Vitals   Temperature Pulse Respirations Blood Pressure SpO2   05/14/22 1822 05/14/22 1822 05/14/22 1822 05/14/22 1822 05/14/22 1822   97 9 °F (36 6 °C) 88 (S) (!) 32 165/83 100 %      Temp src Heart Rate Source Patient Position - Orthostatic VS BP Location FiO2 (%)   -- 05/14/22 1830 05/14/22 1822 05/14/22 1822 --    Monitor Sitting Left arm       Pain Score       05/14/22 1822       No Pain           Vitals:    05/14/22 2000 05/14/22 2100 05/14/22 2200 05/14/22 2300   BP: 158/83 155/77 137/81 153/86   Pulse: 75 71 59 66   Patient Position - Orthostatic VS: Sitting Sitting Sitting Sitting         Visual Acuity      ED Medications  Medications   Famotidine (PF) (PEPCID) injection 40 mg (has no administration in time range)   dextrose 5 % and sodium chloride 0 9 % infusion (0 mL/hr Intravenous Stopped 5/14/22 1957)   ondansetron (ZOFRAN) injection 4 mg (4 mg Intravenous Given 5/14/22 1850)   aluminum-magnesium hydroxide-simethicone (MYLANTA) oral suspension 15 mL (15 mL Oral Given 5/14/22 2243)   Lidocaine Viscous HCl (XYLOCAINE) 2 % mucosal solution 15 mL (15 mL Swish & Spit Given 5/14/22 2243)   ondansetron (ZOFRAN) injection 4 mg (4 mg Intravenous Given 5/15/22 0035)       Diagnostic Studies  Results Reviewed     Procedure Component Value Units Date/Time    High Sensitivity Troponin I Random [[id number]]  (Abnormal) Collected: 05/15/22 0045    Lab Status: Final result Specimen: Blood from Arm, Right Updated: 05/15/22 0117     HS TnI random 4 ng/L     UA w Reflex to Microscopic w Reflex to Culture [[id number]]  (Abnormal) Collected: 05/14/22 1959    Lab Status: Final result Specimen: Urine, Clean Catch Updated: 05/14/22 2008     Color, UA Yellow     Clarity, UA Clear     Specific Gravity, UA 1 010     pH, UA 8 0     Leukocytes, UA Negative     Nitrite, UA Negative     Protein, UA Negative mg/dl      Glucose,  (1/2%) mg/dl      Ketones, UA Negative mg/dl      Urobilinogen, UA 0 2 E U /dl      Bilirubin, UA Negative     Blood, UA Negative    High Sensitivity Troponin I Random [[id number]]  (Abnormal) Collected: 05/14/22 1850    Lab Status: Final result Specimen: Blood from Arm, Right Updated: 05/14/22 1934     HS TnI random 3 ng/L     COVID/FLU/RSV - 2 hour TAT [[id number]]  (Normal) Collected: 05/14/22 1850    Lab Status: Final result Specimen: Nares from Nose Updated: 05/14/22 1934     SARS-CoV-2 Negative     INFLUENZA A PCR Negative     INFLUENZA B PCR Negative     RSV PCR Negative    Narrative:      FOR PEDIATRIC PATIENTS - copy/paste COVID Guidelines URL to browser: [email or web address]    SARS-CoV-2 assay is a Nucleic Acid Amplification assay intended for the  qualitative detection of nucleic acid from SARS-CoV-2 in nasopharyngeal  swabs  Results are for the presumptive identification of SARS-CoV-2 RNA  Positive results are indicative of infection with SARS-CoV-2, the virus  causing COVID-19, but do not rule out bacterial infection or co-infection  with other viruses  Laboratories within the [place] and its  territories are required to report all positive results to the appropriate  public health authorities   Negative results do not preclude SARS-CoV-2  infection and should not be used as the sole basis for treatment or other  patient management decisions  Negative results must be combined with  clinical observations, patient history, and epidemiological information  This test has not been FDA cleared or approved  This test has been authorized by FDA under an Emergency Use Authorization  (EUA)  This test is only authorized for the duration of time the  declaration that circumstances exist justifying the authorization of the  emergency use of an in vitro diagnostic tests for detection of SARS-CoV-2  virus and/or diagnosis of COVID-19 infection under section 564(b)(1) of  the Act, 21 U  S C  293ZUQ-1(C)(4), unless the authorization is terminated  or revoked sooner  The test has been validated but independent review by FDA  and CLIA is pending  Test performed using [employer] Gene[employer]: This RT-PCR assay targets N2,  a region unique to SARS-CoV-2  A conserved region in the E-gene was chosen  for pan-Sarbecovirus detection which includes SARS-CoV-2     TSH, 3rd generation with Free T4 reflex [[id number]]  (Normal) Collected: 05/14/22 1850    Lab Status: Final result Specimen: Blood from Arm, Right Updated: 05/14/22 1920     TSH 3RD GENERATON 2 131 uIU/mL     Narrative:      Patients undergoing fluorescein dye angiography may retain small amounts of fluorescein in the body for 48-72 hours post procedure  Samples containing fluorescein can produce falsely depressed TSH values  If the patient had this procedure,a specimen should be resubmitted post fluorescein clearance        Lipase [[id number]]  (Normal) Collected: 05/14/22 1850    Lab Status: Final result Specimen: Blood from Arm, Right Updated: 05/14/22 1920     Lipase 82 u/L     Hepatic function panel [[id number]]  (Normal) Collected: 05/14/22 1850    Lab Status: Final result Specimen: Blood from Arm, Right Updated: 05/14/22 1920     Total Bilirubin 0 61 mg/dL      Bilirubin, Direct 0 11 mg/dL      Alkaline Phosphatase 59 U/L      AST 24 U/L      ALT 22 U/L      Total Protein 7 0 g/dL      Albumin 3 8 g/dL     NT-BNP PRO [[id number]]  (Normal) Collected: 05/14/22 1850    Lab Status: Final result Specimen: Blood from Arm, Right Updated: 05/14/22 1920     NT-proBNP 156 pg/mL     Basic metabolic panel [[id number]] Collected: 05/14/22 1850    Lab Status: Final result Specimen: Blood from Arm, Right Updated: 05/14/22 1911     Sodium 137 mmol/L      Potassium 3 8 mmol/L      Chloride 100 mmol/L      CO2 31 mmol/L      ANION GAP 6 mmol/L      BUN 18 mg/dL      Creatinine 0 97 mg/dL      Glucose 118 mg/dL      Calcium 9 2 mg/dL      eGFR 73 ml/min/1 73sq m     Narrative:      [place] guidelines for Chronic Kidney Disease (CKD):     Stage 1 with normal or high GFR (GFR > 90 mL/min/1 73 square meters)    Stage 2 Mild CKD (GFR = 60-89 mL/min/1 73 square meters)    Stage 3A Moderate CKD (GFR = 45-59 mL/min/1 73 square meters)    Stage 3B Moderate CKD (GFR = 30-44 mL/min/1 73 square meters)    Stage 4 Severe CKD (GFR = 15-29 mL/min/1 73 square meters)    Stage 5 End Stage CKD (GFR <15 mL/min/1 73 square meters)  Note: GFR calculation is accurate only with a steady state creatinine    D-dimer, quantitative [[id number]]  (Normal) Collected: 05/14/22 1850    Lab Status: Final result Specimen: Blood from Arm, Right Updated: 05/14/22 1908     D-Dimer, Quant 0 31 ug/ml FEU     Narrative: In the evaluation for possible pulmonary embolism, in the appropriate (Well's Score of 4 or less) patient, the age adjusted d-dimer cutoff for this patient can be calculated as:    Age x 0 01 (in ug/mL) for Age-adjusted D-dimer exclusion threshold for a patient over 50 years      CBC and differential [[id number]] Collected: 05/14/22 1850    Lab Status: Final result Specimen: Blood from Arm, Right Updated: 05/14/22 1857     WBC 9 46 Thousand/uL      RBC 4 25 Million/uL      Hemoglobin 12 7 g/dL      Hematocrit 39 1 %      MCV 92 fL      MCH 29 9 pg      MCHC 32 5 g/dL RDW 13 0 %      MPV 10 7 fL      Platelets 133 Thousands/uL      nRBC 0 /100 WBCs      Neutrophils Relative 64 %      Immat GRANS % 0 %      Lymphocytes Relative 25 %      Monocytes Relative 9 %      Eosinophils Relative 1 %      Basophils Relative 1 %      Neutrophils Absolute 6 09 Thousands/µL      Immature Grans Absolute 0 03 Thousand/uL      Lymphocytes Absolute 2 37 Thousands/µL      Monocytes Absolute 0 82 Thousand/µL      Eosinophils Absolute 0 07 Thousand/µL      Basophils Absolute 0 08 Thousands/µL                  XR chest 1 view portable   ED Interpretation by Cheryl Aguilar MD (05/14 2002)   Read by me; Radiologist to provide formal interpretation   No acute process no significant change from prev 4/11/22                 Procedures  ECG 12 Lead Documentation Only    Date/Time: 5/14/2022 6:25 PM  Performed by: Cheryl Aguilar MD  Authorized by: Cheryl Aguilar MD     Indications / Diagnosis:  Sob  ECG reviewed by me, the ED Provider: yes    Patient location:  ED  Previous ECG:     Previous ECG:  Compared to current    Comparison ECG info:  4/11/2 10:48    Similarity:  No change  Rate:     ECG rate:  87    ECG rate assessment: normal    Rhythm:     Rhythm: sinus rhythm    QRS:     QRS axis:  Normal  Comments:      No acute ischemic changes    ECG 12 Lead Documentation Only    Date/Time: 5/15/2022 12:50 AM  Performed by: Cheryl Aguilar MD  Authorized by: Cheryl Aguilar MD     Indications / Diagnosis:  Chest burning  Patient location:  ED  Previous ECG:     Previous ECG:  Compared to current    Comparison ECG info:  5/14/22 1817    Similarity:  No change  Rate:     ECG rate:  69    ECG rate assessment: normal    Rhythm:     Rhythm: sinus rhythm    QRS:     QRS axis:  Normal  Comments:      No no acute ischemic changes             ED Course  ED Course as of 05/15/22 0143   Sat May 14, 2022   2234 Patient feels slightly improved continues to complain of intermittent belching no further respiratory distress reviewed lab results  Will try GI cocktail   2356 Patient feels markedly better after GI cocktail no further belching or burning sensation   Sun May 15, 2022   0038 Patient resting easily awakened  Reviewed with patient that he I recommend that he hold his fluoxetine until he can recheck with Dr Ivan Dong recommend he be placed back on something for reflux I am recommending famotidine 40 mg daily and will prescribe him some Carafate as well as Zofran  Patient then complained of starting to not feel well vital signs recheck were normal will readministered Zofran and recheck EKG and troponin  0139 Easily awakened updated on repeat EKG and repeat tropnonin being normal reviewed discharge instructions in detail agreeable to discharge             HEART Risk Score    Flowsheet Row Most Recent Value   Heart Score Risk Calculator    History 0 Filed at: 05/15/2022 0055   ECG 1 Filed at: 05/15/2022 0055   Age 2 Filed at: 05/15/2022 0055   Risk Factors 2 Filed at: 05/15/2022 0055   Troponin 0 Filed at: 05/15/2022 0055   HEART Score 5 Filed at: 05/15/2022 0055                        SBIRT 20yo+    Flowsheet Row Most Recent Value   SBIRT (23 yo +)    In order to provide better care to our patients, we are screening all of our patients for alcohol and drug use  Would it be okay to ask you these screening questions? Yes Filed at: 05/14/2022 1950   Initial Alcohol Screen: US AUDIT-C     1  How often do you have a drink containing alcohol? 0 Filed at: 05/14/2022 1950   2  How many drinks containing alcohol do you have on a typical day you are drinking? 0 Filed at: 05/14/2022 1950   3a  Male UNDER 65: How often do you have five or more drinks on one occasion? 0 Filed at: 05/14/2022 1951   3b  FEMALE Any Age, or MALE 65+: How often do you have 4 or more drinks on one occassion? 0 Filed at: 05/14/2022 1951   Audit-C Score 0 Filed at: 05/14/2022 1951   JEFFREY: How many times in the past year have you        Used an illegal drug or used a prescription medication for non-medical reasons? Never Filed at: 05/14/2022 1950                    [facility]  Number of Diagnoses or Management Options  Diagnosis management comments: Mdm:  Patient complaining of chest tightness relieved by belching with worsening breathing with lying flat will assess for CHF, initiate symptomatic management Zofran as well as famotidine, assess for pulmonary embolism recurrent COVID, acute coronary syndrome  Disposition  Final diagnoses:   GERD (gastroesophageal reflux disease)   Dyspnea     Time reflects when diagnosis was documented in both MDM as applicable and the Disposition within this note     Time User Action Codes Description Comment    5/15/2022  1:21 AM [place] Add [K21 9,  R06 81] GERD with apnea     5/15/2022  1:21 AM Nannette Landers [K21 9,  R06 81] GERD with apnea     5/15/2022  1:21 AM [place] Add [K21 9] GERD (gastroesophageal reflux disease)     5/15/2022  1:21 AM [place] Add [R06 00] Dyspnea       ED Disposition     ED Disposition   Discharge    Condition   Stable    Date/Time   Sun May 15, 2022  1:39 AM    Comment   Frankie Chandler discharge to home/self care                 Follow-up Information     Follow up With Specialties Details Why Contact Info Additional Information    Marcia Romero DO Family Medicine Call in 2 days discuss whether to switch new medication (fluoxetine) [address] ([place] [address]) [place] [address] [facility] Pulmonology Call in 2 days recheck lung symptoms [address] [phone] [address]          Patient's Medications   Discharge Prescriptions    FAMOTIDINE (PEPCID) 40 MG TABLET    Take 1 tablet (40 mg total) by mouth daily at bedtime for 20 days       Start Date: 5/15/2022 End Date: 6/4/2022       Order Dose: 40 mg       Quantity: 20 tablet    Refills: 0    ONDANSETRON (ZOFRAN-ODT) 4 MG DISINTEGRATING TABLET    Take 1 tablet (4 mg total) by mouth every 8 (eight) hours as needed for nausea or vomiting for up to 20 doses       Start Date: 5/15/2022 End Date: --       Order Dose: 4 mg       Quantity: 20 tablet    Refills: 0    SUCRALFATE (CARAFATE) 1 G TABLET    Dissolve 1  tablet in 2 tablespoons of water take by mouth 3 times a day for 1 week       Start Date: 5/15/2022 End Date: --       Order Dose: --       Quantity: 21 tablet    Refills: 0           PDMP Review       Value Time User    PDMP Reviewed  Yes 5/11/2022 11:29 AM Payam Pope DO          ED Provider  Electronically Signed by           Ulises Payne MD  05/15/22 2122

## 2022-05-15 LAB — CARDIAC TROPONIN I PNL SERPL HS: 4 NG/L (ref 8–18)

## 2022-05-15 PROCEDURE — 36415 COLL VENOUS BLD VENIPUNCTURE: CPT | Performed by: EMERGENCY MEDICINE

## 2022-05-15 PROCEDURE — 84484 ASSAY OF TROPONIN QUANT: CPT | Performed by: EMERGENCY MEDICINE

## 2022-05-15 PROCEDURE — 96376 TX/PRO/DX INJ SAME DRUG ADON: CPT

## 2022-05-15 RX ORDER — SUCRALFATE 1 G/1
TABLET ORAL
Qty: 21 TABLET | Refills: 0 | Status: SHIPPED | OUTPATIENT
Start: 2022-05-15 | End: 2022-05-24 | Stop reason: SDUPTHER

## 2022-05-15 RX ORDER — ONDANSETRON 4 MG/1
4 TABLET, ORALLY DISINTEGRATING ORAL EVERY 8 HOURS PRN
Qty: 20 TABLET | Refills: 0 | Status: SHIPPED | OUTPATIENT
Start: 2022-05-15 | End: 2022-05-24 | Stop reason: ALTCHOICE

## 2022-05-15 RX ORDER — ONDANSETRON 2 MG/ML
4 INJECTION INTRAMUSCULAR; INTRAVENOUS ONCE
Status: COMPLETED | OUTPATIENT
Start: 2022-05-15 | End: 2022-05-15

## 2022-05-15 RX ORDER — FAMOTIDINE 40 MG/1
40 TABLET, FILM COATED ORAL
Qty: 20 TABLET | Refills: 0 | Status: SHIPPED | OUTPATIENT
Start: 2022-05-15 | End: 2022-05-24 | Stop reason: SDUPTHER

## 2022-05-15 RX ADMIN — ONDANSETRON 4 MG: 2 INJECTION INTRAMUSCULAR; INTRAVENOUS at 00:35

## 2022-05-15 NOTE — DISCHARGE INSTRUCTIONS
Hold fluoxetine until recheck with Dr Joni Mays  Famotidine 40mg daily to cut stomach acid take at bedtime  Sucralfate tablet resolved in 2 tbsp of water take by mouth 3 times a day for 1 week  Sleeping in easy chair or with extra pillows may help symptoms  Zofran as needed for nausea  Return to ED with any new or worsening symptoms  Follow up with pulmonary clinic the breathing symptoms are likely secondary to your acid reflux

## 2022-05-16 ENCOUNTER — RA CDI HCC (OUTPATIENT)
Dept: OTHER | Facility: HOSPITAL | Age: 77
End: 2022-05-16

## 2022-05-16 LAB
ATRIAL RATE: 69 BPM
ATRIAL RATE: 87 BPM
P AXIS: 70 DEGREES
P AXIS: 87 DEGREES
PR INTERVAL: 136 MS
PR INTERVAL: 160 MS
QRS AXIS: 75 DEGREES
QRS AXIS: 77 DEGREES
QRSD INTERVAL: 74 MS
QRSD INTERVAL: 76 MS
QT INTERVAL: 366 MS
QT INTERVAL: 400 MS
QTC INTERVAL: 428 MS
QTC INTERVAL: 440 MS
T WAVE AXIS: 78 DEGREES
T WAVE AXIS: 88 DEGREES
VENTRICULAR RATE: 69 BPM
VENTRICULAR RATE: 87 BPM

## 2022-05-16 NOTE — RESULT ENCOUNTER NOTE
Call patient to notify normal results vitamin-D level is just in within the normal limits but it is at the very lower limits limits of normal

## 2022-05-16 NOTE — PROGRESS NOTES
[facility]  coding opportunities       Chart reviewed, no opportunity found: CHART REVIEWED, NO OPPORTUNITY FOUND        Patients Insurance     Medicare Insurance: [employer] [facility] Advantage

## 2022-05-17 ENCOUNTER — APPOINTMENT (EMERGENCY)
Dept: RADIOLOGY | Facility: HOSPITAL | Age: 77
DRG: 880 | End: 2022-05-17
Payer: COMMERCIAL

## 2022-05-17 ENCOUNTER — TELEPHONE (OUTPATIENT)
Dept: FAMILY MEDICINE CLINIC | Facility: CLINIC | Age: 77
End: 2022-05-17

## 2022-05-17 ENCOUNTER — HOSPITAL ENCOUNTER (INPATIENT)
Facility: HOSPITAL | Age: 77
LOS: 2 days | Discharge: HOME | DRG: 880 | End: 2022-05-20
Attending: EMERGENCY MEDICINE | Admitting: INTERNAL MEDICINE
Payer: COMMERCIAL

## 2022-05-17 DIAGNOSIS — E43 SEVERE PROTEIN-CALORIE MALNUTRITION (HCC): ICD-10-CM

## 2022-05-17 DIAGNOSIS — R45.851 PASSIVE SUICIDAL IDEATIONS: ICD-10-CM

## 2022-05-17 DIAGNOSIS — R07.9 CHEST PAIN: Primary | ICD-10-CM

## 2022-05-17 DIAGNOSIS — F41.9 ANXIETY: ICD-10-CM

## 2022-05-17 DIAGNOSIS — F32.A DEPRESSION: ICD-10-CM

## 2022-05-17 LAB
2HR DELTA HS TROPONIN: 1 NG/L
4HR DELTA HS TROPONIN: 1 NG/L
ALBUMIN SERPL BCP-MCNC: 4 G/DL (ref 3.5–5)
ALP SERPL-CCNC: 65 U/L (ref 46–116)
ALT SERPL W P-5'-P-CCNC: 24 U/L (ref 12–78)
ANION GAP SERPL CALCULATED.3IONS-SCNC: 7 MMOL/L (ref 4–13)
AST SERPL W P-5'-P-CCNC: 26 U/L (ref 5–45)
BASOPHILS # BLD AUTO: 0.07 THOUSANDS/ΜL (ref 0–0.1)
BASOPHILS NFR BLD AUTO: 1 % (ref 0–1)
BILIRUB SERPL-MCNC: 0.5 MG/DL (ref 0.2–1)
BUN SERPL-MCNC: 25 MG/DL (ref 5–25)
CALCIUM SERPL-MCNC: 9.6 MG/DL (ref 8.3–10.1)
CARDIAC TROPONIN I PNL SERPL HS: 3 NG/L
CARDIAC TROPONIN I PNL SERPL HS: 4 NG/L
CARDIAC TROPONIN I PNL SERPL HS: 4 NG/L
CHLORIDE SERPL-SCNC: 100 MMOL/L (ref 100–108)
CO2 SERPL-SCNC: 32 MMOL/L (ref 21–32)
CREAT SERPL-MCNC: 1.03 MG/DL (ref 0.6–1.3)
EOSINOPHIL # BLD AUTO: 0.05 THOUSAND/ΜL (ref 0–0.61)
EOSINOPHIL NFR BLD AUTO: 1 % (ref 0–6)
ERYTHROCYTE [DISTWIDTH] IN BLOOD BY AUTOMATED COUNT: 12.8 % (ref 11.6–15.1)
GFR SERPL CREATININE-BSD FRML MDRD: 68 ML/MIN/1.73SQ M
GLUCOSE SERPL-MCNC: 106 MG/DL (ref 65–140)
HCT VFR BLD AUTO: 38.1 % (ref 36.5–49.3)
HGB BLD-MCNC: 12.8 G/DL (ref 12–17)
IMM GRANULOCYTES # BLD AUTO: 0.02 THOUSAND/UL (ref 0–0.2)
IMM GRANULOCYTES NFR BLD AUTO: 0 % (ref 0–2)
LIPASE SERPL-CCNC: 72 U/L (ref 73–393)
LYMPHOCYTES # BLD AUTO: 1.78 THOUSANDS/ΜL (ref 0.6–4.47)
LYMPHOCYTES NFR BLD AUTO: 21 % (ref 14–44)
MCH RBC QN AUTO: 29.6 PG (ref 26.8–34.3)
MCHC RBC AUTO-ENTMCNC: 33.6 G/DL (ref 31.4–37.4)
MCV RBC AUTO: 88 FL (ref 82–98)
MONOCYTES # BLD AUTO: 0.62 THOUSAND/ΜL (ref 0.17–1.22)
MONOCYTES NFR BLD AUTO: 7 % (ref 4–12)
NEUTROPHILS # BLD AUTO: 5.83 THOUSANDS/ΜL (ref 1.85–7.62)
NEUTS SEG NFR BLD AUTO: 70 % (ref 43–75)
NRBC BLD AUTO-RTO: 0 /100 WBCS
NT-PROBNP SERPL-MCNC: 173 PG/ML
PLATELET # BLD AUTO: 211 THOUSANDS/UL (ref 149–390)
PLATELET # BLD AUTO: 235 THOUSANDS/UL (ref 149–390)
PMV BLD AUTO: 10.5 FL (ref 8.9–12.7)
PMV BLD AUTO: 10.5 FL (ref 8.9–12.7)
POTASSIUM SERPL-SCNC: 3.7 MMOL/L (ref 3.5–5.3)
PROT SERPL-MCNC: 7.3 G/DL (ref 6.4–8.2)
RBC # BLD AUTO: 4.32 MILLION/UL (ref 3.88–5.62)
SODIUM SERPL-SCNC: 139 MMOL/L (ref 136–145)
WBC # BLD AUTO: 8.37 THOUSAND/UL (ref 4.31–10.16)

## 2022-05-17 PROCEDURE — 85025 COMPLETE CBC W/AUTO DIFF WBC: CPT | Performed by: EMERGENCY MEDICINE

## 2022-05-17 PROCEDURE — 80053 COMPREHEN METABOLIC PANEL: CPT | Performed by: EMERGENCY MEDICINE

## 2022-05-17 PROCEDURE — 84484 ASSAY OF TROPONIN QUANT: CPT

## 2022-05-17 PROCEDURE — 93005 ELECTROCARDIOGRAM TRACING: CPT

## 2022-05-17 PROCEDURE — 96361 HYDRATE IV INFUSION ADD-ON: CPT

## 2022-05-17 PROCEDURE — 96360 HYDRATION IV INFUSION INIT: CPT

## 2022-05-17 PROCEDURE — 71046 X-RAY EXAM CHEST 2 VIEWS: CPT

## 2022-05-17 PROCEDURE — 85049 AUTOMATED PLATELET COUNT: CPT

## 2022-05-17 PROCEDURE — 99285 EMERGENCY DEPT VISIT HI MDM: CPT

## 2022-05-17 PROCEDURE — 36415 COLL VENOUS BLD VENIPUNCTURE: CPT | Performed by: EMERGENCY MEDICINE

## 2022-05-17 PROCEDURE — 83880 ASSAY OF NATRIURETIC PEPTIDE: CPT | Performed by: EMERGENCY MEDICINE

## 2022-05-17 PROCEDURE — 83690 ASSAY OF LIPASE: CPT | Performed by: EMERGENCY MEDICINE

## 2022-05-17 PROCEDURE — 84484 ASSAY OF TROPONIN QUANT: CPT | Performed by: EMERGENCY MEDICINE

## 2022-05-17 PROCEDURE — 99285 EMERGENCY DEPT VISIT HI MDM: CPT | Performed by: EMERGENCY MEDICINE

## 2022-05-17 RX ORDER — FAMOTIDINE 20 MG/1
40 TABLET, FILM COATED ORAL
Status: DISCONTINUED | OUTPATIENT
Start: 2022-05-17 | End: 2022-05-20 | Stop reason: HOSPADM

## 2022-05-17 RX ORDER — LIDOCAINE HYDROCHLORIDE 20 MG/ML
10 SOLUTION OROPHARYNGEAL ONCE
Status: COMPLETED | OUTPATIENT
Start: 2022-05-17 | End: 2022-05-17

## 2022-05-17 RX ORDER — ONDANSETRON 2 MG/ML
4 INJECTION INTRAMUSCULAR; INTRAVENOUS EVERY 6 HOURS PRN
Status: DISCONTINUED | OUTPATIENT
Start: 2022-05-17 | End: 2022-05-20 | Stop reason: HOSPADM

## 2022-05-17 RX ORDER — CALCIUM CARBONATE 200(500)MG
1000 TABLET,CHEWABLE ORAL 3 TIMES DAILY PRN
Status: DISCONTINUED | OUTPATIENT
Start: 2022-05-17 | End: 2022-05-20 | Stop reason: HOSPADM

## 2022-05-17 RX ORDER — MAGNESIUM HYDROXIDE/ALUMINUM HYDROXICE/SIMETHICONE 120; 1200; 1200 MG/30ML; MG/30ML; MG/30ML
30 SUSPENSION ORAL ONCE
Status: COMPLETED | OUTPATIENT
Start: 2022-05-17 | End: 2022-05-17

## 2022-05-17 RX ORDER — ALPRAZOLAM 0.25 MG/1
0.25 TABLET ORAL ONCE
Status: COMPLETED | OUTPATIENT
Start: 2022-05-17 | End: 2022-05-17

## 2022-05-17 RX ORDER — SUCRALFATE 1 G/1
1 TABLET ORAL
Status: DISCONTINUED | OUTPATIENT
Start: 2022-05-18 | End: 2022-05-20 | Stop reason: HOSPADM

## 2022-05-17 RX ORDER — FLUOXETINE 10 MG/1
10 CAPSULE ORAL DAILY
Status: DISCONTINUED | OUTPATIENT
Start: 2022-05-18 | End: 2022-05-19

## 2022-05-17 RX ORDER — PRAVASTATIN SODIUM 20 MG
10 TABLET ORAL
Status: DISCONTINUED | OUTPATIENT
Start: 2022-05-18 | End: 2022-05-20 | Stop reason: HOSPADM

## 2022-05-17 RX ORDER — ENOXAPARIN SODIUM 100 MG/ML
40 INJECTION SUBCUTANEOUS DAILY
Status: DISCONTINUED | OUTPATIENT
Start: 2022-05-18 | End: 2022-05-20 | Stop reason: HOSPADM

## 2022-05-17 RX ADMIN — LIDOCAINE HYDROCHLORIDE 10 ML: 20 SOLUTION ORAL; TOPICAL at 19:00

## 2022-05-17 RX ADMIN — ALPRAZOLAM 0.25 MG: 0.25 TABLET ORAL at 22:14

## 2022-05-17 RX ADMIN — FAMOTIDINE 40 MG: 20 TABLET ORAL at 22:48

## 2022-05-17 RX ADMIN — ALUMINA, MAGNESIA, AND SIMETHICONE ORAL SUSPENSION REGULAR STRENGTH 30 ML: 1200; 1200; 120 SUSPENSION ORAL at 19:01

## 2022-05-17 RX ADMIN — SODIUM CHLORIDE 500 ML: 0.9 INJECTION, SOLUTION INTRAVENOUS at 18:58

## 2022-05-17 NOTE — ED PROVIDER NOTES
History  Chief Complaint   Patient presents with    Heartburn     Patient was seen in ER on Saturday, has hx of acid reflux  Patient c/o of chest tightness and epigastric pain  Denies SOB  This is a 66-year-old male presents emergency department with chest pain  The patient states he has been having chest pain for quite some time now  He awoke with chest pain this morning  It is substernal   He says it got worse at 3:00 a m  In the called his PCP  He did not get a call back so came to the emergency department  Patient has seen Cardiology for the same  He has had diagnosis of GERD  He had a negative HIDA scan  He has had recent EGD and colonoscopy  Also recently saw Cardiology  It does appear patient had COVID several months ago and appears that he had a viral pericarditis that was treated with NSAIDs  Repeat CRP in April was negative  Patient has been taking Tums and Pepcid  He has also been taking Carafate since Saturday  He states that this is not giving him significant relief  The chest pain is not exertional   Patient also states that he is not sleeping well over the last several days he states that he has not slept at night time  He seems very anxious during exam   Patient also states he has had decreased p o  Intake  Review of chart does show that patient has nuclear medicine stress test scheduled for 2022 by his cardiologist   Patient also states that the last 2 days he has been constipated  He describes manually disimpact himself today and was able to pass a fair amount of stool  Prior to Admission Medications   Prescriptions Last Dose Informant Patient Reported? Taking?    FLUoxetine (PROzac) 10 mg capsule   No No   Si capsule daily   calcium carbonate (TUMS) 500 mg chewable tablet   No No   Sig: Chew 2 tablets (1,000 mg total) 3 (three) times a day as needed for indigestion or heartburn   famotidine (PEPCID) 40 MG tablet   No No   Sig: Take 1 tablet (40 mg total) by mouth daily at bedtime for 20 days   ondansetron (ZOFRAN-ODT) 4 mg disintegrating tablet   No No   Sig: Take 1 tablet (4 mg total) by mouth every 8 (eight) hours as needed for nausea or vomiting for up to 20 doses   simvastatin (ZOCOR) 5 MG tablet   Yes No   Sig: Take 5 mg by mouth daily at bedtime   sucralfate (CARAFATE) 1 g tablet   No No   Sig: Dissolve 1  tablet in 2 tablespoons of water take by mouth 3 times a day for 1 week      Facility-Administered Medications: None       Past Medical History:   Diagnosis Date    Coronary artery disease     COVID-19 01/28/2022    History of echocardiogram 03/22/2018    Normal EF, normal LVSF  Thick and rebundent MV leaflets w/ mild posterior leaflet prolapse w/ trace regurg   Hyperlipidemia     Lyme disease     Mitral valve prolapse     Shingles        Past Surgical History:   Procedure Laterality Date    APPENDECTOMY      EYE SURGERY      HERNIA REPAIR      TONSILLECTOMY         Family History   Problem Relation Age of Onset    Heart disease Mother     No Known Problems Father      I have reviewed and agree with the history as documented  E-Cigarette/Vaping    E-Cigarette Use Never User      E-Cigarette/Vaping Substances    Nicotine No     THC No     CBD No     Flavoring No     Other No     Unknown No      Social History     Tobacco Use    Smoking status: Never Smoker    Smokeless tobacco: Never Used   Vaping Use    Vaping Use: Never used   Substance Use Topics    Alcohol use: Never     Alcohol/week: 0 0 standard drinks    Drug use: Never       Review of Systems   Constitutional: Negative for activity change, appetite change and fever  HENT: Negative for congestion, ear pain, rhinorrhea and sore throat  Eyes: Negative for pain, discharge and redness  Respiratory: Positive for chest tightness  Negative for cough, shortness of breath and wheezing  Cardiovascular: Positive for chest pain  Negative for palpitations and leg swelling  Gastrointestinal: Negative for abdominal pain, diarrhea, nausea and vomiting  Endocrine: Negative for polyuria  Genitourinary: Negative for difficulty urinating, dysuria, frequency and urgency  Musculoskeletal: Negative for arthralgias and myalgias  Skin: Negative for color change and rash  Allergic/Immunologic: Negative for immunocompromised state  Neurological: Negative for dizziness, syncope and light-headedness  Hematological: Does not bruise/bleed easily  Psychiatric/Behavioral: Negative for confusion  The patient is nervous/anxious  All other systems reviewed and are negative  Physical Exam  Physical Exam  Vitals and nursing note reviewed  Constitutional:       General: He is not in acute distress  Appearance: He is well-developed  HENT:      Head: Normocephalic and atraumatic  Nose: Nose normal    Eyes:      General: No scleral icterus  Conjunctiva/sclera: Conjunctivae normal    Cardiovascular:      Rate and Rhythm: Normal rate and regular rhythm  Heart sounds: Normal heart sounds  Pulmonary:      Effort: Pulmonary effort is normal  No respiratory distress  Breath sounds: Normal breath sounds  No stridor  No wheezing  Abdominal:      General: There is no distension  Palpations: Abdomen is soft  Tenderness: There is no abdominal tenderness  There is no guarding or rebound  Musculoskeletal:         General: No deformity  Cervical back: Normal range of motion and neck supple  Skin:     General: Skin is warm and dry  Findings: No rash  Neurological:      Mental Status: He is alert and oriented to person, place, and time  Psychiatric:         Thought Content: Thought content normal       Comments: Patient is very anxious           Vital Signs  ED Triage Vitals [05/17/22 1833]   Temperature Pulse Respirations Blood Pressure SpO2   98 °F (36 7 °C) 86 16 (!) 189/84 99 %      Temp Source Heart Rate Source Patient Position - Orthostatic VS BP Location FiO2 (%)   Temporal Monitor Lying Left arm --      Pain Score       3           Vitals:    05/17/22 1900 05/17/22 1930 05/17/22 2030 05/17/22 2230   BP: 144/75 (!) 172/86 139/70 (!) 178/85   Pulse: 81 71 70 78   Patient Position - Orthostatic VS: Lying Lying Lying Lying         Visual Acuity      ED Medications  Medications   famotidine (PEPCID) tablet 40 mg (40 mg Oral Given 5/17/22 2248)   FLUoxetine (PROzac) capsule 10 mg (has no administration in time range)   pravastatin (PRAVACHOL) tablet 10 mg (has no administration in time range)   sucralfate (CARAFATE) tablet 1 g (has no administration in time range)   calcium carbonate (TUMS) chewable tablet 1,000 mg (has no administration in time range)   ondansetron (ZOFRAN) injection 4 mg (has no administration in time range)   enoxaparin (LOVENOX) subcutaneous injection 40 mg (has no administration in time range)   aluminum-magnesium hydroxide-simethicone (MYLANTA) oral suspension 30 mL (30 mL Oral Given 5/17/22 1901)   Lidocaine Viscous HCl (XYLOCAINE) 2 % mucosal solution 10 mL (10 mL Swish & Swallow Given 5/17/22 1900)   sodium chloride 0 9 % bolus 500 mL (0 mL Intravenous Stopped 5/17/22 2041)   ALPRAZolam (XANAX) tablet 0 25 mg (0 25 mg Oral Given 5/17/22 2214)       Diagnostic Studies  Results Reviewed     Procedure Component Value Units Date/Time    HS Troponin I 4hr [[id number]]  (Normal) Collected: 05/17/22 2255    Lab Status: Final result Specimen: Blood from Arm, Right Updated: 05/17/22 2324     hs TnI 4hr 4 ng/L      Delta 4hr hsTnI 1 ng/L     Platelet count [[id number]]  (Normal) Collected: 05/17/22 2253    Lab Status: Final result Specimen: Blood from Arm, Right Updated: 05/17/22 2301     Platelets 270 Thousands/uL      MPV 10 5 fL     HS Troponin I 2hr [[id number]]  (Normal) Collected: 05/17/22 2052    Lab Status: Final result Specimen: Blood from Arm, Right Updated: 05/17/22 2121     hs TnI 2hr 4 ng/L      Delta 2hr hsTnI 1 ng/L     Lipase [[id number]]  (Abnormal) Collected: 05/17/22 1857    Lab Status: Final result Specimen: Blood from Arm, Right Updated: 05/17/22 1929     Lipase 72 u/L     HS Troponin 0hr (reflex protocol) [[id number]]  (Normal) Collected: 05/17/22 1857    Lab Status: Final result Specimen: Blood from Arm, Right Updated: 05/17/22 1929     hs TnI 0hr 3 ng/L     NT-BNP PRO [[id number]]  (Normal) Collected: 05/17/22 1857    Lab Status: Final result Specimen: Blood from Arm, Right Updated: 05/17/22 1929     NT-proBNP 173 pg/mL     Comprehensive metabolic panel [[id number]] Collected: 05/17/22 1857    Lab Status: Final result Specimen: Blood from Arm, Right Updated: 05/17/22 1922     Sodium 139 mmol/L      Potassium 3 7 mmol/L      Chloride 100 mmol/L      CO2 32 mmol/L      ANION GAP 7 mmol/L      BUN 25 mg/dL      Creatinine 1 03 mg/dL      Glucose 106 mg/dL      Calcium 9 6 mg/dL      AST 26 U/L      ALT 24 U/L      Alkaline Phosphatase 65 U/L      Total Protein 7 3 g/dL      Albumin 4 0 g/dL      Total Bilirubin 0 50 mg/dL      eGFR 68 ml/min/1 73sq m     Narrative:      [place] guidelines for Chronic Kidney Disease (CKD):     Stage 1 with normal or high GFR (GFR > 90 mL/min/1 73 square meters)    Stage 2 Mild CKD (GFR = 60-89 mL/min/1 73 square meters)    Stage 3A Moderate CKD (GFR = 45-59 mL/min/1 73 square meters)    Stage 3B Moderate CKD (GFR = 30-44 mL/min/1 73 square meters)    Stage 4 Severe CKD (GFR = 15-29 mL/min/1 73 square meters)    Stage 5 End Stage CKD (GFR <15 mL/min/1 73 square meters)  Note: GFR calculation is accurate only with a steady state creatinine    CBC and differential [[id number]] Collected: 05/17/22 1857    Lab Status: Final result Specimen: Blood from Arm, Right Updated: 05/17/22 1907     WBC 8 37 Thousand/uL      RBC 4 32 Million/uL      Hemoglobin 12 8 g/dL      Hematocrit 38 1 %      MCV 88 fL      MCH 29 6 pg      MCHC 33 6 g/dL      RDW 12 8 %      MPV 10 5 fL Platelets 259 Thousands/uL      nRBC 0 /100 WBCs      Neutrophils Relative 70 %      Immat GRANS % 0 %      Lymphocytes Relative 21 %      Monocytes Relative 7 %      Eosinophils Relative 1 %      Basophils Relative 1 %      Neutrophils Absolute 5 83 Thousands/µL      Immature Grans Absolute 0 02 Thousand/uL      Lymphocytes Absolute 1 78 Thousands/µL      Monocytes Absolute 0 62 Thousand/µL      Eosinophils Absolute 0 05 Thousand/µL      Basophils Absolute 0 07 Thousands/µL                  XR chest 2 views   ED Interpretation by Chanel Woods MD (05/17 2035)   No acute disease  Final Result by Arturo Reid MD (05/17 2357)      Stable findings without acute cardiopulmonary disease  Workstation performed: [id number]                    Procedures  ECG 12 Lead Documentation Only    Date/Time: 5/17/2022 6:53 PM  Performed by: Chanel Woods MD  Authorized by: Chanel Woods MD     Indications / Diagnosis:  Chest pain  ECG reviewed by me, the ED Provider: yes    Patient location:  ED  Previous ECG:     Previous ECG:  Compared to current    Comparison ECG info:  5/15/22    Similarity:  No change  Rate:     ECG rate assessment: normal    Rhythm:     Rhythm: sinus rhythm    Ectopy:     Ectopy: none    QRS:     QRS intervals:  Normal  Conduction:     Conduction: normal    ST segments:     ST segments:  Normal  T waves:     T waves: normal    Q waves:     Q waves:  V1 and V2 (Present on prior EKGs )             ED Course  ED Course as of 05/18/22 0001   Tue May 17, 2022   2023 Chest pain is greater than 3 hours and troponin is less than 4     2145 Patient is stating "I'm not suicidal but if there were a pill at home that I knew would kill me I would take it" he states this is because he is feeling so ill  I feel there is a high likelihood of this being related to anxiety and depression   Patient is scheduled for stress test on 5/20/22 and needs this to complete medical clearance  Discussed with SLIM and will admit for continued workup of chest pain as patient has risk factors  HEART score:    History 0=Slightly or non-suspicious   ECG 1=Nonspecific repolarization disturbance   Age 2= > 65 years   Risk Factors 2= > 3 risk factors, or history of atherosclerotic disease   Troponin 0= Less than or equal to 12 ng/L   Total 5                                    RIMMA Risk Score    Flowsheet Row Most Recent Value   Age >= 72 1 Filed at: 05/17/2022 2152   Known CAD (stenosis >= 50%) 1 Filed at: 05/17/2022 2152   Recent (<=24 hrs) Service Angina 1 Filed at: 05/17/2022 2152   ST Deviation >= 0 5 mm 0 Filed at: 05/17/2022 2152   3+ CAD Risk Factors (FHx, HTN, HLP, DM, Smoker) 0 Filed at: 05/17/2022 2152   Aspirin Use Past 7 Days 0 Filed at: 05/17/2022 2152   Elevated Cardiac Markers 0 Filed at: 05/17/2022 2152   RIMMA Risk Score (Calculated) 3 Filed at: 05/17/2022 2152                  MDM  Number of Diagnoses or Management Options     Amount and/or Complexity of Data Reviewed  Clinical lab tests: ordered and reviewed  Tests in the radiology section of CPT®: ordered and reviewed  Discuss the patient with other providers: yes  Independent visualization of images, tracings, or specimens: yes        Disposition  Final diagnoses:   Chest pain   Depression   Anxiety     Time reflects when diagnosis was documented in both MDM as applicable and the Disposition within this note     Time User Action Codes Description Comment    5/17/2022  9:44 PM Regina Jeyson Add [R07 9] Chest pain     5/17/2022  9:44 PM Regina Jeyson Add [F32  A] Depression     5/17/2022  9:44 PM Regina Jeyson Add [F41 9] Anxiety     5/17/2022 11:14 PM Reshma Adrianna Add [R45 851] Passive suicidal ideations     5/17/2022 11:15 PM Reshma Adrianna Modify [Q90 450] Passive suicidal ideations       ED Disposition     ED Disposition   Admit    Condition   Stable    Date/Time   Tue May 17, 2022  9:44 PM Comment   Case was discussed with ANASTACIO AP and the patient's admission status was agreed to be Admission Status: observation status to the service of Dr Inna hernandez   Follow-up Information    None         Patient's Medications   Discharge Prescriptions    No medications on file       No discharge procedures on file      PDMP Review       Value Time User    PDMP Reviewed  Yes 5/11/2022 11:29 AM Carolyn Garnica DO          ED Provider  Electronically Signed by           Dewayne Cabezas MD  05/18/22 0001

## 2022-05-17 NOTE — ED NOTES
Patient stated "I either need help getting this fixed or I want to die"  I asked the patient if he felt again suicidal and he stated "I do not feel suicidal, but I either want to get help or I want to die"  Provider aware               Arlin Lazcano RN  05/17/22 7747

## 2022-05-17 NOTE — TELEPHONE ENCOUNTER
Pt was in the emergency room this past Saturday with same symptoms he has had ongoing for the past 4 months  C/O sick in the stomach, tight in the chest and cannot sleep at night  States he feels like he is suffocating and Oxygen level is always 98-99  Dr Carlotta Dunn had him stop the fluoxetine you prescribed him at last visit because pt states he took it for 4 days and felt like it made his symptoms worse   She prescribed him Famotadine 40 mg daily and Sucralafate TID

## 2022-05-18 ENCOUNTER — APPOINTMENT (OUTPATIENT)
Dept: NON INVASIVE DIAGNOSTICS | Facility: HOSPITAL | Age: 77
DRG: 880 | End: 2022-05-18
Payer: COMMERCIAL

## 2022-05-18 ENCOUNTER — APPOINTMENT (OUTPATIENT)
Dept: NUCLEAR MEDICINE | Facility: HOSPITAL | Age: 77
DRG: 880 | End: 2022-05-18
Payer: COMMERCIAL

## 2022-05-18 PROBLEM — R45.851 PASSIVE SUICIDAL IDEATIONS: Status: ACTIVE | Noted: 2022-05-18

## 2022-05-18 PROBLEM — F41.9 ANXIETY: Status: ACTIVE | Noted: 2022-05-18

## 2022-05-18 LAB
AORTIC ROOT: 3 CM
APICAL FOUR CHAMBER EJECTION FRACTION: 49 %
ATRIAL RATE: 288 BPM
ATRIAL RATE: 68 BPM
ATRIAL RATE: 70 BPM
BASELINE ST DEPRESSION: 0 MM
E WAVE DECELERATION TIME: 221 MS
FRACTIONAL SHORTENING: 34 % (ref 28–44)
INTERVENTRICULAR SEPTUM IN DIASTOLE (PARASTERNAL SHORT AXIS VIEW): 1.3 CM
INTERVENTRICULAR SEPTUM: 1.3 CM (ref 0.6–1.1)
LEFT ATRIUM AREA SYSTOLE SINGLE PLANE A4C: 14.9 CM2
LEFT ATRIUM SIZE: 2.9 CM
LEFT INTERNAL DIMENSION IN SYSTOLE: 2.3 CM (ref 2.1–4)
LEFT VENTRICULAR INTERNAL DIMENSION IN DIASTOLE: 3.5 CM (ref 3.5–6)
LEFT VENTRICULAR POSTERIOR WALL IN END DIASTOLE: 1.1 CM
LEFT VENTRICULAR STROKE VOLUME: 32 ML
LVSV (TEICH): 32 ML
MAX HR PERCENT: 63 %
MAX HR: 141 BPM
MV E'TISSUE VEL-SEP: 7 CM/S
MV PEAK A VEL: 0.84 M/S
MV PEAK E VEL: 84 CM/S
MV STENOSIS PRESSURE HALF TIME: 64 MS
MV VALVE AREA P 1/2 METHOD: 3.44 CM2
NUC STRESS EJECTION FRACTION: 69 %
P AXIS: 59 DEGREES
P AXIS: 68 DEGREES
PA SYSTOLIC PRESSURE: 35 MMHG
PR INTERVAL: 142 MS
PR INTERVAL: 146 MS
QRS AXIS: 45 DEGREES
QRS AXIS: 61 DEGREES
QRS AXIS: 69 DEGREES
QRSD INTERVAL: 76 MS
QRSD INTERVAL: 90 MS
QRSD INTERVAL: 96 MS
QT INTERVAL: 352 MS
QT INTERVAL: 386 MS
QT INTERVAL: 396 MS
QTC INTERVAL: 411 MS
QTC INTERVAL: 416 MS
QTC INTERVAL: 421 MS
RA PRESSURE ESTIMATED: 5 MMHG
RIGHT ATRIUM AREA SYSTOLE A4C: 15.4 CM2
RIGHT VENTRICLE ID DIMENSION: 2.8 CM
RV PSP: 35 MMHG
SL CV LV EF: 65
SL CV PED ECHO LEFT VENTRICLE DIASTOLIC VOLUME (MOD BIPLANE) 2D: 50 ML
SL CV PED ECHO LEFT VENTRICLE SYSTOLIC VOLUME (MOD BIPLANE) 2D: 18 ML
SL CV REST NUCLEAR ISOTOPE DOSE: 11 MCI
SL CV STRESS NUCLEAR ISOTOPE DOSE: 32.2 MCI
STRESS BASELINE HR: 68 BPM
STRESS PEAK HR: 90 BPM
STRESS ST DEPRESSION: 0 MM
STRESS/REST PERFUSION RATIO: 1.08
T WAVE AXIS: 72 DEGREES
T WAVE AXIS: 76 DEGREES
T WAVE AXIS: 80 DEGREES
TR MAX PG: 30 MMHG
TR PEAK VELOCITY: 2.7 M/S
TRICUSPID ANNULAR PLANE SYSTOLIC EXCURSION: 2.7 CM
TRICUSPID VALVE PEAK REGURGITATION VELOCITY: 2.74 M/S
VENTRICULAR RATE: 68 BPM
VENTRICULAR RATE: 70 BPM
VENTRICULAR RATE: 82 BPM

## 2022-05-18 PROCEDURE — A9502 TC99M TETROFOSMIN: HCPCS

## 2022-05-18 PROCEDURE — 99222 1ST HOSP IP/OBS MODERATE 55: CPT | Performed by: INTERNAL MEDICINE

## 2022-05-18 PROCEDURE — 97165 OT EVAL LOW COMPLEX 30 MIN: CPT

## 2022-05-18 PROCEDURE — 78452 HT MUSCLE IMAGE SPECT MULT: CPT

## 2022-05-18 PROCEDURE — 93010 ELECTROCARDIOGRAM REPORT: CPT | Performed by: INTERNAL MEDICINE

## 2022-05-18 PROCEDURE — 93016 CV STRESS TEST SUPVJ ONLY: CPT | Performed by: INTERNAL MEDICINE

## 2022-05-18 PROCEDURE — 93306 TTE W/DOPPLER COMPLETE: CPT

## 2022-05-18 PROCEDURE — 99220 PR INITIAL OBSERVATION CARE/DAY 70 MINUTES: CPT | Performed by: INTERNAL MEDICINE

## 2022-05-18 PROCEDURE — 99221 1ST HOSP IP/OBS SF/LOW 40: CPT | Performed by: PSYCHIATRY & NEUROLOGY

## 2022-05-18 PROCEDURE — G1004 CDSM NDSC: HCPCS

## 2022-05-18 PROCEDURE — 93306 TTE W/DOPPLER COMPLETE: CPT | Performed by: INTERNAL MEDICINE

## 2022-05-18 PROCEDURE — 93017 CV STRESS TEST TRACING ONLY: CPT

## 2022-05-18 PROCEDURE — 78452 HT MUSCLE IMAGE SPECT MULT: CPT | Performed by: INTERNAL MEDICINE

## 2022-05-18 PROCEDURE — 97162 PT EVAL MOD COMPLEX 30 MIN: CPT

## 2022-05-18 PROCEDURE — 93018 CV STRESS TEST I&R ONLY: CPT | Performed by: INTERNAL MEDICINE

## 2022-05-18 RX ORDER — ALPRAZOLAM 0.25 MG/1
0.25 TABLET ORAL 4 TIMES DAILY PRN
Status: DISCONTINUED | OUTPATIENT
Start: 2022-05-18 | End: 2022-05-18

## 2022-05-18 RX ORDER — SODIUM CHLORIDE 9 MG/ML
75 INJECTION, SOLUTION INTRAVENOUS CONTINUOUS
Status: DISCONTINUED | OUTPATIENT
Start: 2022-05-18 | End: 2022-05-18

## 2022-05-18 RX ORDER — LORAZEPAM 2 MG/ML
0.5 INJECTION INTRAMUSCULAR EVERY 4 HOURS PRN
Status: DISCONTINUED | OUTPATIENT
Start: 2022-05-18 | End: 2022-05-20 | Stop reason: HOSPADM

## 2022-05-18 RX ORDER — MIRTAZAPINE 15 MG/1
15 TABLET, FILM COATED ORAL
Status: DISCONTINUED | OUTPATIENT
Start: 2022-05-18 | End: 2022-05-20 | Stop reason: HOSPADM

## 2022-05-18 RX ADMIN — FAMOTIDINE 40 MG: 20 TABLET ORAL at 22:08

## 2022-05-18 RX ADMIN — SODIUM CHLORIDE 75 ML/HR: 0.9 INJECTION, SOLUTION INTRAVENOUS at 00:45

## 2022-05-18 RX ADMIN — SUCRALFATE 1 G: 1 TABLET ORAL at 17:17

## 2022-05-18 RX ADMIN — SUCRALFATE 1 G: 1 TABLET ORAL at 07:46

## 2022-05-18 RX ADMIN — ONDANSETRON 4 MG: 2 INJECTION INTRAMUSCULAR; INTRAVENOUS at 19:15

## 2022-05-18 RX ADMIN — FLUOXETINE 10 MG: 10 CAPSULE ORAL at 08:07

## 2022-05-18 RX ADMIN — ONDANSETRON 4 MG: 2 INJECTION INTRAMUSCULAR; INTRAVENOUS at 07:46

## 2022-05-18 RX ADMIN — REGADENOSON 0.4 MG: 0.08 INJECTION, SOLUTION INTRAVENOUS at 11:28

## 2022-05-18 RX ADMIN — ENOXAPARIN SODIUM 40 MG: 40 INJECTION SUBCUTANEOUS at 08:08

## 2022-05-18 RX ADMIN — PRAVASTATIN SODIUM 10 MG: 20 TABLET ORAL at 17:17

## 2022-05-18 RX ADMIN — MIRTAZAPINE 15 MG: 15 TABLET, FILM COATED ORAL at 22:08

## 2022-05-18 NOTE — PLAN OF CARE
Problem: Potential for Falls  Goal: Patient will remain free of falls  Description: INTERVENTIONS:  - Educate patient/family on patient safety including physical limitations  - Instruct patient to call for assistance with activity   - Consult OT/PT to assist with strengthening/mobility   - Keep Call bell within reach  - Keep bed low and locked with side rails adjusted as appropriate  - Keep care items and personal belongings within reach  - Initiate and maintain comfort rounds  - Make Fall Risk Sign visible to staff  - Offer Toileting every 2 Hours, in advance of need  - Initiate/Maintain bedalarm  - Obtain necessary fall risk management equipment:   - Apply yellow socks and bracelet for high fall risk patients  - Consider moving patient to room near nurses station  Outcome: Progressing     Problem: Nutrition/Hydration-ADULT  Goal: Nutrient/Hydration intake appropriate for improving, restoring or maintaining nutritional needs  Description: Monitor and assess patient's nutrition/hydration status for malnutrition  Collaborate with interdisciplinary team and initiate plan and interventions as ordered  Monitor patient's weight and dietary intake as ordered or per policy  Utilize nutrition screening tool and intervene as necessary  Determine patient's food preferences and provide high-protein, high-caloric foods as appropriate       INTERVENTIONS:  - Monitor oral intake, urinary output, labs, and treatment plans  - Assess nutrition and hydration status and recommend course of action  - Evaluate amount of meals eaten  - Assist patient with eating if necessary   - Allow adequate time for meals  - Recommend/ encourage appropriate diets, oral nutritional supplements, and vitamin/mineral supplements  - Order, calculate, and assess calorie counts as needed  - Recommend, monitor, and adjust tube feedings and TPN/PPN based on assessed needs  - Assess need for intravenous fluids  - Provide specific nutrition/hydration education as appropriate  - Include patient/family/caregiver in decisions related to nutrition  Outcome: Progressing     Problem: PAIN - ADULT  Goal: Verbalizes/displays adequate comfort level or baseline comfort level  Description: Interventions:  - Encourage patient to monitor pain and request assistance  - Assess pain using appropriate pain scale  - Administer analgesics based on type and severity of pain and evaluate response  - Implement non-pharmacological measures as appropriate and evaluate response  - Consider cultural and social influences on pain and pain management  - Notify physician/advanced practitioner if interventions unsuccessful or patient reports new pain  Outcome: Progressing     Problem: SAFETY ADULT  Goal: Patient will remain free of falls  Description: INTERVENTIONS:  - Educate patient/family on patient safety including physical limitations  - Instruct patient to call for assistance with activity   - Consult OT/PT to assist with strengthening/mobility   - Keep Call bell within reach  - Keep bed low and locked with side rails adjusted as appropriate  - Keep care items and personal belongings within reach  - Initiate and maintain comfort rounds  - Make Fall Risk Sign visible to staff  - Offer Toileting every 2 Hours, in advance of need  - Initiate/Maintain bed alarm  - Obtain necessary fall risk management equipment:   - Apply yellow socks and bracelet for high fall risk patients  - Consider moving patient to room near nurses station  Outcome: Progressing  Goal: Maintain or return to baseline ADL function  Description: INTERVENTIONS:  -  Assess patient's ability to carry out ADLs; assess patient's baseline for ADL function and identify physical deficits which impact ability to perform ADLs (bathing, care of mouth/teeth, toileting, grooming, dressing, etc )  - Assess/evaluate cause of self-care deficits   - Assess range of motion  - Assess patient's mobility; develop plan if impaired  - Assess patient's need for assistive devices and provide as appropriate  - Encourage maximum independence but intervene and supervise when necessary  - Involve family in performance of ADLs  - Assess for home care needs following discharge   - Consider OT consult to assist with ADL evaluation and planning for discharge  - Provide patient education as appropriate  Outcome: Progressing  Goal: Maintains/Returns to pre admission functional level  Description: INTERVENTIONS:  - Perform BMAT or MOVE assessment daily    - Set and communicate daily mobility goal to care team and patient/family/caregiver  - Collaborate with rehabilitation services on mobility goals if consulted  - Perform Range of Motion 2 times a day  - Reposition patient every 2 hours    - Dangle patient 2 times a day  - Stand patient 2 times a day  - Ambulate patient 2 times a day  - Out of bed to chair 2 times a day   - Out of bed for meals 2 times a day  - Out of bed for toileting  - Record patient progress and toleration of activity level   Outcome: Progressing     Problem: DISCHARGE PLANNING  Goal: Discharge to home or other facility with appropriate resources  Description: INTERVENTIONS:  - Identify barriers to discharge w/patient and caregiver  - Arrange for needed discharge resources and transportation as appropriate  - Identify discharge learning needs (meds, wound care, etc )  - Arrange for interpretive services to assist at discharge as needed  - Refer to Case Management Department for coordinating discharge planning if the patient needs post-hospital services based on physician/advanced practitioner order or complex needs related to functional status, cognitive ability, or social support system  Outcome: Progressing     Problem: Knowledge Deficit  Goal: Patient/family/caregiver demonstrates understanding of disease process, treatment plan, medications, and discharge instructions  Description: Complete learning assessment and assess knowledge base   Interventions:  - Provide teaching at level of understanding  - Provide teaching via preferred learning methods  Outcome: Progressing

## 2022-05-18 NOTE — ED NOTES
Patient states "A few months ago if there was a pill I would take it and end it all, but I am not suicidal " Patient appears anxious at this time  Provider made aware       Saskia Cortez RN  05/17/22 3847

## 2022-05-18 NOTE — ASSESSMENT & PLAN NOTE
· Patient reports that he has not slept for days  He states that he feels very on edge  He reports that he is not able to eat due to nausea but has not vomited  During my conversation with him the patient seemed very on edge and anxious  He expressed some passive suicidal ideations during our conversation  When I tried to talk with the patient about his negative cardiac workup as well as the possibility for anxiety being the etiology behind symptoms, the patient stated that he is not anxious and that he feels there is something absolutely wrong with him  · Xanax 0 25 mg 4 times daily p r n   Anxiety, insomnia  · Psychiatry consult

## 2022-05-18 NOTE — ASSESSMENT & PLAN NOTE
· Patient presented complaining of substernal chest pain with associated dyspnea  · Troponins normal x3  · ProBNP normal  · Twelve lead EKG showed no evidence of MI including elevations or depressions of ST segments  · Patient is scheduled to undergo a nuclear stress test which is scheduled by Dr Cosme Aaron of [address] [place] [address] Cardiology  · Stress test completed and no ischemia or perfusion defects noted   Normal stress test  · No further recommendations from our cardiology team  · Suspect chest pains are related to anxiety

## 2022-05-18 NOTE — ASSESSMENT & PLAN NOTE
· Patient reports that he has not slept for days  He states that he feels very on edge  He reports that he is not able to eat due to nausea but has not vomited  · When asked if he is anxious, he adamantly declines but when I discussed it further with him today about his body manifesting stress in different ways, he was much more agreeable  · Ativan 0 5 mg IV daily p r n   Anxiety, insomnia while inpatient

## 2022-05-18 NOTE — TELEMEDICINE
TeleConsultation - [address] 76 y o  male MRN: [id number]  Unit/Bed#: RM16 Encounter: [id number]        REQUIRED DOCUMENTATION:     1  This service was provided via Telemedicine  2  Provider located at [place]  3  TeleMed provider: Patsy Ramirez MD   4  Identify all parties in room with patient during tele consult:  Patient  5  Patient was then informed that this was a Telemedicine visit and that the exam was being conducted confidentially over secure lines  My office door was closed  No one else was in the room  Patient acknowledged consent and understanding of privacy and security of the Telemedicine visit, and gave us permission to have the assistant stay in the room in order to assist with the history and to conduct the exam   I informed the patient that I have reviewed their record in Epic and presented the opportunity for them to ask any questions regarding the visit today  The patient agreed to participate  Assessment/Plan     Assessment:  Mood disorder unspecified; rule out mood disorder (major depression like) secondary to other physiological condition; rule out major depression; anxiety disorder unspecified; rule out anxiety disorder secondary to other physiological condition  Plan:   Risks, benefits and possible side effects of Medications:   Risks, benefits, and possible side effects of medications explained to patient and patient verbalizes understanding  Recommend considering starting Remeron 15 mg p o  q h s  for depression, anxiety, poor appetite and insomnia  If the patient is not able to gain sufficient symptomatic relief before medically cleared, he will need to be re-evaluated for suicide risk for possible psychiatric inpatient treatment  In the meantime would continue continual observation suicide precautions  Re-consult Psychiatry as needed      Chief Complaint:  I told my son a while back that if I had a pill in front of me on the table that would end all I would probably take it     History of Present Illness     Reason for Consult / Principal Problem:  Is depression, anxiety, passive suicidal ideation    Patient is a 76 y o  male who presented to the emergency department with provider documented the following: This is a 20-year-old male presents emergency department with chest pain  The patient states he has been having chest pain for quite some time now  He awoke with chest pain this morning  It is substernal   He says it got worse at 3:00 a m  In the called his PCP  He did not get a call back so came to the emergency department  Patient has seen Cardiology for the same  He has had diagnosis of GERD  He had a negative HIDA scan  He has had recent EGD and colonoscopy  Also recently saw Cardiology  It does appear patient had COVID several months ago and appears that he had a viral pericarditis that was treated with NSAIDs  Repeat CRP in April was negative  Patient has been taking Tums and Pepcid  He has also been taking Carafate since Saturday  He states that this is not giving him significant relief  The chest pain is not exertional   Patient also states that he is not sleeping well over the last several days he states that he has not slept at night time  He seems very anxious during exam   Patient also states he has had decreased p o  Intake  Review of chart does show that patient has nuclear medicine stress test scheduled for 2022 by his cardiologist   Patient also states that the last 2 days he has been constipated  He describes manually disimpact himself today and was able to pass a fair amount of stool       The physician assistant hospitalist as documented the followin [address] conversations with both me and the ER provider in several ER nurses, the patient did express some passive suicidal ideations  When asked if he wants to commit suicide, he states am I going to put a gun to my head and shoot myself    No   If there were pill on the table in front of me that could end it all, I would probably take it though"  The patient does not seem to have a plan for suicide  The patient is exhibiting symptoms of severe anxiety though at this time although he claims that he is not anxious  Patient reports poor quality of life since he had COVID in January stated that he frequently experiences tightness in his chest, feeling that he suffocated or significant acid reflux with sleep disturbance  Past psychiatric history:  Unremarkable     Social history:  The patient states he has been  for the past 10 years  He has 2 children  His son lives with him  When asked about psychological traumatic experiences he reports his COVID in January  Family history:  Unremarkable     Substance use history:  Patient states he has never used alcohol, nicotine or other substances  Mental status examination: The patient is alert and well oriented all spheres  Affect is depressed and constricted with the somewhat irritated edge  Speech is somewhat monotone but otherwise unremarkable  Thought process is logical linear  Thought content is reality based  Memory is grossly intact in all spheres  He denies any problem memory  Associations are tight  He appears to be of average intelligence by his use of vocabulary, general fund of knowledge, sentence structure and syntax  He reports sleep disruption which she feels is secondary to acid reflux  He does report anhedonia stating I just do not feel good like doing anything  He reports his energy level has been fair  Appetite has been significantly diminished  He states he frequently feels nauseated  When asked about suicidal ideation he appeared downplay this somewhat stating that he did make a comment to his son sometime ago that if there was a pill on table in he could take and all he would probably do so    He appears to be minimizing suicidal statements that he has made with staff and providers here in the hospital   He denies homicidal ideation  He denies hallucinations and other psychotic features  I am concerned that his insight and judgment become impaired by the extent of his depression, anxiety feelings of hopelessness and helplessness regarding situation  Inpatient consult to Psychiatry  Consult performed by: Letaha Brown MD  Consult ordered by: Tessie Moritz, PA-C            Past Medical History:   Diagnosis Date    Coronary artery disease     COVID-19 01/28/2022    History of echocardiogram 03/22/2018    Normal EF, normal LVSF  Thick and rebundent MV leaflets w/ mild posterior leaflet prolapse w/ trace regurg   Hyperlipidemia     Lyme disease     Mitral valve prolapse     Shingles        Medical Review Of Systems:  Review of Systems    Meds/Allergies   all current active meds have been reviewed  No Known Allergies    Objective   Vital signs in last 24 hours:  Temp:  [98 °F (36 7 °C)] 98 °F (36 7 °C)  HR:  [61-86] 80  Resp:  [16-31] 17  BP: (132-189)/(70-86) 137/82      Intake/Output Summary (Last 24 hours) at 5/18/2022 1450  Last data filed at 5/17/2022 2041  Gross per 24 hour   Intake 500 ml   Output --   Net 500 ml         Lab Results:  Reviewed  Imaging Studies:  Reviewed  EKG, Pathology, and Other Studies:  Reviewed    Code Status: Level 1 - Full Code  Advance Directive and Living Will:      Power of :    POLST:      Counseling / Coordination of Care  Total floor / unit time spent today 30 minutes  Greater than 50% of total time was spent with the patient and / or family counseling and / or coordination of care  A description of the counseling / coordination of care:  Chart review, patient evaluation, coordination communication with staff, nursing and provider

## 2022-05-18 NOTE — UTILIZATION REVIEW
Initial Clinical Review    Admission: Date/Time/Statement:   Admission Orders (From admission, onward)     Ordered        05/18/22 1536  Inpatient Admission  Once            05/17/22 2148  Place in Observation  Once                      Placed in observation status on 5/17 changed to inpatient admission on 5/18 due to need for continued monitoring for suicidal ideations    Orders Placed This Encounter   Procedures    Inpatient Admission     Standing Status:   Standing     Number of Occurrences:   1     Order Specific Question:   Level of Care     Answer:   Med Surg [16]     Order Specific Question:   Estimated length of stay     Answer:   More than 2 Midnights     Order Specific Question:   Certification     Answer:   I certify that inpatient services are medically necessary for this patient for a duration of greater than two midnights  See H&P and MD Progress Notes for additional information about the patient's course of treatment  ED Arrival Information     Expected   -    Arrival   5/17/2022 18:26    Acuity   Urgent            Means of arrival   Walk-In    Escorted by   Family Member    Service   Hospitalist    Admission type   Urgent            Arrival complaint   Acid reflux/Chest Tightness           Chief Complaint   Patient presents with    Heartburn     Patient was seen in ER on Saturday, has hx of acid reflux  Patient c/o of chest tightness and epigastric pain  Denies SOB  Initial Presentation: 76 y o  male with a pmh of recent COVID, HLD and CAD  He presents to the ED with complaint of  substernal chest pain  He was seen this Saturday in the ED with a complaint of chest pain, he was worked up for a cardiac cause, troponins were negative and EKG was negative and he was discharged home  He had a recent admission for a similar complaint and was diagnosed with GERD after a HIDA scan, EGD and Colonoscopy  He also recently saw cardiology     In the ED he the troponins were negative x 3, EKG was negative for any acute findings  The patient was not comfortable going home as he feels that something is wrong with him  He verbalized passive suicidal ideations  He reports that his issues began following his COVID diagnosis months ago  He is admitted to observation status for a nuclear stress to determine if additional cardiac workup is needed as well as a psychiatry consult due to SI with evidence of anxiety with concurrent depression  Date: 5/18/2022   Day 2: Chest pain - stress completed and no ischemia or perfusion defects, normal stress  Suspect chest pain are related to anxiety  Psych eval - started on remeron 15 mg qhs, and prn ativan, if suicidal ideations persist , psych recommendation for possible inpatient treatment  Cardiology Consult - 5/18 - 77 yo m with mitral valve prolapse, dyslipidemia, CAD calcifications  Noted on CT, GERD, COVID-19 pneumonia in January 2022 with subsequent pericarditis  It appears he did not complete the recommended course of colchicine and NSAID's  He was seen in April by cardiologist, and recommended to get another CRP, dine in April which was negative  In the setting of his persistent chest paina stress was recommended  He is admitted with a suffocating feeling on lying down which is on and off, he admits to significant anxiety and suicidal ideations  Troponins and ECG are negative for any ischemic changes  Plan nuclear stress this admission  Psychiatry Consult - 5/18 Mood disorder, major depression like  Secondary to other physiological condition, r/p major depression, anxiety disorder  Consider starting Remeron 15 mg po qhs for depression, anxiety, poor appetite and insomnia  He may need to be re-evaluated for suicide risk for possible psychiatic inpatient treatment  Continue behavioral health monitoring       ED Triage Vitals [05/17/22 1833]   Temperature Pulse Respirations Blood Pressure SpO2   98 °F (36 7 °C) 86 16 (!) 189/84 99 %      Temp Source Heart Rate Source Patient Position - Orthostatic VS BP Location FiO2 (%)   Temporal Monitor Lying Left arm --      Pain Score       3          Wt Readings from Last 1 Encounters:   05/18/22 53 1 kg (117 lb)     Additional Vital Signs:   Date/Time Temp Pulse Resp BP MAP (mmHg) SpO2 O2 Device Patient Position - Orthostatic VS   05/18/22 0901 -- 80 -- 137/82 -- -- -- --   05/18/22 0725 -- -- -- -- -- -- None (Room air) --   05/18/22 0600 -- 68 17 137/82 104 97 % None (Room air) Lying   05/18/22 0500 -- 61 16 149/84 108 97 % None (Room air) Lying   05/18/22 0400 -- 61 16 148/76 106 96 % None (Room air) Lying   05/18/22 0300 -- 77 18 166/79 114 95 % None (Room air) Lying   05/18/22 0000 -- 70 16 132/70 95 93 % None (Room air) Lying   05/17/22 2230 -- 78 31 Abnormal  178/85 Abnormal  121 97 % None (Room air) Lying   05/17/22 2030 -- 70 20 139/70 97 94 % None (Room air) Lying   05/17/22 1930 -- 71 16 172/86 Abnormal  119 98 % None (Room air) Lying   05/17/22 1900 -- 81 18 144/75 104 99 % None (Room air) Lying             Pertinent Labs/Diagnostic Test Results:   XR chest 2 views   ED Interpretation by Kay Fields MD (05/17 2035)   No acute disease  Final Result by Kehinde Mcmahan MD (05/17 8137)      Stable findings without acute cardiopulmonary disease  Workstation performed: [id number]           ECHO - 5/18 -   Left Ventricle: Left ventricular cavity size is normal  Wall thickness is normal  The left ventricular ejection fraction is 65%  Systolic function is normal  Wall motion is normal  Diastolic function is normal for age    Right Ventricle: Right ventricular cavity size is mildly dilated  Systolic function is normal     Mitral Valve: There is moderate diffuse thickening  There is systolic bowing of the anterior and posterior leaflets    IVC/SVC: The right atrial pressure is estimated at 5 0 mmHg  The inferior vena cava is normal in size   Respirophasic changes were normal     NM myocardial perfusion - 5/18 -   Stress ECG: No ST deviation is noted  The ECG was negative for ischemia  The stress ECG is negative for ischemia after pharmacologic vasodilation, without reproduction of symptoms    Perfusion: There are no perfusion defects    Stress Function: Left ventricular function post-stress is normal  Post-stress ejection fraction is 69 %      No evidence of ischemia or infarction by pharmacologic vasodilatory nuclear stress testing  Results from last 7 days   Lab Units 05/14/22 1850   SARS-COV-2  Negative     Results from last 7 days   Lab Units 05/17/22 2253 05/17/22 1857 05/14/22 1850   WBC Thousand/uL  --  8 37 9 46   HEMOGLOBIN g/dL  --  12 8 12 7   HEMATOCRIT %  --  38 1 39 1   PLATELETS Thousands/uL 211 235 233   NEUTROS ABS Thousands/µL  --  5 83 6 09         Results from last 7 days   Lab Units 05/17/22 1857 05/14/22 1850   SODIUM mmol/L 139 137   POTASSIUM mmol/L 3 7 3 8   CHLORIDE mmol/L 100 100   CO2 mmol/L 32 31   ANION GAP mmol/L 7 6   BUN mg/dL 25 18   CREATININE mg/dL 1 03 0 97   EGFR ml/min/1 73sq m 68 73   CALCIUM mg/dL 9 6 9 2     Results from last 7 days   Lab Units 05/17/22 1857 05/14/22 1850   AST U/L 26 24   ALT U/L 24 22   ALK PHOS U/L 65 59   TOTAL PROTEIN g/dL 7 3 7 0   ALBUMIN g/dL 4 0 3 8   TOTAL BILIRUBIN mg/dL 0 50 0 61   BILIRUBIN DIRECT mg/dL  --  0 11         Results from last 7 days   Lab Units 05/17/22 1857 05/14/22 1850   GLUCOSE RANDOM mg/dL 106 118       Results from last 7 days   Lab Units 05/17/22 2255 05/17/22 2052 05/17/22 1857   HS TNI 0HR ng/L  --   --  3   HS TNI 2HR ng/L  --  4  --    HSTNI D2 ng/L  --  1  --    HS TNI 4HR ng/L 4  --   --    HSTNI D4 ng/L 1  --   --      Results from last 7 days   Lab Units 05/14/22 1850   D-DIMER QUANTITATIVE ug/ml FEU 0 31         Results from last 7 days   Lab Units 05/14/22 1850   TSH 3RD GENERATON uIU/mL 2  131       Results from last 7 days   Lab Units 05/17/22  1857 05/14/22  1850 NT-PRO BNP pg/mL 173 156       Results from last 7 days   Lab Units 05/17/22 1857 05/14/22  1850   LIPASE u/L 72* 82       Results from last 7 days   Lab Units 05/14/22 1959   CLARITY UA  Clear   COLOR UA  Yellow   SPEC GRAV UA  1 010   PH UA  8 0   GLUCOSE UA mg/dl 500 (1/2%)*   KETONES UA mg/dl Negative   BLOOD UA  Negative   PROTEIN UA mg/dl Negative   NITRITE UA  Negative   BILIRUBIN UA  Negative   UROBILINOGEN UA E U /dl 0 2   LEUKOCYTES UA  Negative     Results from last 7 days   Lab Units 05/14/22  1850   INFLUENZA A PCR  Negative   INFLUENZA B PCR  Negative   RSV PCR  Negative       ED Treatment:   Medication Administration from 05/17/2022 1824 to 05/18/2022 6458       Date/Time Order Dose Route Action Comments     05/17/2022 1901 aluminum-magnesium hydroxide-simethicone (MYLANTA) oral suspension 30 mL 30 mL Oral Given      05/17/2022 1900 Lidocaine Viscous HCl (XYLOCAINE) 2 % mucosal solution 10 mL 10 mL Swish & Swallow Given      05/17/2022 1858 sodium chloride 0 9 % bolus 500 mL 500 mL Intravenous New Bag      05/17/2022 2214 ALPRAZolam (XANAX) tablet 0 25 mg 0 25 mg Oral Given      05/17/2022 2248 famotidine (PEPCID) tablet 40 mg 40 mg Oral Given      05/18/2022 0045 sodium chloride 0 9 % infusion 75 mL/hr Intravenous New Bag         Past Medical History:   Diagnosis Date    Coronary artery disease     COVID-19 01/28/2022    History of echocardiogram 03/22/2018    Normal EF, normal LVSF  Thick and rebundent MV leaflets w/ mild posterior leaflet prolapse w/ trace regurg   Hyperlipidemia     Lyme disease     Mitral valve prolapse     Shingles      Present on Admission:   Dyslipidemia   Depression, recurrent ([facility] [address] )   Other chest pain      Admitting Diagnosis: Heartburn [R12]  Anxiety [F41 9]  Chest pain [R07 9]  Depression [F32  A]  Severe protein-calorie malnutrition ([facility] [address] ) [E43]  Passive suicidal ideations [R45 851]  Age/Sex: 76 y o  male       Admission Orders:  Scheduled Medications:  enoxaparin, 40 mg, Subcutaneous, Daily  famotidine, 40 mg, Oral, HS  FLUoxetine, 10 mg, Oral, Daily  pravastatin, 10 mg, Oral, Daily With Dinner  sucralfate, 1 g, Oral, BID AC  Lexiscan 0 4 mg iv once - 5/18 x 1  Remeron 15 mg po hs ( start 5/18)       Continuous IV Infusions:  sodium chloride, 75 mL/hr, Intravenous, Continuous- d/c 5/18 @ 15:36      PRN Meds:  ALPRAZolam, 0 25 mg, Oral, 4x Daily PRN  Ativan 0 5 gm iv prn   calcium carbonate, 1,000 mg, Oral, TID PRN  ondansetron, 4 mg, Intravenous, Q6H PRN-5/18 x 1     Nursing Orders - 1:1 [facility] - Telem - up & OOB as tolerated - SCD's to le's - Cardiac diet (stress test meal)  - PT/OT evaluation         Network Utilization Review Department  ATTENTION: Please call with any questions or concerns to [phone] and carefully listen to the prompts so that you are directed to the right person  All voicemails are confidential   Tanisha Cobb all requests for admission clinical reviews, approved or denied determinations and any other requests to dedicated fax number below belonging to the campus where the patient is receiving treatment   List of dedicated fax numbers for the Facilities:  [address] DENIALS (Administrative/Medical Necessity) [phone]   [address] (Maternity/NICU/Pediatrics) [phone]   [address] [place] [address]  [phone]   Peter Reed [id number] [place] [address] [address] 830 [facility] 05 [facility] [phone]

## 2022-05-18 NOTE — H&P
[facility] [address]  H&P- Andrew Chandler 1945, 76 y o  male MRN: [id number]  Unit/Bed#: RM06 Encounter: [id number]  Primary Care Provider: Cb Medeiros DO   Date and time admitted to hospital: 5/17/2022  6:26 PM    * Other chest pain  Assessment & Plan  · Patient presented complaining of substernal chest pain with associated dyspnea  · Troponins normal x3  · ProBNP normal  · Twelve lead EKG showed no evidence of MI including elevations or depressions of ST segments  · Patient is scheduled to undergo a nuclear stress test which is scheduled by Dr Angelica Vazquez of [facility]iology  · Stress test ordered  · Cardiology consult  · 48 hour tele  · Cardiac diet      Anxiety  Assessment & Plan  · Patient reports that he has not slept for days  He states that he feels very on edge  He reports that he is not able to eat due to nausea but has not vomited  During my conversation with him the patient seemed very on edge and anxious  He expressed some passive suicidal ideations during our conversation  When I tried to talk with the patient about his negative cardiac workup as well as the possibility for anxiety being the etiology behind symptoms, the patient stated that he is not anxious and that he feels there is something absolutely wrong with him  · Xanax 0 25 mg 4 times daily p r n  Anxiety, insomnia  · Psychiatry consult    Passive suicidal ideations  Assessment & Plan  During conversations with both me and the ER provider in several ER nurses, the patient did express some passive suicidal ideations  When asked if he wants to commit suicide, he states am I going to put a gun to my head and shoot myself    No   If there were pill on the table in front of me that could end it all, I would probably take it though"  The patient does not seem to have a plan for suicide  The patient is exhibiting symptoms of severe anxiety though at this time although he claims that he is not anxious  · Continual virtual observation  · Psychiatry consult  · Please see assessment and plan for anxiety    Depression, recurrent ([address] )  Assessment & Plan  · Patient currently takes Prozac 10 mg p o  Daily  · Will continue Prozac 10 mg p o  Daily  · Psych consult    Dyslipidemia  Assessment & Plan  · Protocol 10 mg oral daily with dinner    VTE Pharmacologic Prophylaxis: VTE Score: 4 Moderate Risk (Score 3-4) - Pharmacological DVT Prophylaxis Ordered: enoxaparin (Lovenox)  Code Status: Level 1 - Full Code   Discussion with family: Patient declined call to   Anticipated Length of Stay: Patient will be admitted on an observation basis with an anticipated length of stay of less than 2 midnights secondary to chest pain evaluation  Total Time for Visit, including Counseling / Coordination of Care: 60 minutes Greater than 50% of this total time spent on direct patient counseling and coordination of care  Chief Complaint:     History of Present Illness:  Yo Allen is a 76 y o  male with a PMH of recent COVID, hyperlipidemia, CAD who presents with chest pain  The patient was recently seen in the ER this Saturday with a complaint of chest pain  He was worked up for a cardiac cause  Troponins were negative at that time  EKG was negative for any changes  He was discharged at that time  He now presents to the hospital with a similar complaint  He describes his chest pain is substernal   He describes it as a pressure with the shortness of breath when it is at its worst   He rates the pain a 9/10 at the worst   He denies any radiation anywhere  The patient was recently hospitalized here for a similar complaint and diagnosed with GERD after receiving a HIDA scan, EGD and colonoscopy  He also recently saw cardiology  He did have COVID-19 several months ago and had a viral pericarditis which was treated with NSAIDs at that time  This is completely resolved    In the ER, the patient's troponins were negative x3  Twelve lead EKG was negative for any ST elevations or depressions x3  However, the patient stated to the ED provider that he was not comfortable going home because he feels that there is something wrong with him  He did verbalize the nursing staff and to the ED provider that he has some passive suicidal ideations  During my conversation with the patient, the patient stated will I go home on blow my head off    No   If there is a pill on the table in front of me but I could take to end it all, I would take  The patient does describe to me that he has not been sleeping at night for some time  He states that he feels very on edge and he feels that he is unable to eat  He states that this all began following his COVID Diagnosis months ago  He states this sometimes he is woken up from sleep with this chest pain and shortness of breath  He states that he will get up out of bed and walk and get air which improved his symptoms  The patient does follow [facility] and was scheduled for a nuclear stress test later this week  He was decided to admit the patient to undergo this test to rule out any other cardiac workup  The patient will also receive a psychiatry consult due to passive suicidal ideations and evidence of severe anxiety with concurrent depression  Review of Systems:  Review of Systems   Constitutional: Negative for chills, fatigue and fever  HENT: Negative for ear pain and sore throat  Eyes: Negative for pain and visual disturbance  Respiratory: Positive for chest tightness and shortness of breath  Negative for cough  Cardiovascular: Positive for chest pain  Negative for palpitations and leg swelling  Gastrointestinal: Negative for abdominal pain, constipation, diarrhea, nausea and vomiting  Endocrine: Negative for polydipsia and polyuria  Genitourinary: Negative for dysuria and hematuria  Musculoskeletal: Negative for arthralgias and back pain  Skin: Negative for color change and rash  Neurological: Negative for dizziness, tremors, seizures, syncope, facial asymmetry, speech difficulty, weakness, light-headedness, numbness and headaches  Psychiatric/Behavioral: Positive for suicidal ideas  Negative for agitation and confusion  The patient is nervous/anxious  All other systems reviewed and are negative  Past Medical and Surgical History:   Past Medical History:   Diagnosis Date    Coronary artery disease     COVID-19 01/28/2022    History of echocardiogram 03/22/2018    Normal EF, normal LVSF  Thick and rebundent MV leaflets w/ mild posterior leaflet prolapse w/ trace regurg   Hyperlipidemia     Lyme disease     Mitral valve prolapse     Shingles        Past Surgical History:   Procedure Laterality Date    APPENDECTOMY      EYE SURGERY      HERNIA REPAIR      TONSILLECTOMY         Meds/Allergies:  Prior to Admission medications    Medication Sig Start Date End Date Taking? Authorizing Provider   calcium carbonate (TUMS) 500 mg chewable tablet Chew 2 tablets (1,000 mg total) 3 (three) times a day as needed for indigestion or heartburn 4/14/22   Yovana Hickey,    famotidine (PEPCID) 40 MG tablet Take 1 tablet (40 mg total) by mouth daily at bedtime for 20 days 5/15/22 6/4/22  Benita Lewis MD   FLUoxetine (PROzac) 10 mg capsule 1 capsule daily 5/11/22   Kortney Akbar,    ondansetron (ZOFRAN-ODT) 4 mg disintegrating tablet Take 1 tablet (4 mg total) by mouth every 8 (eight) hours as needed for nausea or vomiting for up to 20 doses 5/15/22   Benita Lewis MD   simvastatin (ZOCOR) 5 MG tablet Take 5 mg by mouth daily at bedtime    Historical Provider, MD   sucralfate (CARAFATE) 1 g tablet Dissolve 1  tablet in 2 tablespoons of water take by mouth 3 times a day for 1 week 5/15/22   Benita Lewis MD     I have reviewed home medications with patient personally      Allergies: No Known Allergies    Social History:  Marital Status:    Occupation: none   Patient Pre-hospital Living Situation: Home, With other family member: son  Patient Pre-hospital Level of Mobility: walks  Patient Pre-hospital Diet Restrictions: none   Substance Use History:   Social History     Substance and Sexual Activity   Alcohol Use Never    Alcohol/week: 0 0 standard drinks     Social History     Tobacco Use   Smoking Status Never Smoker   Smokeless Tobacco Never Used     Social History     Substance and Sexual Activity   Drug Use Never       Family History:  Family History   Problem Relation Age of Onset    Heart disease Mother     No Known Problems Father        Physical Exam:     Vitals:   Blood Pressure: 132/70 (05/18/22 0000)  Pulse: 70 (05/18/22 0000)  Temperature: 98 °F (36 7 °C) (05/17/22 1833)  Temp Source: Temporal (05/17/22 1833)  Respirations: 16 (05/18/22 0000)  Height: 5' 9" (175 3 cm) (05/17/22 1833)  Weight - Scale: 53 3 kg (117 lb 8 1 oz) (05/17/22 1833)  SpO2: 93 % (05/18/22 0000)    Physical Exam  Vitals and nursing note reviewed  Constitutional:       Appearance: Normal appearance  He is well-developed and underweight  HENT:      Head: Normocephalic and atraumatic  Nose: Nose normal  No congestion or rhinorrhea  Mouth/Throat:      Mouth: Mucous membranes are moist       Pharynx: Oropharynx is clear  No oropharyngeal exudate or posterior oropharyngeal erythema  Eyes:      General: No scleral icterus  Right eye: No discharge  Left eye: No discharge  Extraocular Movements: Extraocular movements intact  Conjunctiva/sclera: Conjunctivae normal       Pupils: Pupils are equal, round, and reactive to light  Cardiovascular:      Rate and Rhythm: Normal rate and regular rhythm  Pulses: Normal pulses  Radial pulses are 2+ on the right side and 2+ on the left side  Posterior tibial pulses are 2+ on the right side and 2+ on the left side        Heart sounds: Normal heart sounds, S1 normal and S2 normal  No murmur heard  No friction rub  No gallop  Pulmonary:      Effort: Pulmonary effort is normal  No respiratory distress  Breath sounds: Normal breath sounds  No decreased breath sounds, wheezing, rhonchi or rales  Abdominal:      General: Abdomen is flat  Bowel sounds are normal  There is no distension  Palpations: Abdomen is soft  Tenderness: There is no abdominal tenderness  There is no guarding  Musculoskeletal:      Cervical back: Normal range of motion and neck supple  No rigidity  Right lower leg: No edema  Left lower leg: No edema  Skin:     General: Skin is warm and dry  Capillary Refill: Capillary refill takes less than 2 seconds  Neurological:      General: No focal deficit present  Mental Status: He is alert and oriented to person, place, and time  Mental status is at baseline  Cranial Nerves: No cranial nerve deficit  Sensory: No sensory deficit  Motor: No weakness  Gait: Gait normal    Psychiatric:         Attention and Perception: Attention and perception normal          Mood and Affect: Mood is anxious and depressed  Speech: Speech is rapid and pressured  Behavior: Behavior is cooperative  Cognition and Memory: Cognition and memory normal          Judgment: Judgment normal       Comments: During our conversation, the patient did express some passive suicidal ideation saying that if there were a pill available and all he would take it  He did not express any evidence of a plan to overdose on any medications or to harm himself  It seemed to be more of a passive ideation  The patient was very anxious throughout our conversation and exam   He seemed on edge and exhibited  rapid and pressured speech            Additional Data:     Lab Results:  Results from last 7 days   Lab Units 05/17/22  1200 05/17/22  1857   WBC Thousand/uL  --  8 37   HEMOGLOBIN g/dL  --  12 8 HEMATOCRIT %  --  38 1   PLATELETS Thousands/uL 211 235   NEUTROS PCT %  --  70   LYMPHS PCT %  --  21   MONOS PCT %  --  7   EOS PCT %  --  1     Results from last 7 days   Lab Units 05/17/22  1857   SODIUM mmol/L 139   POTASSIUM mmol/L 3 7   CHLORIDE mmol/L 100   CO2 mmol/L 32   BUN mg/dL 25   CREATININE mg/dL 1 03   ANION GAP mmol/L 7   CALCIUM mg/dL 9 6   ALBUMIN g/dL 4 0   TOTAL BILIRUBIN mg/dL 0 50   ALK PHOS U/L 65   ALT U/L 24   AST U/L 26   GLUCOSE RANDOM mg/dL 106                       Imaging: Reviewed radiology reports from this admission including: chest xray  XR chest 2 views   ED Interpretation by Vianney Montejo MD (05/17 2035)   No acute disease  Final Result by Luke Mathew MD (05/17 2357)      Stable findings without acute cardiopulmonary disease  Workstation performed: [id number]             EKG and Other Studies Reviewed on Admission:   · EKG: NSR  HR 78     ** Please Note: This note has been constructed using a voice recognition system   **

## 2022-05-18 NOTE — CASE MANAGEMENT
Case Management Assessment & Discharge Planning Note    Patient name Billy Gallegos  Location /529-42 MRN [id number]  : 1945 Date 2022       Current Admission Date: 2022  Current Admission Diagnosis:Other chest pain   Patient Active Problem List    Diagnosis Date Noted    Passive suicidal ideations 2022    Anxiety 2022    Severe protein-calorie malnutrition ([facility] [address] ) 2022    Depression, recurrent ([facility] ) 2022    Other chest pain 2022    Dyslipidemia 2022      LOS (days): 0  Geometric Mean LOS (GMLOS) (days):   Days to GMLOS:     OBJECTIVE:    Risk of Unplanned Readmission Score: 13 26         Current admission status: Inpatient       Preferred Pharmacy:   [facility] Foster [address] [address]  Phone: [phone] Fax: [phone]     MADELIN Mercer [address] [place] [address]  Phone: [phone] Fax: [phone] ([facility])   [address], [address] [place] [address] [address]  Phone: [phone] Fax: [phone]    Primary Care Provider: Gamal Castellanos DO    Primary Insurance: [facility]  Secondary Insurance:     ASSESSMENT:  Jennifer Espinoza [employer]     Geraldine [facility] Representative - Son   Primary Phone: [phone] (Mobile)  Home Phone: [phone]                         Readmission Root Cause  30 Day Readmission: No    Patient Information  Admitted from[de-identified] Home  Mental Status: Alert  During Assessment patient was accompanied by: Not accompanied during assessment  Assessment information provided by[de-identified] Patient  Primary Caregiver: Self  Support Systems: Son, Daughter  [place] of Residence: [place]  What city do you live in?: [address] entry access options  Select all that apply : Stairs  Number of steps to enter home  : 1  Do the steps have railings?: No  Type of Current Residence: 2 story home  Upon entering residence, is there a bedroom on the main floor (no further steps)?: No  A bedroom is located on the following floor levels of residence (select all that apply):: 2nd Floor  Upon entering residence, is there a bathroom on the main floor (no further steps)?: No  Indicate which floors of current residence have a bathroom (select all the apply):: 2nd Floor  Number of steps to 2nd floor from main floor: One Flight  In the last 12 months, was there a time when you were not able to pay the mortgage or rent on time?: No  In the last 12 months, how many places have you lived?: 1  In the last 12 months, was there a time when you did not have a steady place to sleep or slept in a shelter (including now)?: No  Homeless/housing insecurity resource given?: N/A  Living Arrangements: Lives w/ Son  Is patient a ?: No    Activities of Daily Living Prior to Admission  Functional Status: Independent  Completes ADLs independently?: Yes  Ambulates independently?: Yes  Does patient use assisted devices?: No  Does patient currently own DME?: Yes  What DME does the patient currently own?: [employer]  Does patient have a history of Outpatient Therapy (PT/OT)?: No  Does the patient have a history of Short-Term Rehab?: No  Does patient have a history of HHC?: No  Does patient currently have [address]?: No         Patient Information Continued  Income Source: Pension/snf  Does patient have prescription coverage?: Yes  Within the past 12 months, you worried that your food would run out before you got the money to buy more : Never true  Within the past 12 months, the food you bought just didn't last and you didn't have money to get more : Never true  Food insecurity resource given?: N/A  Does patient receive dialysis treatments?: No  Does patient have a history of substance abuse?: No  Does patient have a history of Mental Health Diagnosis?: Yes (Pt has a history off  anxiety)  Is patient receiving treatment for mental health?: No  Patient declined treatment information  Has patient received inpatient treatment related to mental health in the last 2 years?: No         Means of Transportation  Means of Transport to Appts[de-identified] Drives Self (Pt drives himself to appts  Pt's son will drive him home from the hospital)  In the past 12 months, has lack of transportation kept you from medical appointments or from getting medications?: No  In the past 12 months, has lack of transportation kept you from meetings, work, or from getting things needed for daily living?: No  Was application for public transport provided?: N/A        DISCHARGE DETAILS:    Discharge planning discussed with[de-identified] Pt  Freedom of Choice: Yes     CM contacted family/caregiver?: No- see comments (Pt is alert and oriented x3)      I will continue to follow for any Case Management needs  Herberth López

## 2022-05-18 NOTE — ASSESSMENT & PLAN NOTE
· Patient presented complaining of substernal chest pain with associated dyspnea  · Troponins normal x3  · ProBNP normal  · Twelve lead EKG showed no evidence of MI including elevations or depressions of ST segments  · Patient is scheduled to undergo a nuclear stress test which is scheduled by Dr Lena Balderas of [facility]    · Stress test ordered  · Cardiology consult  · 48 hour tele  · Cardiac diet

## 2022-05-18 NOTE — PROGRESS NOTES
[facility] [address] [place]  Progress Note Franca Chandler 1945, 76 y o  male MRN: [id number]  Unit/Bed#: Nicole Cornelius Encounter: [id number]  Primary Care Provider: Jaylin Gray DO   Date and time admitted to hospital: 5/17/2022  6:26 PM    Passive suicidal ideations  Assessment & Plan  During conversations with both me and the ER provider in several ER nurses, the patient did express some passive suicidal ideations  When asked if he wants to commit suicide, he states am I going to put a gun to my head and shoot myself    No   If there were pill on the table in front of me that could end it all, I would probably take it though"  The patient does not seem to have a plan for suicide  · Psychiatric consult completed 5/18  · Patient hopeless and having panic attacks while inpatient in relation to his general malaise for 4 months  · Recommendations to initiate on remeron 15 mg qhs, will continue ativan as needed  · Should suicidal ideations and feelings of hopelessness persist tomorrow, recommendations for inpatient psych treatment  · Discussed signing a 12 tomorrow with patient, he is agreeable at this time    * Other chest pain  Assessment & Plan  · Patient presented complaining of substernal chest pain with associated dyspnea  · Troponins normal x3  · ProBNP normal  · Twelve lead EKG showed no evidence of MI including elevations or depressions of ST segments  · Patient is scheduled to undergo a nuclear stress test which is scheduled by Dr Lena Balderas of [address] [place] [address] Cardiology  · Stress test completed and no ischemia or perfusion defects noted  Normal stress test  · No further recommendations from our cardiology team  · Suspect chest pains are related to anxiety      Anxiety  Assessment & Plan  · Patient reports that he has not slept for days  He states that he feels very on edge  He reports that he is not able to eat due to nausea but has not vomited      · When asked if he is anxious, he adamantly declines but when I discussed it further with him today about his body manifesting stress in different ways, he was much more agreeable  · Ativan 0 5 mg IV daily p r n  Anxiety, insomnia while inpatient      Depression, recurrent ([facility] )  Assessment & Plan  · Patient currently takes Prozac 10 mg p o  Daily        Dyslipidemia  Assessment & Plan  · pravastatin 10 mg oral daily with dinner      Patient declined for me to call his family at this time

## 2022-05-18 NOTE — OCCUPATIONAL THERAPY NOTE
Occupational Therapy Evaluation      Audrey Elizaldejuve    5/18/2022    Patient Active Problem List   Diagnosis    Dyslipidemia    Other chest pain    Depression, recurrent ([facility] [address] )    Severe protein-calorie malnutrition ([facility] [address] )    Passive suicidal ideations    Anxiety       Past Medical History:   Diagnosis Date    Coronary artery disease     COVID-19 01/28/2022    History of echocardiogram 03/22/2018    Normal EF, normal LVSF  Thick and rebundent MV leaflets w/ mild posterior leaflet prolapse w/ trace regurg  Hyperlipidemia     Lyme disease     Mitral valve prolapse     Shingles        Past Surgical History:   Procedure Laterality Date    APPENDECTOMY      EYE SURGERY      HERNIA REPAIR      TONSILLECTOMY          05/18/22 1335   OT Last Visit   OT Visit Date 05/18/22   Note Type   Note type Evaluation   Restrictions/Precautions   Weight Bearing Precautions Per Order No   Other Precautions 1:1;Pain;Telemetry; Fall Risk  (virtual 1:1 observation)   Pain Assessment   Pain Assessment Tool 0-10   Pain Score 1   Pain Location/Orientation Location: Chest   Pain Onset/Description Onset: Ongoing;Frequency: Intermittent   Home Living   Type of [address] Two level;Stairs to enter with rails;Bed/bath upstairs  (5+3 JANNIE c HR)   Bathroom Shower/Tub Tub only   Bathroom Toilet Standard   Bathroom Equipment   (no DME)   Home Equipment Walker  (not used at baseline)   Prior Function   Level of [place] Independent with ADLs and functional mobility; Needs assistance with IADLs   Lives With Son   Receives Help From Family   ADL Assistance Independent   IADLs Needs assistance  (A with laundry, meals, home mgmt, groceries  (I) med mgmt)   Falls in the last 6 months 0   Vocational Retired   Comments (+) driving, but often does not   Son assists with transportation   Lifestyle   Autonomy PTA, pt reports being (I) with ADLs, assistance with IADLs, lives with son in 2 story home with 8 JANNIE  (-) AD, son assists with transportation   Reciprocal Relationships son   Service to Others retired   Subjective   Subjective Pt generally anxious throughout session, emotional support provided   ADL   Eating Assistance 7  Independent   Grooming Assistance 7  Independent   UB Bathing Assistance 7  [facility] 7  Jake Duvall [address] 7  Independent   Additional Comments Pt limited by pain, decreased activity tolerance, generalized weakness   Bed Mobility   Supine to Sit 7  Independent   Sit to Supine 7  Independent   Transfers   Sit to Stand 7  Independent   Stand to Sit 7  Independent   Additional Comments no AD used during assessment   Functional Mobility   Functional Mobility 7  Independent   Additional Comments Functional mobility community distance, no LOB or s/s of exertion  Additional items   (no AD)   Balance   Static Sitting Good   Dynamic Sitting Good   Static Standing Good   Dynamic Standing Good   Ambulatory Good   Activity Tolerance   Activity Tolerance Patient tolerated treatment well   Medical Staff Made Aware CM, PT   Nurse Made Aware Communication with RN Pre/post session   RUE Assessment   RUE Assessment WFL  (MMT ~4/5 grossly based on functional assessment)   LUE Assessment   LUE Assessment WFL  (MMT ~4/5 grossly based on functional assessment)   Hand Function   Gross Motor Coordination Functional   Sensation   Light Touch Partial deficits in the LUE  (intermittently)   Vision-Basic Assessment   Current Vision Wears glasses all the time   Patient Visual Report   (denies changes in vision)   Cognition   Overall Cognitive Status [facility]   Arousal/Participation Alert; Cooperative   Attention Within functional limits   Orientation Level Oriented X4   Memory Within functional limits   Following Commands Follows all commands and directions without difficulty   Comments Pt agreeable to OT session, pleasant   Assessment   Prognosis Good   Assessment Patient is a 76 y o  male seen for OT evaluation at [address] [place] [address] following admission on 5/17/2022  s/p Other chest pain  Comorbidities impacting functional performance include: dyslipidemia, depression, passive suicidal ideations, anxiety, malnutrition  Patient presents with active orders for OT eval and treat and up and OOB as tolerated   Performed at least 2 patient identifiers during session including name and wristband  PTA, pt reports being (I) with ADLs, assistance with IADLs, lives with son in 2 story home with 8 JANNIE  (-) AD, son assists with transportation Upon initial evaluation, patient is independent for UB/ LB ADLs, and independent for transfers and functional mobility within room and bathroom with the use of with no AD  Based on functional eval, patient presents A&Ox4 with intact  attention, intact  safety awareness, and intact  short term and long term memory  Occupational performance is not significantly impacted by any physical or cognitive deficits; no OT services warranted at this time  Recommending D/C to return to previous environment with social support once medically cleared  Will sign off, D/C OT  Please reconsult if further immediate skilled OT needs warranted  Goals   Patient Goals "to figure out whats going on" no rehab goals at this time, pt reports no concerns re: function upon D/C   Plan   OT Frequency Eval only   Recommendation   OT Discharge Recommendation No rehabilitation needs   OT - OK to Discharge Yes  (once medically clear)   Additional Comments  Pt resting in bed at end of session with all needs met, call button in reach   Additional Comments 2 The patient's raw score on the AM-PAC Daily Activity inpatient short form is 24, standardized score is 57 54, greater than 39 4  Patients at this level are likely to benefit from discharge to home   Please refer to the recommendation of the Occupational Therapist for safe discharge planning     AM-PAC Daily Activity Inpatient   Lower Body Dressing 4   Bathing 4   Toileting 4   Upper Body Dressing 4   Grooming 4   Eating 4   Daily Activity Raw Score 24   Daily Activity Standardized Score (Calc for Raw Score >=11) 57 54   AM-PAC Applied Cognition Inpatient   Following a Speech/Presentation 4   Understanding Ordinary Conversation 4   Taking Medications 4   Remembering Where Things Are Placed or Put Away 4   Remembering List of 4-5 Errands 4   Taking Care of Complicated Tasks 4   Applied Cognition Raw Score 24   Applied Cognition Standardized Score 62 21   Camille Quiles, OT

## 2022-05-18 NOTE — ASSESSMENT & PLAN NOTE
During conversations with both me and the ER provider in several ER nurses, the patient did express some passive suicidal ideations  When asked if he wants to commit suicide, he states am I going to put a gun to my head and shoot myself    No   If there were pill on the table in front of me that could end it all, I would probably take it though"  The patient does not seem to have a plan for suicide  The patient is exhibiting symptoms of severe anxiety though at this time although he claims that he is not anxious      · Continual virtual observation  · Psychiatry consult  · Please see assessment and plan for anxiety

## 2022-05-18 NOTE — CONSULTS
Consultation - Cardiology   Erik Chandler 76 y o  male MRN: [id number]  Unit/Bed#: Hali Fragoso Encounter: [id number]    Consults      Physician Requesting Consult: Jeana Moreira DO  Reason for Consult / Principal Problem: chest pain    Assessment/Plan:  1  Chest tightness   - chest tightness is intermittent and not consistent with any given activity, is not provoked with exertion such as when ascending a flight of stairs   - hs troponin levels are within normal limits here, EKG without ischemic changes   - a nuclear stress test was ordered by his primary cardiologist - this will be performed while he is inpatient   - an echocardiogram was also ordered which will be reviewed   - further recommendations will be made on this testing   - symptoms are not consistent with pericarditis - CRP was normal in April 2022    2  Orthopnea    - unclear etiology at this time, patient has no signs of volume overload on exam, BNP was within normal limits, he has no history of congestive heart failure   - a repeat echocardiogram is pending - if filling pressures are not elevated, then no need to treat with diuretics   - possible component of anxiety as discussed by SLIM    3  Mitral valve prolapse   - patient has a known history of such for which he chronically follows with Dr Raj Armas   - repeat echo is pending as above    History of Present Illness   HPI: Edita Valencia is a 76y o  year old male with mitral valve prolapse, dyslipidemia, coronary artery calcifications noted on CT, GERD, COVID-19 pneumonia in January 2022 with subsequent pericarditis who follows with Dr Raj Armas  The patient presented to the emergency department yesterday for evaluation of shortness of breath and chest tightness  Patient states he has been feeling generally unwell since he was diagnosed with COVID-19 in January 2022  After that he had pericarditis  CRP in April was within normal limits    Since then, he has felt generally weak and nauseous and has lost approximately 20 lb  He is unable to lay flat at night secondary to shortness of breath and has to sleep upright  Even then, he does wake up feeling out of breath  He does get occasional tightness in his chest which does not seem to be related to exertion  He states his symptoms are sporadic and not consistent  He was hospitalized last month with similar symptoms; during that admission he underwent EGD and colonoscopy both of which were generally unremarkable  He was thought to be dehydrated and did receive a fair amount of IV fluids  At that time, he states he was discharged home and felt generally well  He went to help his son and granddaughter at their bakery and states he became lightheaded and had palpitations  After that, he states he felt well for a few weeks but then the past week had recurrent symptoms as described above  Due to this he saw his primary cardiologist who ordered a nuclear stress test   This was scheduled but has not yet been completed  Yesterday specifically he awoke in the middle of the night feeling like he was suffocating    He had associated tightness in his chest   He states he got up and walked around without much improvement  Thus he presented to the emergency department for further evaluation and management  Upon admission he underwent had chest x-ray revealing no acute cardiopulmonary disease  High sensitivity troponin levels were within normal limits  EKG showed sinus rhythm without any ischemic changes or change compared to previous  An echocardiogram and nuclear stress test were ordered  Cardiology was consulted for further evaluation and management  Review of Systems   Cardiovascular: Positive for chest pain, orthopnea and paroxysmal nocturnal dyspnea  Negative for dyspnea on exertion and leg swelling  Gastrointestinal: Positive for nausea  Neurological: Negative for dizziness and light-headedness     All other systems reviewed and are negative  Historical Information   Past Medical History:   Diagnosis Date    Coronary artery disease     COVID-19 01/28/2022    History of echocardiogram 03/22/2018    Normal EF, normal LVSF  Thick and rebundent MV leaflets w/ mild posterior leaflet prolapse w/ trace regurg   Hyperlipidemia     Lyme disease     Mitral valve prolapse     Shingles      Past Surgical History:   Procedure Laterality Date    APPENDECTOMY      EYE SURGERY      HERNIA REPAIR      TONSILLECTOMY       Social History     Substance and Sexual Activity   Alcohol Use Never    Alcohol/week: 0 0 standard drinks     Social History     Substance and Sexual Activity   Drug Use Never     Social History     Tobacco Use   Smoking Status Never Smoker   Smokeless Tobacco Never Used     Family History: non-contributory    Meds/Allergies   all current active meds have been reviewed  sodium chloride, 75 mL/hr, Last Rate: 75 mL/hr (05/18/22 0045)        No Known Allergies    Objective   Vitals: Blood pressure 137/82, pulse 80, temperature 98 °F (36 7 °C), temperature source Temporal, resp  rate 17, height 5' 9" (1 753 m), weight 53 1 kg (117 lb), SpO2 97 %  , Body mass index is 17 28 kg/m² ,   Orthostatic Blood Pressures    Flowsheet Row Most Recent Value   Blood Pressure 137/82 filed at 05/18/2022 0901   Patient Position - Orthostatic VS Lying filed at 05/18/2022 9757        Systolic (27YQT), PSJ:146 , Min:132 , KLV:959     Diastolic (18XVP), XRP:11, Min:70, Max:86      Intake/Output Summary (Last 24 hours) at 5/18/2022 1117  Last data filed at 5/17/2022 2041  Gross per 24 hour   Intake 500 ml   Output --   Net 500 ml       Weight (last 2 days)     Date/Time Weight    05/18/22 0901 53 1 (117)    05/17/22 1833 53 3 (117 51)          Invasive Devices  Report    Peripheral Intravenous Line  Duration           Peripheral IV 05/17/22 Right Antecubital <1 day                  Physical Exam  Vitals reviewed     Constitutional:       General: He is not in acute distress  Appearance: He is well-developed  He is not diaphoretic  HENT:      Head: Normocephalic and atraumatic  Eyes:      Pupils: Pupils are equal, round, and reactive to light  Neck:      Vascular: No carotid bruit  Cardiovascular:      Rate and Rhythm: Normal rate and regular rhythm  Pulses:           Radial pulses are 2+ on the right side and 2+ on the left side  Dorsalis pedis pulses are 2+ on the right side and 2+ on the left side  Heart sounds: S1 normal and S2 normal  No murmur heard  Pulmonary:      Effort: Pulmonary effort is normal  No respiratory distress  Breath sounds: Normal breath sounds  No wheezing or rales  Abdominal:      General: There is no distension  Palpations: Abdomen is soft  Tenderness: There is no abdominal tenderness  Musculoskeletal:         General: Normal range of motion  Cervical back: Normal range of motion  Right lower leg: No edema  Left lower leg: No edema  Skin:     General: Skin is warm and dry  Findings: No erythema  Neurological:      General: No focal deficit present  Mental Status: He is alert and oriented to person, place, and time     Psychiatric:         Mood and Affect: Mood normal          Behavior: Behavior normal              Laboratory Results:        CBC with diff:   Results from last 7 days   Lab Units 05/17/22 2253 05/17/22 1857 05/14/22  1850   WBC Thousand/uL  --  8 37 9 46   HEMOGLOBIN g/dL  --  12 8 12 7   HEMATOCRIT %  --  38 1 39 1   MCV fL  --  88 92   PLATELETS Thousands/uL 211 235 233   MCH pg  --  29 6 29 9   MCHC g/dL  --  33 6 32 5   RDW %  --  12 8 13 0   MPV fL 10 5 10 5 10 7   NRBC AUTO /100 WBCs  --  0 0         CMP:  Results from last 7 days   Lab Units 05/17/22 1857 05/14/22  1850   POTASSIUM mmol/L 3 7 3 8   CHLORIDE mmol/L 100 100   CO2 mmol/L 32 31   BUN mg/dL 25 18   CREATININE mg/dL 1 03 0 97   CALCIUM mg/dL 9 6 9 2   AST U/L 26 24   ALT U/L 24 22 ALK PHOS U/L 65 59   EGFR ml/min/1 73sq m 68 73         BMP:  Results from last 7 days   Lab Units 22  1857 22  1850   POTASSIUM mmol/L 3 7 3 8   CHLORIDE mmol/L 100 100   CO2 mmol/L 32 31   BUN mg/dL 25 18   CREATININE mg/dL 1 03 0 97   CALCIUM mg/dL 9 6 9 2       BNP:  No results for input(s): BNP in the last 72 hours  Magnesium:       Coags:       TSH:       Hemoglobin A1C       Lipid Profile:         Cardiac testing:   Results for orders placed during the hospital encounter of 21    Echo complete with contrast if indicated    Narrative  [address]  [facility], [address] [place] [address]    Transthoracic Echocardiogram  2D, M-mode, Doppler, and Color Doppler    Study date:  2021    Patient: Brit Acuna  MR number: [id number]  Account number: [de-identified]  : 1945  Age: 64 years  Gender: Male  Status: Outpatient  Location: [facility] [place]  Height: 68 in  Weight: 134 lb  BP: 128/ 72 mmHg    Indications: Mitral Valve Disease    Diagnoses: I34 1 - Nonrheumatic mitral (valve) prolapse    Sonographer:  Rockford Seip, RDCS  Interpreting Physician: LONI Fernandez  Primary Physician:  Khushbu Roca DO  Referring Physician: LONI Fernandez  Group:  [facility]    SUMMARY    LEFT VENTRICLE:  Systolic function was normal  Ejection fraction was estimated to be 65 %  There were no regional wall motion abnormalities  MITRAL VALVE:  There was a mild prolapse involving the posterior leaflet  There was trace regurgitation  TRICUSPID VALVE:  There was trace regurgitation  HISTORY: PRIOR HISTORY: Lyme Disease, Shingles, Hyperlipidemia    PROCEDURE: The study was performed in the [facility] and Vascular Center  This was a routine study  The transthoracic approach was used  The study included complete 2D imaging, M-mode, complete spectral Doppler, and color Doppler   The  heart rate was 68 bpm, at the start of the study  Image quality was adequate  LEFT VENTRICLE: Size was normal  Systolic function was normal  Ejection fraction was estimated to be 65 %  There were no regional wall motion abnormalities  Wall thickness was normal  No evidence of apical thrombus  DOPPLER: Left  ventricular diastolic function parameters were normal     RIGHT VENTRICLE: The size was normal  Systolic function was normal  Wall thickness was normal     LEFT ATRIUM: Size was normal     RIGHT ATRIUM: Size was normal     MITRAL VALVE: Valve structure was normal  There was mild diffuse thickening  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  There was normal leaflet separation  There was a mild prolapse involving the posterior leaflet  DOPPLER: The transmitral velocity was within the normal range  There was no evidence for stenosis  There was trace regurgitation  AORTIC VALVE: The valve was trileaflet  Leaflets exhibited normal thickness and normal cuspal separation  DOPPLER: Transaortic velocity was within the normal range  There was no evidence for stenosis  There was no significant  regurgitation  TRICUSPID VALVE: The valve structure was normal  There was normal leaflet separation  DOPPLER: The transtricuspid velocity was within the normal range  There was no evidence for stenosis  There was trace regurgitation  PULMONIC VALVE: Leaflets exhibited normal thickness, no calcification, and normal cuspal separation  DOPPLER: The transpulmonic velocity was within the normal range  There was no significant regurgitation  PERICARDIUM: There was no pericardial effusion  The pericardium was normal in appearance  AORTA: The root exhibited normal size  SYSTEMIC VEINS: IVC: The inferior vena cava was normal in size      SYSTEM MEASUREMENT TABLES    2D  %FS: 54 47 %  Ao Diam: 3 26 cm  EDV(Teich): 95 95 ml  EF(Teich): 85 31 %  ESV(Teich): 14 09 ml  IVSd: 0 86 cm  LA Area: 18 45 cm2  LA Diam: 3 01 cm  LVEDV MOD A4C: 66 19 ml  LVEF MOD A4C: 49 13 %  LVESV MOD A4C: 33 67 ml  LVIDd: 4 57 cm  LVIDs: 2 08 cm  LVLd A4C: 6 6 cm  LVLs A4C: 5 78 cm  LVPWd: 0 81 cm  RA Area: 14 92 cm2  RVIDd: 2 94 cm  SV MOD A4C: 32 52 ml  SV(Teich): 81 86 ml    CW  AV Env  Ti: 317 16 ms  AV VTI: 25 88 cm  AV Vmax: 1 17 m/s  AV Vmean: 0 82 m/s  AV maxP 47 mmHg  AV meanPG: 3 04 mmHg  TR Vmax: 2 49 m/s  TR maxP 78 mmHg    MM  TAPSE: 2 2 cm    PW  E' Sept: 0 08 m/s  E/E' Sept: 10 62  LVOT Env  Ti: 293 05 ms  LVOT VTI: 20 92 cm  LVOT Vmax: 1 03 m/s  LVOT Vmean: 0 71 m/s  LVOT maxP 25 mmHg  LVOT meanP 3 mmHg  MV A Ghassan: 0 87 m/s  MV Dec [place]: 3 86 m/s2  MV DecT: 211 58 ms  MV E Ghassan: 0 82 m/s  MV E/A Ratio: 0 94  MV PHT: 61 36 ms  MVA By PHT: 3 59 cm2    [facility] Accredited Echocardiography Laboratory    Prepared and electronically signed by    LONI Sheppard  Signed 2021 11:42:34    No results found for this or any previous visit  No results found for this or any previous visit  No results found for this or any previous visit  Imaging: I have personally reviewed pertinent reports  XR chest 1 view portable    Result Date: 5/15/2022  Narrative: CHEST INDICATION:   sob  COMPARISON:  2022 EXAM PERFORMED/VIEWS:  XR CHEST PORTABLE FINDINGS: Cardiomediastinal silhouette appears unremarkable  The lungs are clear  No pneumothorax or pleural effusion  Osseous structures appear within normal limits for patient age  Impression: No acute cardiopulmonary disease  Workstation performed: [id number]     XR chest 2 views    Result Date: 2022  Narrative: CHEST INDICATION:   chest pain  COMPARISON:  2022 EXAM PERFORMED/VIEWS:  XR CHEST PA & LATERAL FINDINGS: Cardiomediastinal silhouette appears unremarkable  Mild aortic arch calcifications again noted  The lungs are clear  No pneumothorax or pleural effusion  Osseous structures appear within normal limits for patient age   No free air in the upper abdomen seen  Impression: Stable findings without acute cardiopulmonary disease  Workstation performed: [id number]     NM hepatobiliary w rx    Result Date: 5/4/2022  Narrative: HEPATOBILIARY SCAN WITH CHOLECYSTOKININ ADMINISTRATION INDICATION: Abdominal pain and nausea COMPARISON:  Ultrasound 4/26/2022 TECHNIQUE:   Following the intravenous administration of 5 3 mCi Tc-99m labeled mebrofenin, dynamic abdominal images were obtained up to a 60 minute time period  Images were performed in AP projection  FINDINGS: There is prompt, uniform accumulation with normal clearance of the radiopharmaceutical by the liver  There is normal filling of the intrahepatic ducts, common bile duct and gallbladder with normal excretion of the radiopharmaceutical into the duodenum  In order to evaluate the contractile response of the gallbladder to  cholecystokinin stimulation, 1 1 mcg sincalide (0 02 mcg/kg) was mixed with saline for a total of 60 cc and administered by slow intravenous infusion up to 60 minutes  These images demonstrate normal contraction of the gallbladder  The calculated gallbladder ejection fraction is 79 % (N = >38%)  The patient experienced no symptoms after CCK administration  Impression: 1  Normal hepatobiliary study  2  Normal contractile response of the gallbladder to cholecystokinin infusion   (79%) Workstation performed: [id number]     US abdomen limited    Result Date: 4/27/2022  Narrative: RIGHT UPPER QUADRANT ULTRASOUND INDICATION:    R10 11: Right upper quadrant pain  COMPARISON:  None  TECHNIQUE:   Real-time ultrasound of the right upper quadrant was performed with a curvilinear transducer with both volumetric sweeps and still imaging techniques  FINDINGS: PANCREAS:  Visualized portions of the pancreas are within normal limits  AORTA AND IVC:  Visualized portions are normal for patient age  LIVER: Size:  Within normal range  The liver measures 13 4 cm in the midclavicular line  Contour:  Surface contour is smooth  Parenchyma:  Echogenicity and echotexture are within normal limits  Limited imaging of the main portal vein shows it to be patent and hepatopetal  No liver mass identified  BILIARY: No gallbladder findings  No intrahepatic biliary dilatation  CBD measures 3 0 mm  No choledocholithiasis  Negative Wilson's sign KIDNEY: Right kidney measures 10 3 SAG x 5 1 AP x 4 6 TRV cm  Volume 127 7 mL Kidney within normal limits  ASCITES:   None       Impression: Normal  Workstation performed: [id number]       EKG reviewed personally: normal sinus rhythm, septal infarct  Telemetry reviewed personally: normal sinus rhythm, no events    Assessment:  Principal Problem:    Other chest pain  Active Problems:    Dyslipidemia    Depression, recurrent (HCC)    Passive suicidal ideations    Anxiety        Code Status: Level 1 - Full Code

## 2022-05-18 NOTE — ASSESSMENT & PLAN NOTE
During conversations with both me and the ER provider in several ER nurses, the patient did express some passive suicidal ideations  When asked if he wants to commit suicide, he states am I going to put a gun to my head and shoot myself    No   If there were pill on the table in front of me that could end it all, I would probably take it though"  The patient does not seem to have a plan for suicide       · Psychiatric consult completed 5/18  · Patient hopeless and having panic attacks while inpatient in relation to his general malaise for 4 months  · Recommendations to initiate on remeron 15 mg qhs, will continue ativan as needed  · Should suicidal ideations and feelings of hopelessness persist tomorrow, recommendations for inpatient psych treatment  · Discussed signing a [phone] with patient, he is agreeable at this time

## 2022-05-18 NOTE — PHYSICAL THERAPY NOTE
Physical Therapy Evaluation    Patient Name: Kamila Gil    YIJZK'A Date: 5/18/2022     Problem List  Principal Problem:    Other chest pain  Active Problems:    Dyslipidemia    Depression, recurrent ([address] )    Passive suicidal ideations    Anxiety       Past Medical History  Past Medical History:   Diagnosis Date    Coronary artery disease     COVID-19 01/28/2022    History of echocardiogram 03/22/2018    Normal EF, normal LVSF  Thick and rebundent MV leaflets w/ mild posterior leaflet prolapse w/ trace regurg  Hyperlipidemia     Lyme disease     Mitral valve prolapse     Shingles         Past Surgical History  Past Surgical History:   Procedure Laterality Date    APPENDECTOMY      EYE SURGERY      HERNIA REPAIR      TONSILLECTOMY             05/18/22 1346   PT Last Visit   PT Visit Date 05/18/22   Note Type   Note type Evaluation   Pain Assessment   Pain Assessment Tool 0-10   Pain Score 1   Pain Location/Orientation Location: Chest   Restrictions/Precautions   Weight Bearing Precautions Per Order No   Home Living   Type of [address] [place] [address] Two level;Bed/bath upstairs;Stairs to enter with rails  (8 JANNIE c HR)   Bathroom Shower/Tub Tub only   Bathroom Toilet Standard   Bathroom Accessibility Accessible   Home Equipment Walker   Additional Comments pt denies use of DME at baseline   Prior Function   Level of [place] Independent with ADLs and functional mobility; Needs assistance with IADLs   Lives With Son   Receives Help From Family   ADL Assistance Independent   IADLs Needs assistance   Falls in the last 6 months 0   Vocational Retired   Comments (+) driving but not currently   General   Family/Caregiver Present No   Cognition   Overall Cognitive Status WFL   Arousal/Participation Alert   Orientation Level Oriented X4   Following Commands Follows all commands and directions without difficulty   Subjective   Subjective pt agreeable to ambulation; pleasant   RLE Assessment   RLE Assessment WFL   LLE Assessment   LLE Assessment WFL   Bed Mobility   Supine to Sit 7  Independent   Sit to Supine 7  Independent   Transfers   Sit to Stand 7  Independent   Stand to Sit 7  Independent   Additional Comments no device used   Ambulation/Elevation   Gait pattern WNL   Gait Assistance 7  Independent   Assistive Device None   Distance 200'   Balance   Static Sitting Good   Dynamic Sitting Good   Static Standing Good   Dynamic Standing Good   Ambulatory Good   Endurance Deficit   Endurance Deficit No   Activity Tolerance   Activity Tolerance Patient tolerated treatment well   Assessment   Prognosis Good   Assessment Patient is a 76 y o  male evaluated by Physical Therapy s/p admit to [address] [place] [address] on 5/17/2022 with admitting diagnosis of: Heartburn and principal problem of: Other chest pain  PT was consulted to assess patient's functional mobility and discharge needs  Ordered are PT Evaluation and treatment with activity level of: up and OOB as tolerated  Comorbidities affecting patient's physical performance at time of assessment include: HLD, Lyme disease, hx of shingles, CAD, hx of COVID-19  Personal factors affecting the patient at time of IE include: lives in 2 story home, step(s) to enter home and inability/difficulty performing IADLs  Please locate objective findings from PT assessment regarding body systems outlined above  Upon evaluation, pt able to perform all functional mobility independently without assistive device  Pt demonstrating WFL strength, balance, and endurance; able to ambulate 200' before taking seated rest break  Pt denies current fatigue or SOB  Pt does have concerns surrounding his waxing and waning symptoms, but does not have any about his mobility at this time  Continued PT intervention not indicated; will d/c current PT orders  The patient's AM-PAC Basic Mobility Inpatient Short Form Raw Score is 24   A Raw score of greater than 16 suggests the patient may benefit from discharge to home  Please also refer to the recommendation of the Physical Therapist for safe discharge planning  Co treatment with OT secondary to complex medical condition of pt, possible A of 2 required to achieve and maintain transitional movements, requiring the need of skilled therapeutic intervention of 2 therapists to achieve delivery of services  Recommendation   PT Discharge Recommendation No rehabilitation needs   AM-PAC Basic Mobility Inpatient   Turning in Bed Without Bedrails 4   Lying on Back to Sitting on Edge of Flat Bed 4   Moving Bed to Chair 4   Standing Up From Chair 4   Walk in Room 4   Climb 3-5 Stairs 4   Basic Mobility Inpatient Raw Score 24   Basic Mobility Standardized Score 57 68   Highest Level Of Mobility   JH-HLM Goal 8: Walk 250 feet or more   JH-HLM Achieved 7: Walk 25 feet or more   End of Consult   Patient Position at End of Consult Supine; All needs within reach

## 2022-05-18 NOTE — ASSESSMENT & PLAN NOTE
· Patient currently takes Prozac 10 mg p o  Daily  · Will continue Prozac 10 mg p o   Daily  · Psych consult

## 2022-05-19 LAB
ALBUMIN SERPL BCP-MCNC: 3.2 G/DL (ref 3.5–5)
ALP SERPL-CCNC: 46 U/L (ref 46–116)
ALT SERPL W P-5'-P-CCNC: 18 U/L (ref 12–78)
ANION GAP SERPL CALCULATED.3IONS-SCNC: 7 MMOL/L (ref 4–13)
AST SERPL W P-5'-P-CCNC: 15 U/L (ref 5–45)
BASOPHILS # BLD AUTO: 0.05 THOUSANDS/ΜL (ref 0–0.1)
BASOPHILS NFR BLD AUTO: 1 % (ref 0–1)
BILIRUB SERPL-MCNC: 0.64 MG/DL (ref 0.2–1)
BUN SERPL-MCNC: 19 MG/DL (ref 5–25)
CALCIUM ALBUM COR SERPL-MCNC: 9.3 MG/DL (ref 8.3–10.1)
CALCIUM SERPL-MCNC: 8.7 MG/DL (ref 8.3–10.1)
CHLORIDE SERPL-SCNC: 104 MMOL/L (ref 100–108)
CO2 SERPL-SCNC: 28 MMOL/L (ref 21–32)
CREAT SERPL-MCNC: 0.88 MG/DL (ref 0.6–1.3)
EOSINOPHIL # BLD AUTO: 0.16 THOUSAND/ΜL (ref 0–0.61)
EOSINOPHIL NFR BLD AUTO: 3 % (ref 0–6)
ERYTHROCYTE [DISTWIDTH] IN BLOOD BY AUTOMATED COUNT: 13 % (ref 11.6–15.1)
GFR SERPL CREATININE-BSD FRML MDRD: 81 ML/MIN/1.73SQ M
GLUCOSE SERPL-MCNC: 91 MG/DL (ref 65–140)
HCT VFR BLD AUTO: 37 % (ref 36.5–49.3)
HGB BLD-MCNC: 12.2 G/DL (ref 12–17)
IMM GRANULOCYTES # BLD AUTO: 0.02 THOUSAND/UL (ref 0–0.2)
IMM GRANULOCYTES NFR BLD AUTO: 0 % (ref 0–2)
LYMPHOCYTES # BLD AUTO: 2.07 THOUSANDS/ΜL (ref 0.6–4.47)
LYMPHOCYTES NFR BLD AUTO: 35 % (ref 14–44)
MCH RBC QN AUTO: 30.3 PG (ref 26.8–34.3)
MCHC RBC AUTO-ENTMCNC: 33 G/DL (ref 31.4–37.4)
MCV RBC AUTO: 92 FL (ref 82–98)
MONOCYTES # BLD AUTO: 0.54 THOUSAND/ΜL (ref 0.17–1.22)
MONOCYTES NFR BLD AUTO: 9 % (ref 4–12)
NEUTROPHILS # BLD AUTO: 3.08 THOUSANDS/ΜL (ref 1.85–7.62)
NEUTS SEG NFR BLD AUTO: 52 % (ref 43–75)
NRBC BLD AUTO-RTO: 0 /100 WBCS
PLATELET # BLD AUTO: 196 THOUSANDS/UL (ref 149–390)
PMV BLD AUTO: 10.6 FL (ref 8.9–12.7)
POTASSIUM SERPL-SCNC: 3.6 MMOL/L (ref 3.5–5.3)
PROT SERPL-MCNC: 6.1 G/DL (ref 6.4–8.2)
RBC # BLD AUTO: 4.03 MILLION/UL (ref 3.88–5.62)
SODIUM SERPL-SCNC: 139 MMOL/L (ref 136–145)
WBC # BLD AUTO: 5.92 THOUSAND/UL (ref 4.31–10.16)

## 2022-05-19 PROCEDURE — 80053 COMPREHEN METABOLIC PANEL: CPT | Performed by: PHYSICIAN ASSISTANT

## 2022-05-19 PROCEDURE — 85025 COMPLETE CBC W/AUTO DIFF WBC: CPT | Performed by: PHYSICIAN ASSISTANT

## 2022-05-19 PROCEDURE — 99232 SBSQ HOSP IP/OBS MODERATE 35: CPT | Performed by: PSYCHIATRY & NEUROLOGY

## 2022-05-19 PROCEDURE — 99232 SBSQ HOSP IP/OBS MODERATE 35: CPT | Performed by: PHYSICIAN ASSISTANT

## 2022-05-19 RX ORDER — FLUOXETINE HYDROCHLORIDE 20 MG/1
20 CAPSULE ORAL DAILY
Status: DISCONTINUED | OUTPATIENT
Start: 2022-05-20 | End: 2022-05-20 | Stop reason: HOSPADM

## 2022-05-19 RX ORDER — FLUOXETINE 10 MG/1
10 CAPSULE ORAL ONCE
Status: COMPLETED | OUTPATIENT
Start: 2022-05-19 | End: 2022-05-19

## 2022-05-19 RX ADMIN — SUCRALFATE 1 G: 1 TABLET ORAL at 15:46

## 2022-05-19 RX ADMIN — SUCRALFATE 1 G: 1 TABLET ORAL at 09:32

## 2022-05-19 RX ADMIN — FAMOTIDINE 40 MG: 20 TABLET ORAL at 21:03

## 2022-05-19 RX ADMIN — ENOXAPARIN SODIUM 40 MG: 40 INJECTION SUBCUTANEOUS at 09:32

## 2022-05-19 RX ADMIN — PRAVASTATIN SODIUM 10 MG: 20 TABLET ORAL at 15:45

## 2022-05-19 RX ADMIN — FLUOXETINE 10 MG: 10 CAPSULE ORAL at 12:29

## 2022-05-19 RX ADMIN — FLUOXETINE 10 MG: 10 CAPSULE ORAL at 09:32

## 2022-05-19 RX ADMIN — MIRTAZAPINE 15 MG: 15 TABLET, FILM COATED ORAL at 21:00

## 2022-05-19 NOTE — PROGRESS NOTES
[address] [address]  Progress Note [facility] 1945, 76 y o  male MRN: [id number]  Unit/Bed#: 269-96 Encounter: [id number]  Primary Care Provider: Cb Medeiros DO   Date and time admitted to hospital: 5/17/2022  6:26 PM    * Other chest pain  Assessment & Plan  · Patient presented complaining of substernal chest pain with associated dyspnea  · Troponins normal x3  · ProBNP normal  · Twelve lead EKG showed no evidence of MI including elevations or depressions of ST segments  · Patient is scheduled to undergo a nuclear stress test which is scheduled by Dr Angelica Vazquez of [facility]  · Stress test completed and no ischemia or perfusion defects noted  Normal stress test  · No further recommendations from our cardiology team  · Suspect chest pains are related to anxiety      Passive suicidal ideations  Assessment & Plan  During conversations with both me and the ER provider in several ER nurses, the patient did express some passive suicidal ideations  When asked if he wants to commit suicide, he states am I going to put a gun to my head and shoot myself    No   If there were pill on the table in front of me that could end it all, I would probably take it though"  Patient hopeless and having panic attacks while inpatient in relation to his general malaise for 4 months  · Seen by psych 05/18 and 05/19  · Recommendations to continue on remeron 15 mg qhs, increased prozac to 20mg today, will continue ativan as needed  · Did re-consult psych today, should symptoms, suicidal ideations and feelings of hopelessness persist tomorrow, recommendations for inpatient psych treatment  · Discussed signing a 12 with patient, he is agreeable at this time - will plan to have patient sign 201 if patient does not notice improvement from medications adjustments today     Anxiety  Assessment & Plan  · Patient reports that he has not slept for days  He states that he feels very on edge    He reports that he is not able to eat due to nausea but has not vomited  · When asked if he is anxious, he adamantly declines but when I discussed it further with him today about his body manifesting stress in different ways, he was much more agreeable  · Ativan 0 5 mg IV daily p r n  Anxiety, insomnia while inpatient      Depression, recurrent ([facility] [address] )  Assessment & Plan  · Patient currently takes Prozac 10 mg p o  Daily- this was increased to 20mg daily today           Dyslipidemia  Assessment & Plan  · Pravastatin 10 mg oral daily with dinner          VTE Pharmacologic Prophylaxis: VTE Score: 4 Moderate Risk (Score 3-4) - Pharmacological DVT Prophylaxis Ordered: enoxaparin (Lovenox)  Patient Centered Rounds: I performed bedside rounds with nursing staff today  Discussions with Specialists or Other Care Team Provider: CATHY,RN, psych    Education and Discussions with Family / Patient: Patient declined call to   Time Spent for Care: 30 minutes  More than 50% of total time spent on counseling and coordination of care as described above  Current Length of Stay: 1 day(s)  Current Patient Status: Inpatient   Certification Statement: The patient will continue to require additional inpatient hospital stay due to possible inpatient psych   Discharge Plan: Anticipate discharge in 24-48 hrs to inpatient psych  Code Status: Level 1 - Full Code    Subjective:   Patient was found standing staring at a wall in the room  He states that he did feel a little better after starting Remeron last night however he will still intermittently feel like his heart is racing/chest is tight/nauseous  Reports that he is happy that we are looking deeper into alternative causes for his symptoms and would be willing to try inpatient psych if it means that it would make him feel better  Wants to give it another day to see if med adjustments help his symptoms       Objective:     Vitals:   Temp (24hrs), Av 8 °F (36 6 °C), Min:97 8 °F (36 6 °C), Max:97 8 °F (36 6 °C)    Temp:  [97 8 °F (36 6 °C)] 97 8 °F (36 6 °C)  HR:  [64-71] 64  Resp:  [18] 18  BP: (142-162)/(83-93) 142/83  SpO2:  [96 %-100 %] 100 %  Body mass index is 16 21 kg/m²  Input and Output Summary (last 24 hours): Intake/Output Summary (Last 24 hours) at 5/19/2022 1317  Last data filed at 5/19/2022 5171  Gross per 24 hour   Intake 440 ml   Output --   Net 440 ml       Physical Exam:   Physical Exam  Constitutional:       General: He is not in acute distress  HENT:      Mouth/Throat:      Mouth: Mucous membranes are moist    Eyes:      General: No scleral icterus  Cardiovascular:      Rate and Rhythm: Normal rate and regular rhythm  Heart sounds: Normal heart sounds  Pulmonary:      Effort: No respiratory distress  Breath sounds: Normal breath sounds  No wheezing, rhonchi or rales  Abdominal:      General: Abdomen is flat  Bowel sounds are normal       Palpations: Abdomen is soft  Tenderness: There is no abdominal tenderness  Musculoskeletal:      Right lower leg: No edema  Left lower leg: No edema  Skin:     General: Skin is warm  Neurological:      Mental Status: He is alert  Mental status is at baseline     Psychiatric:      Comments: Anxious          Additional Data:     Labs:  Results from last 7 days   Lab Units 05/19/22  0407   WBC Thousand/uL 5 92   HEMOGLOBIN g/dL 12 2   HEMATOCRIT % 37 0   PLATELETS Thousands/uL 196   NEUTROS PCT % 52   LYMPHS PCT % 35   MONOS PCT % 9   EOS PCT % 3     Results from last 7 days   Lab Units 05/19/22  0407   SODIUM mmol/L 139   POTASSIUM mmol/L 3 6   CHLORIDE mmol/L 104   CO2 mmol/L 28   BUN mg/dL 19   CREATININE mg/dL 0 88   ANION GAP mmol/L 7   CALCIUM mg/dL 8 7   ALBUMIN g/dL 3 2*   TOTAL BILIRUBIN mg/dL 0 64   ALK PHOS U/L 46   ALT U/L 18   AST U/L 15   GLUCOSE RANDOM mg/dL 91                       Lines/Drains:  Invasive Devices  Report    Peripheral Intravenous Line  Duration Peripheral IV 05/17/22 Right Antecubital 1 day                      Imaging: No pertinent imaging reviewed  Recent Cultures (last 7 days):         Last 24 Hours Medication List:   Current Facility-Administered Medications   Medication Dose Route Frequency Provider Last Rate    calcium carbonate  1,000 mg Oral TID PRN Fayrene MATTHEW Luciano      enoxaparin  40 mg Subcutaneous Daily Fayrene MATTHEW Luciano      famotidine  40 mg Oral HS Dillan Porter PA-C      [START ON 5/20/2022] FLUoxetine  20 mg Oral Daily Pasquale Moeller PA-C      LORazepam  0 5 mg Intravenous Q4H PRN Abraham Farooq PA-C      mirtazapine  15 mg Oral HS Abraham Farooq PA-C      ondansetron  4 mg Intravenous Q6H PRN Lois Luciano PA-C      pravastatin  10 mg Oral Daily With MATTHEW Valero      sucralfate  1 g Oral BID AC Lois Luciano PA-C          Today, Patient Was Seen By: Pasquale Moeller PA-C    **Please Note: This note may have been constructed using a voice recognition system  **

## 2022-05-19 NOTE — PLAN OF CARE
Problem: Potential for Falls  Goal: Patient will remain free of falls  Description: INTERVENTIONS:  - Educate patient/family on patient safety including physical limitations  - Instruct patient to call for assistance with activity   - Consult OT/PT to assist with strengthening/mobility   - Keep Call bell within reach  - Keep bed low and locked with side rails adjusted as appropriate  - Keep care items and personal belongings within reach  - Initiate and maintain comfort rounds  - Make Fall Risk Sign visible to staff  - Apply yellow socks and bracelet for high fall risk patients  - Consider moving patient to room near nurses station  Outcome: Progressing     Problem: Nutrition/Hydration-ADULT  Goal: Nutrient/Hydration intake appropriate for improving, restoring or maintaining nutritional needs  Description: Monitor and assess patient's nutrition/hydration status for malnutrition  Collaborate with interdisciplinary team and initiate plan and interventions as ordered  Monitor patient's weight and dietary intake as ordered or per policy  Utilize nutrition screening tool and intervene as necessary  Determine patient's food preferences and provide high-protein, high-caloric foods as appropriate       INTERVENTIONS:  - Monitor oral intake, urinary output, labs, and treatment plans  - Assess nutrition and hydration status and recommend course of action  - Evaluate amount of meals eaten  - Assist patient with eating if necessary   - Allow adequate time for meals  - Recommend/ encourage appropriate diets, oral nutritional supplements, and vitamin/mineral supplements  - Order, calculate, and assess calorie counts as needed  - Recommend, monitor, and adjust tube feedings and TPN/PPN based on assessed needs  - Assess need for intravenous fluids  - Provide specific nutrition/hydration education as appropriate  - Include patient/family/caregiver in decisions related to nutrition  Outcome: Progressing     Problem: PAIN - ADULT  Goal: Verbalizes/displays adequate comfort level or baseline comfort level  Description: Interventions:  - Encourage patient to monitor pain and request assistance  - Assess pain using appropriate pain scale  - Administer analgesics based on type and severity of pain and evaluate response  - Implement non-pharmacological measures as appropriate and evaluate response  - Consider cultural and social influences on pain and pain management  - Notify physician/advanced practitioner if interventions unsuccessful or patient reports new pain  Outcome: Progressing     Problem: SAFETY ADULT  Goal: Patient will remain free of falls  Description: INTERVENTIONS:  - Educate patient/family on patient safety including physical limitations  - Instruct patient to call for assistance with activity   - Consult OT/PT to assist with strengthening/mobility   - Keep Call bell within reach  - Keep bed low and locked with side rails adjusted as appropriate  - Keep care items and personal belongings within reach  - Initiate and maintain comfort rounds  - Make Fall Risk Sign visible to staff  - Apply yellow socks and bracelet for high fall risk patients  - Consider moving patient to room near nurses station  Outcome: Progressing  Goal: Maintain or return to baseline ADL function  Description: INTERVENTIONS:  -  Assess patient's ability to carry out ADLs; assess patient's baseline for ADL function and identify physical deficits which impact ability to perform ADLs (bathing, care of mouth/teeth, toileting, grooming, dressing, etc )  - Assess/evaluate cause of self-care deficits   - Assess range of motion  - Assess patient's mobility; develop plan if impaired  - Assess patient's need for assistive devices and provide as appropriate  - Encourage maximum independence but intervene and supervise when necessary  - Involve family in performance of ADLs  - Assess for home care needs following discharge   - Consider OT consult to assist with ADL evaluation and planning for discharge  - Provide patient education as appropriate  Outcome: Progressing  Goal: Maintains/Returns to pre admission functional level  Description: INTERVENTIONS:  - Perform BMAT or MOVE assessment daily    - Set and communicate daily mobility goal to care team and patient/family/caregiver  - Collaborate with rehabilitation services on mobility goals if consulted  - Out of bed for toileting  - Record patient progress and toleration of activity level   Outcome: Progressing     Problem: DISCHARGE PLANNING  Goal: Discharge to home or other facility with appropriate resources  Description: INTERVENTIONS:  - Identify barriers to discharge w/patient and caregiver  - Arrange for needed discharge resources and transportation as appropriate  - Identify discharge learning needs (meds, wound care, etc )  - Arrange for interpretive services to assist at discharge as needed  - Refer to Case Management Department for coordinating discharge planning if the patient needs post-hospital services based on physician/advanced practitioner order or complex needs related to functional status, cognitive ability, or social support system  Outcome: Progressing     Problem: Knowledge Deficit  Goal: Patient/family/caregiver demonstrates understanding of disease process, treatment plan, medications, and discharge instructions  Description: Complete learning assessment and assess knowledge base    Interventions:  - Provide teaching at level of understanding  - Provide teaching via preferred learning methods  Outcome: Progressing

## 2022-05-19 NOTE — PLAN OF CARE
Problem: Potential for Falls  Goal: Patient will remain free of falls  Description: INTERVENTIONS:  - Educate patient/family on patient safety including physical limitations  - Instruct patient to call for assistance with activity   - Consult OT/PT to assist with strengthening/mobility   - Keep Call bell within reach  - Keep bed low and locked with side rails adjusted as appropriate  - Keep care items and personal belongings within reach  - Initiate and maintain comfort rounds  - Make Fall Risk Sign visible to staff  - Obtain necessary fall risk management equipment: as needed  - Apply yellow socks and bracelet for high fall risk patients  - Consider moving patient to room near nurses station  Outcome: Progressing     Problem: Nutrition/Hydration-ADULT  Goal: Nutrient/Hydration intake appropriate for improving, restoring or maintaining nutritional needs  Description: Monitor and assess patient's nutrition/hydration status for malnutrition  Collaborate with interdisciplinary team and initiate plan and interventions as ordered  Monitor patient's weight and dietary intake as ordered or per policy  Utilize nutrition screening tool and intervene as necessary  Determine patient's food preferences and provide high-protein, high-caloric foods as appropriate       INTERVENTIONS:  - Monitor oral intake, urinary output, labs, and treatment plans  - Assess nutrition and hydration status and recommend course of action  - Evaluate amount of meals eaten  - Assist patient with eating if necessary   - Allow adequate time for meals  - Recommend/ encourage appropriate diets, oral nutritional supplements, and vitamin/mineral supplements  - Order, calculate, and assess calorie counts as needed  - Recommend, monitor, and adjust tube feedings and TPN/PPN based on assessed needs  - Assess need for intravenous fluids  - Provide specific nutrition/hydration education as appropriate  - Include patient/family/caregiver in decisions related to nutrition  Outcome: Progressing     Problem: PAIN - ADULT  Goal: Verbalizes/displays adequate comfort level or baseline comfort level  Description: Interventions:  - Encourage patient to monitor pain and request assistance  - Assess pain using appropriate pain scale  - Administer analgesics based on type and severity of pain and evaluate response  - Implement non-pharmacological measures as appropriate and evaluate response  - Consider cultural and social influences on pain and pain management  - Notify physician/advanced practitioner if interventions unsuccessful or patient reports new pain  Outcome: Progressing     Problem: SAFETY ADULT  Goal: Patient will remain free of falls  Description: INTERVENTIONS:  - Educate patient/family on patient safety including physical limitations  - Instruct patient to call for assistance with activity   - Consult OT/PT to assist with strengthening/mobility   - Keep Call bell within reach  - Keep bed low and locked with side rails adjusted as appropriate  - Keep care items and personal belongings within reach  - Initiate and maintain comfort rounds  - Make Fall Risk Sign visible to staff  - Obtain necessary fall risk management equipment: as needed  - Apply yellow socks and bracelet for high fall risk patients  - Consider moving patient to room near nurses station  Outcome: Progressing  Goal: Maintain or return to baseline ADL function  Description: INTERVENTIONS:  -  Assess patient's ability to carry out ADLs; assess patient's baseline for ADL function and identify physical deficits which impact ability to perform ADLs (bathing, care of mouth/teeth, toileting, grooming, dressing, etc )  - Assess/evaluate cause of self-care deficits   - Assess range of motion  - Assess patient's mobility; develop plan if impaired  - Assess patient's need for assistive devices and provide as appropriate  - Encourage maximum independence but intervene and supervise when necessary  - Involve family in performance of ADLs  - Assess for home care needs following discharge   - Consider OT consult to assist with ADL evaluation and planning for discharge  - Provide patient education as appropriate  Outcome: Progressing  Goal: Maintains/Returns to pre admission functional level  Description: INTERVENTIONS:  - Perform BMAT or MOVE assessment daily    - Set and communicate daily mobility goal to care team and patient/family/caregiver  - Collaborate with rehabilitation services on mobility goals if consulted  - Out of bed for meals 3 times a day  - Out of bed for toileting  - Record patient progress and toleration of activity level   Outcome: Progressing     Problem: DISCHARGE PLANNING  Goal: Discharge to home or other facility with appropriate resources  Description: INTERVENTIONS:  - Identify barriers to discharge w/patient and caregiver  - Arrange for needed discharge resources and transportation as appropriate  - Identify discharge learning needs (meds, wound care, etc )  - Arrange for interpretive services to assist at discharge as needed  - Refer to Case Management Department for coordinating discharge planning if the patient needs post-hospital services based on physician/advanced practitioner order or complex needs related to functional status, cognitive ability, or social support system  Outcome: Progressing     Problem: Knowledge Deficit  Goal: Patient/family/caregiver demonstrates understanding of disease process, treatment plan, medications, and discharge instructions  Description: Complete learning assessment and assess knowledge base    Interventions:  - Provide teaching at level of understanding  - Provide teaching via preferred learning methods  Outcome: Progressing     Problem: MOBILITY - ADULT  Goal: Maintain or return to baseline ADL function  Description: INTERVENTIONS:  -  Assess patient's ability to carry out ADLs; assess patient's baseline for ADL function and identify physical deficits which impact ability to perform ADLs (bathing, care of mouth/teeth, toileting, grooming, dressing, etc )  - Assess/evaluate cause of self-care deficits   - Assess range of motion  - Assess patient's mobility; develop plan if impaired  - Assess patient's need for assistive devices and provide as appropriate  - Encourage maximum independence but intervene and supervise when necessary  - Involve family in performance of ADLs  - Assess for home care needs following discharge   - Consider OT consult to assist with ADL evaluation and planning for discharge  - Provide patient education as appropriate  Outcome: Progressing  Goal: Maintains/Returns to pre admission functional level  Description: INTERVENTIONS:  - Perform BMAT or MOVE assessment daily    - Set and communicate daily mobility goal to care team and patient/family/caregiver     - Collaborate with rehabilitation services on mobility goals if consulted  - Out of bed for meals 3 times a day  - Out of bed for toileting  - Record patient progress and toleration of activity level   Outcome: Progressing

## 2022-05-19 NOTE — ASSESSMENT & PLAN NOTE
· Patient presented complaining of substernal chest pain with associated dyspnea  · Troponins normal x3  · ProBNP normal  · Twelve lead EKG showed no evidence of MI including elevations or depressions of ST segments  · Patient is scheduled to undergo a nuclear stress test which is scheduled by Dr Debi Walden of [address] [place] [address] Cardiology  · Stress test completed and no ischemia or perfusion defects noted   Normal stress test  · No further recommendations from our cardiology team  · Suspect chest pains are related to anxiety

## 2022-05-19 NOTE — NUTRITION
Pt  requesting Ensure per   Ordered Ensure Enlive (vanilla per pt  preference) at all meals  Full nutrition assessment pending

## 2022-05-19 NOTE — TELEMEDICINE
TeleConsultation - [address]ine 76 y o  male MRN: [id number]  Unit/Bed#: 414-01 Encounter: [id number]        REQUIRED DOCUMENTATION:     1  This service was provided via Telemedicine  2  Provider located at [place]  3  TeleMed provider: Swati Mccann MD   4  Identify all parties in room with patient during tele consult:  Patient  5  Patient was then informed that this was a Telemedicine visit and that the exam was being conducted confidentially over secure lines  My office door was closed  No one else was in the room  Patient acknowledged consent and understanding of privacy and security of the Telemedicine visit, and gave us permission to have the assistant stay in the room in order to assist with the history and to conduct the exam   I informed the patient that I have reviewed their record in Epic and presented the opportunity for them to ask any questions regarding the visit today  The patient agreed to participate  Assessment/Plan     Assessment:  Mood disorder unspecified; rule out mood disorder (major depression like) secondary to other physiological condition (possibly related to COVID infection in January); rule out major depression; anxiety disorder unspecified; rule out anxiety disorder secondary to other physiological condition  Plan:   Risks, benefits and possible side effects of Medications:   Risks, benefits, and possible side effects of medications explained to patient and patient verbalizes understanding  Recommend further increasing Prozac to 20 milligrams p o  daily for depression and anxiety  The patient tells me he slept better after receiving Remeron 15 milligrams p o  last night  Would continue Remeron at current dose  The patient tells me he understands the treatment plan is to see if he can make sufficient progress over the next day or 2 with current medication adjustments being made so as to be able to return home    He understands however if he does not make sufficient progress then he will be transferred to a psychiatric unit for continuation of precautions, further diagnostic evaluation and treatment stabilization  The patient is in agreement with this plan and is appropriate to sign 201 voluntary paperwork  Re-consult Psychiatry as needed  Chief Complaint:  Patient reports he is feeling better at the moment but is concerned about vacillations in how he feels at any given moment  History of Present Illness     Reason for Consult / Principal Problem:  Psychiatry re-evaluation    This is a follow-up psychiatry consult to that provided yesterday  Please see that consult for details  In summary, Patient is a 76 y o  male who presented to the emergency department with provider documented the following: This is a 76-year-old male presents emergency department with chest pain   The patient states he has been having chest pain for quite some time now  Stephanie Skinner awoke with chest pain this morning   It is substernal   He says it got worse at 3:00 a m  In the called his PCP  Stephanie Skinner did not get a call back so came to the emergency department  Kenn Enriquez has seen Cardiology for the same  Nojorge Skinner has had diagnosis of GERD  Stephanie Skinner had a negative HIDA scan  Stephanie Brody has had recent EGD and colonoscopy  Davian Most recently saw Cardiology  Sonmanuel Flax does appear patient had [place] several months ago and appears that he had a viral pericarditis that was treated with NSAIDs   Repeat CRP in April was negative   Patient has been taking Tums and Delia Emperor has also been taking Carafate since Saturday  Stephanie Skinner states that this is not giving him significant relief   The chest pain is not exertional   Patient also states that he is not sleeping well over the last several days he states that he has not slept at night time  Stephanie Skinner seems very anxious during exam  Kenn Enriquez also states he has had decreased p o   Intake  Genevieve Salas of chart does show that patient has nuclear medicine stress test scheduled for 2022 by his cardiologist  Mallory Torres also states that the last 2 days he has been constipated   He describes manually disimpact himself today and was able to pass a fair amount of stool  Maximus Mixer      The physician assistant hospitalist as documented the followin [address] conversations with both me and the ER provider in several ER nurses, the patient did express some passive suicidal ideations   When asked if he wants to commit suicide, he states am I going to put a gun to my head and shoot myself    No   If there were pill on the table in front of me that could end it all, I would probably take it though"   The patient does not seem to have a plan for suicide   The patient is exhibiting symptoms of severe anxiety though at this time although he claims that he is not anxious      Patient reports poor quality of life since he had COVID in January stated that he frequently experiences tightness in his chest, feeling that he suffocated or significant acid reflux with sleep disturbance      Past psychiatric history:  Unremarkable      Social history:  The patient states he has been  for the past 10 years  He has 2 children  His son lives with him  When asked about psychological traumatic experiences he reports his COVID in January        Family history:  Unremarkable      Substance use history:  Patient states he has never used alcohol, nicotine or other substances  Hospital course: The patient tells me he has had periods of time where he has felt better  He states he is able to sleep very well last night after receiving Remeron 15 milligrams p o  He states he has had periods of time also without the nausea but is concerned that he did have an episode of nausea requiring medication  Mental status examination:  The patient is alert well oriented all spheres  Sensorium is clear  Speech is unremarkable  Affect is somewhat less depressed today    He appears to be he is somewhat more hopeful regarding his situation the after sleeping well last night and having some periods of time without nausea  Thought process is logical linear  Thought content is reality based  Associations are tight  Memory is grossly intact in all spheres  He denies suicidal ideation at this moment but has admitted that if he had a suicide pill in front of him that he knew would work he would likely take the pill  There is no homicidal ideation  He has denied hallucinations other psychotic features  He has shown impairment insight judgment at times secondary to his physical emotional distress  He does however recognize the need and is accepting of inpatient psychiatric treatment if he fails sufficiently improve over the next day or 2  Inpatient consult to Psychiatry  Consult performed by: Linden Bridges MD  Consult ordered by: Laura Landers PA-C            Past Medical History:   Diagnosis Date    Coronary artery disease     COVID-19 01/28/2022    History of echocardiogram 03/22/2018    Normal EF, normal LVSF  Thick and rebundent MV leaflets w/ mild posterior leaflet prolapse w/ trace regurg   Hyperlipidemia     Lyme disease     Mitral valve prolapse     Shingles        Medical Review Of Systems:  Review of Systems    Meds/Allergies   all current active meds have been reviewed  No Known Allergies    Objective   Vital signs in last 24 hours:  Temp:  [97 8 °F (36 6 °C)] 97 8 °F (36 6 °C)  HR:  [64-71] 64  Resp:  [18] 18  BP: (142-162)/(83-93) 142/83      Intake/Output Summary (Last 24 hours) at 5/19/2022 1149  Last data filed at 5/19/2022 7770  Gross per 24 hour   Intake 440 ml   Output --   Net 440 ml         Lab Results:  Reviewed  Imaging Studies:  Reviewed  EKG, Pathology, and Other Studies:  Reviewed    Code Status: Level 1 - Full Code  Advance Directive and Living Will:      Power of :    POLST:      Counseling / Coordination of Care  Total floor / unit time spent today 30 minutes   Greater than 50% of total time was spent with the patient and / or family counseling and / or coordination of care  A description of the counseling / coordination of care:  Chart review, patient evaluation, coordination communication with staff, nursing and provider

## 2022-05-19 NOTE — ASSESSMENT & PLAN NOTE
During conversations with both me and the ER provider in several ER nurses, the patient did express some passive suicidal ideations  When asked if he wants to commit suicide, he states am I going to put a gun to my head and shoot myself    No   If there were pill on the table in front of me that could end it all, I would probably take it though"  Patient hopeless and having panic attacks while inpatient in relation to his general malaise for 4 months        · Seen by psych 05/18 and 05/19  · Recommendations to continue on remeron 15 mg qhs, increased prozac to 20mg today, will continue ativan as needed  · Did re-consult psych today, should symptoms, suicidal ideations and feelings of hopelessness persist tomorrow, recommendations for inpatient psych treatment  · Discussed signing a 12 with patient, he is agreeable at this time - will plan to have patient sign 201 if patient does not notice improvement from medications adjustments today

## 2022-05-20 VITALS
DIASTOLIC BLOOD PRESSURE: 73 MMHG | TEMPERATURE: 97.4 F | OXYGEN SATURATION: 98 % | HEART RATE: 61 BPM | RESPIRATION RATE: 18 BRPM | SYSTOLIC BLOOD PRESSURE: 141 MMHG | WEIGHT: 109.79 LBS | HEIGHT: 69 IN | BODY MASS INDEX: 16.26 KG/M2

## 2022-05-20 PROCEDURE — 99239 HOSP IP/OBS DSCHRG MGMT >30: CPT | Performed by: INTERNAL MEDICINE

## 2022-05-20 RX ORDER — LORAZEPAM 0.5 MG/1
0.5 TABLET ORAL 2 TIMES DAILY PRN
Qty: 30 TABLET | Refills: 0 | Status: SHIPPED | OUTPATIENT
Start: 2022-05-20 | End: 2022-06-27 | Stop reason: SDUPTHER

## 2022-05-20 RX ORDER — FLUOXETINE HYDROCHLORIDE 20 MG/1
20 CAPSULE ORAL DAILY
Qty: 30 CAPSULE | Refills: 0 | Status: SHIPPED | OUTPATIENT
Start: 2022-05-20 | End: 2022-05-24 | Stop reason: DRUGHIGH

## 2022-05-20 RX ORDER — MIRTAZAPINE 15 MG/1
15 TABLET, FILM COATED ORAL
Qty: 30 TABLET | Refills: 0 | Status: SHIPPED | OUTPATIENT
Start: 2022-05-20 | End: 2022-06-27 | Stop reason: SDUPTHER

## 2022-05-20 RX ADMIN — FLUOXETINE 20 MG: 20 CAPSULE ORAL at 09:47

## 2022-05-20 RX ADMIN — ENOXAPARIN SODIUM 40 MG: 40 INJECTION SUBCUTANEOUS at 09:47

## 2022-05-20 NOTE — DISCHARGE SUMMARY
[facility] [address]  Discharge- Kimberly Chandler 1945, 76 y o  male MRN: [id number]  Unit/Bed#: 250-66 Encounter: [id number]  Primary Care Provider: Kortney Akbar DO   Date and time admitted to hospital: 5/17/2022  6:26 PM    * Other chest pain  Assessment & Plan  · Patient presented complaining of substernal chest pain with associated dyspnea  · Troponins normal x3  · ProBNP normal  · Twelve lead EKG showed no evidence of MI including elevations or depressions of ST segments  · Patient is scheduled to undergo a nuclear stress test which is scheduled by Dr Evi Brunson of [address] [place] [address] Cardiology  · Stress test completed and no ischemia or perfusion defects noted  Normal stress test  · No further recommendations from our cardiology team  · Suspect chest pains are related to anxiety      Passive suicidal ideations  Assessment & Plan  During conversations with both me and the ER provider in several ER nurses, the patient did express some passive suicidal ideations  When asked if he wants to commit suicide, he states am I going to put a gun to my head and shoot myself    No   If there were pill on the table in front of me that could end it all, I would probably take it though"  Patient hopeless and having panic attacks while inpatient in relation to his general malaise for 4 months  · Seen by psych 05/18 and 05/19  · Recommendations to continue on remeron 15 mg qhs, increased prozac to 20mg, will continue ativan as needed  · Did re-consult psych, should symptoms, suicidal ideations and feelings of hopelessness persist tomorrow, recommendations for inpatient psych treatment, if improved, patient can go home   · Fortunately, patient states that he is feeling much better, has been able to sleep for the past 2 nights and now with improved appetite  Denies any active suicidal ideation    Denies any further episodes of his panic attacks encompassing chest tightness, shortness of breath, nausea  · Patient is stable for discharge to home with referral to behavioral health and resources have been given to the patient  · He was instructed that if he ever does have any thoughts severe hopelessness and suicidal ideation to come to the ER  Anxiety  Assessment & Plan  · Patient reports that he has not slept for days  He states that he feels very on edge  He reports that he is not able to eat due to nausea but has not vomited  · When asked if he is anxious, he adamantly declines but when I discussed it further with him today about his body manifesting stress in different ways, he was much more agreeable  · Ativan 0 5 mg IV daily p r n  Anxiety, insomnia while inpatient      Depression, recurrent [facility])  Assessment & Plan  Patient currently takes Prozac 10 mg p o  Daily- this was increased to 20mg daily         Dyslipidemia  Assessment & Plan  · Pravastatin 10 mg oral daily with dinner        Medical Problems             Resolved Problems  Date Reviewed: 5/20/2022   None               Discharging Physician / Practitioner: Barbra Johnson PA-C  PCP: Mary Cline DO  Admission Date:   Admission Orders (From admission, onward)     Ordered        05/18/22 1536  Inpatient Admission  Once            05/17/22 2148  Place in Observation  Once                      Discharge Date: 05/20/22    Consultations During Hospital Stay:  · Cardiology, Psychology    Procedures Performed:   None     Significant Findings / Test Results:   XR chest 2 views   ED Interpretation by Shannon Vines MD (05/17 2035)   No acute disease  Final Result by Andrew Smith MD (05/17 2357)      Stable findings without acute cardiopulmonary disease  Workstation performed: [id number]         · 05/18 stress test: No ST deviation is noted  The ECG was negative for ischemia  The stress ECG is negative for ischemia after pharmacologic vasodilation, without reproduction of symptoms  No perfusion defects  LVEF 69%   · 05/18 echo: normal systolic and diastolic function no major valvular abnormalities     Incidental Findings:   · None      Test Results Pending at Discharge (will require follow up): · None      Outpatient Tests Requested:  · None     Complications:  None     Reason for Admission: chest pain and passive suicidal ideation     Hospital Course:   Noble Mart is a 76 y o  male patient who originally presented to the hospital on 5/17/2022 due to substernal chest pain with associated shortness of breath  Chest x-ray was obtained which was negative  EKG nonischemic and troponins were negative  Cardiology was consulted and they performed a nuclear stress test which was negative  Echocardiogram with no major findings  Ultimately was determined that patient's symptoms likely attributed to improperly treated anxiety with depression  Patient did express passive suicidal ideation  For this reason, behavioral health was consulted  Remeron was added to his regimen and Prozac dose was increased which improved patient's symptoms  Patient was maintained on continual observation  Per Psychiatry, patient can be discharged to home if he notices improvement in his symptoms with medication adjustments  Patient denies any suicidal ideation at time of discharge  Please see above list of diagnoses and related plan for additional information  Condition at Discharge: good    Discharge Day Visit / Exam:   Subjective:  Patient was sitting in bed eating his breakfast with no complaints at this time  States to be feeling a lot better now that he was able to get a good night's sleep over the past 2 nights  He states that he has not had any further episodes of chest tightness with shortness of breath and nausea and that he is very happy and grateful that the medication adjustments have helped him  He is agreeable to go home now that he is feeling a lot better    He denies any suicidal or homicidal ideation  Vitals: Blood Pressure: 141/73 (05/20/22 0705)  Pulse: 61 (05/20/22 0705)  Temperature: (!) 97 4 °F (36 3 °C) (05/20/22 0705)  Temp Source: Oral (05/20/22 0705)  Respirations: 18 (05/20/22 0705)  Height: 5' 9" (175 3 cm) (05/18/22 1606)  Weight - Scale: 49 8 kg (109 lb 12 6 oz) (05/18/22 1606)  SpO2: 98 % (05/20/22 0705)  Exam:   Physical Exam  Constitutional:       General: He is not in acute distress  HENT:      Mouth/Throat:      Mouth: Mucous membranes are moist    Eyes:      General: No scleral icterus  Cardiovascular:      Rate and Rhythm: Normal rate and regular rhythm  Heart sounds: Normal heart sounds  Pulmonary:      Breath sounds: Normal breath sounds  Abdominal:      General: Abdomen is flat  Bowel sounds are normal       Palpations: Abdomen is soft  Tenderness: There is no abdominal tenderness  Musculoskeletal:      Right lower leg: No edema  Left lower leg: No edema  Skin:     General: Skin is warm  Neurological:      General: No focal deficit present  Mental Status: He is alert  Mental status is at baseline  Psychiatric:         Mood and Affect: Mood normal       Comments: No SI/HI          Discussion with Family: Updated  (son) via phone  Discharge instructions/Information to patient and family:   See after visit summary for information provided to patient and family  Provisions for Follow-Up Care:  See after visit summary for information related to follow-up care and any pertinent home health orders  Disposition:   Home    Planned Readmission: none      Discharge Statement:  I spent 45 minutes discharging the patient  This time was spent on the day of discharge  I had direct contact with the patient on the day of discharge  Greater than 50% of the total time was spent examining patient, answering all patient questions, arranging and discussing plan of care with patient as well as directly providing post-discharge instructions  Additional time then spent on discharge activities  Discharge Medications:  See after visit summary for reconciled discharge medications provided to patient and/or family        **Please Note: This note may have been constructed using a voice recognition system**

## 2022-05-20 NOTE — DISCHARGE INSTR - AVS FIRST PAGE
Please start taking Remeron 15mg daily at bedtime  Change how you take your Prozac: Please take 20mg daily in the morning   You may take ativan 0 5mg as needed in the morning and in the evening for anxiety   Please follow up with your primary care provider in 1 week   You will receive a call to schedule an appointment with a psychiatrist- please follow up with them as well   Please return to the ER if you experience any worsening of symptoms including suicidal ideation

## 2022-05-20 NOTE — CASE MANAGEMENT
Case Management Discharge Planning Note    Patient name Nigel Roman  Location /844-01 MRN [id number]  : 1945 Date 2022       Current Admission Date: 2022  Current Admission Diagnosis:Other chest pain   Patient Active Problem List    Diagnosis Date Noted    Passive suicidal ideations 2022    Anxiety 2022    Severe protein-calorie malnutrition ([facility] [address] ) 2022    Depression, recurrent ([facility] [address] ) 2022    Other chest pain 2022    Dyslipidemia 2022      LOS (days): 2  Geometric Mean LOS (GMLOS) (days): 1 70  Days to GMLOS:-0 1     OBJECTIVE:  Risk of Unplanned Readmission Score: 13 6         Current admission status: Inpatient   Preferred Pharmacy:   [facility] [address] [place] [address]  Phone: [phone] Fax: [phone]    [address]  Phone: [phone] Fax: [phone] ([place])   [address] [facility] [address]  Phone: [phone] Fax: [phone]    Primary Care Provider: Dana Rodriguez DO    Primary Insurance: [facility]  Secondary Insurance:     DISCHARGE DETAILS:    Discharge planning discussed with[de-identified] Pt  Freedom of Choice: Yes     CM contacted family/caregiver?: No- see comments (Pt is alert and oriented x3 )     Did patient/caregiver verbalize understanding of patient care needs?: Yes  Were patient/caregiver advised of the risks associated with not following Treatment Team discharge recommendations?: Yes         Treatment Team Recommendation: Home  Discharge Destination Plan[de-identified] Home  Transport at Discharge : Automobile, Family         Gave patient the phone number for [facility] [employer]  Pt can call that number to be see up with a psychologist  Pt is agreeable to same

## 2022-05-20 NOTE — PLAN OF CARE
Problem: SAFETY ADULT  Goal: Patient will remain free of falls  Description: INTERVENTIONS:  - Educate patient/family on patient safety including physical limitations  - Instruct patient to call for assistance with activity   - Consult OT/PT to assist with strengthening/mobility   - Keep Call bell within reach  - Keep bed low and locked with side rails adjusted as appropriate  - Keep care items and personal belongings within reach  - Initiate and maintain comfort rounds  - Make Fall Risk Sign visible to staff  - Initiate/Maintain  bed alarm  - Obtain necessary fall risk management equipment  - Apply yellow socks and bracelet for high fall risk patients  - Consider moving patient to room near nurses station  Outcome: Progressing

## 2022-05-20 NOTE — ASSESSMENT & PLAN NOTE
During conversations with both me and the ER provider in several ER nurses, the patient did express some passive suicidal ideations  When asked if he wants to commit suicide, he states am I going to put a gun to my head and shoot myself    No   If there were pill on the table in front of me that could end it all, I would probably take it though"  Patient hopeless and having panic attacks while inpatient in relation to his general malaise for 4 months  · Seen by psych 05/18 and 05/19  · Recommendations to continue on remeron 15 mg qhs, increased prozac to 20mg, will continue ativan as needed  · Did re-consult psych, should symptoms, suicidal ideations and feelings of hopelessness persist tomorrow, recommendations for inpatient psych treatment, if improved, patient can go home   · Fortunately, patient states that he is feeling much better, has been able to sleep for the past 2 nights and now with improved appetite  Denies any active suicidal ideation  Denies any further episodes of his panic attacks encompassing chest tightness, shortness of breath, nausea  · Patient is stable for discharge to home with referral to behavioral health and resources have been given to the patient  · He was instructed that if he ever does have any thoughts severe hopelessness and suicidal ideation to come to the ER

## 2022-05-20 NOTE — NURSING NOTE
Pt discharged to home  D/c instructions given and reviewed with pt  Son present in room  Pt verbalized understanding  Pt left floor ambulatory with son

## 2022-05-20 NOTE — PLAN OF CARE
Problem: Potential for Falls  Goal: Patient will remain free of falls  Description: INTERVENTIONS:  - Educate patient/family on patient safety including physical limitations  - Instruct patient to call for assistance with activity   - Consult OT/PT to assist with strengthening/mobility   - Keep Call bell within reach  - Keep bed low and locked with side rails adjusted as appropriate  - Keep care items and personal belongings within reach  - Initiate and maintain comfort rounds  - Make Fall Risk Sign visible to staff  - Consider moving patient to room near nurses station  Outcome: Progressing     Problem: Nutrition/Hydration-ADULT  Goal: Nutrient/Hydration intake appropriate for improving, restoring or maintaining nutritional needs  Description: Monitor and assess patient's nutrition/hydration status for malnutrition  Collaborate with interdisciplinary team and initiate plan and interventions as ordered  Monitor patient's weight and dietary intake as ordered or per policy  Utilize nutrition screening tool and intervene as necessary  Determine patient's food preferences and provide high-protein, high-caloric foods as appropriate       INTERVENTIONS:  - Monitor oral intake, urinary output, labs, and treatment plans  - Assess nutrition and hydration status and recommend course of action  - Evaluate amount of meals eaten  - Assist patient with eating if necessary   - Allow adequate time for meals  - Recommend/ encourage appropriate diets, oral nutritional supplements, and vitamin/mineral supplements  - Order, calculate, and assess calorie counts as needed  - Recommend, monitor, and adjust tube feedings and TPN/PPN based on assessed needs  - Assess need for intravenous fluids  - Provide specific nutrition/hydration education as appropriate  - Include patient/family/caregiver in decisions related to nutrition  Outcome: Progressing     Problem: PAIN - ADULT  Goal: Verbalizes/displays adequate comfort level or baseline comfort level  Description: Interventions:  - Encourage patient to monitor pain and request assistance  - Assess pain using appropriate pain scale  - Administer analgesics based on type and severity of pain and evaluate response  - Implement non-pharmacological measures as appropriate and evaluate response  - Consider cultural and social influences on pain and pain management  - Notify physician/advanced practitioner if interventions unsuccessful or patient reports new pain  Outcome: Progressing     Problem: SAFETY ADULT  Goal: Patient will remain free of falls  Description: INTERVENTIONS:  - Educate patient/family on patient safety including physical limitations  - Instruct patient to call for assistance with activity   - Consult OT/PT to assist with strengthening/mobility   - Keep Call bell within reach  - Keep bed low and locked with side rails adjusted as appropriate  - Keep care items and personal belongings within reach  - Initiate and maintain comfort rounds  - Make Fall Risk Sign visible to staff  - Consider moving patient to room near nurses station  Outcome: Progressing  Goal: Maintain or return to baseline ADL function  Description: INTERVENTIONS:  -  Assess patient's ability to carry out ADLs; assess patient's baseline for ADL function and identify physical deficits which impact ability to perform ADLs (bathing, care of mouth/teeth, toileting, grooming, dressing, etc )  - Assess/evaluate cause of self-care deficits   - Assess range of motion  - Assess patient's mobility; develop plan if impaired  - Assess patient's need for assistive devices and provide as appropriate  - Encourage maximum independence but intervene and supervise when necessary  - Involve family in performance of ADLs  - Assess for home care needs following discharge   - Consider OT consult to assist with ADL evaluation and planning for discharge  - Provide patient education as appropriate  Outcome: Progressing  Goal: Maintains/Returns to pre admission functional level  Description: INTERVENTIONS:  - Perform BMAT or MOVE assessment daily    - Set and communicate daily mobility goal to care team and patient/family/caregiver  - Collaborate with rehabilitation services on mobility goals if consulted  - Out of bed for meals 3 times a day  - Out of bed for toileting  - Record patient progress and toleration of activity level   Outcome: Progressing     Problem: DISCHARGE PLANNING  Goal: Discharge to home or other facility with appropriate resources  Description: INTERVENTIONS:  - Identify barriers to discharge w/patient and caregiver  - Arrange for needed discharge resources and transportation as appropriate  - Identify discharge learning needs (meds, wound care, etc )  - Arrange for interpretive services to assist at discharge as needed  - Refer to Case Management Department for coordinating discharge planning if the patient needs post-hospital services based on physician/advanced practitioner order or complex needs related to functional status, cognitive ability, or social support system  Outcome: Progressing     Problem: Knowledge Deficit  Goal: Patient/family/caregiver demonstrates understanding of disease process, treatment plan, medications, and discharge instructions  Description: Complete learning assessment and assess knowledge base    Interventions:  - Provide teaching at level of understanding  - Provide teaching via preferred learning methods  Outcome: Progressing     Problem: MOBILITY - ADULT  Goal: Maintain or return to baseline ADL function  Description: INTERVENTIONS:  -  Assess patient's ability to carry out ADLs; assess patient's baseline for ADL function and identify physical deficits which impact ability to perform ADLs (bathing, care of mouth/teeth, toileting, grooming, dressing, etc )  - Assess/evaluate cause of self-care deficits   - Assess range of motion  - Assess patient's mobility; develop plan if impaired  - Assess patient's need for assistive devices and provide as appropriate  - Encourage maximum independence but intervene and supervise when necessary  - Involve family in performance of ADLs  - Assess for home care needs following discharge   - Consider OT consult to assist with ADL evaluation and planning for discharge  - Provide patient education as appropriate  Outcome: Progressing  Goal: Maintains/Returns to pre admission functional level  Description: INTERVENTIONS:  - Perform BMAT or MOVE assessment daily    - Set and communicate daily mobility goal to care team and patient/family/caregiver     - Collaborate with rehabilitation services on mobility goals if consulted  - Out of bed to chair 3 times a day   - Out of bed for meals 3 times a day  - Out of bed for toileting  - Record patient progress and toleration of activity level   Outcome: Progressing

## 2022-05-20 NOTE — DISCHARGE INSTR - OTHER ORDERS
Please Call [facility] Service Access Management  to be set up with a counselor  [phone]

## 2022-05-20 NOTE — ASSESSMENT & PLAN NOTE
· Patient presented complaining of substernal chest pain with associated dyspnea  · Troponins normal x3  · ProBNP normal  · Twelve lead EKG showed no evidence of MI including elevations or depressions of ST segments  · Patient is scheduled to undergo a nuclear stress test which is scheduled by Dr Cheryle Bynum of [address] [place] [address] Cardiology  · Stress test completed and no ischemia or perfusion defects noted   Normal stress test  · No further recommendations from our cardiology team  · Suspect chest pains are related to anxiety

## 2022-05-21 ENCOUNTER — TRANSITIONAL CARE MANAGEMENT (OUTPATIENT)
Dept: FAMILY MEDICINE CLINIC | Facility: CLINIC | Age: 77
End: 2022-05-21

## 2022-05-24 ENCOUNTER — OFFICE VISIT (OUTPATIENT)
Dept: FAMILY MEDICINE CLINIC | Facility: CLINIC | Age: 77
End: 2022-05-24
Payer: COMMERCIAL

## 2022-05-24 VITALS
HEIGHT: 69 IN | OXYGEN SATURATION: 99 % | DIASTOLIC BLOOD PRESSURE: 64 MMHG | BODY MASS INDEX: 17.63 KG/M2 | WEIGHT: 119 LBS | TEMPERATURE: 97.5 F | SYSTOLIC BLOOD PRESSURE: 120 MMHG | HEART RATE: 86 BPM

## 2022-05-24 DIAGNOSIS — E43 SEVERE PROTEIN-CALORIE MALNUTRITION (HCC): ICD-10-CM

## 2022-05-24 DIAGNOSIS — F33.9 DEPRESSION, RECURRENT (HCC): ICD-10-CM

## 2022-05-24 DIAGNOSIS — R45.851 PASSIVE SUICIDAL IDEATIONS: ICD-10-CM

## 2022-05-24 DIAGNOSIS — Z76.89 ENCOUNTER FOR SUPPORT AND COORDINATION OF TRANSITION OF CARE: Primary | ICD-10-CM

## 2022-05-24 DIAGNOSIS — K21.9 GERD (GASTROESOPHAGEAL REFLUX DISEASE): ICD-10-CM

## 2022-05-24 LAB
CHEST PAIN STATEMENT: NORMAL
MAX DIASTOLIC BP: 80 MMHG
MAX HEART RATE: 90 BPM
MAX PREDICTED HEART RATE: 141 BPM
MAX. SYSTOLIC BP: 134 MMHG
PROTOCOL NAME: NORMAL
REASON FOR TERMINATION: NORMAL
TARGET HR FORMULA: NORMAL
TEST INDICATION: NORMAL
TIME IN EXERCISE PHASE: NORMAL

## 2022-05-24 PROCEDURE — 99496 TRANSJ CARE MGMT HIGH F2F 7D: CPT | Performed by: FAMILY MEDICINE

## 2022-05-24 PROCEDURE — 1111F DSCHRG MED/CURRENT MED MERGE: CPT | Performed by: FAMILY MEDICINE

## 2022-05-24 RX ORDER — FLUOXETINE 10 MG/1
CAPSULE ORAL
Qty: 12 CAPSULE | Refills: 1 | Status: SHIPPED | OUTPATIENT
Start: 2022-05-24 | End: 2022-06-27 | Stop reason: SDUPTHER

## 2022-05-24 RX ORDER — SUCRALFATE 1 G/1
TABLET ORAL
Qty: 90 TABLET | Refills: 0 | Status: SHIPPED | OUTPATIENT
Start: 2022-05-24 | End: 2022-06-27 | Stop reason: ALTCHOICE

## 2022-05-24 RX ORDER — FAMOTIDINE 40 MG/1
TABLET, FILM COATED ORAL
Qty: 30 TABLET | Refills: 1 | Status: SHIPPED | OUTPATIENT
Start: 2022-05-24 | End: 2022-06-27 | Stop reason: ALTCHOICE

## 2022-05-24 NOTE — PROGRESS NOTES
Assessment/Plan:  Mr Ned Denney read see will take continue take his medicines on the adjusted dose basis on I also changed some of the times of his medications to minimize being geriatric psychiatry he has not received any communication from [facility] yet but he did get called by his health insurance company [employer] [place] and they said that they have someone that does telephone therapy and that they will call him and get him set up so that that is that is good because he does not have to go anywhere on will see him again in 1 month      Diagnoses and all orders for this visit:    Encounter for support and coordination of transition of care    Depression, recurrent (HCC)  -     FLUoxetine (PROzac) 10 mg capsule; 1 capsule 3 times weekly Monday Wednesday and Friday at dinnertime    Passive suicidal ideations    Severe protein-calorie malnutrition ([address] )    GERD (gastroesophageal reflux disease)  -     famotidine (PEPCID) 40 MG tablet; 1 nightly at bedtime  -     sucralfate (CARAFATE) 1 g tablet; Dissolve 1  tablet in 2 tablespoons of water take by mouth 3 times a day for 1 month         Subjective:     Patient ID: Gerry Marte is a 76 y o  male      Mr Ned Denney or eats he is here today for a transition of care he looks great he is put 10 lb on since leaving the hospital he is eating well he sleeping better than he has ever slapped and his depression and anxiety are lifting he is 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 very pleased with his progress the only issue is had is when he took the 20 mg Prozac he would get very dizzy from that then he went back to the 10 mg that we had given him that made him dizzy to when he was taking the medicines in the morning ask him to decrease the Prozac to 10 mg 3 times weekly and to take it with his dinner time meal and I think that all minimize any dizziness he will continue with the Remeron he will continue the famotidine and the sit sucralfate and he will continue the Ativan at nighttime he sleeping very well      Review of Systems   Constitutional: Negative for activity change, appetite change, diaphoresis, fatigue and fever  HENT: Negative  Eyes: Negative  Respiratory: Negative for apnea, cough, chest tightness, shortness of breath and wheezing  Cardiovascular: Negative for chest pain, palpitations and leg swelling  Gastrointestinal: Negative for abdominal distention, abdominal pain, anal bleeding, constipation, diarrhea, nausea and vomiting  Endocrine: Negative for cold intolerance, heat intolerance, polydipsia, polyphagia and polyuria  Genitourinary: Negative for difficulty urinating, dysuria, flank pain, hematuria and urgency  Musculoskeletal: Negative for arthralgias, back pain, gait problem, joint swelling and myalgias  Skin: Negative for color change, rash and wound  Allergic/Immunologic: Negative for environmental allergies, food allergies and immunocompromised state  Neurological: Negative for dizziness, seizures, syncope, speech difficulty, numbness and headaches  Hematological: Negative for adenopathy  Does not bruise/bleed easily  Psychiatric/Behavioral: Negative for agitation, behavioral problems, hallucinations, sleep disturbance and suicidal ideas  The patient is nervous/anxious  Objective:     Physical Exam  Constitutional:       General: He is not in acute distress  Appearance: He is well-developed  He is not diaphoretic  HENT:      Head: Normocephalic  Right Ear: External ear normal       Left Ear: External ear normal       Nose: Nose normal    Eyes:      General: No scleral icterus  Right eye: No discharge  Left eye: No discharge  Conjunctiva/sclera: Conjunctivae normal       Pupils: Pupils are equal, round, and reactive to light  Neck:      Thyroid: No thyromegaly  Trachea: No tracheal deviation     Cardiovascular:      Rate and Rhythm: Normal rate and regular rhythm  Heart sounds: Normal heart sounds  No murmur heard  No friction rub  No gallop  Pulmonary:      Effort: Pulmonary effort is normal  No respiratory distress  Breath sounds: Normal breath sounds  No wheezing  Abdominal:      General: Bowel sounds are normal       Palpations: Abdomen is soft  There is no mass  Tenderness: There is no abdominal tenderness  There is no guarding  Musculoskeletal:         General: No deformity  Cervical back: Normal range of motion  Lymphadenopathy:      Cervical: No cervical adenopathy  Skin:     General: Skin is warm and dry  Findings: No erythema or rash  Neurological:      Mental Status: He is alert and oriented to person, place, and time  Cranial Nerves: No cranial nerve deficit  Psychiatric:         Thought Content: Thought content normal            Vitals:    05/24/22 1159   BP: 120/64   BP Location: Left arm   Patient Position: Sitting   Cuff Size: Standard   Pulse: 86   Temp: 97 5 °F (36 4 °C)   TempSrc: Temporal   SpO2: 99%   Weight: 54 kg (119 lb)   Height: 5' 9" (1 753 m)       The following portions of the patient's history were reviewed and updated as appropriate:   He  has a past medical history of Coronary artery disease, COVID-19 (01/28/2022), History of echocardiogram (03/22/2018), Hyperlipidemia, Lyme disease, Mitral valve prolapse, and Shingles  He   Patient Active Problem List    Diagnosis Date Noted    Passive suicidal ideations 05/18/2022    Anxiety 05/18/2022    Severe protein-calorie malnutrition ([facility] ) 04/13/2022    Depression, recurrent ([facility] ) 02/28/2022    Other chest pain 02/05/2022    Dyslipidemia 01/28/2022     He  has a past surgical history that includes Appendectomy; Tonsillectomy; Hernia repair; and Eye surgery  His family history includes Heart disease in his mother; No Known Problems in his father  He  reports that he has never smoked   He has never used smokeless tobacco  He reports that he does not drink alcohol and does not use drugs  Current Outpatient Medications   Medication Sig Dispense Refill    calcium carbonate (TUMS) 500 mg chewable tablet Chew 2 tablets (1,000 mg total) 3 (three) times a day as needed for indigestion or heartburn  0    famotidine (PEPCID) 40 MG tablet 1 nightly at bedtime 30 tablet 1    FLUoxetine (PROzac) 10 mg capsule 1 capsule 3 times weekly Monday Wednesday and Friday at dinnertime 12 capsule 1    LORazepam (Ativan) 0 5 mg tablet Take 1 tablet (0 5 mg total) by mouth as needed in the morning and 1 tablet (0 5 mg total) as needed in the evening for anxiety  30 tablet 0    mirtazapine (REMERON) 15 mg tablet Take 1 tablet (15 mg total) by mouth daily at bedtime 30 tablet 0    simvastatin (ZOCOR) 5 MG tablet Take 5 mg by mouth daily at bedtime      sucralfate (CARAFATE) 1 g tablet Dissolve 1  tablet in 2 tablespoons of water take by mouth 3 times a day for 1 month 90 tablet 0     No current facility-administered medications for this visit  Current Outpatient Medications on File Prior to Visit   Medication Sig    calcium carbonate (TUMS) 500 mg chewable tablet Chew 2 tablets (1,000 mg total) 3 (three) times a day as needed for indigestion or heartburn    LORazepam (Ativan) 0 5 mg tablet Take 1 tablet (0 5 mg total) by mouth as needed in the morning and 1 tablet (0 5 mg total) as needed in the evening for anxiety   mirtazapine (REMERON) 15 mg tablet Take 1 tablet (15 mg total) by mouth daily at bedtime    simvastatin (ZOCOR) 5 MG tablet Take 5 mg by mouth daily at bedtime    [DISCONTINUED] famotidine (PEPCID) 40 MG tablet Take 1 tablet (40 mg total) by mouth daily at bedtime for 20 days    [DISCONTINUED] FLUoxetine (PROzac) 20 mg capsule Take 1 capsule (20 mg total) by mouth in the morning   (Patient taking differently: Take 10 mg by mouth in the morning )    [DISCONTINUED] ondansetron (ZOFRAN-ODT) 4 mg disintegrating tablet Take 1 tablet (4 mg total) by mouth every 8 (eight) hours as needed for nausea or vomiting for up to 20 doses    [DISCONTINUED] sucralfate (CARAFATE) 1 g tablet Dissolve 1  tablet in 2 tablespoons of water take by mouth 3 times a day for 1 week     No current facility-administered medications on file prior to visit  He has No Known Allergies       Transitional Care Management Review:  Anjum Bae is a 76 y o  male here for TCM follow up  During the TCM phone call patient stated:    TCM Call (since 4/23/2022)     Date and time call was made  5/21/2022  7:14 AM    Hospital care reviewed  Records reviewed    Patient was hospitialized at  [address] [place] [address]    Date of Admission  05/17/22    Date of discharge  05/20/22    Diagnosis  chest pain, passive suicidal thoughtsi    Disposition  Home    Were the patients medications reviewed and updated  No    Current Symptoms  None      TCM Call (since 4/23/2022)     Post hospital issues  Reduced activity    Scheduled for follow up?   Yes    Patients specialists  Cardiologist    Did you obtain your prescribed medications  Yes    Do you need help managing your prescriptions or medications  No    Is transportation to your appointment needed  Yes    Counseling  Patient              Sergey Buchanan, DO

## 2022-06-27 ENCOUNTER — OFFICE VISIT (OUTPATIENT)
Dept: FAMILY MEDICINE CLINIC | Facility: CLINIC | Age: 77
End: 2022-06-27
Payer: COMMERCIAL

## 2022-06-27 VITALS
HEART RATE: 70 BPM | BODY MASS INDEX: 18.37 KG/M2 | WEIGHT: 124 LBS | TEMPERATURE: 98.4 F | OXYGEN SATURATION: 99 % | HEIGHT: 69 IN | DIASTOLIC BLOOD PRESSURE: 72 MMHG | SYSTOLIC BLOOD PRESSURE: 122 MMHG

## 2022-06-27 DIAGNOSIS — F32.A DEPRESSION: ICD-10-CM

## 2022-06-27 DIAGNOSIS — F33.9 DEPRESSION, RECURRENT (HCC): ICD-10-CM

## 2022-06-27 DIAGNOSIS — F41.9 ANXIETY: ICD-10-CM

## 2022-06-27 PROCEDURE — 1036F TOBACCO NON-USER: CPT | Performed by: FAMILY MEDICINE

## 2022-06-27 PROCEDURE — 1125F AMNT PAIN NOTED PAIN PRSNT: CPT | Performed by: FAMILY MEDICINE

## 2022-06-27 PROCEDURE — 3288F FALL RISK ASSESSMENT DOCD: CPT | Performed by: FAMILY MEDICINE

## 2022-06-27 PROCEDURE — 1160F RVW MEDS BY RX/DR IN RCRD: CPT | Performed by: FAMILY MEDICINE

## 2022-06-27 PROCEDURE — 99214 OFFICE O/P EST MOD 30 MIN: CPT | Performed by: FAMILY MEDICINE

## 2022-06-27 PROCEDURE — G0439 PPPS, SUBSEQ VISIT: HCPCS | Performed by: FAMILY MEDICINE

## 2022-06-27 PROCEDURE — 1170F FXNL STATUS ASSESSED: CPT | Performed by: FAMILY MEDICINE

## 2022-06-27 PROCEDURE — 3725F SCREEN DEPRESSION PERFORMED: CPT | Performed by: FAMILY MEDICINE

## 2022-06-27 RX ORDER — MIRTAZAPINE 15 MG/1
15 TABLET, FILM COATED ORAL
Qty: 30 TABLET | Refills: 5 | Status: SHIPPED | OUTPATIENT
Start: 2022-06-27 | End: 2022-08-10

## 2022-06-27 RX ORDER — LORAZEPAM 0.5 MG/1
0.5 TABLET ORAL 2 TIMES DAILY PRN
Qty: 20 TABLET | Refills: 1 | Status: SHIPPED | OUTPATIENT
Start: 2022-06-27

## 2022-06-27 RX ORDER — FLUOXETINE 10 MG/1
CAPSULE ORAL
Qty: 12 CAPSULE | Refills: 5 | Status: SHIPPED | OUTPATIENT
Start: 2022-06-27

## 2022-06-27 NOTE — PATIENT INSTRUCTIONS
Medicare Preventive Visit Patient Instructions  Thank you for completing your Welcome to Medicare Visit or Medicare Annual Wellness Visit today  Your next wellness visit will be due in one year (6/28/2023)  The screening/preventive services that you may require over the next 5-10 years are detailed below  Some tests may not apply to you based off risk factors and/or age  Screening tests ordered at today's visit but not completed yet may show as past due  Also, please note that scanned in results may not display below  Preventive Screenings:  Service Recommendations Previous Testing/Comments   Colorectal Cancer Screening  · Colonoscopy    · Fecal Occult Blood Test (FOBT)/Fecal Immunochemical Test (FIT)  · Fecal DNA/Cologuard Test  · Flexible Sigmoidoscopy Age: 54-65 years old   Colonoscopy: every 10 years (May be performed more frequently if at higher risk)  OR  FOBT/FIT: every 1 year  OR  Cologuard: every 3 years  OR  Sigmoidoscopy: every 5 years  Screening may be recommended earlier than age 48 if at higher risk for colorectal cancer  Also, an individualized decision between you and your healthcare provider will decide whether screening between the ages of 74-80 would be appropriate   Colonoscopy: 04/13/2022  FOBT/FIT: Not on file  Cologuard: Not on file  Sigmoidoscopy: Not on file          Prostate Cancer Screening Individualized decision between patient and health care provider in men between ages of 53-78   Medicare will cover every 12 months beginning on the day after your 50th birthday PSA: No results in last 5 years     Screening Not Indicated     Hepatitis C Screening Once for adults born between [facility]  More frequently in patients at high risk for Hepatitis C Hep C Antibody: Not on file        Diabetes Screening 1-2 times per year if you're at risk for diabetes or have pre-diabetes Fasting glucose: 106 mg/dL   A1C: No results in last 5 years    Screening Current   Cholesterol Screening Once every 5 years if you don't have a lipid disorder  May order more often based on risk factors  Lipid panel: 03/21/2022    Screening Current      Other Preventive Screenings Covered by Medicare:  1  Abdominal Aortic Aneurysm (AAA) Screening: covered once if your at risk  You're considered to be at risk if you have a family history of AAA or a male between the age of 73-68 who smoking at least 100 cigarettes in your lifetime  2  Lung Cancer Screening: covers low dose CT scan once per year if you meet all of the following conditions: (1) Age 50-69; (2) No signs or symptoms of lung cancer; (3) Current smoker or have quit smoking within the last 15 years; (4) You have a tobacco smoking history of at least 30 pack years (packs per day x number of years you smoked); (5) You get a written order from a healthcare provider  3  Glaucoma Screening: covered annually if you're considered high risk: (1) You have diabetes OR (2) Family history of glaucoma OR (3)  aged 48 and older OR (3)  American aged 72 and older  3  Osteoporosis Screening: covered every 2 years if you meet one of the following conditions: (1) Have a vertebral abnormality; (2) On glucocorticoid therapy for more than 3 months; (3) Have primary hyperparathyroidism; (4) On osteoporosis medications and need to assess response to drug therapy  5  HIV Screening: covered annually if you're between the age of 12-76  Also covered annually if you are younger than 13 and older than 72 with risk factors for HIV infection  For pregnant patients, it is covered up to 3 times per pregnancy      Immunizations:  Immunization Recommendations   Influenza Vaccine Annual influenza vaccination during flu season is recommended for all persons aged >= 6 months who do not have contraindications   Pneumococcal Vaccine (Prevnar and Pneumovax)  * Prevnar = PCV13  * Pneumovax = PPSV23 Adults 25-60 years old: 1-3 doses may be recommended based on certain risk factors  Adults 72 years old: Prevnar (PCV13) vaccine recommended followed by Pneumovax (PPSV23) vaccine  If already received PPSV23 since turning 65, then PCV13 recommended at least one year after PPSV23 dose  Hepatitis B Vaccine 3 dose series if at intermediate or high risk (ex: diabetes, end stage renal disease, liver disease)   Tetanus (Td) Vaccine - COST NOT COVERED BY MEDICARE PART B Following completion of primary series, a booster dose should be given every 10 years to maintain immunity against tetanus  Td may also be given as tetanus wound prophylaxis  Tdap Vaccine - COST NOT COVERED BY MEDICARE PART B Recommended at least once for all adults  For pregnant patients, recommended with each pregnancy  Shingles Vaccine (Shingrix) - COST NOT COVERED BY MEDICARE PART B  2 shot series recommended in those aged 48 and above     Health Maintenance Due:      Topic Date Due    Hepatitis C Screening  Never done     Immunizations Due:      Topic Date Due    COVID-19 Vaccine (1) Never done    DTaP,Tdap,and Td Vaccines (1 - Tdap) Never done    Pneumococcal Vaccine: 65+ Years (1 - PCV) Never done    Influenza Vaccine (Season Ended) 09/01/2022     Advance Directives   What are advance directives? Advance directives are legal documents that state your wishes and plans for medical care  These plans are made ahead of time in case you lose your ability to make decisions for yourself  Advance directives can apply to any medical decision, such as the treatments you want, and if you want to donate organs  What are the types of advance directives? There are many types of advance directives, and each state has rules about how to use them  You may choose a combination of any of the following:  · Living will: This is a written record of the treatment you want  You can also choose which treatments you do not want, which to limit, and which to stop at a certain time  This includes surgery, medicine, IV fluid, and tube feedings  · Durable power of  for healthcare [facility]): This is a written record that states who you want to make healthcare choices for you when you are unable to make them for yourself  This person, called a proxy, is usually a family member or a friend  You may choose more than 1 proxy  · Do not resuscitate (DNR) order:  A DNR order is used in case your heart stops beating or you stop breathing  It is a request not to have certain forms of treatment, such as CPR  A DNR order may be included in other types of advance directives  · Medical directive: This covers the care that you want if you are in a coma, near death, or unable to make decisions for yourself  You can list the treatments you want for each condition  Treatment may include pain medicine, surgery, blood transfusions, dialysis, IV or tube feedings, and a ventilator (breathing machine)  · Values history: This document has questions about your views, beliefs, and how you feel and think about life  This information can help others choose the care that you would choose  Why are advance directives important? An advance directive helps you control your care  Although spoken wishes may be used, it is better to have your wishes written down  Spoken wishes can be misunderstood, or not followed  Treatments may be given even if you do not want them  An advance directive may make it easier for your family to make difficult choices about your care  Underweight  Underweight is defined as having a body mass index (BMI) of less than 18 5 kg/m2   Anorexia  is a loss of appetite, decreased food intake, or both  Your appetite naturally decreases as you get older  You also get full faster than you used to  This occurs because your body needs less energy  Other body changes can also lead to a decreased appetite  Even though some appetite loss is normal, you still need to get enough calories and nutrients to keep you healthy   You can start to lose too much weight if you do not eat as much food as your body needs  Unwanted weight loss can cause health problems, or worsen health problems you already have  You can also become dehydrated if you do not drink enough liquid  How to eat healthy and get enough nutrients:   · Choose healthy foods  Eat a variety of fruits, vegetables, whole grains, low-fat dairy foods, lean meats, and other protein foods  Limit foods high in fat, sugar, and salt  Limit or avoid alcohol as directed  Work with a dietitian to help you plan your meals if you need to follow a special diet  A dietitian can also teach you how to modify foods if you have trouble chewing or swallowing  · Snack on healthy foods between meals  if you only eat a small amount during meals  Snacks provide extra healthy nutrients and calories between meals  Examples include fruit, cheese, and whole grain crackers  · Drink liquids as directed  to avoid dehydration  Drink liquids between meals if they cause you to get full too quickly during meals  Ask how much liquid to drink each day and which liquids are best for you  · Use herbs, spices, and flavor enhancers to add flavor to foods  Avoid using herbs and spice blends that also contain sodium  Ask your healthcare provider or dietitian about flavor enhancers  Flavor enhancers with ham, natural watters, and roast beef flavors can also be sprinkled on food to add flavor  · Share meals with others as often as you can  Eating with others may help you to eat better during meal time  Ask family members, neighbors, or friends to join you for lunch  There are also senior centers where you can meet people, and share meals with them  · Ask family and friends for help  with shopping or preparing foods  Ask for a ride to the grocery store, if needed  © Copyright [employer] 2018 Information is for End User's use only and may not be sold, redistributed or otherwise used for commercial purposes   All illustrations and images included in [address] are the copyrighted property of [employer]  or [place] [employer]

## 2022-06-27 NOTE — PROGRESS NOTES
Assessment and Plan:     Problem List Items Addressed This Visit    None          Preventive health issues were discussed with patient, and age appropriate screening tests were ordered as noted in patient's After Visit Summary  Personalized health advice and appropriate referrals for health education or preventive services given if needed, as noted in patient's After Visit Summary  History of Present Illness:     Patient presents for a Medicare Wellness Visit    Purpose of today's visit is a Medicare well visit however any acute problems that are discovered during the Medicare well visit will be acutely addressed and treated     Patient Care Team:  Claire Rose DO as PCP - General (Family Medicine)     Review of Systems:     Review of Systems   Constitutional: Positive for unexpected weight change  Negative for activity change, appetite change, diaphoresis, fatigue and fever  Patient has regained quite a bit of his previously lost weight he is up about 18 lb since last visit   HENT: Positive for hearing loss  Negative for dental problem  Patient has a loose filling he already has an appointment set up with his dentist he has some occlusion from wax in the left ear which we will address during his physical exam   Eyes: Positive for visual disturbance  Patient has had a cataract extraction left eye he is set up for an appointment with Dr Debi Rand he is having some issues with vision from the right eye   Respiratory: Negative for apnea, cough, chest tightness, shortness of breath and wheezing  Breathing is much improved patient is sleeping comfortably at night not having any issues he was ill and had issues with this for about 5 months   Cardiovascular: Negative for chest pain, palpitations and leg swelling          Patient follows with Dr Alessandra Oh and is a heart function is much improved   Gastrointestinal: Negative for abdominal distention, abdominal pain, anal bleeding, constipation, diarrhea, nausea and vomiting  Endocrine: Negative for cold intolerance, heat intolerance, polydipsia, polyphagia and polyuria  Genitourinary: Negative for difficulty urinating, dysuria, flank pain, hematuria and urgency  Musculoskeletal: Negative for arthralgias, back pain, gait problem, joint swelling and myalgias  Skin: Negative for color change, rash and wound  Allergic/Immunologic: Negative for environmental allergies, food allergies and immunocompromised state  Neurological: Negative for dizziness, seizures, syncope, speech difficulty, numbness and headaches  Hematological: Negative for adenopathy  Does not bruise/bleed easily  Psychiatric/Behavioral: Negative for agitation, behavioral problems, hallucinations, sleep disturbance and suicidal ideas  Problem List:     Patient Active Problem List   Diagnosis    Dyslipidemia    Other chest pain    Depression, recurrent ([facility] )    [facility]    Passive suicidal ideations    Anxiety      Past Medical and Surgical History:     Past Medical History:   Diagnosis Date    Coronary artery disease     COVID-19 01/28/2022    History of echocardiogram 03/22/2018    Normal EF, normal LVSF  Thick and rebundent MV leaflets w/ mild posterior leaflet prolapse w/ trace regurg   Hyperlipidemia     Lyme disease     Mitral valve prolapse     Shingles      Past Surgical History:   Procedure Laterality Date    APPENDECTOMY      EYE SURGERY      HERNIA REPAIR      TONSILLECTOMY        Family History:     Family History   Problem Relation Age of Onset    Heart disease Mother     No Known Problems Father       Social History:     Social History     Socioeconomic History    Marital status:       Spouse name: None    Number of children: None    Years of education: None    Highest education level: None   Occupational History    None   Tobacco Use    Smoking status: Never Smoker    Smokeless tobacco: Never Used   Vaping Use    Vaping Use: Never used   Substance and Sexual Activity    Alcohol use: Never     Alcohol/week: 0 0 standard drinks    Drug use: Never    Sexual activity: Not Currently     Partners: Female   Other Topics Concern    None   Social History Narrative    None     Social Determinants of Health     Financial Resource Strain: Not on file   Food Insecurity: No Food Insecurity    Worried About Running Out of Food in the Last Year: Never true    Flavia of Food in the Last Year: Never true   Transportation Needs: No Transportation Needs    Lack of Transportation (Medical): No    Lack of Transportation (Non-Medical): No   Physical Activity: Not on file   Stress: Not on file   Social Connections: Not on file   Intimate Partner Violence: Not on file   Housing Stability: Low Risk     Unable to Pay for Housing in the Last Year: No    Number of Places Lived in the Last Year: 1    Unstable Housing in the Last Year: No      Medications and Allergies:     Current Outpatient Medications   Medication Sig Dispense Refill    calcium carbonate (TUMS) 500 mg chewable tablet Chew 2 tablets (1,000 mg total) 3 (three) times a day as needed for indigestion or heartburn  0    famotidine (PEPCID) 40 MG tablet 1 nightly at bedtime 30 tablet 1    FLUoxetine (PROzac) 10 mg capsule 1 capsule 3 times weekly Monday Wednesday and Friday at dinnertime 12 capsule 1    simvastatin (ZOCOR) 5 MG tablet Take 5 mg by mouth daily at bedtime      sucralfate (CARAFATE) 1 g tablet Dissolve 1  tablet in 2 tablespoons of water take by mouth 3 times a day for 1 month 90 tablet 0    LORazepam (Ativan) 0 5 mg tablet Take 1 tablet (0 5 mg total) by mouth as needed in the morning and 1 tablet (0 5 mg total) as needed in the evening for anxiety   (Patient not taking: Reported on 6/27/2022) 30 tablet 0    mirtazapine (REMERON) 15 mg tablet Take 1 tablet (15 mg total) by mouth daily at bedtime 30 tablet 0     No current facility-administered medications for this visit  No Known Allergies   Immunizations: There is no immunization history for the selected administration types on file for this patient  Health Maintenance:         Topic Date Due    Hepatitis C Screening  Never done         Topic Date Due    COVID-19 Vaccine (1) Never done    DTaP,Tdap,and Td Vaccines (1 - Tdap) Never done    Pneumococcal Vaccine: 65+ Years (1 - PCV) Never done    Influenza Vaccine (Season Ended) 09/01/2022      Medicare Screening Tests and Risk Assessments:     Belen Diego is here for his Subsequent Wellness visit  Health Risk Assessment:   Patient rates overall health as good  Patient feels that their physical health rating is slightly worse  Patient is satisfied with their life  Eyesight was rated as same  Hearing was rated as same  Patient feels that their emotional and mental health rating is same  Patients states they are never, rarely angry  Patient states they are sometimes unusually tired/fatigued  Pain experienced in the last 7 days has been none  Patient states that he has experienced weight loss or gain in last 6 months  Pt lost weight being sick from COVID    Depression Screening:   PHQ-9 Score: 2      Fall Risk Screening: In the past year, patient has experienced: no history of falling in past year      Home Safety:  Patient does not have trouble with stairs inside or outside of their home  Patient has working smoke alarms and has no working carbon monoxide detector  Home safety hazards include: none  Nutrition:   Current diet is Regular and Limited junk food  Pt watches his diet avoiding high acid food (Hx Acid Reflux)    Medications:   Patient is currently taking over-the-counter supplements  OTC medications include: see medication list  Patient is able to manage medications       Activities of Daily Living (ADLs)/Instrumental Activities of Daily Living (IADLs):   Walk and transfer into and out of bed and chair?: Yes  Dress and groom yourself?: Yes    Bathe or shower yourself?: Yes    Feed yourself? Yes  Do your laundry/housekeeping?: Yes  Manage your money, pay your bills and track your expenses?: Yes  Make your own meals?: Yes    Do your own shopping?: Yes    Previous Hospitalizations:   Any hospitalizations or ED visits within the last 12 months?: Yes    How many hospitalizations have you had in the last year?: more than 4    Advance Care Planning:   Living will: No    Durable POA for healthcare: No    Advanced directive: No    Advanced directive counseling given: Yes    Five wishes given: No    Patient declined ACP directive: No    End of Life Decisions reviewed with patient: Yes    Provider agrees with end of life decisions: Yes      Cognitive Screening:   Provider or family/friend/caregiver concerned regarding cognition?: No    PREVENTIVE SCREENINGS      Cardiovascular Screening:    General: Screening Current      Diabetes Screening:     General: Screening Current      Colorectal Cancer Screening:     General: Screening Current      Prostate Cancer Screening:    General: Screening Not Indicated      Osteoporosis Screening:    General: Screening Not Indicated      Abdominal Aortic Aneurysm (AAA) Screening:        General: Screening Not Indicated      Lung Cancer Screening:     General: Screening Not Indicated      Hepatitis C Screening:    General: Screening Not Indicated    Screening, Brief Intervention, and Referral to Treatment (SBIRT)    Screening  Typical number of drinks in a day: 0  Typical number of drinks in a week: 0  Interpretation: Low risk drinking behavior      Single Item Drug Screening:  How often have you used an illegal drug (including marijuana) or a prescription medication for non-medical reasons in the past year? never    Single Item Drug Screen Score: 0  Interpretation: Negative screen for possible drug use disorder    No exam data present     Physical Exam:     /72 (BP Location: Left arm, Patient Position: Sitting, Cuff Size: Standard)   Pulse 70   Temp 98 4 °F (36 9 °C) (Temporal)   Ht 5' 9" (1 753 m)   Wt 56 2 kg (124 lb)   SpO2 99%   BMI 18 31 kg/m²     Physical Exam  Constitutional:       Appearance: He is well-developed  HENT:      Head: Normocephalic and atraumatic  Right Ear: External ear normal       Left Ear: External ear normal       Nose: Nose normal    Eyes:      Conjunctiva/sclera: Conjunctivae normal       Pupils: Pupils are equal, round, and reactive to light  Cardiovascular:      Rate and Rhythm: Normal rate and regular rhythm  Heart sounds: Normal heart sounds  No murmur heard  No friction rub  Pulmonary:      Effort: Pulmonary effort is normal  No respiratory distress  Breath sounds: Normal breath sounds  No wheezing or rales  Chest:      Chest wall: No tenderness  Abdominal:      General: Bowel sounds are normal       Palpations: Abdomen is soft  Musculoskeletal:         General: Normal range of motion  Cervical back: Normal range of motion and neck supple  Skin:     General: Skin is warm and dry  Capillary Refill: Capillary refill takes 2 to 3 seconds  Neurological:      Mental Status: He is alert and oriented to person, place, and time  Psychiatric:         Behavior: Behavior normal          Thought Content:  Thought content normal          Judgment: Judgment normal           Gen Davis,

## 2022-08-10 ENCOUNTER — OFFICE VISIT (OUTPATIENT)
Dept: CARDIOLOGY CLINIC | Facility: CLINIC | Age: 77
End: 2022-08-10
Payer: COMMERCIAL

## 2022-08-10 VITALS
WEIGHT: 126 LBS | HEIGHT: 69 IN | SYSTOLIC BLOOD PRESSURE: 120 MMHG | BODY MASS INDEX: 18.66 KG/M2 | DIASTOLIC BLOOD PRESSURE: 60 MMHG | HEART RATE: 74 BPM

## 2022-08-10 DIAGNOSIS — I34.1 MVP (MITRAL VALVE PROLAPSE): Primary | ICD-10-CM

## 2022-08-10 DIAGNOSIS — Z86.79 HISTORY OF PERICARDITIS: ICD-10-CM

## 2022-08-10 DIAGNOSIS — E78.5 DYSLIPIDEMIA: ICD-10-CM

## 2022-08-10 PROCEDURE — 1160F RVW MEDS BY RX/DR IN RCRD: CPT | Performed by: INTERNAL MEDICINE

## 2022-08-10 PROCEDURE — 99214 OFFICE O/P EST MOD 30 MIN: CPT | Performed by: INTERNAL MEDICINE

## 2022-08-10 NOTE — PROGRESS NOTES
Subjective:        Patient ID: Aurea Slater is a 76 y o  male  Chief Complaint:  Joe King is here for routine follow-up, he had recurrent vague chest discomfort symptoms back in May, my colleagues at [facility] saw him and did a thorough workup, echo is stable, stress test was nonischemic, EF normal     I am very happy to report that Joe King is feeling better, it appears the medications you switched him to have helped dramatically  He has no significant anxiety, depression, and no suicidal or homicidal ideations  He says he is feeling much better  Is looking forward to doing a little bit of fishing again which I am happy to hear  Appetite improved, weight up a bit  No chest pain shortness of breath palpitations presyncope syncope TIA or claudication like symptoms  No pleuritic or pericarditic symptoms on questioning  The following portions of the patient's history were reviewed and updated as appropriate: allergies, current medications, past family history, past medical history, past social history, past surgical history and problem list   Review of Systems   Constitutional: Negative for chills, diaphoresis, malaise/fatigue and weight gain  HENT: Negative for nosebleeds and stridor  Eyes: Negative for double vision, vision loss in left eye, vision loss in right eye and visual disturbance  Cardiovascular: Negative for chest pain, claudication, cyanosis, dyspnea on exertion, irregular heartbeat, leg swelling, near-syncope, orthopnea, palpitations, paroxysmal nocturnal dyspnea and syncope  Respiratory: Negative for cough, shortness of breath, snoring and wheezing  Endocrine: Negative for polydipsia, polyphagia and polyuria  Hematologic/Lymphatic: Negative for bleeding problem  Does not bruise/bleed easily  Skin: Negative for flushing and rash  Musculoskeletal: Negative for falls and myalgias     Gastrointestinal: Negative for abdominal pain, heartburn, hematemesis, hematochezia, melena and nausea  Genitourinary: Negative for hematuria  Neurological: Negative for brief paralysis, dizziness, focal weakness, headaches, light-headedness, loss of balance and vertigo  Psychiatric/Behavioral: Negative for altered mental status and substance abuse  Allergic/Immunologic: Negative for hives  Objective:      /60   Pulse 74   Ht 5' 9" (1 753 m)   Wt 57 2 kg (126 lb)   BMI 18 61 kg/m²   Physical Exam  Constitutional:       General: He is not in acute distress  Appearance: He is well-developed  He is not diaphoretic  HENT:      Head: Normocephalic and atraumatic  Eyes:      General: No scleral icterus  Pupils: Pupils are equal, round, and reactive to light  Neck:      Thyroid: No thyromegaly  Vascular: No carotid bruit or JVD  Cardiovascular:      Rate and Rhythm: Normal rate and regular rhythm  Heart sounds: Normal heart sounds  No murmur heard  No friction rub  No gallop  Pulmonary:      Effort: Pulmonary effort is normal  No respiratory distress  Breath sounds: Normal breath sounds  No stridor  No wheezing or rales  Abdominal:      General: Bowel sounds are normal  There is no distension  Palpations: Abdomen is soft  There is no mass  Tenderness: There is no abdominal tenderness  Musculoskeletal:         General: No deformity  Normal range of motion  Cervical back: Normal range of motion and neck supple  Right lower leg: No edema  Skin:     General: Skin is warm and dry  Coloration: Skin is not pale  Findings: No erythema  Neurological:      Mental Status: He is alert and oriented to person, place, and time  Coordination: Coordination normal    Psychiatric:         Mood and Affect: Mood normal          Behavior: Behavior normal          Lab Review:   No visits with results within 2 Month(s) from this visit     Latest known visit with results is:   Admission on 05/17/2022, Discharged on 05/20/2022   Component Date Value    WBC 05/17/2022 8 37     RBC 05/17/2022 4 32     Hemoglobin 05/17/2022 12 8     Hematocrit 05/17/2022 38 1     MCV 05/17/2022 88     MCH 05/17/2022 29 6     MCHC 05/17/2022 33 6     RDW 05/17/2022 12 8     MPV 05/17/2022 10 5     Platelets 09/41/2985 235     nRBC 05/17/2022 0     Neutrophils Relative 05/17/2022 70     Immat GRANS % 05/17/2022 0     Lymphocytes Relative 05/17/2022 21     Monocytes Relative 05/17/2022 7     Eosinophils Relative 05/17/2022 1     Basophils Relative 05/17/2022 1     Neutrophils Absolute 05/17/2022 5 83     Immature Grans Absolute 05/17/2022 0 02     Lymphocytes Absolute 05/17/2022 1 78     Monocytes Absolute 05/17/2022 0 62     Eosinophils Absolute 05/17/2022 0 05     Basophils Absolute 05/17/2022 0 07     Sodium 05/17/2022 139     Potassium 05/17/2022 3 7     Chloride 05/17/2022 100     CO2 05/17/2022 32     ANION GAP 05/17/2022 7     BUN 05/17/2022 25     Creatinine 05/17/2022 1 03     Glucose 05/17/2022 106     Calcium 05/17/2022 9 6     AST 05/17/2022 26     ALT 05/17/2022 24     Alkaline Phosphatase 05/17/2022 65     Total Protein 05/17/2022 7 3     Albumin 05/17/2022 4 0     Total Bilirubin 05/17/2022 0 50     eGFR 05/17/2022 68     Lipase 05/17/2022 72 (A)    hs TnI 0hr 05/17/2022 3     NT-proBNP 05/17/2022 173     hs TnI 2hr 05/17/2022 4     Delta 2hr hsTnI 05/17/2022 1     hs TnI 4hr 05/17/2022 4     Delta 4hr hsTnI 05/17/2022 1     Platelets 07/74/1990 211     MPV 05/17/2022 10 5     Baseline HR 05/18/2022 68     Stress peak HR 05/18/2022 90     Max HR Percent 05/18/2022 63     Max HR 05/18/2022 141     Rest Nuclear Isotope Dose 05/18/2022 11 00     Stress Nuclear Isotope D* 05/18/2022 32 20     Base ST Depresion (mm) 05/18/2022 0     ST Depression (mm) 05/18/2022 0     Stress/rest perfusion ra* 05/18/2022 1 08     EF (%) 05/18/2022 69     LA size 05/18/2022 2 9     Triscuspid Valve Regurgi* 05/18/2022 30 0     Tricuspid valve peak reg* 05/18/2022 2 74     LVPWd 05/18/2022 1 10     MV E' Tissue Velocity Se* 05/18/2022 7     Tricuspid annular plane * 05/18/2022 2 70     TR Peak Ghassan 05/18/2022 2 7     Right Ventricular Peak S* 05/18/2022 35 00     IVSd 05/18/2022 3 55     LV DIASTOLIC VOLUME (MOD* 22/62/2689 50     LEFT VENTRICLE SYSTOLIC * 17/93/6868 18     Left ventricular stroke * 05/18/2022 32 00     A4C EF 05/18/2022 49     LVIDd 05/18/2022 3 50     IVS 05/18/2022 1 3     LVIDS 05/18/2022 2 30     FS 05/18/2022 34     Ao root 05/18/2022 3 00     RVID d 05/18/2022 2 8     Est  RA pres 05/18/2022 5 0     MV valve area p 1/2 meth* 05/18/2022 3 44     E wave deceleration time 05/18/2022 221     MV Peak E Ghassan 05/18/2022 84     MV Peak A Ghassan 05/18/2022 0 84     COSME A4C 05/18/2022 14 9     RAA A4C 05/18/2022 15 4     MV stenosis pressure 1/2* 05/18/2022 64     LVSV, 2D 05/18/2022 32     LV EF 05/18/2022 65     PASP 05/18/2022 35 0     Ventricular Rate 05/17/2022 82     Atrial Rate 05/17/2022 288     QRSD Interval 05/17/2022 76     QT Interval 05/17/2022 352     QTC Interval 05/17/2022 411     QRS Axis 05/17/2022 61     T Wave Axis 05/17/2022 80     Ventricular Rate 05/17/2022 70     Atrial Rate 05/17/2022 70     NM Interval 05/17/2022 146     QRSD Interval 05/17/2022 90     QT Interval 05/17/2022 386     QTC Interval 05/17/2022 416     P Axis 05/17/2022 68     QRS Axis 05/17/2022 69     T Wave Axis 05/17/2022 72     Ventricular Rate 05/17/2022 68     Atrial Rate 05/17/2022 68     NM Interval 05/17/2022 142     QRSD Interval 05/17/2022 96     QT Interval 05/17/2022 396     QTC Interval 05/17/2022 421     P Axis 05/17/2022 59     QRS Axis 05/17/2022 45     T Wave Axis 05/17/2022 76     WBC 05/19/2022 5 92     RBC 05/19/2022 4 03     Hemoglobin 05/19/2022 12 2     Hematocrit 05/19/2022 37 0     MCV 05/19/2022 92     MCH 05/19/2022 30 3     MCHC 05/19/2022 33 0  RDW 05/19/2022 13 0     MPV 05/19/2022 10 6     Platelets 66/60/4929 196     nRBC 05/19/2022 0     Neutrophils Relative 05/19/2022 52     Immat GRANS % 05/19/2022 0     Lymphocytes Relative 05/19/2022 35     Monocytes Relative 05/19/2022 9     Eosinophils Relative 05/19/2022 3     Basophils Relative 05/19/2022 1     Neutrophils Absolute 05/19/2022 3 08     Immature Grans Absolute 05/19/2022 0 02     Lymphocytes Absolute 05/19/2022 2 07     Monocytes Absolute 05/19/2022 0 54     Eosinophils Absolute 05/19/2022 0 16     Basophils Absolute 05/19/2022 0 05     Sodium 05/19/2022 139     Potassium 05/19/2022 3 6     Chloride 05/19/2022 104     CO2 05/19/2022 28     ANION GAP 05/19/2022 7     BUN 05/19/2022 19     Creatinine 05/19/2022 0 88     Glucose 05/19/2022 91     Calcium 05/19/2022 8 7     Corrected Calcium 05/19/2022 9 3     AST 05/19/2022 15     ALT 05/19/2022 18     Alkaline Phosphatase 05/19/2022 46     Total Protein 05/19/2022 6 1 (A)    Albumin 05/19/2022 3 2 (A)    Total Bilirubin 05/19/2022 0 64     eGFR 05/19/2022 81     Protocol Name 05/18/2022 LEXISCAN SIT     Time In Exercise Phase 05/18/2022 00:03:01     MAX  SYSTOLIC BP 79/20/3650 641     Max Diastolic Bp 07/88/3883 80     Max Heart Rate 05/18/2022 90     Max Predicted Heart Rate 05/18/2022 141     Reason for Termination 05/18/2022 Test Complete  [place] Test Indication 05/18/2022 cp     Target Hr Formular 05/18/2022 (220 - Age)*85%     Chest Pain Statement 05/18/2022 none      No results found  Assessment:       1  MVP (mitral valve prolapse)     2  Dyslipidemia     3  History of pericarditis          Plan:       I think Nelly Lofton is doing well and I hope this will continue, he has no signs or symptoms of angina heart failure electrical instability nor pericarditis at this time      Lipids have been well controlled, tolerating statin therapy, blood pressure well controlled, he is euvolemic, no concerning cardiac auscultatory findings recent stable echo and stress test     I ordered no acute cardiac testing today, asked to come back and see me in 6 months but did feel free to call me sooner should he develop any concerning potential cardiac symptoms in the meantime  I was particularly clear that he should give us a call if he develops any recurrent pericarditic or pleuritic symptoms (reviewed in detail today)

## 2022-08-10 NOTE — PATIENT INSTRUCTIONS
Cholesterol and Your Health   AMBULATORY CARE:   Cholesterol  is a waxy, fat-like substance  Your body uses cholesterol to make hormones and new cells, and to protect nerves  Cholesterol is made by your body  It also comes from certain foods you eat, such as meat and dairy products  Your healthcare provider can help you set goals for your cholesterol levels  He or she can help you create a plan to meet your goals  Cholesterol level goals: Your cholesterol level goals depend on your risk for heart disease, your age, and your other health conditions  The following are general guidelines: Total cholesterol  includes low-density lipoprotein (LDL), high-density lipoprotein (HDL), and triglyceride levels  The total cholesterol level should be lower than 200 mg/dL and is best at about 150 mg/dL  LDL cholesterol  is called bad cholesterol  because it forms plaque in your arteries  As plaque builds up, your arteries become narrow, and less blood flows through  When plaque decreases blood flow to your heart, you may have chest pain  If plaque completely blocks an artery that brings blood to your heart, you may have a heart attack  Plaque can break off and form blood clots  Blood clots may block arteries in your brain and cause a stroke  The level should be less than 130 mg/dL and is best at about 100 mg/dL  HDL cholesterol  is called good cholesterol  because it helps remove LDL cholesterol from your arteries  It does this by attaching to LDL cholesterol and carrying it to your liver  Your liver breaks down LDL cholesterol so your body can get rid of it  High levels of HDL cholesterol can help prevent a heart attack and stroke  Low levels of HDL cholesterol can increase your risk for heart disease, heart attack, and stroke  The level should be 60 mg/dL or higher  Triglycerides  are a type of fat that store energy from foods you eat  High levels of triglycerides also cause plaque buildup   This can increase your risk for a heart attack or stroke  If your triglyceride level is high, your LDL cholesterol level may also be high  The level should be less than 150 mg/dL  Any of the following can increase your risk for high cholesterol:   Smoking cigarettes    Being overweight or obese, or not getting enough exercise    Drinking large amounts of alcohol    A medical condition such as hypertension (high blood pressure) or diabetes    Certain genes passed from your parents to you    Age older than 65 years    What you need to know about having your cholesterol levels checked: Adults 21to 39years of age should have their cholesterol levels checked every 4 to 6 years  Adults 45 years or older should have their cholesterol checked every 1 to 2 years  You may need your cholesterol checked more often, or at a younger age, if you have risk factors for heart disease  You may also need to have your cholesterol checked more often if you have other health conditions, such as diabetes  Blood tests are used to check cholesterol levels  Blood tests measure your levels of triglycerides, LDL cholesterol, and HDL cholesterol  How healthy fats affect your cholesterol levels:  Healthy fats, also called unsaturated fats, help lower LDL cholesterol and triglyceride levels  Healthy fats include the following:  Monounsaturated fats  are found in foods such as olive oil, canola oil, avocado, nuts, and olives  Polyunsaturated fats,  such as omega 3 fats, are found in fish, such as salmon, trout, and tuna  They can also be found in plant foods such as flaxseed, walnuts, and soybeans  How unhealthy fats affect your cholesterol levels:  Unhealthy fats increase LDL cholesterol and triglyceride levels  They are found in foods high in cholesterol, saturated fat, and trans fat:  Cholesterol  is found in eggs, dairy, and meat  Saturated fat  is found in butter, cheese, ice cream, whole milk, and coconut oil   Saturated fat is also found in meat, such as sausage, hot dogs, and bologna  Trans fat  is found in liquid oils and is used in fried and baked foods  Foods that contain trans fats include chips, crackers, muffins, sweet rolls, microwave popcorn, and cookies  Treatment  for high cholesterol will also decrease your risk of heart disease, heart attack, and stroke  Treatment may include any of the following:  Lifestyle changes  may include food, exercise, weight loss, and quitting smoking  You may also need to decrease the amount of alcohol you drink  Your healthcare provider will want you to start with lifestyle changes  Other treatment may be added if lifestyle changes are not enough  Your healthcare provider may recommend you work with a team to manage hyperlipidemia  The team may include medical experts such as a dietitian, an exercise or physical therapist, and a behavior therapist  Your family members may be included in helping you create lifestyle changes  Medicines  may be given to lower your LDL cholesterol, triglyceride levels, or total cholesterol level  You may need medicines to lower your cholesterol if any of the following is true:    You have a history of stroke, TIA, unstable angina, or a heart attack  Your LDL cholesterol level is 190 mg/dL or higher  You are age 36 to 76 years, have diabetes or heart disease risk factors, and your LDL cholesterol is 70 mg/dL or higher  Supplements  include fish oil, red yeast rice, and garlic  Fish oil may help lower your triglyceride and LDL cholesterol levels  It may also increase your HDL cholesterol level  Red yeast rice may help decrease your total cholesterol level and LDL cholesterol level  Garlic may help lower your total cholesterol level  Do not take any supplements without talking to your healthcare provider  Food changes you can make to lower your cholesterol levels:  A dietitian can help you create a healthy eating plan   He or she can show you how to read food labels and choose foods low in saturated fat, trans fats, and cholesterol  Decrease the total amount of fat you eat  Choose lean meats, fat-free or 1% fat milk, and low-fat dairy products, such as yogurt and cheese  Try to limit or avoid red meats  Limit or do not eat fried foods or baked goods, such as cookies  Replace unhealthy fats with healthy fats  Cook foods in olive oil or canola oil  Choose soft margarines that are low in saturated fat and trans fat  Seeds, nuts, and avocados are other examples of healthy fats  Eat foods with omega-3 fats  Examples include salmon, tuna, mackerel, walnuts, and flaxseed  Eat fish 2 times per week  Pregnant women should not eat fish that have high levels of mercury, such as shark, swordfish, and marissa mackerel  Increase the amount of high-fiber foods you eat  High-fiber foods can help lower your LDL cholesterol  Aim to get between 20 and 30 grams of fiber each day  Fruits and vegetables are high in fiber  Eat at least 5 servings each day  Other high-fiber foods are whole-grain or whole-wheat breads, pastas, or cereals, and brown rice  Eat 3 ounces of whole-grain foods each day  Increase fiber slowly  You may have abdominal discomfort, bloating, and gas if you add fiber to your diet too quickly  Eat healthy protein foods  Examples include low-fat dairy products, skinless chicken and turkey, fish, and nuts  Limit foods and drinks that are high in sugar  Your dietitian or healthcare provider can help you create daily limits for high-sugar foods and drinks  The limit may be lower if you have diabetes or another health condition  Limits can also help you lose weight if needed  Lifestyle changes you can make to lower your cholesterol levels:   Maintain a healthy weight  Ask your healthcare provider what a healthy weight is for you  Ask him or her to help you create a weight loss plan if needed   Weight loss can decrease your total cholesterol and triglyceride levels  Weight loss may also help keep your blood pressure at a healthy level  Be physically active throughout the day  Physical activity, such as exercise, can help lower your total cholesterol level and maintain a healthy weight  Physical activity can also help increase your HDL cholesterol level  Work with your healthcare provider to create an program that is right for you  Get at least 30 to 40 minutes of moderate physical activity most days of the week  Examples of exercise include brisk walking, swimming, or biking  Also include strength training at least 2 times each week  Your healthcare providers can help you create a physical activity plan  Do not smoke  Nicotine and other chemicals in cigarettes and cigars can raise your cholesterol levels  Ask your healthcare provider for information if you currently smoke and need help to quit  E-cigarettes or smokeless tobacco still contain nicotine  Talk to your healthcare provider before you use these products  Limit or do not drink alcohol  Alcohol can increase your triglyceride levels  Ask your healthcare provider before you drink alcohol  Ask how much is okay for you to drink in 24 hours or 1 week  Follow up with your doctor as directed:  Write down your questions so you remember to ask them during your visits  © Copyright [employer] 2022 Information is for End User's use only and may not be sold, redistributed or otherwise used for commercial purposes  All illustrations and images included in CareNotes® are the copyrighted property of [employer]  or [facility]  The above information is an  only  It is not intended as medical advice for individual conditions or treatments  Talk to your doctor, nurse or pharmacist before following any medical regimen to see if it is safe and effective for you

## 2022-09-29 NOTE — PLAN OF CARE
Physical Therapy Daily Note  Visit: 2  Date of Initial Visit: Type: THERAPY  Noted: 2022    Patient: Chaitanya Browne   : 1925  Diagnosis/ICD-10 Code:  Gait abnormality [R26.9]  Referring practitioner: Dirk Russ MD  Date of Initial Visit: Type: THERAPY  Noted: 2022  Today's Date: 2022  Patient seen for 2 sessions      Subjective:   Patient reports: Libtayo is what he started last week.   Pain: 0/10  Clinical Progress: unchanged  Home Program Compliance: Yes  Treatment has included: therapeutic exercise and neuromuscular re-education    Objective   See Exercise, Manual, and Modality Logs for complete treatment.    PT Neuro          Assessment & Plan     Assessment    Assessment details: Patient with tricep fatigue during session. He will require ongoing strengthening to assist up/down from chair, as well as scooting chairs. Patient will continue to be progressed as indicated.     Plan  Plan details: Patient to continue with PT services to improve gait, balance, strength, transfers and overall functional mobility.          Timed:  Manual Therapy:            0     mins  06105;  Therapeutic Exercise:    30    mins  89486;     Neuromuscular [place]:    8    mins  43357;    Therapeutic Activity:      0     mins  32559;     Gait Trainin    mins  23317;     Electrical Stimulation:    0    mins  06771 ( );     Untimed:  [place] Repositioning techniques [id number]      Timed Treatment:   38   mins   Total Treatment:     38   mins      Vianca Durant, PT, DPT, MSCS, CDP, CSRS  [place] License #: [id number]  Physical Therapist     Problem: Potential for Falls  Goal: Patient will remain free of falls  Description: INTERVENTIONS:  - Educate patient/family on patient safety including physical limitations  - Instruct patient to call for assistance with activity   - Consult OT/PT to assist with strengthening/mobility   - Keep Call bell within reach  - Keep bed low and locked with side rails adjusted as appropriate  - Keep care items and personal belongings within reach  - Initiate and maintain comfort rounds  - Make Fall Risk Sign visible to staff  - Offer Toileting every 2 Hours, in advance of need  - Initiate/Maintain bed alarm  - Obtain necessary fall risk management equipment  - Apply yellow socks and bracelet for high fall risk patients  - Consider moving patient to room near nurses station  Outcome: Progressing     Problem: Nutrition/Hydration-ADULT  Goal: Nutrient/Hydration intake appropriate for improving, restoring or maintaining nutritional needs  Description: Monitor and assess patient's nutrition/hydration status for malnutrition  Collaborate with interdisciplinary team and initiate plan and interventions as ordered  Monitor patient's weight and dietary intake as ordered or per policy  Utilize nutrition screening tool and intervene as necessary  Determine patient's food preferences and provide high-protein, high-caloric foods as appropriate       INTERVENTIONS:  - Monitor oral intake, urinary output, labs, and treatment plans  - Assess nutrition and hydration status and recommend course of action  - Evaluate amount of meals eaten  - Assist patient with eating if necessary   - Allow adequate time for meals  - Recommend/ encourage appropriate diets, oral nutritional supplements, and vitamin/mineral supplements  - Order, calculate, and assess calorie counts as needed  - Recommend, monitor, and adjust tube feedings and TPN/PPN based on assessed needs  - Assess need for intravenous fluids  - Provide specific nutrition/hydration education as appropriate  - Include patient/family/caregiver in decisions related to nutrition  Outcome: Progressing     Problem: PAIN - ADULT  Goal: Verbalizes/displays adequate comfort level or baseline comfort level  Description: Interventions:  - Encourage patient to monitor pain and request assistance  - Assess pain using appropriate pain scale  - Administer analgesics based on type and severity of pain and evaluate response  - Implement non-pharmacological measures as appropriate and evaluate response  - Consider cultural and social influences on pain and pain management  - Notify physician/advanced practitioner if interventions unsuccessful or patient reports new pain  Outcome: Progressing     Problem: INFECTION - ADULT  Goal: Absence or prevention of progression during hospitalization  Description: INTERVENTIONS:  - Assess and monitor for signs and symptoms of infection  - Monitor lab/diagnostic results  - Monitor all insertion sites, i e  indwelling lines, tubes, and drains  - Monitor endotracheal if appropriate and nasal secretions for changes in amount and color  - [place] appropriate cooling/warming therapies per order  - Administer medications as ordered  - Instruct and encourage patient and family to use good hand hygiene technique  - Identify and instruct in appropriate isolation precautions for identified infection/condition  Outcome: Progressing     Problem: SAFETY ADULT  Goal: Patient will remain free of falls  Description: INTERVENTIONS:  - Educate patient/family on patient safety including physical limitations  - Instruct patient to call for assistance with activity   - Consult OT/PT to assist with strengthening/mobility   - Keep Call bell within reach  - Keep bed low and locked with side rails adjusted as appropriate  - Keep care items and personal belongings within reach  - Initiate and maintain comfort rounds  - Make Fall Risk Sign visible to staff  - Offer Toileting every 2 Hours, in advance of need  - Initiate/Maintain bed alarm  - Obtain necessary fall risk management equipment  - Apply yellow socks and bracelet for high fall risk patients  - Consider moving patient to room near nurses station  Outcome: Progressing  Goal: Maintain or return to baseline ADL function  Description: INTERVENTIONS:  -  Assess patient's ability to carry out ADLs; assess patient's baseline for ADL function and identify physical deficits which impact ability to perform ADLs (bathing, care of mouth/teeth, toileting, grooming, dressing, etc )  - Assess/evaluate cause of self-care deficits   - Assess range of motion  - Assess patient's mobility; develop plan if impaired  - Assess patient's need for assistive devices and provide as appropriate  - Encourage maximum independence but intervene and supervise when necessary  - Involve family in performance of ADLs  - Assess for home care needs following discharge   - Consider OT consult to assist with ADL evaluation and planning for discharge  - Provide patient education as appropriate  Outcome: Progressing  Goal: Maintains/Returns to pre admission functional level  Description: INTERVENTIONS:  - Perform BMAT or MOVE assessment daily    - Set and communicate daily mobility goal to care team and patient/family/caregiver  - Collaborate with rehabilitation services on mobility goals if consulted  - Perform Range of Motion 4 times a day  - Reposition patient every 4 hours    - Dangle patient 4 times a day  - Stand patient 4 times a day  - Ambulate patient 4 times a day  - Out of bed to chair 4 times a day   - Out of bed for meals 3 times a day  - Out of bed for toileting  - Record patient progress and toleration of activity level   Outcome: Progressing     Problem: DISCHARGE PLANNING  Goal: Discharge to home or other facility with appropriate resources  Description: INTERVENTIONS:  - Identify barriers to discharge w/patient and caregiver  - Arrange for needed discharge resources and transportation as appropriate  - Identify discharge learning needs (meds, wound care, etc )  - Arrange for interpretive services to assist at discharge as needed  - Refer to Case Management Department for coordinating discharge planning if the patient needs post-hospital services based on physician/advanced practitioner order or complex needs related to functional status, cognitive ability, or social support system  Outcome: Progressing     Problem: Knowledge Deficit  Goal: Patient/family/caregiver demonstrates understanding of disease process, treatment plan, medications, and discharge instructions  Description: Complete learning assessment and assess knowledge base    Interventions:  - Provide teaching at level of understanding  - Provide teaching via preferred learning methods  Outcome: Progressing     Problem: CARDIOVASCULAR - ADULT  Goal: Maintains optimal cardiac output and hemodynamic stability  Description: INTERVENTIONS:  - Monitor I/O, vital signs and rhythm  - Monitor for S/S and trends of decreased cardiac output  - Administer and titrate ordered vasoactive medications to optimize hemodynamic stability  - Assess quality of pulses, skin color and temperature  - Assess for signs of decreased coronary artery perfusion  - Instruct patient to report change in severity of symptoms  Outcome: Progressing  Goal: Absence of cardiac dysrhythmias or at baseline rhythm  Description: INTERVENTIONS:  - Continuous cardiac monitoring, vital signs, obtain 12 lead EKG if ordered  - Administer antiarrhythmic and heart rate control medications as ordered  - Monitor electrolytes and administer replacement therapy as ordered  Outcome: Progressing     Problem: RESPIRATORY - ADULT  Goal: Achieves optimal ventilation and oxygenation  Description: INTERVENTIONS:  - Assess for changes in respiratory status  - Assess for changes in mentation and behavior  - Position to facilitate oxygenation and minimize respiratory effort  - Oxygen administered by appropriate delivery if ordered  - Initiate smoking cessation education as indicated  - Encourage broncho-pulmonary hygiene including cough, deep breathe, Incentive Spirometry  - Assess the need for suctioning and aspirate as needed  - Assess and instruct to report SOB or any respiratory difficulty  - Respiratory Therapy support as indicated  Outcome: Progressing     Problem: METABOLIC, FLUID AND ELECTROLYTES - ADULT  Goal: Electrolytes maintained within normal limits  Description: INTERVENTIONS:  - Monitor labs and assess patient for signs and symptoms of electrolyte imbalances  - Administer electrolyte replacement as ordered  - Monitor response to electrolyte replacements, including repeat lab results as appropriate  - Instruct patient on fluid and nutrition as appropriate  Outcome: Progressing  Goal: Fluid balance maintained  Description: INTERVENTIONS:  - Monitor labs   - Monitor I/O and WT  - Instruct patient on fluid and nutrition as appropriate  - Assess for signs & symptoms of volume excess or deficit  Outcome: Progressing  Goal: Glucose maintained within target range  Description: INTERVENTIONS:  - Monitor Blood Glucose as ordered  - Assess for signs and symptoms of hyperglycemia and hypoglycemia  - Administer ordered medications to maintain glucose within target range  - Assess nutritional intake and initiate nutrition service referral as needed  Outcome: Progressing     Problem: MOBILITY - ADULT  Goal: Maintain or return to baseline ADL function  Description: INTERVENTIONS:  -  Assess patient's ability to carry out ADLs; assess patient's baseline for ADL function and identify physical deficits which impact ability to perform ADLs (bathing, care of mouth/teeth, toileting, grooming, dressing, etc )  - Assess/evaluate cause of self-care deficits   - Assess range of motion  - Assess patient's mobility; develop plan if impaired  - Assess patient's need for assistive devices and provide as appropriate  - Encourage maximum independence but intervene and supervise when necessary  - Involve family in performance of ADLs  - Assess for home care needs following discharge   - Consider OT consult to assist with ADL evaluation and planning for discharge  - Provide patient education as appropriate  Outcome: Progressing  Goal: Maintains/Returns to pre admission functional level  Description: INTERVENTIONS:  - Perform BMAT or MOVE assessment daily    - Set and communicate daily mobility goal to care team and patient/family/caregiver  - Collaborate with rehabilitation services on mobility goals if consulted  - Perform Range of Motion 4 times a day  - Reposition patient every 4 hours    - Dangle patient 4 times a day  - Stand patient 4 times a day  - Ambulate patient 4 times a day  - Out of bed to chair 4 times a day   - Out of bed for meals 3 times a day  - Out of bed for toileting  - Record patient progress and toleration of activity level   Outcome: Progressing     Problem: Prexisting or High Potential for Compromised Skin Integrity  Goal: Skin integrity is maintained or improved  Description: INTERVENTIONS:  - Identify patients at risk for skin breakdown  - Assess and monitor skin integrity  - Assess and monitor nutrition and hydration status  - Monitor labs   - Assess for incontinence   - Turn and reposition patient  - Assist with mobility/ambulation  - Relieve pressure over bony prominences  - Avoid friction and shearing  - Provide appropriate hygiene as needed including keeping skin clean and dry  - Evaluate need for skin moisturizer/barrier cream  - Collaborate with interdisciplinary team   - Patient/family teaching  - Consider wound care consult   Outcome: Progressing

## 2022-10-17 ENCOUNTER — OFFICE VISIT (OUTPATIENT)
Dept: FAMILY MEDICINE CLINIC | Facility: CLINIC | Age: 77
End: 2022-10-17
Payer: COMMERCIAL

## 2022-10-17 VITALS
DIASTOLIC BLOOD PRESSURE: 72 MMHG | WEIGHT: 128 LBS | SYSTOLIC BLOOD PRESSURE: 148 MMHG | OXYGEN SATURATION: 99 % | HEIGHT: 69 IN | HEART RATE: 73 BPM | TEMPERATURE: 97.8 F | BODY MASS INDEX: 18.96 KG/M2

## 2022-10-17 DIAGNOSIS — Z23 NEED FOR IMMUNIZATION AGAINST INFLUENZA: Primary | ICD-10-CM

## 2022-10-17 PROCEDURE — 99214 OFFICE O/P EST MOD 30 MIN: CPT | Performed by: FAMILY MEDICINE

## 2022-10-17 NOTE — PROGRESS NOTES
Assessment/Plan:    Problem List Items Addressed This Visit    None     Visit Diagnoses     Need for immunization against influenza    -  Primary           Diagnoses and all orders for this visit:    Need for immunization against influenza        No problem-specific Assessment & Plan notes found for this encounter  Subjective:      Patient ID: Raphael Alcaraz is a 76 y o  male  Mr  Uriel Tabares her eats he is here today for a follow-up visit he feels well his vital signs are stable he is taking his lips his simvastatin his mirtazapine and his fluoxetine but no other medications      The following portions of the patient's history were reviewed and updated as appropriate:   He has a past medical history of Coronary artery disease, COVID-19 (01/28/2022), Dyspnea on exertion, History of echocardiogram (03/22/2018), Hyperlipidemia, Lyme disease, Mitral valve prolapse, and Shingles  ,  does not have any pertinent problems on file  ,   has a past surgical history that includes Appendectomy; Tonsillectomy; Hernia repair; and Eye surgery  ,  family history includes Heart disease in his mother; No Known Problems in his father  ,   reports that he has never smoked  He has never used smokeless tobacco  He reports that he does not drink alcohol and does not use drugs  ,  has No Known Allergies     Current Outpatient Medications   Medication Sig Dispense Refill   • FLUoxetine (PROzac) 10 mg capsule 1 capsule 3 times weekly Monday Wednesday and Friday at dinnertime 12 capsule 5   • mirtazapine (REMERON) 15 mg tablet Take 1 tablet (15 mg total) by mouth daily at bedtime 30 tablet 5   • simvastatin (ZOCOR) 5 MG tablet Take 5 mg by mouth daily at bedtime       No current facility-administered medications for this visit  Review of Systems   Constitutional: Negative for activity change, appetite change, diaphoresis, fatigue and fever  HENT: Negative  Negative for dental problem and hearing loss      Eyes: Positive for visual disturbance  Patient patient wears corrective lenses   Respiratory: Negative for apnea, cough, chest tightness, shortness of breath and wheezing  Cardiovascular: Negative for chest pain, palpitations and leg swelling  Gastrointestinal: Negative for abdominal distention, abdominal pain, anal bleeding, constipation, diarrhea, nausea and vomiting  Endocrine: Negative for cold intolerance, heat intolerance, polydipsia, polyphagia and polyuria  Genitourinary: Negative for difficulty urinating, dysuria, flank pain, hematuria and urgency  Musculoskeletal: Negative for arthralgias, back pain, gait problem, joint swelling and myalgias  Patient has a trigger finger on the 4th finger of both hands it has not yet become completely locked but I did make him aware the fact we could refer him to Orthopedics for injection if necessary   Skin: Negative for color change, rash and wound  Allergic/Immunologic: Negative for environmental allergies, food allergies and immunocompromised state  Neurological: Negative for dizziness, seizures, syncope, speech difficulty, numbness and headaches  Hematological: Negative for adenopathy  Does not bruise/bleed easily  Psychiatric/Behavioral: Negative for agitation, behavioral problems, hallucinations, sleep disturbance and suicidal ideas  Objective:  Vitals:    10/17/22 0855   BP: 148/72   BP Location: Left arm   Patient Position: Sitting   Cuff Size: Standard   Pulse: 73   Temp: 97 8 °F (36 6 °C)   TempSrc: Temporal   SpO2: 99%   Weight: 58 1 kg (128 lb)   Height: 5' 9" (1 753 m)     Body mass index is 18 9 kg/m²  Physical Exam  Constitutional:       General: He is not in acute distress  Appearance: He is well-developed  He is not diaphoretic  HENT:      Head: Normocephalic  Right Ear: External ear normal       Left Ear: External ear normal       Nose: Nose normal    Eyes:      General: No scleral icterus  Right eye: No discharge  Left eye: No discharge  Conjunctiva/sclera: Conjunctivae normal       Pupils: Pupils are equal, round, and reactive to light  Neck:      Thyroid: No thyromegaly  Trachea: No tracheal deviation  Cardiovascular:      Rate and Rhythm: Normal rate and regular rhythm  Heart sounds: Normal heart sounds  No murmur heard  No friction rub  No gallop  Pulmonary:      Effort: Pulmonary effort is normal  No respiratory distress  Breath sounds: Normal breath sounds  No wheezing  Abdominal:      General: Bowel sounds are normal       Palpations: Abdomen is soft  There is no mass  Tenderness: There is no abdominal tenderness  There is no guarding  Musculoskeletal:         General: No deformity  Cervical back: Normal range of motion  Lymphadenopathy:      Cervical: No cervical adenopathy  Skin:     General: Skin is warm and dry  Findings: No erythema or rash  Neurological:      Mental Status: He is alert and oriented to person, place, and time  Cranial Nerves: No cranial nerve deficit  Psychiatric:         Thought Content:  Thought content normal

## 2023-01-04 DIAGNOSIS — F32.A DEPRESSION: ICD-10-CM

## 2023-01-04 DIAGNOSIS — F41.9 ANXIETY: ICD-10-CM

## 2023-01-04 DIAGNOSIS — F33.9 DEPRESSION, RECURRENT (HCC): ICD-10-CM

## 2023-01-04 RX ORDER — MIRTAZAPINE 15 MG/1
15 TABLET, FILM COATED ORAL
Qty: 30 TABLET | Refills: 5 | Status: SHIPPED | OUTPATIENT
Start: 2023-01-04 | End: 2023-02-03

## 2023-01-04 RX ORDER — FLUOXETINE 10 MG/1
CAPSULE ORAL
Qty: 12 CAPSULE | Refills: 5 | Status: SHIPPED | OUTPATIENT
Start: 2023-01-04

## 2023-02-13 ENCOUNTER — OFFICE VISIT (OUTPATIENT)
Dept: CARDIOLOGY CLINIC | Facility: CLINIC | Age: 78
End: 2023-02-13

## 2023-02-13 VITALS
WEIGHT: 130 LBS | HEART RATE: 69 BPM | SYSTOLIC BLOOD PRESSURE: 128 MMHG | BODY MASS INDEX: 19.26 KG/M2 | DIASTOLIC BLOOD PRESSURE: 80 MMHG | HEIGHT: 69 IN

## 2023-02-13 DIAGNOSIS — I34.1 MITRAL VALVE PROLAPSE: Primary | ICD-10-CM

## 2023-02-13 DIAGNOSIS — E78.5 DYSLIPIDEMIA: ICD-10-CM

## 2023-02-13 DIAGNOSIS — Z86.79 HISTORY OF PERICARDITIS: ICD-10-CM

## 2023-02-13 NOTE — PATIENT INSTRUCTIONS
Cholesterol and Your Health   AMBULATORY CARE:   Cholesterol  is a waxy, fat-like substance  Your body uses cholesterol to make hormones and new cells, and to protect nerves  Cholesterol is made by your body  It also comes from certain foods you eat, such as meat and dairy products  Your healthcare provider can help you set goals for your cholesterol levels  He or she can help you create a plan to meet your goals  Cholesterol level goals: Your cholesterol level goals depend on your risk for heart disease, your age, and your other health conditions  The following are general guidelines: Total cholesterol  includes low-density lipoprotein (LDL), high-density lipoprotein (HDL), and triglyceride levels  The total cholesterol level should be lower than 200 mg/dL and is best at about 150 mg/dL  LDL cholesterol  is called bad cholesterol  because it forms plaque in your arteries  As plaque builds up, your arteries become narrow, and less blood flows through  When plaque decreases blood flow to your heart, you may have chest pain  If plaque completely blocks an artery that brings blood to your heart, you may have a heart attack  Plaque can break off and form blood clots  Blood clots may block arteries in your brain and cause a stroke  The level should be less than 130 mg/dL and is best at about 100 mg/dL  HDL cholesterol  is called good cholesterol  because it helps remove LDL cholesterol from your arteries  It does this by attaching to LDL cholesterol and carrying it to your liver  Your liver breaks down LDL cholesterol so your body can get rid of it  High levels of HDL cholesterol can help prevent a heart attack and stroke  Low levels of HDL cholesterol can increase your risk for heart disease, heart attack, and stroke  The level should be 60 mg/dL or higher  Triglycerides  are a type of fat that store energy from foods you eat  High levels of triglycerides also cause plaque buildup   This can increase your risk for a heart attack or stroke  If your triglyceride level is high, your LDL cholesterol level may also be high  The level should be less than 150 mg/dL  Any of the following can increase your risk for high cholesterol:   Smoking cigarettes    Being overweight or obese, or not getting enough exercise    Drinking large amounts of alcohol    A medical condition such as hypertension (high blood pressure) or diabetes    Certain genes passed from your parents to you    Age older than 65 years    What you need to know about having your cholesterol levels checked: Adults 21to 39years of age should have their cholesterol levels checked every 4 to 6 years  Adults 45 years or older should have their cholesterol checked every 1 to 2 years  You may need your cholesterol checked more often, or at a younger age, if you have risk factors for heart disease  You may also need to have your cholesterol checked more often if you have other health conditions, such as diabetes  Blood tests are used to check cholesterol levels  Blood tests measure your levels of triglycerides, LDL cholesterol, and HDL cholesterol  How healthy fats affect your cholesterol levels:  Healthy fats, also called unsaturated fats, help lower LDL cholesterol and triglyceride levels  Healthy fats include the following:  Monounsaturated fats  are found in foods such as olive oil, canola oil, avocado, nuts, and olives  Polyunsaturated fats,  such as omega 3 fats, are found in fish, such as salmon, trout, and tuna  They can also be found in plant foods such as flaxseed, walnuts, and soybeans  How unhealthy fats affect your cholesterol levels:  Unhealthy fats increase LDL cholesterol and triglyceride levels  They are found in foods high in cholesterol, saturated fat, and trans fat:  Cholesterol  is found in eggs, dairy, and meat  Saturated fat  is found in butter, cheese, ice cream, whole milk, and coconut oil   Saturated fat is also found in meat, such as sausage, hot dogs, and bologna  Trans fat  is found in liquid oils and is used in fried and baked foods  Foods that contain trans fats include chips, crackers, muffins, sweet rolls, microwave popcorn, and cookies  Treatment  for high cholesterol will also decrease your risk of heart disease, heart attack, and stroke  Treatment may include any of the following:  Lifestyle changes  may include food, exercise, weight loss, and quitting smoking  You may also need to decrease the amount of alcohol you drink  Your healthcare provider will want you to start with lifestyle changes  Other treatment may be added if lifestyle changes are not enough  Your healthcare provider may recommend you work with a team to manage hyperlipidemia  The team may include medical experts such as a dietitian, an exercise or physical therapist, and a behavior therapist  Your family members may be included in helping you create lifestyle changes  Medicines  may be given to lower your LDL cholesterol, triglyceride levels, or total cholesterol level  You may need medicines to lower your cholesterol if any of the following is true:    You have a history of stroke, TIA, unstable angina, or a heart attack  Your LDL cholesterol level is 190 mg/dL or higher  You are age 36 to 76 years, have diabetes or heart disease risk factors, and your LDL cholesterol is 70 mg/dL or higher  Supplements  include fish oil, red yeast rice, and garlic  Fish oil may help lower your triglyceride and LDL cholesterol levels  It may also increase your HDL cholesterol level  Red yeast rice may help decrease your total cholesterol level and LDL cholesterol level  Garlic may help lower your total cholesterol level  Do not take any supplements without talking to your healthcare provider  Food changes you can make to lower your cholesterol levels:  A dietitian can help you create a healthy eating plan   He or she can show you how to read food labels and choose foods low in saturated fat, trans fats, and cholesterol  Decrease the total amount of fat you eat  Choose lean meats, fat-free or 1% fat milk, and low-fat dairy products, such as yogurt and cheese  Try to limit or avoid red meats  Limit or do not eat fried foods or baked goods, such as cookies  Replace unhealthy fats with healthy fats  Cook foods in olive oil or canola oil  Choose soft margarines that are low in saturated fat and trans fat  Seeds, nuts, and avocados are other examples of healthy fats  Eat foods with omega-3 fats  Examples include salmon, tuna, mackerel, walnuts, and flaxseed  Eat fish 2 times per week  Pregnant women should not eat fish that have high levels of mercury, such as shark, swordfish, and marissa mackerel  Increase the amount of high-fiber foods you eat  High-fiber foods can help lower your LDL cholesterol  Aim to get between 20 and 30 grams of fiber each day  Fruits and vegetables are high in fiber  Eat at least 5 servings each day  Other high-fiber foods are whole-grain or whole-wheat breads, pastas, or cereals, and brown rice  Eat 3 ounces of whole-grain foods each day  Increase fiber slowly  You may have abdominal discomfort, bloating, and gas if you add fiber to your diet too quickly  Eat healthy protein foods  Examples include low-fat dairy products, skinless chicken and turkey, fish, and nuts  Limit foods and drinks that are high in sugar  Your dietitian or healthcare provider can help you create daily limits for high-sugar foods and drinks  The limit may be lower if you have diabetes or another health condition  Limits can also help you lose weight if needed  Lifestyle changes you can make to lower your cholesterol levels:   Maintain a healthy weight  Ask your healthcare provider what a healthy weight is for you  Ask him or her to help you create a weight loss plan if needed   Weight loss can decrease your total cholesterol and triglyceride levels  Weight loss may also help keep your blood pressure at a healthy level  Be physically active throughout the day  Physical activity, such as exercise, can help lower your total cholesterol level and maintain a healthy weight  Physical activity can also help increase your HDL cholesterol level  Work with your healthcare provider to create an program that is right for you  Get at least 30 to 40 minutes of moderate physical activity most days of the week  Examples of exercise include brisk walking, swimming, or biking  Also include strength training at least 2 times each week  Your healthcare providers can help you create a physical activity plan  Do not smoke  Nicotine and other chemicals in cigarettes and cigars can raise your cholesterol levels  Ask your healthcare provider for information if you currently smoke and need help to quit  E-cigarettes or smokeless tobacco still contain nicotine  Talk to your healthcare provider before you use these products  Limit or do not drink alcohol  Alcohol can increase your triglyceride levels  Ask your healthcare provider before you drink alcohol  Ask how much is okay for you to drink in 24 hours or 1 week  Follow up with your doctor as directed:  Write down your questions so you remember to ask them during your visits  © Copyright [employer] 2022 Information is for End User's use only and may not be sold, redistributed or otherwise used for commercial purposes  All illustrations and images included in CareNotes® are the copyrighted property of [employer]  or [facility] Feroz Hale  The above information is an  only  It is not intended as medical advice for individual conditions or treatments  Talk to your doctor, nurse or pharmacist before following any medical regimen to see if it is safe and effective for you

## 2023-02-13 NOTE — PROGRESS NOTES
Subjective:        Patient ID: Zakiya Calderon is a 76 y o  male  Chief Complaint:  Malia Addison is here for routine follow-up, feels well offers no cardiac complaints  Requested a refill on his simvastatin  Asked if he needed a mitral valve surgery in the future, saw an article in the paper  I pulled up his last echocardiogram we will review this together, only trace to mild MR was noted with his MVP  The following portions of the patient's history were reviewed and updated as appropriate: allergies, current medications, past family history, past medical history, past social history, past surgical history and problem list   Review of Systems   Constitutional: Negative for chills, diaphoresis, malaise/fatigue and weight gain  HENT: Negative for nosebleeds and stridor  Eyes: Negative for double vision, vision loss in left eye, vision loss in right eye and visual disturbance  Cardiovascular: Negative for chest pain, claudication, cyanosis, dyspnea on exertion, irregular heartbeat, leg swelling, near-syncope, orthopnea, palpitations, paroxysmal nocturnal dyspnea and syncope  Respiratory: Negative for cough, shortness of breath, snoring and wheezing  Endocrine: Negative for polydipsia, polyphagia and polyuria  Hematologic/Lymphatic: Negative for bleeding problem  Does not bruise/bleed easily  Skin: Negative for flushing and rash  Musculoskeletal: Negative for falls and myalgias  Gastrointestinal: Negative for abdominal pain, heartburn, hematemesis, hematochezia, melena and nausea  Genitourinary: Negative for hematuria  Neurological: Negative for brief paralysis, dizziness, focal weakness, headaches, light-headedness, loss of balance and vertigo  Psychiatric/Behavioral: Negative for altered mental status and substance abuse  Allergic/Immunologic: Negative for hives            Objective:      /80   Pulse 69   Ht 5' 9" (1 753 m)   Wt 59 kg (130 lb)   BMI 19 20 kg/m²   Physical Exam  Constitutional:       General: He is not in acute distress  Appearance: He is well-developed  He is not diaphoretic  HENT:      Head: Normocephalic and atraumatic  Eyes:      General: No scleral icterus  Pupils: Pupils are equal, round, and reactive to light  Neck:      Thyroid: No thyromegaly  Vascular: No carotid bruit or JVD  Cardiovascular:      Rate and Rhythm: Normal rate and regular rhythm  Heart sounds: Normal heart sounds  No murmur heard  No friction rub  No gallop  Pulmonary:      Effort: Pulmonary effort is normal  No respiratory distress  Breath sounds: Normal breath sounds  No stridor  No wheezing or rales  Abdominal:      General: Bowel sounds are normal  There is no distension  Palpations: Abdomen is soft  There is no mass  Tenderness: There is no abdominal tenderness  Musculoskeletal:      Cervical back: Normal range of motion and neck supple  Right lower leg: No edema  Left lower leg: No edema  Skin:     General: Skin is warm and dry  Coloration: Skin is not pale  Findings: No erythema  Neurological:      Mental Status: He is alert and oriented to person, place, and time  Coordination: Coordination normal    Psychiatric:         Mood and Affect: Mood normal          Behavior: Behavior normal          Lab Review:   No visits with results within 6 Month(s) from this visit     Latest known visit with results is:   Admission on 05/17/2022, Discharged on 05/20/2022   Component Date Value   • WBC 05/17/2022 8 37    • RBC 05/17/2022 4 32    • Hemoglobin 05/17/2022 12 8    • Hematocrit 05/17/2022 38 1    • MCV 05/17/2022 88    • MCH 05/17/2022 29 6    • MCHC 05/17/2022 33 6    • RDW 05/17/2022 12 8    • MPV 05/17/2022 10 5    • Platelets 65/13/5177 235    • nRBC 05/17/2022 0    • Neutrophils Relative 05/17/2022 70    • Immat GRANS % 05/17/2022 0    • Lymphocytes Relative 05/17/2022 21    • Monocytes Relative 05/17/2022 7 • Eosinophils Relative 05/17/2022 1    • Basophils Relative 05/17/2022 1    • Neutrophils Absolute 05/17/2022 5 83    • Immature Grans Absolute 05/17/2022 0 02    • Lymphocytes Absolute 05/17/2022 1 78    • Monocytes Absolute 05/17/2022 0 62    • Eosinophils Absolute 05/17/2022 0 05    • Basophils Absolute 05/17/2022 0 07    • Sodium 05/17/2022 139    • Potassium 05/17/2022 3 7    • Chloride 05/17/2022 100    • CO2 05/17/2022 32    • ANION GAP 05/17/2022 7    • BUN 05/17/2022 25    • Creatinine 05/17/2022 1 03    • Glucose 05/17/2022 106    • Calcium 05/17/2022 9 6    • AST 05/17/2022 26    • ALT 05/17/2022 24    • Alkaline Phosphatase 05/17/2022 65    • Total Protein 05/17/2022 7 3    • Albumin 05/17/2022 4 0    • Total Bilirubin 05/17/2022 0 50    • eGFR 05/17/2022 68    • Lipase 05/17/2022 72 (L)    • hs TnI 0hr 05/17/2022 3    • NT-proBNP 05/17/2022 173    • hs TnI 2hr 05/17/2022 4    • Delta 2hr hsTnI 05/17/2022 1    • hs TnI 4hr 05/17/2022 4    • Delta 4hr hsTnI 05/17/2022 1    • Platelets 48/41/0950 211    • MPV 05/17/2022 10 5    • Baseline HR 05/18/2022 68    • Stress peak HR 05/18/2022 90    • Max HR Percent 05/18/2022 63    • Max HR 05/18/2022 141    • Rest Nuclear Isotope Dose 05/18/2022 11 00    • Stress Nuclear Isotope D* 05/18/2022 32 20    • Base ST Depresion (mm) 05/18/2022 0    • ST Depression (mm) 05/18/2022 0    • Stress/rest perfusion ra* 05/18/2022 1 08    • EF (%) 05/18/2022 69    • LA size 05/18/2022 2 9    • Triscuspid Valve Regurgi* 05/18/2022 30 0    • Tricuspid valve peak reg* 05/18/2022 2 74    • LVPWd 05/18/2022 1 10    • MV E' Tissue Velocity Se* 05/18/2022 7    • Tricuspid annular plane * 05/18/2022 2 70    • TR Peak Ghassan 05/18/2022 2 7    • Right Ventricular Peak S* 05/18/2022 35 00    • IVSd 05/18/2022 9 84    • LV DIASTOLIC VOLUME (MOD* 90/55/3279 50    • LEFT VENTRICLE SYSTOLIC * 31/90/2656 18    • Left ventricular stroke * 05/18/2022 32 00    • A4C EF 05/18/2022 49    • LVIDd 05/18/2022 3 50    • IVS 05/18/2022 1 3    • LVIDS 05/18/2022 2 30    • FS 05/18/2022 34    • Ao root 05/18/2022 3 00    • RVID d 05/18/2022 2 8    • Est  RA pres 05/18/2022 5 0    • MV valve area p 1/2 meth* 05/18/2022 3 44    • E wave deceleration time 05/18/2022 221    • MV Peak E Ghassan 05/18/2022 84    • MV Peak A Ghassan 05/18/2022 0 84    • COSME A4C 05/18/2022 14 9    • RAA A4C 05/18/2022 15 4    • MV stenosis pressure 1/2* 05/18/2022 64    • LVSV, 2D 05/18/2022 32    • LV EF 05/18/2022 65    • PASP 05/18/2022 35 0    • Ventricular Rate 05/17/2022 82    • Atrial Rate 05/17/2022 288    • QRSD Interval 05/17/2022 76    • QT Interval 05/17/2022 352    • QTC Interval 05/17/2022 411    • QRS Axis 05/17/2022 61    • T Wave Axis 05/17/2022 80    • Ventricular Rate 05/17/2022 70    • Atrial Rate 05/17/2022 70    • OH Interval 05/17/2022 146    • QRSD Interval 05/17/2022 90    • QT Interval 05/17/2022 386    • QTC Interval 05/17/2022 416    • P Axis 05/17/2022 68    • QRS Axis 05/17/2022 69    • T Wave Axis 05/17/2022 72    • Ventricular Rate 05/17/2022 68    • Atrial Rate 05/17/2022 68    • OH Interval 05/17/2022 142    • QRSD Interval 05/17/2022 96    • QT Interval 05/17/2022 396    • QTC Interval 05/17/2022 421    • P Axis 05/17/2022 59    • QRS Axis 05/17/2022 45    • T Wave Axis 05/17/2022 76    • WBC 05/19/2022 5 92    • RBC 05/19/2022 4 03    • Hemoglobin 05/19/2022 12 2    • Hematocrit 05/19/2022 37 0    • MCV 05/19/2022 92    • MCH 05/19/2022 30 3    • MCHC 05/19/2022 33 0    • RDW 05/19/2022 13 0    • MPV 05/19/2022 10 6    • Platelets 34/23/2507 196    • nRBC 05/19/2022 0    • Neutrophils Relative 05/19/2022 52    • Immat GRANS % 05/19/2022 0    • Lymphocytes Relative 05/19/2022 35    • Monocytes Relative 05/19/2022 9    • Eosinophils Relative 05/19/2022 3    • Basophils Relative 05/19/2022 1    • Neutrophils Absolute 05/19/2022 3 08    • Immature Grans Absolute 05/19/2022 0 02    • Lymphocytes Absolute 05/19/2022 2 07    • Monocytes Absolute 05/19/2022 0 54    • Eosinophils Absolute 05/19/2022 0 16    • Basophils Absolute 05/19/2022 0 05    • Sodium 05/19/2022 139    • Potassium 05/19/2022 3 6    • Chloride 05/19/2022 104    • CO2 05/19/2022 28    • ANION GAP 05/19/2022 7    • BUN 05/19/2022 19    • Creatinine 05/19/2022 0 88    • Glucose 05/19/2022 91    • Calcium 05/19/2022 8 7    • Corrected Calcium 05/19/2022 9 3    • AST 05/19/2022 15    • ALT 05/19/2022 18    • Alkaline Phosphatase 05/19/2022 46    • Total Protein 05/19/2022 6 1 (L)    • Albumin 05/19/2022 3 2 (L)    • Total Bilirubin 05/19/2022 0 64    • eGFR 05/19/2022 81    • Protocol Name 05/18/2022 LEXISCAN SIT    • Time In Exercise Phase 05/18/2022 00:03:01    • MAX  SYSTOLIC BP 83/50/1745 700    • Max Diastolic Bp 29/15/6541 80    • Max Heart Rate 05/18/2022 90    • Max Predicted Heart Rate 05/18/2022 141    • Reason for Termination 05/18/2022 Test Complete       • Test Indication 05/18/2022 cp    • Target Hr Formular 05/18/2022 (220 - Age)*85%    • Chest Pain Statement 05/18/2022 none      No results found  Assessment:       1  Mitral valve prolapse        2  Dyslipidemia        3  History of pericarditis             Plan:       Anam King has no signs or symptoms reminiscent of angina heart failure nor electrical instability  I hear no audible evidence for progressive mitral insufficiency, trace to mild on his last echo  SBE antibiotic prophylaxis is not warranted  Lipids well controlled, blood pressure well controlled, no evidence of recurrent pericarditic symptoms      I will see him back in 6 months, asked him to call sooner with any concerning potential cardiac symptoms in the meantime

## 2023-03-17 ENCOUNTER — TELEPHONE (OUTPATIENT)
Dept: PSYCHIATRY | Facility: CLINIC | Age: 78
End: 2023-03-17

## 2023-03-17 NOTE — TELEPHONE ENCOUNTER
I was unable to leave a message for pt to return call to intake in regards to referral for psychiatry, adding pt to the proper waitlist because the phone kept ringing without a VM to leave a message

## 2023-03-23 ENCOUNTER — TELEPHONE (OUTPATIENT)
Dept: PSYCHIATRY | Facility: CLINIC | Age: 78
End: 2023-03-23

## 2023-03-23 NOTE — TELEPHONE ENCOUNTER
I was unable to leave a VM for pt to return call to intake in regards to referral for psychiatry, adding pt to the proper waitlist because the number was unavailable

## 2023-04-04 ENCOUNTER — TELEPHONE (OUTPATIENT)
Dept: PSYCHIATRY | Facility: CLINIC | Age: 78
End: 2023-04-04

## 2023-04-24 DIAGNOSIS — E78.5 DYSLIPIDEMIA: Primary | ICD-10-CM

## 2023-04-24 RX ORDER — SIMVASTATIN 5 MG
5 TABLET ORAL
Qty: 90 TABLET | Refills: 3 | Status: SHIPPED | OUTPATIENT
Start: 2023-04-24

## 2023-05-25 ENCOUNTER — RA CDI HCC (OUTPATIENT)
Dept: OTHER | Facility: HOSPITAL | Age: 78
End: 2023-05-25

## 2023-05-25 NOTE — PROGRESS NOTES
[facility]  coding opportunities       Chart reviewed, no opportunity found: CHART REVIEWED, NO OPPORTUNITY FOUND        Patients Insurance     Medicare Insurance: [employer] [facility] [employer]

## 2023-06-05 ENCOUNTER — OFFICE VISIT (OUTPATIENT)
Dept: FAMILY MEDICINE CLINIC | Facility: CLINIC | Age: 78
End: 2023-06-05
Payer: COMMERCIAL

## 2023-06-05 VITALS
HEIGHT: 69 IN | DIASTOLIC BLOOD PRESSURE: 68 MMHG | SYSTOLIC BLOOD PRESSURE: 126 MMHG | TEMPERATURE: 97.8 F | WEIGHT: 127 LBS | OXYGEN SATURATION: 99 % | BODY MASS INDEX: 18.81 KG/M2 | HEART RATE: 74 BPM

## 2023-06-05 DIAGNOSIS — E46 PROTEIN-CALORIE MALNUTRITION, UNSPECIFIED SEVERITY (HCC): Primary | ICD-10-CM

## 2023-06-05 DIAGNOSIS — F33.9 DEPRESSION, RECURRENT (HCC): ICD-10-CM

## 2023-06-05 DIAGNOSIS — I25.119 CORONARY ARTERY DISEASE INVOLVING NATIVE CORONARY ARTERY OF NATIVE HEART WITH ANGINA PECTORIS (HCC): ICD-10-CM

## 2023-06-05 PROCEDURE — 99214 OFFICE O/P EST MOD 30 MIN: CPT | Performed by: FAMILY MEDICINE

## 2023-06-05 NOTE — PROGRESS NOTES
Assessment/Plan:depression resolved   Wean off mirtazipine    Problem List Items Addressed This Visit    None       There are no diagnoses linked to this encounter  No problem-specific Assessment & Plan notes found for this encounter  Subjective:      Patient ID: Angi Desir is a [de-identified] y o  male  Follow up on depression with insomnia   Doing very well  Not taking prozac       The following portions of the patient's history were reviewed and updated as appropriate:   He has a past medical history of Coronary artery disease, COVID-19 (01/28/2022), Dyspnea on exertion, History of echocardiogram (03/22/2018), Hyperlipidemia, Lyme disease, Mitral valve prolapse, and Shingles  ,  does not have any pertinent problems on file  ,   has a past surgical history that includes Appendectomy; Tonsillectomy; Hernia repair; and Eye surgery  ,  family history includes Heart disease in his mother; No Known Problems in his father  ,   reports that he has never smoked  He has never used smokeless tobacco  He reports that he does not drink alcohol and does not use drugs  ,  has No Known Allergies     Current Outpatient Medications   Medication Sig Dispense Refill   • mirtazapine (REMERON) 15 mg tablet Take 1 tablet (15 mg total) by mouth daily at bedtime 30 tablet 5   • simvastatin (ZOCOR) 5 MG tablet Take 1 tablet (5 mg total) by mouth daily at bedtime 90 tablet 3     No current facility-administered medications for this visit  Review of Systems   Constitutional: Negative for activity change, appetite change, diaphoresis, fatigue and fever  HENT: Negative  Eyes: Negative  Respiratory: Negative for apnea, cough, chest tightness, shortness of breath and wheezing  Cardiovascular: Negative for chest pain, palpitations and leg swelling  Gastrointestinal: Negative for abdominal distention, abdominal pain, anal bleeding, constipation, diarrhea, nausea and vomiting     Endocrine: Negative for cold intolerance, "heat intolerance, polydipsia, polyphagia and polyuria  Genitourinary: Negative for difficulty urinating, dysuria, flank pain, hematuria and urgency  Musculoskeletal: Negative for arthralgias, back pain, gait problem, joint swelling and myalgias  Skin: Negative for color change, rash and wound  Allergic/Immunologic: Negative for environmental allergies, food allergies and immunocompromised state  Neurological: Negative for dizziness, seizures, syncope, speech difficulty, numbness and headaches  Hematological: Negative for adenopathy  Does not bruise/bleed easily  Psychiatric/Behavioral: Negative for agitation, behavioral problems, hallucinations, sleep disturbance and suicidal ideas  Objective:  Vitals:    06/05/23 1118   BP: 126/68   BP Location: Left arm   Patient Position: Sitting   Cuff Size: Standard   Pulse: 74   Temp: 97 8 °F (36 6 °C)   TempSrc: Temporal   SpO2: 99%   Weight: 57 6 kg (127 lb)   Height: 5' 9\" (1 753 m)     Body mass index is 18 75 kg/m²  Physical Exam  Constitutional:       General: He is not in acute distress  Appearance: He is well-developed  He is not diaphoretic  HENT:      Head: Normocephalic  Right Ear: External ear normal       Left Ear: External ear normal       Nose: Nose normal    Eyes:      General: No scleral icterus  Right eye: No discharge  Left eye: No discharge  Conjunctiva/sclera: Conjunctivae normal       Pupils: Pupils are equal, round, and reactive to light  Neck:      Thyroid: No thyromegaly  Trachea: No tracheal deviation  Cardiovascular:      Rate and Rhythm: Normal rate and regular rhythm  Heart sounds: Normal heart sounds  No murmur heard  No friction rub  No gallop  Pulmonary:      Effort: Pulmonary effort is normal  No respiratory distress  Breath sounds: Normal breath sounds  No wheezing  Abdominal:      General: Bowel sounds are normal       Palpations: Abdomen is soft   There is no " mass       Tenderness: There is no abdominal tenderness  There is no guarding  Musculoskeletal:         General: No deformity  Cervical back: Normal range of motion  Lymphadenopathy:      Cervical: No cervical adenopathy  Skin:     General: Skin is warm and dry  Findings: No erythema or rash  Neurological:      Mental Status: He is alert and oriented to person, place, and time  Cranial Nerves: No cranial nerve deficit  Psychiatric:         Thought Content:  Thought content normal

## 2023-06-14 DIAGNOSIS — F32.A DEPRESSION: ICD-10-CM

## 2023-06-14 DIAGNOSIS — F41.9 ANXIETY: ICD-10-CM

## 2023-06-14 RX ORDER — MIRTAZAPINE 15 MG/1
15 TABLET, FILM COATED ORAL
Qty: 30 TABLET | Refills: 0 | Status: SHIPPED | OUTPATIENT
Start: 2023-06-14 | End: 2023-07-14

## 2023-06-19 ENCOUNTER — LAB (OUTPATIENT)
Dept: LAB | Facility: MEDICAL CENTER | Age: 78
End: 2023-06-19
Payer: COMMERCIAL

## 2023-06-19 DIAGNOSIS — E46 PROTEIN-CALORIE MALNUTRITION, UNSPECIFIED SEVERITY (HCC): ICD-10-CM

## 2023-06-19 LAB
ALBUMIN SERPL BCP-MCNC: 4 G/DL (ref 3.5–5)
ALP SERPL-CCNC: 59 U/L (ref 46–116)
ALT SERPL W P-5'-P-CCNC: 24 U/L (ref 12–78)
ANION GAP SERPL CALCULATED.3IONS-SCNC: -1 MMOL/L (ref 4–13)
AST SERPL W P-5'-P-CCNC: 21 U/L (ref 5–45)
BILIRUB SERPL-MCNC: 0.74 MG/DL (ref 0.2–1)
BUN SERPL-MCNC: 17 MG/DL (ref 5–25)
CALCIUM SERPL-MCNC: 9.5 MG/DL (ref 8.3–10.1)
CHLORIDE SERPL-SCNC: 108 MMOL/L (ref 96–108)
CO2 SERPL-SCNC: 33 MMOL/L (ref 21–32)
CREAT SERPL-MCNC: 1.05 MG/DL (ref 0.6–1.3)
GFR SERPL CREATININE-BSD FRML MDRD: 66 ML/MIN/1.73SQ M
GLUCOSE P FAST SERPL-MCNC: 95 MG/DL (ref 65–99)
POTASSIUM SERPL-SCNC: 4 MMOL/L (ref 3.5–5.3)
PROT SERPL-MCNC: 7.1 G/DL (ref 6.4–8.4)
SODIUM SERPL-SCNC: 140 MMOL/L (ref 135–147)

## 2023-06-19 PROCEDURE — 36415 COLL VENOUS BLD VENIPUNCTURE: CPT

## 2023-06-19 PROCEDURE — 80053 COMPREHEN METABOLIC PANEL: CPT

## 2023-07-10 DIAGNOSIS — F41.9 ANXIETY: ICD-10-CM

## 2023-07-10 DIAGNOSIS — F32.A DEPRESSION: ICD-10-CM

## 2023-07-10 RX ORDER — MIRTAZAPINE 15 MG/1
15 TABLET, FILM COATED ORAL
Qty: 30 TABLET | Refills: 0 | Status: SHIPPED | OUTPATIENT
Start: 2023-07-10 | End: 2023-08-09

## 2023-08-17 ENCOUNTER — OFFICE VISIT (OUTPATIENT)
Dept: CARDIOLOGY CLINIC | Facility: CLINIC | Age: 78
End: 2023-08-17
Payer: COMMERCIAL

## 2023-08-17 VITALS
DIASTOLIC BLOOD PRESSURE: 78 MMHG | HEART RATE: 68 BPM | RESPIRATION RATE: 16 BRPM | BODY MASS INDEX: 18.81 KG/M2 | SYSTOLIC BLOOD PRESSURE: 122 MMHG | OXYGEN SATURATION: 99 % | HEIGHT: 69 IN | WEIGHT: 127 LBS

## 2023-08-17 DIAGNOSIS — E78.5 DYSLIPIDEMIA: ICD-10-CM

## 2023-08-17 DIAGNOSIS — I25.10 CORONARY ARTERY CALCIFICATION SEEN ON CAT SCAN: ICD-10-CM

## 2023-08-17 DIAGNOSIS — Z86.79 HISTORY OF PERICARDITIS: ICD-10-CM

## 2023-08-17 DIAGNOSIS — I34.1 MITRAL VALVE PROLAPSE: Primary | ICD-10-CM

## 2023-08-17 PROCEDURE — 93000 ELECTROCARDIOGRAM COMPLETE: CPT | Performed by: INTERNAL MEDICINE

## 2023-08-17 PROCEDURE — 99213 OFFICE O/P EST LOW 20 MIN: CPT | Performed by: INTERNAL MEDICINE

## 2023-08-17 NOTE — PROGRESS NOTES
Subjective:        Patient ID: Ganesh Greenfield is a 78 y.o. male. Chief Complaint:  Kathie Durbin is here for his routine follow-up, feeling well, staying active, did a lot of fishing this spring and summer, did quite well he said. Still wading the streams without any issues. No chest pain shortness of breath palpitations edema orthopnea or PND. No TIA or claudication-like symptoms. No fevers or rigors. The following portions of the patient's history were reviewed and updated as appropriate: allergies, current medications, past family history, past medical history, past social history, past surgical history and problem list.  Review of Systems   Constitutional: Negative for chills, diaphoresis, malaise/fatigue and weight gain. HENT: Negative for nosebleeds and stridor. Eyes: Negative for double vision, vision loss in left eye, vision loss in right eye and visual disturbance. Cardiovascular: Negative for chest pain, claudication, cyanosis, dyspnea on exertion, irregular heartbeat, leg swelling, near-syncope, orthopnea, palpitations, paroxysmal nocturnal dyspnea and syncope. Respiratory: Negative for cough, shortness of breath, snoring and wheezing. Endocrine: Negative for polydipsia, polyphagia and polyuria. Hematologic/Lymphatic: Negative for bleeding problem. Does not bruise/bleed easily. Skin: Negative for flushing and rash. Musculoskeletal: Negative for falls and myalgias. Gastrointestinal: Negative for abdominal pain, heartburn, hematemesis, hematochezia, melena and nausea. Genitourinary: Negative for hematuria. Neurological: Negative for brief paralysis, dizziness, focal weakness, headaches, light-headedness, loss of balance and vertigo. Psychiatric/Behavioral: Negative for altered mental status and substance abuse. Allergic/Immunologic: Negative for hives.           Objective:      /78 (BP Location: Left arm, Patient Position: Sitting, Cuff Size: Standard)   Pulse 68   Resp 16   Ht 5' 9" (1.753 m)   Wt 57.6 kg (127 lb)   SpO2 99%   BMI 18.75 kg/m²   Physical Exam  Constitutional:       General: He is not in acute distress. Appearance: He is well-developed. He is not diaphoretic. HENT:      Head: Normocephalic and atraumatic. Eyes:      General: No scleral icterus. Pupils: Pupils are equal, round, and reactive to light. Neck:      Thyroid: No thyromegaly. Vascular: No carotid bruit or JVD. Cardiovascular:      Rate and Rhythm: Normal rate and regular rhythm. Heart sounds: Murmur heard. No friction rub. No gallop. Pulmonary:      Effort: Pulmonary effort is normal. No respiratory distress. Breath sounds: Normal breath sounds. No stridor. No wheezing or rales. Abdominal:      General: Bowel sounds are normal. There is no distension. Palpations: Abdomen is soft. There is no mass. Tenderness: There is no abdominal tenderness. Musculoskeletal:         General: No deformity. Normal range of motion. Cervical back: Normal range of motion and neck supple. Right lower leg: No edema. Left lower leg: No edema. Skin:     General: Skin is warm and dry. Coloration: Skin is not pale. Findings: No erythema. Neurological:      Mental Status: He is alert and oriented to person, place, and time. Coordination: Coordination normal.   Psychiatric:         Mood and Affect: Mood normal.         Behavior: Behavior normal.         Thought Content:  Thought content normal.         Judgment: Judgment normal.         Lab Review:   Lab on 06/19/2023   Component Date Value   • Sodium 06/19/2023 140    • Potassium 06/19/2023 4.0    • Chloride 06/19/2023 108    • CO2 06/19/2023 33 (H)    • ANION GAP 06/19/2023 -1 (L)    • BUN 06/19/2023 17    • Creatinine 06/19/2023 1.05    • Glucose, Fasting 06/19/2023 95    • Calcium 06/19/2023 9.5    • AST 06/19/2023 21    • ALT 06/19/2023 24    • Alkaline Phosphatase 06/19/2023 59    • Total Protein 06/19/2023 7.1    • Albumin 06/19/2023 4.0    • Total Bilirubin 06/19/2023 0.74    • eGFR 06/19/2023 66      No results found. Assessment:       1. Mitral valve prolapse  POCT ECG      2. History of pericarditis        3. Dyslipidemia  Lipid panel      4. Coronary artery calcification seen on CAT scan             Plan:       Mychal Quiles has no signs or symptoms reminiscent of angina pectoris heart failure nor electrical instability. No evidence for recurrent pericarditis. Lipids controlled tolerating statin therapy, advise discontinue in light of coronary artery calcification seen on CT scanning. He maintains a high level of activity with no angina. Stress testing last year was nonischemic. I will see him back in 6 months asked him to call sooner with any concerning potential cardiac symptoms prior, made no changes today.

## 2023-08-17 NOTE — PATIENT INSTRUCTIONS
Cholesterol and Your Health   AMBULATORY CARE:   Cholesterol  is a waxy, fat-like substance. Your body uses cholesterol to make hormones and new cells, and to protect nerves. Cholesterol is made by your body. It also comes from certain foods you eat, such as meat and dairy products. Your healthcare provider can help you set goals for your cholesterol levels. He or she can help you create a plan to meet your goals. Cholesterol level goals: Your cholesterol level goals depend on your risk for heart disease, your age, and your other health conditions. The following are general guidelines: Total cholesterol  includes low-density lipoprotein (LDL), high-density lipoprotein (HDL), and triglyceride levels. The total cholesterol level should be lower than 200 mg/dL and is best at about 150 mg/dL. LDL cholesterol  is called bad cholesterol  because it forms plaque in your arteries. As plaque builds up, your arteries become narrow, and less blood flows through. When plaque decreases blood flow to your heart, you may have chest pain. If plaque completely blocks an artery that brings blood to your heart, you may have a heart attack. Plaque can break off and form blood clots. Blood clots may block arteries in your brain and cause a stroke. The level should be less than 130 mg/dL and is best at about 100 mg/dL. HDL cholesterol  is called good cholesterol  because it helps remove LDL cholesterol from your arteries. It does this by attaching to LDL cholesterol and carrying it to your liver. Your liver breaks down LDL cholesterol so your body can get rid of it. High levels of HDL cholesterol can help prevent a heart attack and stroke. Low levels of HDL cholesterol can increase your risk for heart disease, heart attack, and stroke. The level should be 60 mg/dL or higher. Triglycerides  are a type of fat that store energy from foods you eat. High levels of triglycerides also cause plaque buildup.  This can increase your risk for a heart attack or stroke. If your triglyceride level is high, your LDL cholesterol level may also be high. The level should be less than 150 mg/dL. Any of the following can increase your risk for high cholesterol:   Smoking cigarettes    Being overweight or obese, or not getting enough exercise    Drinking large amounts of alcohol    A medical condition such as hypertension (high blood pressure) or diabetes    Certain genes passed from your parents to you    Age older than 65 years    What you need to know about having your cholesterol levels checked: Adults 21to 39years of age should have their cholesterol levels checked every 4 to 6 years. Adults 45 years or older should have their cholesterol checked every 1 to 2 years. You may need your cholesterol checked more often, or at a younger age, if you have risk factors for heart disease. You may also need to have your cholesterol checked more often if you have other health conditions, such as diabetes. Blood tests are used to check cholesterol levels. Blood tests measure your levels of triglycerides, LDL cholesterol, and HDL cholesterol. How healthy fats affect your cholesterol levels:  Healthy fats, also called unsaturated fats, help lower LDL cholesterol and triglyceride levels. Healthy fats include the following:  Monounsaturated fats  are found in foods such as olive oil, canola oil, avocado, nuts, and olives. Polyunsaturated fats,  such as omega 3 fats, are found in fish, such as salmon, trout, and tuna. They can also be found in plant foods such as flaxseed, walnuts, and soybeans. How unhealthy fats affect your cholesterol levels:  Unhealthy fats increase LDL cholesterol and triglyceride levels. They are found in foods high in cholesterol, saturated fat, and trans fat:  Cholesterol  is found in eggs, dairy, and meat. Saturated fat  is found in butter, cheese, ice cream, whole milk, and coconut oil.  Saturated fat is also found in meat, such as sausage, hot dogs, and bologna. Trans fat  is found in liquid oils and is used in fried and baked foods. Foods that contain trans fats include chips, crackers, muffins, sweet rolls, microwave popcorn, and cookies. Treatment  for high cholesterol will also decrease your risk of heart disease, heart attack, and stroke. Treatment may include any of the following:  Lifestyle changes  may include food, exercise, weight loss, and quitting smoking. You may also need to decrease the amount of alcohol you drink. Your healthcare provider will want you to start with lifestyle changes. Other treatment may be added if lifestyle changes are not enough. Your healthcare provider may recommend you work with a team to manage hyperlipidemia. The team may include medical experts such as a dietitian, an exercise or physical therapist, and a behavior therapist. Your family members may be included in helping you create lifestyle changes. Medicines  may be given to lower your LDL cholesterol, triglyceride levels, or total cholesterol level. You may need medicines to lower your cholesterol if any of the following is true:    You have a history of stroke, TIA, unstable angina, or a heart attack. Your LDL cholesterol level is 190 mg/dL or higher. You are age 36 to 76 years, have diabetes or heart disease risk factors, and your LDL cholesterol is 70 mg/dL or higher. Supplements  include fish oil, red yeast rice, and garlic. Fish oil may help lower your triglyceride and LDL cholesterol levels. It may also increase your HDL cholesterol level. Red yeast rice may help decrease your total cholesterol level and LDL cholesterol level. Garlic may help lower your total cholesterol level. Do not take any supplements without talking to your healthcare provider. Food changes you can make to lower your cholesterol levels:  A dietitian can help you create a healthy eating plan.  He or she can show you how to read food labels and choose foods low in saturated fat, trans fats, and cholesterol. Decrease the total amount of fat you eat. Choose lean meats, fat-free or 1% fat milk, and low-fat dairy products, such as yogurt and cheese. Try to limit or avoid red meats. Limit or do not eat fried foods or baked goods, such as cookies. Replace unhealthy fats with healthy fats. Cook foods in olive oil or canola oil. Choose soft margarines that are low in saturated fat and trans fat. Seeds, nuts, and avocados are other examples of healthy fats. Eat foods with omega-3 fats. Examples include salmon, tuna, mackerel, walnuts, and flaxseed. Eat fish 2 times per week. Pregnant women should not eat fish that have high levels of mercury, such as shark, swordfish, and marissa mackerel. Increase the amount of high-fiber foods you eat. High-fiber foods can help lower your LDL cholesterol. Aim to get between 20 and 30 grams of fiber each day. Fruits and vegetables are high in fiber. Eat at least 5 servings each day. Other high-fiber foods are whole-grain or whole-wheat breads, pastas, or cereals, and brown rice. Eat 3 ounces of whole-grain foods each day. Increase fiber slowly. You may have abdominal discomfort, bloating, and gas if you add fiber to your diet too quickly. Eat healthy protein foods. Examples include low-fat dairy products, skinless chicken and turkey, fish, and nuts. Limit foods and drinks that are high in sugar. Your dietitian or healthcare provider can help you create daily limits for high-sugar foods and drinks. The limit may be lower if you have diabetes or another health condition. Limits can also help you lose weight if needed. Lifestyle changes you can make to lower your cholesterol levels:   Maintain a healthy weight. Ask your healthcare provider what a healthy weight is for you. Ask him or her to help you create a weight loss plan if needed.  Weight loss can decrease your total cholesterol and triglyceride levels. Weight loss may also help keep your blood pressure at a healthy level. Be physically active throughout the day. Physical activity, such as exercise, can help lower your total cholesterol level and maintain a healthy weight. Physical activity can also help increase your HDL cholesterol level. Work with your healthcare provider to create an program that is right for you. Get at least 30 to 40 minutes of moderate physical activity most days of the week. Examples of exercise include brisk walking, swimming, or biking. Also include strength training at least 2 times each week. Your healthcare providers can help you create a physical activity plan. Do not smoke. Nicotine and other chemicals in cigarettes and cigars can raise your cholesterol levels. Ask your healthcare provider for information if you currently smoke and need help to quit. E-cigarettes or smokeless tobacco still contain nicotine. Talk to your healthcare provider before you use these products. Limit or do not drink alcohol. Alcohol can increase your triglyceride levels. Ask your healthcare provider before you drink alcohol. Ask how much is okay for you to drink in 24 hours or 1 week. Follow up with your doctor as directed:  Write down your questions so you remember to ask them during your visits. © Copyright [employer] 2022 Information is for End User's use only and may not be sold, redistributed or otherwise used for commercial purposes. The above information is an  only. It is not intended as medical advice for individual conditions or treatments. Talk to your doctor, nurse or pharmacist before following any medical regimen to see if it is safe and effective for you.

## 2023-09-05 ENCOUNTER — LAB (OUTPATIENT)
Dept: LAB | Facility: MEDICAL CENTER | Age: 78
End: 2023-09-05
Payer: COMMERCIAL

## 2023-09-05 ENCOUNTER — OFFICE VISIT (OUTPATIENT)
Dept: FAMILY MEDICINE CLINIC | Facility: CLINIC | Age: 78
End: 2023-09-05
Payer: COMMERCIAL

## 2023-09-05 VITALS
SYSTOLIC BLOOD PRESSURE: 140 MMHG | DIASTOLIC BLOOD PRESSURE: 76 MMHG | OXYGEN SATURATION: 99 % | TEMPERATURE: 97.7 F | HEART RATE: 70 BPM | BODY MASS INDEX: 18.94 KG/M2 | WEIGHT: 125 LBS | HEIGHT: 68 IN

## 2023-09-05 DIAGNOSIS — R07.89 ATYPICAL CHEST PAIN: ICD-10-CM

## 2023-09-05 DIAGNOSIS — Z00.00 MEDICARE ANNUAL WELLNESS VISIT, SUBSEQUENT: ICD-10-CM

## 2023-09-05 DIAGNOSIS — F41.9 ANXIETY: ICD-10-CM

## 2023-09-05 DIAGNOSIS — F32.A DEPRESSION: ICD-10-CM

## 2023-09-05 DIAGNOSIS — R07.89 ATYPICAL CHEST PAIN: Primary | ICD-10-CM

## 2023-09-05 PROBLEM — R45.851 PASSIVE SUICIDAL IDEATIONS: Status: RESOLVED | Noted: 2022-05-18 | Resolved: 2023-09-05

## 2023-09-05 PROBLEM — E43 SEVERE PROTEIN-CALORIE MALNUTRITION (HCC): Status: RESOLVED | Noted: 2022-04-13 | Resolved: 2023-09-05

## 2023-09-05 LAB
CARDIAC TROPONIN I PNL SERPL HS: 3 NG/L (ref 8–18)
CHOLEST SERPL-MCNC: 196 MG/DL
CK SERPL-CCNC: 165 U/L (ref 39–308)
HDLC SERPL-MCNC: 63 MG/DL
LDLC SERPL CALC-MCNC: 116 MG/DL (ref 0–100)
NONHDLC SERPL-MCNC: 133 MG/DL
TRIGL SERPL-MCNC: 84 MG/DL

## 2023-09-05 PROCEDURE — 99214 OFFICE O/P EST MOD 30 MIN: CPT | Performed by: FAMILY MEDICINE

## 2023-09-05 PROCEDURE — 36415 COLL VENOUS BLD VENIPUNCTURE: CPT

## 2023-09-05 PROCEDURE — G0439 PPPS, SUBSEQ VISIT: HCPCS | Performed by: FAMILY MEDICINE

## 2023-09-05 PROCEDURE — 80061 LIPID PANEL: CPT | Performed by: FAMILY MEDICINE

## 2023-09-05 PROCEDURE — 82550 ASSAY OF CK (CPK): CPT

## 2023-09-05 PROCEDURE — 84484 ASSAY OF TROPONIN QUANT: CPT

## 2023-09-05 RX ORDER — MIRTAZAPINE 7.5 MG/1
7.5 TABLET, FILM COATED ORAL
Qty: 30 TABLET | Refills: 1 | Status: SHIPPED | OUTPATIENT
Start: 2023-09-05 | End: 2023-10-05

## 2023-09-05 NOTE — PROGRESS NOTES
Assessment and Plan:     Problem List Items Addressed This Visit    None       Preventive health issues were discussed with patient, and age appropriate screening tests were ordered as noted in patient's After Visit Summary. Personalized health advice and appropriate referrals for health education or preventive services given if needed, as noted in patient's After Visit Summary. History of Present Illness:     Patient presents for a Medicare Wellness Visit    Mr. Slava Hernandez is here today for the purposes of a Medicare subsequent well visit he has other issues including some atypical chest discomfort which will be addressed during the well visit     Patient Care Team:  Brando Zaman DO as PCP - General (Family Medicine)     Review of Systems:     Review of Systems   Constitutional: Negative for activity change, appetite change, diaphoresis, fatigue and fever. HENT: Negative. Negative for dental problem and hearing loss. Eyes: Positive for visual disturbance. Follows with Dr. Keira Saleem he has had 1 cataract removing the other eye the cataract in his lens is not ready yet for removal   Respiratory: Negative for apnea, cough, chest tightness, shortness of breath and wheezing. Cardiovascular: Negative for chest pain, palpitations and leg swelling. Gastrointestinal: Negative for abdominal distention, abdominal pain, anal bleeding, constipation, diarrhea, nausea and vomiting. Endocrine: Negative for cold intolerance, heat intolerance, polydipsia, polyphagia and polyuria. Genitourinary: Negative for difficulty urinating, dysuria, flank pain, hematuria and urgency. Musculoskeletal: Negative for arthralgias, back pain, gait problem, joint swelling and myalgias. Skin: Negative for color change, rash and wound. Allergic/Immunologic: Negative for environmental allergies, food allergies and immunocompromised state.    Neurological: Negative for dizziness, seizures, syncope, speech difficulty, numbness and headaches. Hematological: Negative for adenopathy. Does not bruise/bleed easily. Psychiatric/Behavioral: Negative for agitation, behavioral problems, hallucinations, sleep disturbance and suicidal ideas. Problem List:     Patient Active Problem List   Diagnosis   • Dyslipidemia   • Other chest pain   • Depression, recurrent (HCC)   • Severe protein-calorie malnutrition (HCC)   • Passive suicidal ideations   • Anxiety   • Coronary artery disease involving native coronary artery of native heart with angina pectoris [facility])      Past Medical and Surgical History:     Past Medical History:   Diagnosis Date   • Coronary artery disease    • COVID-19 01/28/2022   • Dyspnea on exertion    • History of echocardiogram 03/22/2018    Normal EF, normal LVSF. Thick and rebundent MV leaflets w/ mild posterior leaflet prolapse w/ trace regurg. • Hyperlipidemia    • Lyme disease    • Mitral valve prolapse    • Shingles      Past Surgical History:   Procedure Laterality Date   • APPENDECTOMY     • EYE SURGERY     • HERNIA REPAIR     • TONSILLECTOMY        Family History:     Family History   Problem Relation Age of Onset   • Heart disease Mother    • No Known Problems Father       Social History:     Social History     Socioeconomic History   • Marital status:       Spouse name: None   • Number of children: None   • Years of education: None   • Highest education level: None   Occupational History   • None   Tobacco Use   • Smoking status: Never   • Smokeless tobacco: Never   Vaping Use   • Vaping Use: Never used   Substance and Sexual Activity   • Alcohol use: Never     Alcohol/week: 0.0 standard drinks of alcohol   • Drug use: Never   • Sexual activity: Not Currently     Partners: Female   Other Topics Concern   • None   Social History Narrative   • None     Social Determinants of Health     Financial Resource Strain: Not on file   Food Insecurity: No Food Insecurity (5/18/2022)    Hunger Vital Sign    • Worried About [place] in the Last Year: Never true    • Ran Out of Food in the Last Year: Never true   Transportation Needs: No Transportation Needs (5/18/2022)    PRAPARE - Transportation    • Lack of Transportation (Medical): No    • Lack of Transportation (Non-Medical): No   Physical Activity: Not on file   Stress: Not on file   Social Connections: Not on file   Intimate Partner Violence: Not on file   Housing Stability: Low Risk  (5/18/2022)    Housing Stability Vital Sign    • Unable to Pay for Housing in the Last Year: No    • Number of Places Lived in the Last Year: 1    • Unstable Housing in the Last Year: No      Medications and Allergies:     Current Outpatient Medications   Medication Sig Dispense Refill   • mirtazapine (REMERON) 15 mg tablet Take 1 tablet (15 mg total) by mouth daily at bedtime 30 tablet 0   • simvastatin (ZOCOR) 5 MG tablet Take 1 tablet (5 mg total) by mouth daily at bedtime 90 tablet 3     No current facility-administered medications for this visit. No Known Allergies   Immunizations: There is no immunization history for the selected administration types on file for this patient. Health Maintenance: There are no preventive care reminders to display for this patient. Topic Date Due   • COVID-19 Vaccine (1) Never done   • Pneumococcal Vaccine: 65+ Years (1 - PCV) Never done   • Influenza Vaccine (1) 09/01/2023      Medicare Screening Tests and Risk Assessments:     Fernie Greene is here for his Subsequent Wellness visit. Health Risk Assessment:   Patient rates overall health as good. Patient feels that their physical health rating is same. Eyesight was rated as same. Hearing was rated as same. Patient feels that their emotional and mental health rating is same. Patients states they are never, rarely angry. Patient states they are never, rarely unusually tired/fatigued. Pain experienced in the last 7 days has been some. Patient's pain rating has been 2/10. Patient states that he has experienced no weight loss or gain in last 6 months. Fall Risk Screening: In the past year, patient has experienced: no history of falling in past year      Home Safety:  Patient does not have trouble with stairs inside or outside of their home. Patient has working smoke alarms and has no working carbon monoxide detector. Home safety hazards include: none. Nutrition:   Current diet is Regular and Limited junk food. Medications:   Patient is currently taking over-the-counter supplements. OTC medications include: see medication list. Patient is able to manage medications. Activities of Daily Living (ADLs)/Instrumental Activities of Daily Living (IADLs):   Walk and transfer into and out of bed and chair?: Yes  Dress and groom yourself?: Yes    Bathe or shower yourself?: Yes    Feed yourself? Yes  Do your laundry/housekeeping?: Yes  Manage your money, pay your bills and track your expenses?: Yes  Make your own meals?: Yes    Do your own shopping?: Yes    Previous Hospitalizations:   Any hospitalizations or ED visits within the last 12 months?: No      Advance Care Planning:   Living will: No    Five wishes given: Yes      PREVENTIVE SCREENINGS      Cardiovascular Screening:    General: Screening Current      Diabetes Screening:     General: Screening Current      Prostate Cancer Screening:    General: Screening Not Indicated      Lung Cancer Screening:     General: Screening Not Indicated    Screening, Brief Intervention, and Referral to Treatment (SBIRT)    Screening  Typical number of drinks in a day: 0  Typical number of drinks in a week: 0  Interpretation: Low risk drinking behavior.     Single Item Drug Screening:  How often have you used an illegal drug (including marijuana) or a prescription medication for non-medical reasons in the past year? never    Single Item Drug Screen Score: 0  Interpretation: Negative screen for possible drug use disorder    No results found.     Physical Exam:     /76 (BP Location: Left arm, Patient Position: Sitting, Cuff Size: Standard)   Pulse 70   Temp 97.7 °F (36.5 °C) (Temporal)   Ht 5' 8" (1.727 m)   Wt 56.7 kg (125 lb)   SpO2 99%   BMI 19.01 kg/m²     Physical Exam  Constitutional:       Appearance: He is well-developed. HENT:      Head: Normocephalic and atraumatic. Right Ear: External ear normal.      Left Ear: External ear normal.      Nose: Nose normal.   Eyes:      Conjunctiva/sclera: Conjunctivae normal.      Pupils: Pupils are equal, round, and reactive to light. Cardiovascular:      Rate and Rhythm: Normal rate and regular rhythm. Heart sounds: Normal heart sounds. No murmur heard. No friction rub. Pulmonary:      Effort: Pulmonary effort is normal. No respiratory distress. Breath sounds: Normal breath sounds. No wheezing or rales. Chest:      Chest wall: No tenderness. Abdominal:      General: Bowel sounds are normal.      Palpations: Abdomen is soft. Musculoskeletal:         General: Normal range of motion. Cervical back: Normal range of motion and neck supple. Skin:     General: Skin is warm and dry. Capillary Refill: Capillary refill takes 2 to 3 seconds. Neurological:      Mental Status: He is alert and oriented to person, place, and time. Psychiatric:         Behavior: Behavior normal.         Thought Content:  Thought content normal.         Judgment: Judgment normal.          Vandana Dawson DO

## 2023-10-11 DIAGNOSIS — F41.9 ANXIETY: ICD-10-CM

## 2023-10-11 DIAGNOSIS — F32.A DEPRESSION: ICD-10-CM

## 2023-10-11 RX ORDER — MIRTAZAPINE 7.5 MG/1
7.5 TABLET, FILM COATED ORAL
Qty: 30 TABLET | Refills: 0 | Status: SHIPPED | OUTPATIENT
Start: 2023-10-11 | End: 2023-11-10

## 2023-10-30 DIAGNOSIS — E78.5 DYSLIPIDEMIA: ICD-10-CM

## 2023-10-30 RX ORDER — SIMVASTATIN 5 MG
5 TABLET ORAL
Qty: 90 TABLET | Refills: 0 | Status: SHIPPED | OUTPATIENT
Start: 2023-10-30 | End: 2023-11-01 | Stop reason: SDUPTHER

## 2023-11-01 ENCOUNTER — OFFICE VISIT (OUTPATIENT)
Dept: FAMILY MEDICINE CLINIC | Facility: CLINIC | Age: 78
End: 2023-11-01
Payer: COMMERCIAL

## 2023-11-01 VITALS
HEIGHT: 68 IN | BODY MASS INDEX: 18.7 KG/M2 | HEART RATE: 70 BPM | OXYGEN SATURATION: 96 % | TEMPERATURE: 97.6 F | DIASTOLIC BLOOD PRESSURE: 84 MMHG | WEIGHT: 123.4 LBS | SYSTOLIC BLOOD PRESSURE: 148 MMHG

## 2023-11-01 DIAGNOSIS — B34.9 VIRAL INFECTION, UNSPECIFIED: ICD-10-CM

## 2023-11-01 DIAGNOSIS — F51.02 ADJUSTMENT INSOMNIA: ICD-10-CM

## 2023-11-01 DIAGNOSIS — R31.9 PAINLESS HEMATURIA: Primary | ICD-10-CM

## 2023-11-01 DIAGNOSIS — Z20.822 EXPOSURE TO COVID-19 VIRUS: ICD-10-CM

## 2023-11-01 DIAGNOSIS — J22 LOWER RESPIRATORY INFECTION: ICD-10-CM

## 2023-11-01 PROCEDURE — 87636 SARSCOV2 & INF A&B AMP PRB: CPT | Performed by: FAMILY MEDICINE

## 2023-11-01 PROCEDURE — 99214 OFFICE O/P EST MOD 30 MIN: CPT | Performed by: FAMILY MEDICINE

## 2023-11-01 RX ORDER — SIMVASTATIN 5 MG
5 TABLET ORAL
Qty: 90 TABLET | Refills: 3 | Status: SHIPPED | OUTPATIENT
Start: 2023-11-01

## 2023-11-01 RX ORDER — MIRTAZAPINE 15 MG/1
15 TABLET, FILM COATED ORAL
Qty: 30 TABLET | Refills: 5 | Status: SHIPPED | OUTPATIENT
Start: 2023-11-01

## 2023-11-01 NOTE — PROGRESS NOTES
Assessment/Plan:    Problem List Items Addressed This Visit    None  Visit Diagnoses       Painless hematuria    -  Primary    Relevant Orders    Urinalysis with microscopic    Urine culture    PSA Total (Reflex To Free)    CBC and differential    Comprehensive metabolic panel    CT abdomen pelvis w wo contrast    Adjustment insomnia        Relevant Medications    mirtazapine (REMERON) 15 mg tablet    Lower respiratory infection        Relevant Orders    XR chest pa & lateral    Exposure to COVID-19 virus        Relevant Orders    Covid/Flu- Office Collect    Viral infection, unspecified        Relevant Orders    Covid/Flu- Office Collect             Diagnoses and all orders for this visit:    Painless hematuria  -     Urinalysis with microscopic  -     Urine culture  -     PSA Total (Reflex To Free)  -     CBC and differential  -     Comprehensive metabolic panel  -     CT abdomen pelvis w wo contrast; Future    Adjustment insomnia  -     mirtazapine (REMERON) 15 mg tablet; Take 1 tablet (15 mg total) by mouth daily at bedtime    Lower respiratory infection  -     XR chest pa & lateral; Future    Exposure to COVID-19 virus  -     Covid/Flu- Office Collect    Viral infection, unspecified  -     Covid/Flu- Office Collect        No problem-specific Assessment & Plan notes found for this encounter. Subjective:      Patient ID: Torito Lentz is a 78 y.o. male.     Mr. Lewis Mark he is here with upper respiratory and lower respiratory symptoms he got sick got better and then relapsed with cold symptoms had cold symptoms and cough and congestion also he got up this morning and urinated gross blood the next 2 times he urinated it was normal so it was an intermittent gross hematuria no flank tenderness no fever or chills        The following portions of the patient's history were reviewed and updated as appropriate:   He has a past medical history of Coronary artery disease, COVID-19 (01/28/2022), Dyspnea on exertion, History of echocardiogram (03/22/2018), Hyperlipidemia, Lyme disease, Mitral valve prolapse, and Shingles. ,  does not have any pertinent problems on file. ,   has a past surgical history that includes Appendectomy; Tonsillectomy; Hernia repair; and Eye surgery. ,  family history includes Heart disease in his mother; No Known Problems in his father. ,   reports that he has never smoked. He has never used smokeless tobacco. He reports that he does not drink alcohol and does not use drugs. ,  has No Known Allergies. .  Current Outpatient Medications   Medication Sig Dispense Refill    mirtazapine (REMERON) 15 mg tablet Take 1 tablet (15 mg total) by mouth daily at bedtime 30 tablet 5    simvastatin (ZOCOR) 5 MG tablet Take 1 tablet (5 mg total) by mouth daily at bedtime 90 tablet 3     No current facility-administered medications for this visit. Review of Systems   Constitutional:  Negative for activity change, appetite change, diaphoresis, fatigue and fever. HENT: Negative. Eyes: Negative. Respiratory:  Positive for cough. Negative for apnea, chest tightness, shortness of breath and wheezing. Cardiovascular:  Negative for chest pain, palpitations and leg swelling. Gastrointestinal:  Negative for abdominal distention, abdominal pain, anal bleeding, constipation, diarrhea, nausea and vomiting. Endocrine: Negative for cold intolerance, heat intolerance, polydipsia, polyphagia and polyuria. Genitourinary:  Positive for hematuria. Negative for difficulty urinating, dysuria, flank pain and urgency. Musculoskeletal:  Negative for arthralgias, back pain, gait problem, joint swelling and myalgias. Skin:  Negative for color change, rash and wound. Allergic/Immunologic: Negative for environmental allergies, food allergies and immunocompromised state. Neurological:  Negative for dizziness, seizures, syncope, speech difficulty, numbness and headaches. Hematological:  Negative for adenopathy. Does not bruise/bleed easily. Psychiatric/Behavioral:  Negative for agitation, behavioral problems, hallucinations, sleep disturbance and suicidal ideas. Objective:  Vitals:    11/01/23 1559   BP: 148/84   BP Location: Left arm   Patient Position: Sitting   Cuff Size: Standard   Pulse: 70   Temp: 97.6 °F (36.4 °C)   TempSrc: Temporal   SpO2: 96%   Weight: 56 kg (123 lb 6.4 oz)   Height: 5' 8" (1.727 m)     Body mass index is 18.76 kg/m². Physical Exam  Constitutional:       General: He is not in acute distress. Appearance: He is well-developed. He is not diaphoretic. HENT:      Head: Normocephalic. Right Ear: External ear normal.      Left Ear: External ear normal.      Nose: Nose normal.   Eyes:      General: No scleral icterus. Right eye: No discharge. Left eye: No discharge. Conjunctiva/sclera: Conjunctivae normal.      Pupils: Pupils are equal, round, and reactive to light. Neck:      Thyroid: No thyromegaly. Trachea: No tracheal deviation. Cardiovascular:      Rate and Rhythm: Normal rate and regular rhythm. Heart sounds: Normal heart sounds. No murmur heard. No friction rub. No gallop. Pulmonary:      Effort: Pulmonary effort is normal. No respiratory distress. Breath sounds: Normal breath sounds. No wheezing. Abdominal:      General: Bowel sounds are normal.      Palpations: Abdomen is soft. There is no mass. Tenderness: There is no abdominal tenderness. There is no guarding. Musculoskeletal:         General: No deformity. Cervical back: Normal range of motion. Lymphadenopathy:      Cervical: No cervical adenopathy. Skin:     General: Skin is warm and dry. Findings: No erythema or rash. Neurological:      Mental Status: He is alert and oriented to person, place, and time. Cranial Nerves: No cranial nerve deficit. Psychiatric:         Thought Content:  Thought content normal.

## 2023-11-02 ENCOUNTER — HOSPITAL ENCOUNTER (OUTPATIENT)
Dept: RADIOLOGY | Facility: HOSPITAL | Age: 78
Discharge: HOME | End: 2023-11-02
Payer: COMMERCIAL

## 2023-11-02 ENCOUNTER — HOSPITAL ENCOUNTER (OUTPATIENT)
Dept: CT IMAGING | Facility: HOSPITAL | Age: 78
Discharge: HOME | End: 2023-11-02
Attending: FAMILY MEDICINE
Payer: COMMERCIAL

## 2023-11-02 ENCOUNTER — LAB (OUTPATIENT)
Dept: LAB | Facility: HOSPITAL | Age: 78
End: 2023-11-02
Payer: COMMERCIAL

## 2023-11-02 DIAGNOSIS — R31.9 PAINLESS HEMATURIA: ICD-10-CM

## 2023-11-02 DIAGNOSIS — R31.9 PAINLESS HEMATURIA: Primary | ICD-10-CM

## 2023-11-02 DIAGNOSIS — E78.5 DYSLIPIDEMIA: Primary | ICD-10-CM

## 2023-11-02 DIAGNOSIS — J22 LOWER RESPIRATORY INFECTION: ICD-10-CM

## 2023-11-02 LAB
ALBUMIN SERPL BCP-MCNC: 4.4 G/DL (ref 3.5–5)
ALP SERPL-CCNC: 57 U/L (ref 34–104)
ALT SERPL W P-5'-P-CCNC: 16 U/L (ref 7–52)
AMORPH PHOS CRY URNS QL MICRO: ABNORMAL /HPF
ANION GAP SERPL CALCULATED.3IONS-SCNC: 7 MMOL/L
AST SERPL W P-5'-P-CCNC: 23 U/L (ref 13–39)
BACTERIA UR QL AUTO: ABNORMAL /HPF
BASOPHILS # BLD AUTO: 0.08 THOUSANDS/ÂΜL (ref 0–0.1)
BASOPHILS NFR BLD AUTO: 1 % (ref 0–1)
BILIRUB SERPL-MCNC: 0.89 MG/DL (ref 0.2–1)
BILIRUB UR QL STRIP: NEGATIVE
BUN SERPL-MCNC: 17 MG/DL (ref 5–25)
CALCIUM SERPL-MCNC: 9.7 MG/DL (ref 8.4–10.2)
CHLORIDE SERPL-SCNC: 99 MMOL/L (ref 96–108)
CLARITY UR: ABNORMAL
CO2 SERPL-SCNC: 34 MMOL/L (ref 21–32)
COLOR UR: YELLOW
CREAT SERPL-MCNC: 0.99 MG/DL (ref 0.6–1.3)
EOSINOPHIL # BLD AUTO: 0.5 THOUSAND/ÂΜL (ref 0–0.61)
EOSINOPHIL NFR BLD AUTO: 7 % (ref 0–6)
ERYTHROCYTE [DISTWIDTH] IN BLOOD BY AUTOMATED COUNT: 12.6 % (ref 11.6–15.1)
FLUAV RNA RESP QL NAA+PROBE: NEGATIVE
FLUBV RNA RESP QL NAA+PROBE: NEGATIVE
GFR SERPL CREATININE-BSD FRML MDRD: 71 ML/MIN/1.73SQ M
GLUCOSE P FAST SERPL-MCNC: 111 MG/DL (ref 65–99)
GLUCOSE UR STRIP-MCNC: NEGATIVE MG/DL
HCT VFR BLD AUTO: 45.3 % (ref 36.5–49.3)
HGB BLD-MCNC: 14.6 G/DL (ref 12–17)
HGB UR QL STRIP.AUTO: ABNORMAL
IMM GRANULOCYTES # BLD AUTO: 0.01 THOUSAND/UL (ref 0–0.2)
IMM GRANULOCYTES NFR BLD AUTO: 0 % (ref 0–2)
KETONES UR STRIP-MCNC: NEGATIVE MG/DL
LEUKOCYTE ESTERASE UR QL STRIP: NEGATIVE
LYMPHOCYTES # BLD AUTO: 1.96 THOUSANDS/ÂΜL (ref 0.6–4.47)
LYMPHOCYTES NFR BLD AUTO: 26 % (ref 14–44)
MCH RBC QN AUTO: 29.6 PG (ref 26.8–34.3)
MCHC RBC AUTO-ENTMCNC: 32.2 G/DL (ref 31.4–37.4)
MCV RBC AUTO: 92 FL (ref 82–98)
MONOCYTES # BLD AUTO: 0.62 THOUSAND/ÂΜL (ref 0.17–1.22)
MONOCYTES NFR BLD AUTO: 8 % (ref 4–12)
NEUTROPHILS # BLD AUTO: 4.42 THOUSANDS/ÂΜL (ref 1.85–7.62)
NEUTS SEG NFR BLD AUTO: 58 % (ref 43–75)
NITRITE UR QL STRIP: NEGATIVE
NON-SQ EPI CELLS URNS QL MICRO: ABNORMAL /HPF
NRBC BLD AUTO-RTO: 0 /100 WBCS
PH UR STRIP.AUTO: 7 [PH]
PLATELET # BLD AUTO: 231 THOUSANDS/UL (ref 149–390)
PMV BLD AUTO: 10.5 FL (ref 8.9–12.7)
POTASSIUM SERPL-SCNC: 3.6 MMOL/L (ref 3.5–5.3)
PROT SERPL-MCNC: 6.8 G/DL (ref 6.4–8.4)
PROT UR STRIP-MCNC: NEGATIVE MG/DL
RBC # BLD AUTO: 4.93 MILLION/UL (ref 3.88–5.62)
RBC #/AREA URNS AUTO: ABNORMAL /HPF
SARS-COV-2 RNA RESP QL NAA+PROBE: NEGATIVE
SODIUM SERPL-SCNC: 140 MMOL/L (ref 135–147)
SP GR UR STRIP.AUTO: 1.01 (ref 1–1.03)
UROBILINOGEN UR QL STRIP.AUTO: 0.2 E.U./DL
WBC # BLD AUTO: 7.59 THOUSAND/UL (ref 4.31–10.16)
WBC #/AREA URNS AUTO: ABNORMAL /HPF

## 2023-11-02 PROCEDURE — 71046 X-RAY EXAM CHEST 2 VIEWS: CPT

## 2023-11-02 PROCEDURE — 36415 COLL VENOUS BLD VENIPUNCTURE: CPT

## 2023-11-02 PROCEDURE — 74178 CT ABD&PLV WO CNTR FLWD CNTR: CPT

## 2023-11-02 PROCEDURE — 81001 URINALYSIS AUTO W/SCOPE: CPT | Performed by: FAMILY MEDICINE

## 2023-11-02 PROCEDURE — G1004 CDSM NDSC: HCPCS

## 2023-11-02 PROCEDURE — 84153 ASSAY OF PSA TOTAL: CPT

## 2023-11-02 PROCEDURE — 87086 URINE CULTURE/COLONY COUNT: CPT | Performed by: FAMILY MEDICINE

## 2023-11-02 PROCEDURE — 80053 COMPREHEN METABOLIC PANEL: CPT | Performed by: FAMILY MEDICINE

## 2023-11-02 PROCEDURE — 84154 ASSAY OF PSA FREE: CPT

## 2023-11-02 PROCEDURE — 85025 COMPLETE CBC W/AUTO DIFF WBC: CPT | Performed by: FAMILY MEDICINE

## 2023-11-02 RX ADMIN — IOHEXOL 100 ML: 350 INJECTION, SOLUTION INTRAVENOUS at 15:26

## 2023-11-02 NOTE — RESULT ENCOUNTER NOTE
Please call the patient regarding his abnormal result.   Urinalysis does not show any infection but it does show a lot of red blood cells so we will need to set him up with the urologist so that they can take a look at his red blood cells in his urine

## 2023-11-02 NOTE — RESULT ENCOUNTER NOTE
Please call the patient regarding his abnormal result.   No obvious renal tumors patient has have a very large prostate gland and that is possibly where the bleeding is coming from I have made an appointment for him to see the urology group they will contact him probably tomorrow and give him a time to be seen there is no infection in his urine it was strictly blood which he is still passing but he is not its not visible no urology wishes to do he also has a cyst in his pancreas its been there before we will deal with that issue after his bloody urine issue has been fixed

## 2023-11-02 NOTE — LETTER
[address] [place] [address]  [facility] [id number]      November 6, 2023    MRN: [id number]     Phone: [phone]     Dear Mr. Charmayne Ghent recently had a(n) Cat Scan performed on 11/2/2023 at  [address] [place] [address] that was requested by Yumiko Whitney DO. The study was reviewed by a radiologist, which is a physician who specializes in medical imaging. The radiologist issued a report describing his or her findings. In that report there was a finding that the radiologist felt warranted further discussion with your health care provider and that discussion would be beneficial to you. The results were sent to Yumiko Whitney DO on 11/02/2023  3:42 PM. We recommend that you contact Yumiko Whitney DO at [phone] or set up an appointment to discuss the results of the imaging test. If you have already heard from Yumiko Whitney DO regarding the results of your study, you can disregard this letter. This letter is not meant to alarm you, but intended to encourage you to follow-up on your results with the provider that sent you for the imaging study. In addition, we have enclosed answers to frequently asked questions by other patients who have also received a letter to review results with their health care provider (see page two). Thank you for choosing [address] [place] [address] for your medical imaging needs. FREQUENTLY ASKED QUESTIONS    Why am I receiving this letter? [facility] [place] requires us to notify patients who have findings on imaging exams that may require more testing or follow-up with a health professional within the next 3 months. How serious is the finding on the imaging test?  This letter is sent to all patients who may need follow-up or more testing within the next 3 months. Receiving this letter does not necessarily mean you have a life-threatening imaging finding or disease. Recommendations in the radiologist’s imaging report are general in nature and it is up to your healthcare provider to say whether those recommendations make sense for your situation. You are strongly encouraged to talk to your health care provider about the results and ask whether additional steps need to be taken. Where can I get a copy of the final report for my recent radiology exam?  To get a full copy of the report you can access your records online at [email or web address] or please contact [facility] Medical Records Department at [phone] Monday through Friday between 8 am and 6 pm.         What do I need to do now? Please contact your health care provider who requested the imaging study to discuss what further actions (if any) are needed. You may have already heard from (your ordering provider) in regard to this test in which case you can disregard this letter. NOTICE IN ACCORDANCE WITH THE [place] “PATIENT TEST RESULT INFORMATION ACT OF 2018”    You are receiving this notice as a result of a determination by your diagnostic imaging service that further discussions of your test results are warranted and would be beneficial to you. The complete results of your test or tests have been or will be sent to the health care practitioner that ordered the test or tests. It is recommended that you contact your health care practitioner to discuss your results as soon as possible.

## 2023-11-03 ENCOUNTER — TELEPHONE (OUTPATIENT)
Dept: UROLOGY | Facility: CLINIC | Age: 78
End: 2023-11-03

## 2023-11-03 LAB
BACTERIA UR CULT: NORMAL
PSA FREE MFR SERPL: 15.6 %
PSA FREE SERPL-MCNC: 1.42 NG/ML
PSA SERPL-MCNC: 9.1 NG/ML (ref 0–4)

## 2023-11-03 NOTE — TELEPHONE ENCOUNTER
Received referral from Dr. Tamera Knox for patient regarding hematuria. Patient had CT completed yesterday which was negative  for any suspicious renal mass, bladder mass, or urothelial mass. Marked prostatomegaly noted. Patient scheduled for next available appointment with Horacio Posey in [facility]. Date, time, and location confirmed.

## 2023-11-03 NOTE — RESULT ENCOUNTER NOTE
Call patient to notify normal results urinalysis shows no sign of any infection please make sure that he was contacted by urology for a visit

## 2023-12-07 ENCOUNTER — OFFICE VISIT (OUTPATIENT)
Dept: UROLOGY | Facility: CLINIC | Age: 78
End: 2023-12-07
Payer: COMMERCIAL

## 2023-12-07 VITALS
OXYGEN SATURATION: 100 % | HEIGHT: 68 IN | WEIGHT: 124.2 LBS | HEART RATE: 74 BPM | SYSTOLIC BLOOD PRESSURE: 124 MMHG | DIASTOLIC BLOOD PRESSURE: 82 MMHG | BODY MASS INDEX: 18.82 KG/M2

## 2023-12-07 DIAGNOSIS — R39.11 BENIGN PROSTATIC HYPERPLASIA WITH URINARY HESITANCY: ICD-10-CM

## 2023-12-07 DIAGNOSIS — R31.0 GROSS HEMATURIA: Primary | ICD-10-CM

## 2023-12-07 DIAGNOSIS — N40.1 BENIGN PROSTATIC HYPERPLASIA WITH URINARY HESITANCY: ICD-10-CM

## 2023-12-07 DIAGNOSIS — R97.20 ELEVATED PSA: ICD-10-CM

## 2023-12-07 LAB
POST-VOID RESIDUAL VOLUME, ML POC: 27 ML
SL AMB  POCT GLUCOSE, UA: NEGATIVE
SL AMB LEUKOCYTE ESTERASE,UA: NEGATIVE
SL AMB POCT BILIRUBIN,UA: NEGATIVE
SL AMB POCT BLOOD,UA: NEGATIVE
SL AMB POCT CLARITY,UA: CLEAR
SL AMB POCT COLOR,UA: YELLOW
SL AMB POCT KETONES,UA: NEGATIVE
SL AMB POCT NITRITE,UA: NEGATIVE
SL AMB POCT PH,UA: 6
SL AMB POCT SPECIFIC GRAVITY,UA: 1.03
SL AMB POCT URINE PROTEIN: NORMAL
SL AMB POCT UROBILINOGEN: 0.2

## 2023-12-07 PROCEDURE — 51798 US URINE CAPACITY MEASURE: CPT

## 2023-12-07 PROCEDURE — 99203 OFFICE O/P NEW LOW 30 MIN: CPT

## 2023-12-07 PROCEDURE — 81002 URINALYSIS NONAUTO W/O SCOPE: CPT

## 2023-12-07 RX ORDER — TAMSULOSIN HYDROCHLORIDE 0.4 MG/1
0.4 CAPSULE ORAL
Qty: 30 CAPSULE | Refills: 6 | Status: SHIPPED | OUTPATIENT
Start: 2023-12-07

## 2023-12-07 NOTE — PROGRESS NOTES
12/7/2023    Chief Complaint   Patient presents with    [facility] to be established for hematuria some difficulty at times starting stream in the AM. CT Scan completed. Enlarged prostate. Urine dip and PVR done today. Assessment and Plan    78 y.o. male new patient to office    1. Gross hematuria  1 single occurrence of gross hematuria in early November. Urine testing was negative for infection and CT imaging without contrast showed no evidence of urinary tract malignancy. This was without IV contrast.  CT imaging did show marked prostatomegaly with intravesical protrusion. Patient's PCP also checked a PSA which is elevated at 9.1 as of 11/2/2023. Patient does complain of mild baseline lower urinary tract symptoms of urinary hesitancy mostly in the morning. Denies significant bothersome symptoms but is interested in trying Flomax. Urine dip today is negative for blood or infection. PVR was within normal limits. My suspicion is that his gross hematuria may be from his prostate hypertrophy however cannot not exclude prostate or bladder malignancy as a possible cause. I am recommending cystoscopy evaluation     2. Elevated PSA  PSA 9.1 (11/2/2023)  REBEKA does reveal slight asymmetric prostate with the right gland slightly larger than the left. Otherwise prostate is smooth without nodularity. I had a lengthy discussion with the patient today regarding his elevated PSA and the potential for prostate cancer. We briefly discussed further evaluation which typically includes a prostate biopsy. I also briefly discussed typical treatment options which include surgical removal of the prostate versus radiation versus androgen deprivation therapy. Due to his age he is not a surgical candidate. It is possible he may be a radiation candidate as he is in overall good health for his age.   After discussion the potential risk and benefits of radiation therapy patient is uncertain at this time whether he wishes to pursue further evaluation of his elevated PSA. We will recheck a PSA and will revisit discussion regarding further evaluation/treatment at the time of his cystoscopy. 3. BPH  Very mild symptoms of urinary hesitancy. He is emptying well with PVR 27 mL. We discussed continued surveillance versus a trial of Flomax 0.4 mg daily. Patient wishes to trial Flomax. History of Present Illness  Heena Rosenthal is a 78 y.o. male new patient to office with PMHx significant for CAD and dyslipidemia. Here for evaluation of gross hematuria. Patient was referred by his PCP Dr. Shannon Hobson for evaluation of painless gross hematuria. Patient was seen by his PCP in early November for evaluation of upper respiratory and lower respiratory symptoms. He also reported an episode of painless gross hematuria which occurred with his initial AM void. Hematuria resolved immediately and he has had no subsequent gross hematuria. His PCP ordered Urine testing on 11/02 showed 10-20 RBCs, no WBCs or bacteria and urine culture was negative. CT abdomen pelvis without IV contrast from 11/2 showed no suspicious renal masses, detectable urothelial mass, bladder mass, or renal/ureteral calculi or hydronephrosis. Patient did have marked prostatomegaly with intravesical protrusion. There are findings of a 5 mm pancreatic body cystic lesion stable since February 2022. Of note patient did have a PSA also completed which was elevated at 9.1. Prior to this his last PSA was 2.6 in 2015. Denies any known family history of prostate cancer. He does complain of baseline lower urinary tract symptoms of urinary hesitancy mostly in the morning. He reports that every couple of months he will have some difficulty urinating first thing in the morning, he will walk around a little and then will be able to void without much difficulty. This does not occur on a daily basis and occurs infrequently every couple of months or so.  He will also experience occasional nocturia once in a while but again not frequently. He feels at baseline he empties his bladder well. We discussed disadvantages of prostate cancer screening: including over diagnosis and over treatment, if insignificant cancer is found by screening he he may be subjected to unnecessary testing such as re-biopsy for surveillance, treatments, psychological stress, treatment related side effects, and increased medical cost. We also discussed the advantages of prostate cancer screening:  Reducing risk of dying from prostate cancer. We also discussed that 1 in 6 men are diagnosed with prostate cancer. We also discussed that prostate cancer is the 2nd leading cause of cancer death in men. However only 3% of men with prostate cancer die from the disease. We discussed that over 90% of prostate cancers detected by screening or organ confined. Even if prostate cancer is on treated most are clinically insignificant and would  not cause morbidity or mortality. We discussed that most patients are asymptomatic. And advanced prostate cancer can present with bone pain, voiding symptoms, hematuria, urinary retention or hydronephrosis. We reviewed risk factors for prostate cancer including a family history of prostate cancer and that the risk is higher when prostate cancer is present in a first-degree relative such as a brother or father.  descent and higher age are also risk factors. We discussed the continued workup of elevated PSA or abnormal digital rectal examination in the form of the performance of a prostate biopsy. The preparation for, and steps of, an office-based transrectal ultrasound of prostate biopsy were described to the patient or transperineal prostate biopsy. Benefits of obtaining tissue for pathologic analysis were discussed with him and the risks of prostate biopsy were also discussed at length.   These risks include but are not limited to infection, bleeding, pain, sepsis with need for admission to hospital, risk of change in sexual function, and risk of diagnosis with prostate cancer. Alternatives to prostate biopsy in the form of continued PSA and REBEKA surveillance were also offered to him. Review of Systems   Constitutional:  Negative for chills and fever. HENT:  Negative for congestion and sore throat. Respiratory:  Negative for cough and shortness of breath. Cardiovascular:  Negative for chest pain and leg swelling. Gastrointestinal:  Negative for abdominal pain, constipation and diarrhea. Genitourinary:  Negative for difficulty urinating, dysuria, frequency, hematuria and urgency. Urinary hesitancy   Musculoskeletal:  Negative for back pain and gait problem. Skin:  Negative for wound. Allergic/Immunologic: Negative for immunocompromised state. Hematological:  Does not bruise/bleed easily. Vitals  Vitals:    12/07/23 1336   BP: 124/82   Pulse: 74   SpO2: 100%   Weight: 56.3 kg (124 lb 3.2 oz)   Height: 5' 8" (1.727 m)       Physical Exam  Vitals reviewed. Constitutional:       General: He is not in acute distress. Appearance: Normal appearance. He is not ill-appearing or toxic-appearing. HENT:      Head: Normocephalic and atraumatic. Eyes:      General: No scleral icterus. Conjunctiva/sclera: Conjunctivae normal.   Cardiovascular:      Rate and Rhythm: Normal rate. Pulmonary:      Effort: Pulmonary effort is normal. No respiratory distress. Abdominal:      Tenderness: There is no right CVA tenderness or left CVA tenderness. Hernia: No hernia is present. Genitourinary:     Comments: Abnormal REBEKA with slightly asymmetric prostate gland, right gland is slightly larger than the left. Prostate is smooth without nodularity. Estimated gland size between 30 to 35 g. Musculoskeletal:      Cervical back: Normal range of motion. Right lower leg: No edema.       Left lower leg: No edema. Skin:     General: Skin is warm and dry. Coloration: Skin is not jaundiced or pale. Neurological:      General: No focal deficit present. Mental Status: He is alert and oriented to person, place, and time. Mental status is at baseline. Gait: Gait normal.   Psychiatric:         Mood and Affect: Mood normal.         Behavior: Behavior normal.         Thought Content: Thought content normal.         Judgment: Judgment normal.         Past History  Past Medical History:   Diagnosis Date    Coronary artery disease     COVID-19 01/28/2022    Dyspnea on exertion     History of echocardiogram 03/22/2018    Normal EF, normal LVSF. Thick and rebundent MV leaflets w/ mild posterior leaflet prolapse w/ trace regurg. Hyperlipidemia     Lyme disease     Mitral valve prolapse     Shingles      Social History     Socioeconomic History    Marital status:      Spouse name: None    Number of children: None    Years of education: None    Highest education level: None   Occupational History    None   Tobacco Use    Smoking status: Never    Smokeless tobacco: Never   Vaping Use    Vaping Use: Never used   Substance and Sexual Activity    Alcohol use: Never     Alcohol/week: 0.0 standard drinks of alcohol    Drug use: Never    Sexual activity: Not Currently     Partners: Female   Other Topics Concern    None   Social History Narrative    None     Social Determinants of Health     Financial Resource Strain: Low Risk  (9/5/2023)    Overall Financial Resource Strain (CARDIA)     Difficulty of Paying Living Expenses: Not hard at all   Food Insecurity: No Food Insecurity (5/18/2022)    Hunger Vital Sign     Worried About Running Out of Food in the Last Year: Never true     [address] in the Last Year: Never true   Transportation Needs: No Transportation Needs (9/5/2023)    PRAPARE - Transportation     Lack of Transportation (Medical): No     Lack of Transportation (Non-Medical):  No   Physical Activity: Not on file   Stress: Not on file   Social Connections: Not on file   Intimate Partner Violence: Not on file   Housing Stability: Low Risk  (2022)    Housing Stability Vital Sign     Unable to Pay for Housing in the Last Year: No     Number of Places Lived in the Last Year: 1     Unstable Housing in the Last Year: No     Social History     Tobacco Use   Smoking Status Never   Smokeless Tobacco Never     Family History   Problem Relation Age of Onset    Heart disease Mother     No Known Problems Father        The following portions of the patient's history were reviewed and updated as appropriate allergies, current medications, past medical history, past social history, past surgical history and problem list    [email or web address]  CT ABDOMEN AND PELVIS WITH AND WITHOUT IV CONTRAST     INDICATION:   R31.9: Hematuria, unspecified. Painless gross hematuria. COMPARISON: CT chest, abdomen, pelvis 2022. TECHNIQUE: Initial CT of the abdomen and pelvis was performed without intravenous contrast.  Subsequent dynamic CT evaluation of the abdomen and pelvis was performed after the administration of intravenous contrast in both nephrographic and delayed   phases. Multiplanar 2D reformatted images were created from the source data. This examination, like all CT scans performed in the [facility], was performed utilizing techniques to minimize radiation dose exposure, including the use of iterative reconstruction and automated exposure control. Radiation dose length   product (DLP) for this visit:  1064 mGy-cm     IV Contrast:  100 mL of iohexol (OMNIPAQUE)  Enteric Contrast:  Enteric contrast was not administered. FINDINGS:     ABDOMEN     RIGHT KIDNEY AND URETER:  No solid renal mass. No detectable urothelial mass. No hydronephrosis or hydroureter. No urinary tract calculi. No perinephric collection. LEFT KIDNEY AND URETER:  No solid renal mass.   No detectable urothelial mass. Subcentimeter hypodense lesion which is too small to characterize. No hydronephrosis or hydroureter. No urinary tract calculi. No perinephric collection. URINARY BLADDER:  No bladder wall mass. No calculi. LOWER CHEST:  No clinically significant abnormality identified in the visualized lower chest.     LIVER/BILIARY TREE:  Unremarkable. GALLBLADDER:  No calcified gallstones. No pericholecystic inflammatory change. SPLEEN:  Unremarkable. PANCREAS: A 5 mm pancreatic body cystic lesion (3/34), stable since 2/4/2022. No main pancreatic duct dilatation. ADRENAL GLANDS:  Unremarkable. STOMACH AND BOWEL:  Unremarkable. APPENDIX:  No findings to suggest appendicitis. ABDOMINOPELVIC CAVITY:  No ascites. No free intraperitoneal air. No lymphadenopathy. VESSELS: Moderate atherosclerotic calcifications of the abdominal aorta. No abdominal aortic aneurysm. PELVIS     REPRODUCTIVE ORGANS: Marked prostatomegaly with intravesical protrusion. ABDOMINAL WALL/INGUINAL REGIONS:  Unremarkable. OSSEOUS STRUCTURES:  No acute fracture or destructive osseous lesion. Degenerative changes of the visualized spine. Synovial herniation pit in the right femoral neck. IMPRESSION:     1. No suspicious renal mass, detectable urothelial mass, bladder mass, renal/ureteral calculi, or hydronephrosis. 2. Marked prostatomegaly with intravesical protrusion. 3. A 5 mm pancreatic body cystic lesion, stable since 2/4/2022. For simple cyst(s) less than 1.5 cm, recommend followup every 2 year for 2 times. After 4 years, no more followups. Recommend next followup in 2 years.  Preferred imaging modality: abdomen   MRI and MRCP with and without IV contrast, or triple phase abdomen CT with IV contrast, or abdomen MRI and MRCP without IV contrast.     The recommendations regarding pancreatic findings assumes that patient does not have family history of pancreatic cancer nor have any symptoms potentially attributable to pancreatic cystic lesions (hyperamylasemia, recent-onset diabetes, severe   epigastric pain, weight loss, steatorrhea, or jaundice.) If these conditions are not true, then management should be deferred to judgement of specialists such as gastroenterologists or oncologic surgeons. Results  Recent Results (from the past 1 hour(s))   POCT urine dip    Collection Time: 12/07/23  1:45 PM   Result Value Ref Range    LEUKOCYTE ESTERASE,UA negative     NITRITE,UA negative     SL AMB POCT UROBILINOGEN 0.2     POCT URINE PROTEIN +/15      PH,UA 6.0     BLOOD,UA negative     SPECIFIC GRAVITY,UA 1.030     KETONES,UA negative     BILIRUBIN,UA negative     GLUCOSE, UA negative      COLOR,UA yellow     CLARITY,UA clear    POCT Measure PVR    Collection Time: 12/07/23  1:45 PM   Result Value Ref Range    POST-VOID RESIDUAL VOLUME, ML POC 27 mL   ]  Lab Results   Component Value Date    PSA 9.1 (H) 11/02/2023    PSA 2.6 08/18/2015    PSA 2.0 05/06/2015     Lab Results   Component Value Date    GLUCOSE 99 08/18/2015    CALCIUM 9.7 11/02/2023     08/18/2015    K 3.6 11/02/2023    CO2 34 (H) 11/02/2023    CL 99 11/02/2023    BUN 17 11/02/2023    CREATININE 0.99 11/02/2023     Lab Results   Component Value Date    WBC 7.59 11/02/2023    HGB 14.6 11/02/2023    HCT 45.3 11/02/2023    MCV 92 11/02/2023     11/02/2023       Please Note:  Voice dictation software has been used to create this document. There may be inadvertent transcriptions errors.      LOVE Du 12/07/23

## 2024-01-09 ENCOUNTER — OFFICE VISIT (OUTPATIENT)
Dept: FAMILY MEDICINE CLINIC | Facility: CLINIC | Age: 79
End: 2024-01-09
Payer: COMMERCIAL

## 2024-01-09 VITALS
BODY MASS INDEX: 18.94 KG/M2 | HEIGHT: 68 IN | WEIGHT: 125 LBS | DIASTOLIC BLOOD PRESSURE: 76 MMHG | SYSTOLIC BLOOD PRESSURE: 116 MMHG | TEMPERATURE: 97.1 F | OXYGEN SATURATION: 99 % | HEART RATE: 76 BPM

## 2024-01-09 DIAGNOSIS — E78.5 DYSLIPIDEMIA: Primary | ICD-10-CM

## 2024-01-09 DIAGNOSIS — I25.119 CORONARY ARTERY DISEASE INVOLVING NATIVE CORONARY ARTERY OF NATIVE HEART WITH ANGINA PECTORIS (HCC): ICD-10-CM

## 2024-01-09 DIAGNOSIS — F33.9 DEPRESSION, RECURRENT (HCC): ICD-10-CM

## 2024-01-09 PROCEDURE — 99214 OFFICE O/P EST MOD 30 MIN: CPT | Performed by: FAMILY MEDICINE

## 2024-01-09 NOTE — PROGRESS NOTES
Assessment/Plan:    Problem List Items Addressed This Visit    None       There are no diagnoses linked to this encounter.    No problem-specific Assessment & Plan notes found for this encounter.        Subjective:      Patient ID: Vincent Chandler is a 78 y.o. male.    Mr. Pineda here for a follow-up visit seems to be doing pretty well he is following with urology and is urinating well and he has a follow-up visit scheduled with them in a few months still working at the [employer] his weight never varies more than a pound his blood pressure is well-controlled and sleeping well at night        The following portions of the patient's history were reviewed and updated as appropriate:   He has a past medical history of Coronary artery disease, COVID-19 (01/28/2022), Dyspnea on exertion, History of echocardiogram (03/22/2018), Hyperlipidemia, Lyme disease, Mitral valve prolapse, and Shingles.,  does not have any pertinent problems on file.,   has a past surgical history that includes Appendectomy; Tonsillectomy; Hernia repair; and Eye surgery.,  family history includes Heart disease in his mother; No Known Problems in his father.,   reports that he has never smoked. He has never used smokeless tobacco. He reports that he does not drink alcohol and does not use drugs.,  has No Known Allergies..  Current Outpatient Medications   Medication Sig Dispense Refill    mirtazapine (REMERON) 15 mg tablet Take 1 tablet (15 mg total) by mouth daily at bedtime 30 tablet 5    simvastatin (ZOCOR) 5 MG tablet Take 1 tablet (5 mg total) by mouth daily at bedtime 90 tablet 3    tamsulosin (FLOMAX) 0.4 mg Take 1 capsule (0.4 mg total) by mouth daily with dinner 30 capsule 6     No current facility-administered medications for this visit.       Review of Systems   Constitutional:  Negative for activity change, appetite change, diaphoresis, fatigue and fever.   HENT: Negative.  Negative for dental problem and hearing loss.    Eyes:  Positive  "for visual disturbance.   Respiratory:  Negative for apnea, cough, chest tightness, shortness of breath and wheezing.    Cardiovascular:  Negative for chest pain, palpitations and leg swelling.   Gastrointestinal:  Negative for abdominal distention, abdominal pain, anal bleeding, constipation, diarrhea, nausea and vomiting.   Endocrine: Negative for cold intolerance, heat intolerance, polydipsia, polyphagia and polyuria.   Genitourinary:  Negative for difficulty urinating, dysuria, flank pain, hematuria and urgency.        Follows with urology   Musculoskeletal:  Negative for arthralgias, back pain, gait problem, joint swelling and myalgias.   Skin:  Negative for color change, rash and wound.   Allergic/Immunologic: Negative for environmental allergies, food allergies and immunocompromised state.   Neurological:  Negative for dizziness, seizures, syncope, speech difficulty, numbness and headaches.   Hematological:  Negative for adenopathy. Does not bruise/bleed easily.   Psychiatric/Behavioral:  Negative for agitation, behavioral problems, hallucinations, sleep disturbance and suicidal ideas.          Objective:  Vitals:    01/09/24 0752   BP: 116/76   BP Location: Left arm   Patient Position: Sitting   Cuff Size: Standard   Pulse: 76   Temp: (!) 97.1 °F (36.2 °C)   TempSrc: Temporal   SpO2: 99%   Weight: 56.7 kg (125 lb)   Height: 5' 8\" (1.727 m)     Body mass index is 19.01 kg/m².     Physical Exam  Constitutional:       General: He is not in acute distress.     Appearance: He is well-developed. He is not diaphoretic.   HENT:      Head: Normocephalic.      Right Ear: External ear normal.      Left Ear: External ear normal.      Nose: Nose normal.   Eyes:      General: No scleral icterus.        Right eye: No discharge.         Left eye: No discharge.      Conjunctiva/sclera: Conjunctivae normal.      Pupils: Pupils are equal, round, and reactive to light.   Neck:      Thyroid: No thyromegaly.      Trachea: No " tracheal deviation.   Cardiovascular:      Rate and Rhythm: Normal rate and regular rhythm.      Heart sounds: Normal heart sounds. No murmur heard.     No friction rub. No gallop.   Pulmonary:      Effort: Pulmonary effort is normal. No respiratory distress.      Breath sounds: Normal breath sounds. No wheezing.   Abdominal:      General: Bowel sounds are normal.      Palpations: Abdomen is soft. There is no mass.      Tenderness: There is no abdominal tenderness. There is no guarding.   Musculoskeletal:         General: No deformity.      Cervical back: Normal range of motion.   Lymphadenopathy:      Cervical: No cervical adenopathy.   Skin:     General: Skin is warm and dry.      Findings: No erythema or rash.   Neurological:      Mental Status: He is alert and oriented to person, place, and time.      Cranial Nerves: No cranial nerve deficit.   Psychiatric:         Thought Content: Thought content normal.

## 2024-03-06 ENCOUNTER — PROCEDURE VISIT (OUTPATIENT)
Dept: UROLOGY | Facility: CLINIC | Age: 79
End: 2024-03-06
Payer: COMMERCIAL

## 2024-03-06 VITALS
HEART RATE: 72 BPM | BODY MASS INDEX: 18.79 KG/M2 | WEIGHT: 124 LBS | SYSTOLIC BLOOD PRESSURE: 120 MMHG | DIASTOLIC BLOOD PRESSURE: 68 MMHG | HEIGHT: 68 IN

## 2024-03-06 DIAGNOSIS — R97.20 ELEVATED PSA: ICD-10-CM

## 2024-03-06 DIAGNOSIS — R39.11 BENIGN PROSTATIC HYPERPLASIA WITH URINARY HESITANCY: ICD-10-CM

## 2024-03-06 DIAGNOSIS — R31.0 GROSS HEMATURIA: Primary | ICD-10-CM

## 2024-03-06 DIAGNOSIS — N40.1 BENIGN PROSTATIC HYPERPLASIA WITH URINARY HESITANCY: ICD-10-CM

## 2024-03-06 LAB
SL AMB  POCT GLUCOSE, UA: NORMAL
SL AMB LEUKOCYTE ESTERASE,UA: NORMAL
SL AMB POCT BILIRUBIN,UA: NORMAL
SL AMB POCT BLOOD,UA: NORMAL
SL AMB POCT CLARITY,UA: CLEAR
SL AMB POCT COLOR,UA: YELLOW
SL AMB POCT KETONES,UA: NORMAL
SL AMB POCT NITRITE,UA: NORMAL
SL AMB POCT PH,UA: 6
SL AMB POCT SPECIFIC GRAVITY,UA: 1.02
SL AMB POCT URINE PROTEIN: NORMAL
SL AMB POCT UROBILINOGEN: 0.2

## 2024-03-06 PROCEDURE — 99213 OFFICE O/P EST LOW 20 MIN: CPT | Performed by: UROLOGY

## 2024-03-06 PROCEDURE — 81002 URINALYSIS NONAUTO W/O SCOPE: CPT | Performed by: UROLOGY

## 2024-03-06 PROCEDURE — 52000 CYSTOURETHROSCOPY: CPT | Performed by: UROLOGY

## 2024-03-06 RX ORDER — FINASTERIDE 5 MG/1
5 TABLET, FILM COATED ORAL DAILY
Qty: 90 TABLET | Refills: 3 | Status: SHIPPED | OUTPATIENT
Start: 2024-03-06

## 2024-03-06 RX ORDER — CEPHALEXIN 500 MG/1
500 CAPSULE ORAL ONCE
Qty: 1 CAPSULE | Refills: 0 | Status: SHIPPED | OUTPATIENT
Start: 2024-03-06 | End: 2024-03-06

## 2024-03-06 NOTE — LETTER
2024     Girish Hurley DO  [address] [facility] [address]    Patient: Vincent Chandler   YOB: 1945   Date of Visit: 3/6/2024       Dear Dr. Hurley:    Thank you for referring Vincent Chandler to me for evaluation. Below are my notes for this consultation.    If you have questions, please do not hesitate to call me. I look forward to following your patient along with you.         Sincerely,        Vincent Walsh MD        CC: No Recipients    Vincent Walsh MD  3/6/2024  2:14 PM  Sign when Signing Visit  UROLOGY PROGRESS NOTE   [facility]  [address] [place] [address] [place]  [address]  [place] [address]  [phone]  Fax:[phone]  [email or web address]      NAME: Vincent Chandler  AGE: 78 y.o. SEX: male  : 1945   MRN: [id number]    DATE: 3/6/2024  TIME: 2:03 PM    Assessment and Plan:    Gross hematuria: 1 episode.  Nothing further.  Workup negative except for friable vessels which is most likely the source.  Nothing malignant.    BPH with obstruction: Given the appearance and size of his prostate, it appears that he was just on the cusp of going into urinary retention.  He had a great response to the Flomax.  We discussed management of this.  I recommend going on Proscar as well to reduce his chance of acute urinary retention and any future bleeding episodes.  He is agreeable and I sent in a prescription.    Elevated PSA: Repeat PSA in about a week, then see me in 3 months with another PSA.  If this PSA is markedly elevated, I will order an MRI of the prostate.               Chief Complaint     Chief Complaint   Patient presents with   • Cystoscopy       History of Present Illness   78-year-old man, established patient but new to me-here for cystoscopy to evaluate gross hematuria which occurred in early November.  Urine testing was negative for infection and CT scan showed no evidence of urinary tract malignancy.  There was an enlarged prostate with intravesical protrusion.   He denied significant bothersome symptoms but was interested in trying Flomax.  PVR was normal.  Urinalysis was negative in the office last time.Since starting the flomax, no problems voiding.he had episodes of temporary retention in the AM occasionally prior to Flomax.    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    Elevated PSA: 9.1 as of November 2, 2023.  Last PSA I have before that was 2.6 in August 2015.       Cystoscopy     Date/Time  3/6/2024 1:30 PM     Performed by  Vincent Walsh MD   Authorized by  LOVE Huff     Universal Protocol:  Consent: Verbal consent obtained. Written consent obtained.      Procedure Details:  Procedure type: cystoscopy    Additional Procedure Details: Cystoscopy Procedure Note        Pre-operative Diagnosis: Gross hematuria, BPH with obstruction    Post-operative Diagnosis: Trilobar hypertrophy with severe obstruction, friable vessels of the prostate    Procedure: Flexible cystoscopy    Surgeon: Vincent Walsh MD    Anesthesia: 1% Xylocaine per urethra    EBL: Minimal    Complications: none    Procedure Details   The risks, benefits, complications, treatment options, and expected outcomes were discussed with the patient. The patient concurred with the proposed plan, giving informed consent.    Cystoscopy was performed today under local anesthesia, using sterile technique. The patient was placed in the supine position, prepped with Betadine, and draped in the usual sterile fashion. The flexible cystocope was used to inspect both the urethra and bladder    Findings:  Urethra: Normal without stricture.  Prostate shows trilobar hypertrophy, large gland with large median lobe, friable vessels.    Bladder:  Smooth, not trabeculated and there were no stones tumors or other lesions.  The orifices were orthotopic and intact.           Specimens: none                "  Complications:  None           Disposition: To home            Condition:  Stable              The following portions of the patient's history were reviewed and updated as appropriate: allergies, current medications, past family history, past medical history, past social history, past surgical history and problem list.  Past Medical History:   Diagnosis Date   • Coronary artery disease    • COVID-19 01/28/2022   • Dyspnea on exertion    • History of echocardiogram 03/22/2018    Normal EF, normal LVSF.Thick and rebundent MV leaflets w/ mild posterior leaflet prolapse w/ trace regurg.   • Hyperlipidemia    • Lyme disease    • Mitral valve prolapse    • Shingles      Past Surgical History:   Procedure Laterality Date   • APPENDECTOMY     • EYE SURGERY     • HERNIA REPAIR     • TONSILLECTOMY       shoulder  Review of Systems   Review of Systems   Genitourinary:         As per HPI.       Active Problem List     Patient Active Problem List   Diagnosis   • Dyslipidemia   • Other chest pain   • Depression, recurrent (HCC)   • Anxiety   • Coronary artery disease involving native coronary artery of native heart with angina pectoris (HCC)       Objective   /68   Pulse 72   Ht 5' 8\" (1.727 m)   Wt 56.2 kg (124 lb)   BMI 18.85 kg/m²     Physical Exam  Vitals reviewed.   Constitutional:       Appearance: Normal appearance.   HENT:      Head: Normocephalic and atraumatic.   Eyes:      Extraocular Movements: Extraocular movements intact.   Pulmonary:      Effort: Pulmonary effort is normal.   Genitourinary:     Penis: Normal.    Musculoskeletal:         General: Normal range of motion.      Cervical back: Normal range of motion.   Skin:     General: Skin is warm and dry.      Coloration: Skin is not jaundiced or pale.   Neurological:      General: No focal deficit present.      Mental Status: He is alert and oriented to person, place, and time.   Psychiatric:         Mood and Affect: Mood normal.         Behavior: " Behavior normal.         Thought Content: Thought content normal.         Judgment: Judgment normal.             Current Medications     Current Outpatient Medications:   •  cephalexin (KEFLEX) 500 mg capsule, Take 1 capsule (500 mg total) by mouth 1 (one) time for 1 dose Take after procedure, Disp: 1 capsule, Rfl: 0  •  mirtazapine (REMERON) 15 mg tablet, Take 1 tablet (15 mg total) by mouth daily at bedtime, Disp: 30 tablet, Rfl: 5  •  simvastatin (ZOCOR) 5 MG tablet, Take 1 tablet (5 mg total) by mouth daily at bedtime, Disp: 90 tablet, Rfl: 3  •  tamsulosin (FLOMAX) 0.4 mg, Take 1 capsule (0.4 mg total) by mouth daily with dinner, Disp: 30 capsule, Rfl: 6        Vincent Walsh MD

## 2024-03-06 NOTE — PATIENT INSTRUCTIONS
You have just undergone cystoscopy of the bladder.  Expect to see some blood in the urine, which should clear up within 24 to 48 hours.  You also may experience burning urgency and frequency of urination which is normal.  Call for fever greater than 101.5 degrees, severe pain not relieved by over-the-counter medicines, or inability to urinate.  You may resume all normal activities.  For irritative symptoms, you can purchase over-the-counter remedies such as cystek or Azo, or any other preparations advertised in the pharmacy for bladder symptoms.  I will call in Pyridium which is a prescription strength medication for bladder irritability upon your request.   
Yes

## 2024-03-06 NOTE — PROGRESS NOTES
UROLOGY PROGRESS NOTE   [facility]  [address]  [address]  [place] [address]  [phone]  Fax:[phone]  [email or web address]      NAME: Vincent Chandler  AGE: 78 y.o. SEX: male  : 1945   MRN: [id number]    DATE: 3/6/2024  TIME: 2:03 PM    Assessment and Plan:    Gross hematuria: 1 episode.  Nothing further.  Workup negative except for friable vessels which is most likely the source.  Nothing malignant.    BPH with obstruction: Given the appearance and size of his prostate, it appears that he was just on the cusp of going into urinary retention.  He had a great response to the Flomax.  We discussed management of this.  I recommend going on Proscar as well to reduce his chance of acute urinary retention and any future bleeding episodes.  He is agreeable and I sent in a prescription.    Elevated PSA: Repeat PSA in about a week, then see me in 3 months with another PSA.  If this PSA is markedly elevated, I will order an MRI of the prostate.               Chief Complaint     Chief Complaint   Patient presents with    Cystoscopy       History of Present Illness   78-year-old man, established patient but new to me-here for cystoscopy to evaluate gross hematuria which occurred in early November.  Urine testing was negative for infection and CT scan showed no evidence of urinary tract malignancy.  There was an enlarged prostate with intravesical protrusion.  He denied significant bothersome symptoms but was interested in trying Flomax.  PVR was normal.  Urinalysis was negative in the office last time.Since starting the flomax, no problems voiding.he had episodes of temporary retention in the AM occasionally prior to Flomax.    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    Elevated PSA: 9.1 as of 2023.  Last PSA I have before that was 2.6 in August  2015.       Cystoscopy     Date/Time  3/6/2024 1:30 PM     Performed by  Vincent Walsh MD   Authorized by  LOVE Huff     Universal Protocol:  Consent: Verbal consent obtained. Written consent obtained.      Procedure Details:  Procedure type: cystoscopy    Additional Procedure Details: Cystoscopy Procedure Note        Pre-operative Diagnosis: Gross hematuria, BPH with obstruction    Post-operative Diagnosis: Trilobar hypertrophy with severe obstruction, friable vessels of the prostate    Procedure: Flexible cystoscopy    Surgeon: Vincent Walsh MD    Anesthesia: 1% Xylocaine per urethra    EBL: Minimal    Complications: none    Procedure Details   The risks, benefits, complications, treatment options, and expected outcomes were discussed with the patient. The patient concurred with the proposed plan, giving informed consent.    Cystoscopy was performed today under local anesthesia, using sterile technique. The patient was placed in the supine position, prepped with Betadine, and draped in the usual sterile fashion. The flexible cystocope was used to inspect both the urethra and bladder    Findings:  Urethra: Normal without stricture.  Prostate shows trilobar hypertrophy, large gland with large median lobe, friable vessels.    Bladder:  Smooth, not trabeculated and there were no stones tumors or other lesions.  The orifices were orthotopic and intact.           Specimens: none                 Complications:  None           Disposition: To home            Condition:  Stable              The following portions of the patient's history were reviewed and updated as appropriate: allergies, current medications, past family history, past medical history, past social history, past surgical history and problem list.  Past Medical History:   Diagnosis Date    Coronary artery disease     COVID-19 01/28/2022    Dyspnea on exertion     History of echocardiogram 03/22/2018    Normal EF, normal LVSF.Thick and rebundent MV  "leaflets w/ mild posterior leaflet prolapse w/ trace regurg.    Hyperlipidemia     Lyme disease     Mitral valve prolapse     Shingles      Past Surgical History:   Procedure Laterality Date    APPENDECTOMY      EYE SURGERY      HERNIA REPAIR      TONSILLECTOMY       shoulder  Review of Systems   Review of Systems   Genitourinary:         As per HPI.       Active Problem List     Patient Active Problem List   Diagnosis    Dyslipidemia    Other chest pain    Depression, recurrent (HCC)    Anxiety    Coronary artery disease involving native coronary artery of native heart with angina pectoris (HCC)       Objective   /68   Pulse 72   Ht 5' 8\" (1.727 m)   Wt 56.2 kg (124 lb)   BMI 18.85 kg/m²     Physical Exam  Vitals reviewed.   Constitutional:       Appearance: Normal appearance.   HENT:      Head: Normocephalic and atraumatic.   Eyes:      Extraocular Movements: Extraocular movements intact.   Pulmonary:      Effort: Pulmonary effort is normal.   Genitourinary:     Penis: Normal.    Musculoskeletal:         General: Normal range of motion.      Cervical back: Normal range of motion.   Skin:     General: Skin is warm and dry.      Coloration: Skin is not jaundiced or pale.   Neurological:      General: No focal deficit present.      Mental Status: He is alert and oriented to person, place, and time.   Psychiatric:         Mood and Affect: Mood normal.         Behavior: Behavior normal.         Thought Content: Thought content normal.         Judgment: Judgment normal.             Current Medications     Current Outpatient Medications:     cephalexin (KEFLEX) 500 mg capsule, Take 1 capsule (500 mg total) by mouth 1 (one) time for 1 dose Take after procedure, Disp: 1 capsule, Rfl: 0    mirtazapine (REMERON) 15 mg tablet, Take 1 tablet (15 mg total) by mouth daily at bedtime, Disp: 30 tablet, Rfl: 5    simvastatin (ZOCOR) 5 MG tablet, Take 1 tablet (5 mg total) by mouth daily at bedtime, Disp: 90 tablet, Rfl: " 3    tamsulosin (FLOMAX) 0.4 mg, Take 1 capsule (0.4 mg total) by mouth daily with dinner, Disp: 30 capsule, Rfl: 6        Vincent Walsh MD

## 2024-03-11 ENCOUNTER — APPOINTMENT (OUTPATIENT)
Dept: LAB | Facility: MEDICAL CENTER | Age: 79
End: 2024-03-11
Payer: COMMERCIAL

## 2024-03-11 DIAGNOSIS — R97.20 ELEVATED PSA: ICD-10-CM

## 2024-03-11 PROCEDURE — 84153 ASSAY OF PSA TOTAL: CPT

## 2024-03-11 PROCEDURE — 84154 ASSAY OF PSA FREE: CPT

## 2024-03-11 PROCEDURE — 36415 COLL VENOUS BLD VENIPUNCTURE: CPT

## 2024-03-12 LAB
PSA FREE MFR SERPL: 16.7 %
PSA FREE SERPL-MCNC: 0.4 NG/ML
PSA SERPL-MCNC: 2.4 NG/ML (ref 0–4)

## 2024-03-15 ENCOUNTER — TELEPHONE (OUTPATIENT)
Dept: UROLOGY | Facility: CLINIC | Age: 79
End: 2024-03-15

## 2024-03-15 NOTE — TELEPHONE ENCOUNTER
Tried calling patient and was unable to leave a detailed vm due to the patient's voicemail box being unavailable. Will try calling again later.

## 2024-03-15 NOTE — TELEPHONE ENCOUNTER
----- Message from Vincent Walsh MD sent at 3/14/2024 11:33 AM EDT -----  Please let him know that his PSA dropped to 2.4 which I am very happy about.  I will see him in June as scheduled.

## 2024-03-18 NOTE — TELEPHONE ENCOUNTER
Returned call to patient and made him aware of PSA results and plan to follow up as scheduled with Dr. Walsh.

## 2024-03-18 NOTE — TELEPHONE ENCOUNTER
Patient called stating he was returning the call.  He stated he did labs and he thinks is regarding his psa results.  Asper encounter note . Patient will keep his appointment in June with DR. Walsh.     Patient can be reached at [phone]

## 2024-04-22 ENCOUNTER — OFFICE VISIT (OUTPATIENT)
Dept: CARDIOLOGY CLINIC | Facility: CLINIC | Age: 79
End: 2024-04-22
Payer: COMMERCIAL

## 2024-04-22 VITALS
SYSTOLIC BLOOD PRESSURE: 120 MMHG | BODY MASS INDEX: 18.64 KG/M2 | HEIGHT: 68 IN | DIASTOLIC BLOOD PRESSURE: 70 MMHG | WEIGHT: 123 LBS | OXYGEN SATURATION: 99 % | RESPIRATION RATE: 18 BRPM | HEART RATE: 80 BPM

## 2024-04-22 DIAGNOSIS — I25.10 CORONARY ARTERY CALCIFICATION SEEN ON CAT SCAN: Primary | ICD-10-CM

## 2024-04-22 DIAGNOSIS — E78.5 DYSLIPIDEMIA: ICD-10-CM

## 2024-04-22 PROCEDURE — 99214 OFFICE O/P EST MOD 30 MIN: CPT | Performed by: INTERNAL MEDICINE

## 2024-04-22 RX ORDER — ATORVASTATIN CALCIUM 20 MG/1
20 TABLET, FILM COATED ORAL DAILY
Qty: 30 TABLET | Refills: 5 | Status: SHIPPED | OUTPATIENT
Start: 2024-04-22

## 2024-04-22 RX ORDER — ASPIRIN 81 MG/1
81 TABLET, CHEWABLE ORAL DAILY
Start: 2024-04-22

## 2024-04-22 NOTE — ASSESSMENT & PLAN NOTE
Angina free  Normal EF  LDL suboptimal.  Discussed GDMT.  Advised DC Zocor, start Lipitor 20 mg QD, BW in 2 mos, call with any SE.  Also rec'd 81 mg asa daily.

## 2024-04-22 NOTE — PATIENT INSTRUCTIONS
Cholesterol and Your Health   AMBULATORY CARE:   Cholesterol  is a waxy, fat-like substance. Your body uses cholesterol to make hormones and new cells, and to protect nerves. Cholesterol is made by your body. It also comes from certain foods you eat, such as meat and dairy products. Your healthcare provider can help you set goals for your cholesterol levels. Your provider can help you create a plan to meet your goals.  Cholesterol level goals:  Your cholesterol level goals depend on your risk for heart disease, your age, and your other health conditions. The following are general guidelines:  Total cholesterol  includes low-density lipoprotein (LDL), high-density lipoprotein (HDL), and triglyceride levels. The total cholesterol level should be lower than 200 mg/dL and is best at about 150 mg/dL.    LDL cholesterol  is called bad cholesterol  because it forms plaque in your arteries. As plaque builds up, your arteries become narrow, and less blood flows through. When plaque decreases blood flow to your heart, you may have chest pain. If plaque completely blocks an artery that brings blood to your heart, you may have a heart attack. Plaque can break off and form blood clots. Blood clots may block arteries in your brain and cause a stroke. The level should be less than 130 mg/dL and is best at about 100 mg/dL.         HDL cholesterol  is called good cholesterol  because it helps remove LDL cholesterol from your arteries. It does this by attaching to LDL cholesterol and carrying it to your liver. Your liver breaks down LDL cholesterol so your body can get rid of it. High levels of HDL cholesterol can help prevent a heart attack and stroke. Low levels of HDL cholesterol can increase your risk for heart disease, heart attack, and stroke. The level should be at least 40 mg/dL in males or at least 50 mg/dL in females.    Triglycerides  are a type of fat that store energy from foods you eat. High levels of triglycerides also  cause plaque buildup. This can increase your risk for a heart attack or stroke. If your triglyceride level is high, your LDL cholesterol level may also be high. The level should be less than 150 mg/dL.    Any of the following can increase your risk for high cholesterol:   Smoking or drinking large amounts of alcohol    Having overweight or obesity, or not getting enough exercise    A medical condition such as hypertension (high blood pressure) or diabetes    A family history of high cholesterol    Age older than 65    What you need to know about having your cholesterol levels checked:  Adults 20 to 45 years of age should have their cholesterol levels checked every 4 to 6 years. Adults 45 years or older should have their cholesterol checked every 1 to 2 years. You may need your cholesterol checked more often, or at a younger age, if you have risk factors for heart disease. You may also need to have your cholesterol checked more often if you have other health conditions, such as diabetes. Blood tests are used to check cholesterol levels. Blood tests measure your levels of triglycerides, LDL cholesterol, and HDL cholesterol.  How healthy fats affect your cholesterol levels:  Healthy fats, also called unsaturated fats, help lower LDL cholesterol and triglyceride levels. Healthy fats include the following:  Monounsaturated fats  are found in foods such as olive oil, canola oil, avocado, nuts, and olives.    Polyunsaturated fats,  such as omega 3 fats, are found in fish, such as salmon, trout, and tuna. They can also be found in plant foods such as flaxseed, walnuts, and soybeans.    How unhealthy fats affect your cholesterol levels:  Unhealthy fats increase LDL cholesterol and triglyceride levels. They are found in foods high in cholesterol, saturated fat, and trans fat:  Cholesterol  is found in eggs, dairy, and meat.    Saturated fat  is found in butter, cheese, ice cream, whole milk, and coconut oil. Saturated fat is  also found in meat, such as sausage, hot dogs, and bologna.    Trans fat  is found in liquid oils and is used in fried and baked foods. Foods that contain trans fats include chips, crackers, muffins, sweet rolls, microwave popcorn, and cookies.    Treatment  for high cholesterol will also decrease your risk of heart disease, heart attack, and stroke. Treatment may include any of the following:  Lifestyle changes  may include food, exercise, weight loss, and quitting smoking. You may also need to decrease the amount of alcohol you drink. Your healthcare provider will want you to start with lifestyle changes. Other treatment may be added if lifestyle changes are not enough. Your healthcare provider may recommend you work with a team to manage hyperlipidemia. The team may include medical experts such as a dietitian, an exercise or physical therapist, and a behavior therapist. Your family members may be included in helping you create lifestyle changes.    Medicines  may be given to lower your LDL cholesterol, triglyceride levels, or total cholesterol level. You may need medicines to lower your cholesterol if any of the following is true:    You have a history of stroke, TIA, unstable angina, or a heart attack.    Your LDL cholesterol level is 190 mg/dL or higher.    You are age 40 to 75 years, have diabetes or heart disease risk factors, and your LDL cholesterol is 70 mg/dL or higher.    Supplements  include fish oil, red yeast rice, and garlic. Fish oil may help lower your triglyceride and LDL cholesterol levels. It may also increase your HDL cholesterol level. Red yeast rice may help decrease your total cholesterol level and LDL cholesterol level. Garlic may help lower your total cholesterol level. Do not take any supplements without talking to your healthcare provider.    Food changes you can make to lower your cholesterol levels:  A dietitian can help you create a healthy eating plan. Your dietitian can show you how  to read food labels and choose foods low in saturated fat, trans fats, and cholesterol.     Decrease the total amount of fat you eat.  Choose lean meats, fat-free or 1% fat milk, and low-fat dairy products, such as yogurt and cheese. Try to limit or avoid red meats. Limit or do not eat fried foods or baked goods, such as cookies.    Replace unhealthy fats with healthy fats.  Cook foods in olive oil or canola oil. Choose soft margarines that are low in saturated fat and trans fat. Seeds, nuts, and avocados are other examples of healthy fats.    Eat foods with omega-3 fats.  Examples include salmon, tuna, mackerel, walnuts, and flaxseed. Eat fish 2 times per week. Pregnant women should not eat fish that have high levels of mercury, such as shark, swordfish, and marissa mackerel.         Increase the amount of high-fiber foods you eat.  High-fiber foods can help lower your LDL cholesterol. Aim to get between 20 and 30 grams of fiber each day. Fruits and vegetables are high in fiber. Eat at least 5 servings each day. Other high-fiber foods are whole-grain or whole-wheat breads, pastas, or cereals, and brown rice. Eat 3 ounces of whole-grain foods each day. Increase fiber slowly. You may have abdominal discomfort, bloating, and gas if you add fiber to your diet too quickly.         Eat healthy protein foods.  Examples include low-fat dairy products, skinless chicken and turkey, fish, and nuts.    Limit foods and drinks that are high in sugar.  Your dietitian or healthcare provider can help you create daily limits for high-sugar foods and drinks. The limit may be lower if you have diabetes or another health condition. Limits can also help you lose weight if needed.  Lifestyle changes you can make to lower your cholesterol levels:   Maintain a healthy weight.  Ask your healthcare provider what a healthy weight is for you. Ask your provider to help you create a weight loss plan if needed. Weight loss can decrease your total  cholesterol and triglyceride levels. Weight loss may also help keep your blood pressure at a healthy level.    Be physically active throughout the day.  Physical activity, such as exercise, can help lower your total cholesterol level and maintain a healthy weight. Physical activity can also help increase your HDL cholesterol level. Work with your healthcare provider to create an program that is right for you. Get at least 30 to 40 minutes of moderate physical activity most days of the week. Examples of exercise include brisk walking, swimming, or biking. Also include strength training at least 2 times each week. Your healthcare providers can help you create a physical activity plan.            Do not smoke.  Nicotine and other chemicals in cigarettes and cigars can raise your cholesterol levels. Ask your healthcare provider for information if you currently smoke and need help to quit. E-cigarettes or smokeless tobacco still contain nicotine. Talk to your healthcare provider before you use these products.         Limit or do not drink alcohol.  Alcohol can increase your triglyceride levels. Ask your healthcare provider before you drink alcohol. Ask how much is okay for you to drink in 24 hours or 1 week.    Follow up with your doctor as directed:  Write down your questions so you remember to ask them during your visits.  © Copyright [employer] 2023 Information is for End User's use only and may not be sold, redistributed or otherwise used for commercial purposes.  The above information is an  only. It is not intended as medical advice for individual conditions or treatments. Talk to your doctor, nurse or pharmacist before following any medical regimen to see if it is safe and effective for you.

## 2024-04-22 NOTE — PROGRESS NOTES
" Patient ID: Vincent Chandler is a 79 y.o. male.        Plan:      Coronary artery calcification seen on CAT scan  Angina free  Normal EF  LDL suboptimal.  Discussed GDMT.  Advised DC Zocor, start Lipitor 20 mg QD, BW in 2 mos, call with any SE.  Also rec'd 81 mg asa daily.    Dyslipidemia  See plan under above       Follow up Plan/Other summary comments:  Vincent is wo angina, CHF, dysrhythmia.  Return in about 6 months (around 10/22/2024).  Call sooner with issues.    HPI: Vincent is here for routine FU.  Says ever since Covid 2 years ago he has his good days and his bad days.  Nothing in particular though.  Denies any CP, SOB, palps, (pre)syncope TIA or lopez sx.  No edema nor orthopnea.  No fevers no chills.        Most recent or relevant cardiac/vascular testin NST WNL EF 69%      Past Surgical History:   Procedure Laterality Date    APPENDECTOMY      EYE SURGERY      HERNIA REPAIR      TONSILLECTOMY         Lipid Profile: Reviewed      Review of Systems   10  point ROS  was otherwise non pertinent or negative except as per HPI or as below.         Objective:     /70 (BP Location: Left arm, Patient Position: Sitting, Cuff Size: Standard)   Pulse 80   Resp 18   Ht 5' 8\" (1.727 m)   Wt 55.8 kg (123 lb)   SpO2 99%   BMI 18.70 kg/m²     PHYSICAL EXAM:    General:  Normal appearance in no distress.  Eyes:  Anicteric.  Oral mucosa:  Moist.  Neck:  No JVD. Carotid upstrokes are brisk without bruits.  No masses.  Chest:  Clear to auscultation.  Cardiac:  Regular No palpable PMI.  Normal S1 and S2.  No murmur gallop or rub.  Abdomen:  Soft and nontender. No palpable organomegaly or aortic enlargement.  Extremities:  No peripheral edema.  Musculoskeletal:  Symmetric.   Vascular:  Pedal pulses are intact.  Neuro:  Grossly symmetric.  Psych:  Alert and oriented x3.      Meds reviewed.    Past Medical History:   Diagnosis Date    Coronary artery calcification seen on CAT scan     Coronary artery disease     " COVID-19 01/28/2022    Dyslipidemia     Dyspnea on exertion     History of echocardiogram 03/22/2018    Normal EF, normal LVSF.Thick and rebundent MV leaflets w/ mild posterior leaflet prolapse w/ trace regurg.    History of pericarditis     Hyperlipidemia     Lyme disease     Mitral valve prolapse     Shingles            Social History     Tobacco Use   Smoking Status Never   Smokeless Tobacco Never

## 2024-06-10 ENCOUNTER — APPOINTMENT (OUTPATIENT)
Dept: LAB | Facility: MEDICAL CENTER | Age: 79
End: 2024-06-10
Payer: COMMERCIAL

## 2024-06-10 DIAGNOSIS — E78.5 DYSLIPIDEMIA: ICD-10-CM

## 2024-06-10 DIAGNOSIS — R97.20 ELEVATED PSA: ICD-10-CM

## 2024-06-10 DIAGNOSIS — I25.10 CORONARY ARTERY CALCIFICATION SEEN ON CAT SCAN: ICD-10-CM

## 2024-06-10 LAB
ALBUMIN SERPL BCP-MCNC: 3.9 G/DL (ref 3.5–5)
ALP SERPL-CCNC: 59 U/L (ref 34–104)
ALT SERPL W P-5'-P-CCNC: 18 U/L (ref 7–52)
AST SERPL W P-5'-P-CCNC: 23 U/L (ref 13–39)
BILIRUB DIRECT SERPL-MCNC: 0.09 MG/DL (ref 0–0.2)
BILIRUB SERPL-MCNC: 0.55 MG/DL (ref 0.2–1)
CHOLEST SERPL-MCNC: 149 MG/DL
HDLC SERPL-MCNC: 61 MG/DL
LDLC SERPL CALC-MCNC: 72 MG/DL (ref 0–100)
LDLC SERPL DIRECT ASSAY-MCNC: 78 MG/DL (ref 0–100)
NONHDLC SERPL-MCNC: 88 MG/DL
PROT SERPL-MCNC: 6.5 G/DL (ref 6.4–8.4)
PSA SERPL-MCNC: 3.31 NG/ML (ref 0–4)
TRIGL SERPL-MCNC: 80 MG/DL

## 2024-06-10 PROCEDURE — 36415 COLL VENOUS BLD VENIPUNCTURE: CPT

## 2024-06-10 PROCEDURE — 84153 ASSAY OF PSA TOTAL: CPT

## 2024-06-10 PROCEDURE — 83721 ASSAY OF BLOOD LIPOPROTEIN: CPT

## 2024-06-10 PROCEDURE — 80076 HEPATIC FUNCTION PANEL: CPT

## 2024-06-17 DIAGNOSIS — F51.02 ADJUSTMENT INSOMNIA: ICD-10-CM

## 2024-06-17 RX ORDER — MIRTAZAPINE 15 MG/1
15 TABLET, FILM COATED ORAL
Qty: 30 TABLET | Refills: 5 | Status: SHIPPED | OUTPATIENT
Start: 2024-06-17

## 2024-06-17 NOTE — TELEPHONE ENCOUNTER
Reason for call:   [x] Refill   [] Prior Auth  [] Other:     Office:   [x] PCP/Provider - Girish Hurley,    [] Specialty/Provider -     Medication: mirtazapine (REMERON) 15 mg tablet     Dose/Frequency: 15 mg, Oral, Daily at bedtime     Quantity:  30 tablet     Pharmacy: [id number] - [address]     Does the patient have enough for 3 days?   [] Yes   [x] No - Send as HP to POD

## 2024-06-17 NOTE — PROGRESS NOTES
UROLOGY PROGRESS NOTE   [facility]  [address] [place] [address] [place]  [address]  [place] [address]  [phone]  Fax:[phone]  [email or web address]      NAME: Vincent Chandler  AGE: 79 y.o. SEX: male  : 1945   MRN: [id number]    DATE: 2024  TIME: 8:32 AM    Assessment and Plan:    BPH with obstruction, problems mostly resolved with finasteride and tamsulosin and I refilled his tamsulosin today.  Voiding pattern is normal.  Continue with these medications indefinitely.    Elevated PSA: Normalized with Proscar.  Recheck in 1 year.                   Chief Complaint     Chief Complaint   Patient presents with    Follow-up     3 month follow-up. PSA completed.       History of Present Illness   Follow-up gross hematuria and cystoscopy showed friable vessels with nothing malignant.  Friable vessels involve the prostate.  Follow-up BPH with obstruction, and given the appearance and size of his prostate it appears that he is just on the cusp of going into urinary retention.  He had good response to Flomax so we also started him on Proscar.  Normal no voiding complaints whatsoever.  Everything is normal according to him.  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  Excellent functional status.  No further episodes of gross hematuria.    Elevated PSA: Here for another PSA.  PSA was 9.1 2023, then 2.4 2024, and now 3.31 as of Vickie 10, 2024.      The following portions of the patient's history were reviewed and updated as appropriate: allergies, current medications, past family history, past medical history, past social history, past surgical history and problem list.  Past Medical History:   Diagnosis Date    Coronary artery calcification seen on CAT scan     Coronary artery disease     COVID-19 2022    Dyslipidemia     Dyspnea on exertion     History of echocardiogram 2018    Normal EF, normal LVSF.Thick and rebundent MV leaflets w/  "mild posterior leaflet prolapse w/ trace regurg.    History of pericarditis     Hyperlipidemia     Lyme disease     Mitral valve prolapse     Shingles      Past Surgical History:   Procedure Laterality Date    APPENDECTOMY      EYE SURGERY      HERNIA REPAIR      TONSILLECTOMY       shoulder  Review of Systems   Review of Systems   Genitourinary: Negative.        Active Problem List     Patient Active Problem List   Diagnosis    Dyslipidemia    Other chest pain    Depression, recurrent (HCC)    Anxiety    Coronary artery disease involving native coronary artery of native heart with angina pectoris (HCC)    Coronary artery calcification seen on CAT scan       Objective   /74   Pulse 77   Ht 5' 8\" (1.727 m)   Wt 55.9 kg (123 lb 3.2 oz)   SpO2 98%   BMI 18.73 kg/m²     Physical Exam  Vitals reviewed.   Constitutional:       Appearance: Normal appearance.   HENT:      Head: Normocephalic and atraumatic.   Eyes:      Extraocular Movements: Extraocular movements intact.   Pulmonary:      Effort: Pulmonary effort is normal.   Musculoskeletal:         General: Normal range of motion.      Cervical back: Normal range of motion.   Skin:     Coloration: Skin is not jaundiced or pale.   Neurological:      General: No focal deficit present.      Mental Status: He is alert and oriented to person, place, and time. Mental status is at baseline.   Psychiatric:         Mood and Affect: Mood normal.         Behavior: Behavior normal.         Thought Content: Thought content normal.         Judgment: Judgment normal.             Current Medications     Current Outpatient Medications:     aspirin 81 mg chewable tablet, Chew 1 tablet (81 mg total) daily, Disp: , Rfl:     atorvastatin (LIPITOR) 20 mg tablet, Take 1 tablet (20 mg total) by mouth daily, Disp: 30 tablet, Rfl: 5    finasteride (PROSCAR) 5 mg tablet, Take 1 tablet (5 mg total) by mouth daily, Disp: 90 tablet, Rfl: 3    mirtazapine (REMERON) 15 mg tablet, Take 1 " tablet (15 mg total) by mouth daily at bedtime, Disp: 30 tablet, Rfl: 5    tamsulosin (FLOMAX) 0.4 mg, Take 1 capsule (0.4 mg total) by mouth daily with dinner, Disp: 30 capsule, Rfl: 6        Vincent Walsh MD

## 2024-06-18 ENCOUNTER — OFFICE VISIT (OUTPATIENT)
Dept: UROLOGY | Facility: CLINIC | Age: 79
End: 2024-06-18
Payer: COMMERCIAL

## 2024-06-18 VITALS
HEART RATE: 77 BPM | SYSTOLIC BLOOD PRESSURE: 140 MMHG | OXYGEN SATURATION: 98 % | WEIGHT: 123.2 LBS | BODY MASS INDEX: 18.67 KG/M2 | HEIGHT: 68 IN | DIASTOLIC BLOOD PRESSURE: 74 MMHG

## 2024-06-18 DIAGNOSIS — N40.1 BENIGN PROSTATIC HYPERPLASIA WITH URINARY HESITANCY: ICD-10-CM

## 2024-06-18 DIAGNOSIS — R39.11 BENIGN PROSTATIC HYPERPLASIA WITH URINARY HESITANCY: ICD-10-CM

## 2024-06-18 DIAGNOSIS — R97.20 ELEVATED PSA: Primary | ICD-10-CM

## 2024-06-18 PROCEDURE — 99213 OFFICE O/P EST LOW 20 MIN: CPT | Performed by: UROLOGY

## 2024-06-18 RX ORDER — TAMSULOSIN HYDROCHLORIDE 0.4 MG/1
0.4 CAPSULE ORAL
Qty: 90 CAPSULE | Refills: 3 | Status: SHIPPED | OUTPATIENT
Start: 2024-06-18

## 2024-07-10 ENCOUNTER — RA CDI HCC (OUTPATIENT)
Dept: OTHER | Facility: HOSPITAL | Age: 79
End: 2024-07-10

## 2024-07-18 ENCOUNTER — LAB (OUTPATIENT)
Dept: LAB | Facility: MEDICAL CENTER | Age: 79
End: 2024-07-18
Payer: COMMERCIAL

## 2024-07-18 ENCOUNTER — OFFICE VISIT (OUTPATIENT)
Dept: FAMILY MEDICINE CLINIC | Facility: CLINIC | Age: 79
End: 2024-07-18
Payer: COMMERCIAL

## 2024-07-18 VITALS
BODY MASS INDEX: 18.49 KG/M2 | TEMPERATURE: 97.5 F | WEIGHT: 122 LBS | DIASTOLIC BLOOD PRESSURE: 78 MMHG | OXYGEN SATURATION: 99 % | HEART RATE: 86 BPM | SYSTOLIC BLOOD PRESSURE: 144 MMHG | HEIGHT: 68 IN

## 2024-07-18 DIAGNOSIS — R53.82 CHRONIC FATIGUE: ICD-10-CM

## 2024-07-18 DIAGNOSIS — B34.9 ACUTE VIRAL SYNDROME: ICD-10-CM

## 2024-07-18 DIAGNOSIS — B34.9 ACUTE VIRAL SYNDROME: Primary | ICD-10-CM

## 2024-07-18 DIAGNOSIS — B34.9 VIRAL INFECTION, UNSPECIFIED: ICD-10-CM

## 2024-07-18 LAB
ALBUMIN SERPL BCG-MCNC: 4.2 G/DL (ref 3.5–5)
ALP SERPL-CCNC: 60 U/L (ref 34–104)
ALT SERPL W P-5'-P-CCNC: 21 U/L (ref 7–52)
ANION GAP SERPL CALCULATED.3IONS-SCNC: 10 MMOL/L (ref 4–13)
AST SERPL W P-5'-P-CCNC: 22 U/L (ref 13–39)
BASOPHILS # BLD AUTO: 0.09 THOUSANDS/ÂΜL (ref 0–0.1)
BASOPHILS NFR BLD AUTO: 1 % (ref 0–1)
BILIRUB SERPL-MCNC: 0.71 MG/DL (ref 0.2–1)
BUN SERPL-MCNC: 19 MG/DL (ref 5–25)
CALCIUM SERPL-MCNC: 9.7 MG/DL (ref 8.4–10.2)
CHLORIDE SERPL-SCNC: 100 MMOL/L (ref 96–108)
CO2 SERPL-SCNC: 31 MMOL/L (ref 21–32)
CREAT SERPL-MCNC: 1 MG/DL (ref 0.6–1.3)
EOSINOPHIL # BLD AUTO: 0.44 THOUSAND/ÂΜL (ref 0–0.61)
EOSINOPHIL NFR BLD AUTO: 7 % (ref 0–6)
ERYTHROCYTE [DISTWIDTH] IN BLOOD BY AUTOMATED COUNT: 12.8 % (ref 11.6–15.1)
GFR SERPL CREATININE-BSD FRML MDRD: 70 ML/MIN/1.73SQ M
GLUCOSE P FAST SERPL-MCNC: 109 MG/DL (ref 65–99)
HCT VFR BLD AUTO: 45 % (ref 36.5–49.3)
HGB BLD-MCNC: 14.4 G/DL (ref 12–17)
IMM GRANULOCYTES # BLD AUTO: 0.01 THOUSAND/UL (ref 0–0.2)
IMM GRANULOCYTES NFR BLD AUTO: 0 % (ref 0–2)
LYMPHOCYTES # BLD AUTO: 1.49 THOUSANDS/ÂΜL (ref 0.6–4.47)
LYMPHOCYTES NFR BLD AUTO: 24 % (ref 14–44)
MCH RBC QN AUTO: 29.6 PG (ref 26.8–34.3)
MCHC RBC AUTO-ENTMCNC: 32 G/DL (ref 31.4–37.4)
MCV RBC AUTO: 92 FL (ref 82–98)
MONOCYTES # BLD AUTO: 0.55 THOUSAND/ÂΜL (ref 0.17–1.22)
MONOCYTES NFR BLD AUTO: 9 % (ref 4–12)
NEUTROPHILS # BLD AUTO: 3.63 THOUSANDS/ÂΜL (ref 1.85–7.62)
NEUTS SEG NFR BLD AUTO: 59 % (ref 43–75)
NRBC BLD AUTO-RTO: 0 /100 WBCS
PLATELET # BLD AUTO: 211 THOUSANDS/UL (ref 149–390)
PMV BLD AUTO: 11.1 FL (ref 8.9–12.7)
POTASSIUM SERPL-SCNC: 3.9 MMOL/L (ref 3.5–5.3)
PROT SERPL-MCNC: 7.1 G/DL (ref 6.4–8.4)
RBC # BLD AUTO: 4.87 MILLION/UL (ref 3.88–5.62)
SODIUM SERPL-SCNC: 141 MMOL/L (ref 135–147)
TSH SERPL DL<=0.05 MIU/L-ACNC: 2.58 UIU/ML (ref 0.45–4.5)
WBC # BLD AUTO: 6.21 THOUSAND/UL (ref 4.31–10.16)

## 2024-07-18 PROCEDURE — 84443 ASSAY THYROID STIM HORMONE: CPT

## 2024-07-18 PROCEDURE — 80053 COMPREHEN METABOLIC PANEL: CPT | Performed by: FAMILY MEDICINE

## 2024-07-18 PROCEDURE — 87635 SARS-COV-2 COVID-19 AMP PRB: CPT | Performed by: FAMILY MEDICINE

## 2024-07-18 PROCEDURE — 36415 COLL VENOUS BLD VENIPUNCTURE: CPT | Performed by: FAMILY MEDICINE

## 2024-07-18 PROCEDURE — 99214 OFFICE O/P EST MOD 30 MIN: CPT | Performed by: FAMILY MEDICINE

## 2024-07-18 PROCEDURE — 85025 COMPLETE CBC W/AUTO DIFF WBC: CPT

## 2024-07-18 PROCEDURE — G2211 COMPLEX E/M VISIT ADD ON: HCPCS | Performed by: FAMILY MEDICINE

## 2024-07-18 NOTE — PROGRESS NOTES
Assessment/Plan:    Problem List Items Addressed This Visit    None  Visit Diagnoses       Acute viral syndrome    -  Primary    Chronic fatigue        Viral infection, unspecified        Relevant Orders    COVID Only - Office Collect             Diagnoses and all orders for this visit:    Acute viral syndrome    Chronic fatigue    Viral infection, unspecified  -     COVID Only - Office Collect        No problem-specific Assessment & Plan notes found for this encounter.        Subjective:      Patient ID: Vincent hCandler is a 79 y.o. male.    Mr. Clark to hear he just does not feel well he is very nervous tense has a feeling of malaise really bothered by the heat and humidity if he stays in the air conditioning and rests he feels a little better he has no symptoms of nausea vomiting diarrhea but his appetite is off may be some head cold symptoms but not short of breath no fever that he knows of        The following portions of the patient's history were reviewed and updated as appropriate:   He has a past medical history of Coronary artery calcification seen on CAT scan, Coronary artery disease, COVID-19 (01/28/2022), Dyslipidemia, Dyspnea on exertion, History of echocardiogram (03/22/2018), History of pericarditis, Hyperlipidemia, Lyme disease, Mitral valve prolapse, and Shingles.,  does not have any pertinent problems on file.,   has a past surgical history that includes Appendectomy; Tonsillectomy; Hernia repair; and Eye surgery.,  family history includes Heart disease in his mother; No Known Problems in his father.,   reports that he has never smoked. He has never used smokeless tobacco. He reports that he does not drink alcohol and does not use drugs.,  has No Known Allergies..  Current Outpatient Medications   Medication Sig Dispense Refill    aspirin 81 mg chewable tablet Chew 1 tablet (81 mg total) daily      atorvastatin (LIPITOR) 20 mg tablet Take 1 tablet (20 mg total) by mouth daily 30 tablet 5     "finasteride (PROSCAR) 5 mg tablet Take 1 tablet (5 mg total) by mouth daily 90 tablet 3    mirtazapine (REMERON) 15 mg tablet Take 1 tablet (15 mg total) by mouth daily at bedtime 30 tablet 5    tamsulosin (FLOMAX) 0.4 mg Take 1 capsule (0.4 mg total) by mouth daily with dinner 90 capsule 3     No current facility-administered medications for this visit.       Review of Systems   Constitutional:  Positive for activity change and fatigue.   HENT:  Positive for rhinorrhea and sinus pain.    Eyes:  Negative for visual disturbance.   Respiratory:  Negative for cough and chest tightness.    Gastrointestinal:  Negative for abdominal pain, constipation and diarrhea.   Musculoskeletal:  Negative for arthralgias.         Objective:  Vitals:    07/18/24 0819   BP: 144/78   BP Location: Left arm   Patient Position: Sitting   Cuff Size: Standard   Pulse: 86   Temp: 97.5 °F (36.4 °C)   TempSrc: Temporal   SpO2: 99%   Weight: 55.3 kg (122 lb)   Height: 5' 8\" (1.727 m)     Body mass index is 18.55 kg/m².     Physical Exam  Constitutional:       General: He is not in acute distress.     Appearance: He is well-developed. He is not diaphoretic.   HENT:      Head: Normocephalic.      Right Ear: External ear normal.      Left Ear: External ear normal.      Nose: Nose normal.   Eyes:      General: No scleral icterus.        Right eye: No discharge.         Left eye: No discharge.      Conjunctiva/sclera: Conjunctivae normal.      Pupils: Pupils are equal, round, and reactive to light.   Neck:      Thyroid: No thyromegaly.      Trachea: No tracheal deviation.   Cardiovascular:      Rate and Rhythm: Normal rate and regular rhythm.      Heart sounds: Normal heart sounds. No murmur heard.     No friction rub. No gallop.   Pulmonary:      Effort: Pulmonary effort is normal. No respiratory distress.      Breath sounds: Normal breath sounds. No wheezing.   Abdominal:      General: Bowel sounds are normal.      Palpations: Abdomen is soft. " There is no mass.      Tenderness: There is no abdominal tenderness. There is no guarding.   Musculoskeletal:         General: No deformity.      Cervical back: Normal range of motion.   Lymphadenopathy:      Cervical: No cervical adenopathy.   Skin:     General: Skin is warm and dry.      Findings: No erythema or rash.   Neurological:      Mental Status: He is alert and oriented to person, place, and time.      Cranial Nerves: No cranial nerve deficit.   Psychiatric:         Thought Content: Thought content normal.

## 2024-07-19 LAB — SARS-COV-2 RNA RESP QL NAA+PROBE: NEGATIVE

## 2024-08-06 ENCOUNTER — HOSPITAL ENCOUNTER (EMERGENCY)
Facility: HOSPITAL | Age: 79
Discharge: HOME | End: 2024-08-06
Attending: EMERGENCY MEDICINE
Payer: COMMERCIAL

## 2024-08-06 VITALS
TEMPERATURE: 97.9 F | DIASTOLIC BLOOD PRESSURE: 74 MMHG | OXYGEN SATURATION: 98 % | RESPIRATION RATE: 20 BRPM | HEART RATE: 67 BPM | SYSTOLIC BLOOD PRESSURE: 143 MMHG

## 2024-08-06 DIAGNOSIS — Z86.39 HISTORY OF DEHYDRATION: ICD-10-CM

## 2024-08-06 DIAGNOSIS — R42 DIZZINESS: Primary | ICD-10-CM

## 2024-08-06 LAB
ALBUMIN SERPL BCG-MCNC: 4.4 G/DL (ref 3.5–5)
ALP SERPL-CCNC: 57 U/L (ref 34–104)
ALT SERPL W P-5'-P-CCNC: 17 U/L (ref 7–52)
ANION GAP SERPL CALCULATED.3IONS-SCNC: 9 MMOL/L (ref 4–13)
AST SERPL W P-5'-P-CCNC: 22 U/L (ref 13–39)
BASOPHILS # BLD AUTO: 0.08 THOUSANDS/ÂΜL (ref 0–0.1)
BASOPHILS NFR BLD AUTO: 1 % (ref 0–1)
BILIRUB SERPL-MCNC: 0.8 MG/DL (ref 0.2–1)
BILIRUB UR QL STRIP: NEGATIVE
BNP SERPL-MCNC: 48 PG/ML (ref 0–100)
BUN SERPL-MCNC: 19 MG/DL (ref 5–25)
CALCIUM SERPL-MCNC: 9.8 MG/DL (ref 8.4–10.2)
CARDIAC TROPONIN I PNL SERPL HS: 3 NG/L
CHLORIDE SERPL-SCNC: 99 MMOL/L (ref 96–108)
CLARITY UR: CLEAR
CO2 SERPL-SCNC: 30 MMOL/L (ref 21–32)
COLOR UR: COLORLESS
CREAT SERPL-MCNC: 0.96 MG/DL (ref 0.6–1.3)
EOSINOPHIL # BLD AUTO: 0.32 THOUSAND/ÂΜL (ref 0–0.61)
EOSINOPHIL NFR BLD AUTO: 5 % (ref 0–6)
ERYTHROCYTE [DISTWIDTH] IN BLOOD BY AUTOMATED COUNT: 12.5 % (ref 11.6–15.1)
FLUAV RNA RESP QL NAA+PROBE: NEGATIVE
FLUBV RNA RESP QL NAA+PROBE: NEGATIVE
GFR SERPL CREATININE-BSD FRML MDRD: 73 ML/MIN/1.73SQ M
GLUCOSE SERPL-MCNC: 101 MG/DL (ref 65–140)
GLUCOSE UR STRIP-MCNC: NEGATIVE MG/DL
HCT VFR BLD AUTO: 44.9 % (ref 36.5–49.3)
HGB BLD-MCNC: 14.4 G/DL (ref 12–17)
HGB UR QL STRIP.AUTO: NEGATIVE
IMM GRANULOCYTES # BLD AUTO: 0.01 THOUSAND/UL (ref 0–0.2)
IMM GRANULOCYTES NFR BLD AUTO: 0 % (ref 0–2)
KETONES UR STRIP-MCNC: NEGATIVE MG/DL
LEUKOCYTE ESTERASE UR QL STRIP: NEGATIVE
LYMPHOCYTES # BLD AUTO: 1.53 THOUSANDS/ÂΜL (ref 0.6–4.47)
LYMPHOCYTES NFR BLD AUTO: 24 % (ref 14–44)
MAGNESIUM SERPL-MCNC: 2.2 MG/DL (ref 1.9–2.7)
MCH RBC QN AUTO: 29.2 PG (ref 26.8–34.3)
MCHC RBC AUTO-ENTMCNC: 32.1 G/DL (ref 31.4–37.4)
MCV RBC AUTO: 91 FL (ref 82–98)
MONOCYTES # BLD AUTO: 0.58 THOUSAND/ÂΜL (ref 0.17–1.22)
MONOCYTES NFR BLD AUTO: 9 % (ref 4–12)
NEUTROPHILS # BLD AUTO: 3.85 THOUSANDS/ÂΜL (ref 1.85–7.62)
NEUTS SEG NFR BLD AUTO: 61 % (ref 43–75)
NITRITE UR QL STRIP: NEGATIVE
NRBC BLD AUTO-RTO: 0 /100 WBCS
PH UR STRIP.AUTO: 7 [PH]
PLATELET # BLD AUTO: 201 THOUSANDS/UL (ref 149–390)
PMV BLD AUTO: 10.5 FL (ref 8.9–12.7)
POTASSIUM SERPL-SCNC: 4 MMOL/L (ref 3.5–5.3)
PROT SERPL-MCNC: 7.1 G/DL (ref 6.4–8.4)
PROT UR STRIP-MCNC: NEGATIVE MG/DL
RBC # BLD AUTO: 4.93 MILLION/UL (ref 3.88–5.62)
RSV RNA RESP QL NAA+PROBE: NEGATIVE
SARS-COV-2 RNA RESP QL NAA+PROBE: NEGATIVE
SODIUM SERPL-SCNC: 138 MMOL/L (ref 135–147)
SP GR UR STRIP.AUTO: 1.01 (ref 1–1.03)
TSH SERPL DL<=0.05 MIU/L-ACNC: 2.49 UIU/ML (ref 0.45–4.5)
UROBILINOGEN UR STRIP-ACNC: <2 MG/DL
WBC # BLD AUTO: 6.37 THOUSAND/UL (ref 4.31–10.16)

## 2024-08-06 PROCEDURE — 84484 ASSAY OF TROPONIN QUANT: CPT | Performed by: EMERGENCY MEDICINE

## 2024-08-06 PROCEDURE — 83880 ASSAY OF NATRIURETIC PEPTIDE: CPT | Performed by: EMERGENCY MEDICINE

## 2024-08-06 PROCEDURE — 96365 THER/PROPH/DIAG IV INF INIT: CPT

## 2024-08-06 PROCEDURE — 81003 URINALYSIS AUTO W/O SCOPE: CPT | Performed by: EMERGENCY MEDICINE

## 2024-08-06 PROCEDURE — 99285 EMERGENCY DEPT VISIT HI MDM: CPT | Performed by: EMERGENCY MEDICINE

## 2024-08-06 PROCEDURE — 99285 EMERGENCY DEPT VISIT HI MDM: CPT

## 2024-08-06 PROCEDURE — 84443 ASSAY THYROID STIM HORMONE: CPT | Performed by: EMERGENCY MEDICINE

## 2024-08-06 PROCEDURE — 93005 ELECTROCARDIOGRAM TRACING: CPT

## 2024-08-06 PROCEDURE — 0241U HB NFCT DS VIR RESP RNA 4 TRGT: CPT | Performed by: EMERGENCY MEDICINE

## 2024-08-06 PROCEDURE — 36415 COLL VENOUS BLD VENIPUNCTURE: CPT | Performed by: EMERGENCY MEDICINE

## 2024-08-06 PROCEDURE — 83735 ASSAY OF MAGNESIUM: CPT | Performed by: EMERGENCY MEDICINE

## 2024-08-06 PROCEDURE — 80053 COMPREHEN METABOLIC PANEL: CPT | Performed by: EMERGENCY MEDICINE

## 2024-08-06 PROCEDURE — 85025 COMPLETE CBC W/AUTO DIFF WBC: CPT | Performed by: EMERGENCY MEDICINE

## 2024-08-06 RX ORDER — SODIUM CHLORIDE, SODIUM GLUCONATE, SODIUM ACETATE, POTASSIUM CHLORIDE, MAGNESIUM CHLORIDE, SODIUM PHOSPHATE, DIBASIC, AND POTASSIUM PHOSPHATE .53; .5; .37; .037; .03; .012; .00082 G/100ML; G/100ML; G/100ML; G/100ML; G/100ML; G/100ML; G/100ML
1000 INJECTION, SOLUTION INTRAVENOUS ONCE
Status: COMPLETED | OUTPATIENT
Start: 2024-08-06 | End: 2024-08-06

## 2024-08-06 RX ADMIN — SODIUM CHLORIDE, SODIUM GLUCONATE, SODIUM ACETATE, POTASSIUM CHLORIDE, MAGNESIUM CHLORIDE, SODIUM PHOSPHATE, DIBASIC, AND POTASSIUM PHOSPHATE 1000 ML: .53; .5; .37; .037; .03; .012; .00082 INJECTION, SOLUTION INTRAVENOUS at 15:15

## 2024-08-06 NOTE — ED PROVIDER NOTES
"History  Chief Complaint   Patient presents with    Weakness - Generalized     Patient states he feels weak and believes he may be dehydrated; states feels better inside/in air conditioning but feels worse outside/in the heat; per patient/family eating okay but not drinking enough     HPI  This is a 79-year-old male who presents the emergency department with family from home for evaluation of intermittent dizziness.  Patient has a past medical history significant for CAD, mitral valve prolapse, HLD, anxiety.  Patient takes a daily aspirin, Flomax, atorvastatin Remeron and finasteride.    Patient reports that symptoms going on over weeks to months.  He reports intermittent episodes which he attributes to exposure to the heat.  He describes the episodes as brief periods where he feels \"sick all over\".  He does not feel like he is going to pass out but does endorse some nonspecific dizziness which is not vertiginous in nature.  He denies any falls or syncopal  with this.  Denies chest pain or diaphoresis.  He notes that he has been able to do his typical exertion and recalls recently going on a 15-minute walk and recalls it was hot outside but when he got back to the house and sat down he had acute onset of the symptoms.  In general he feels like the symptoms occur when he is up and about or changing positions rather than when he is resting.  He denies any other acute symptoms.    Family quickly adds that the patient does not drink enough has never drink enough anything the patient is dehydrated.  I think given the persistence of these episodes and the concerns that the patient is dehydrated they brought him here for evaluation.  No new medication changes.  No history of arrhythmias.  No leg swelling.  Prior to Admission Medications   Prescriptions Last Dose Informant Patient Reported? Taking?   aspirin 81 mg chewable tablet  Self No No   Sig: Chew 1 tablet (81 mg total) daily   atorvastatin (LIPITOR) " 20 mg tablet  Self No No   Sig: Take 1 tablet (20 mg total) by mouth daily   finasteride (PROSCAR) 5 mg tablet  Self No No   Sig: Take 1 tablet (5 mg total) by mouth daily   mirtazapine (REMERON) 15 mg tablet  Self No No   Sig: Take 1 tablet (15 mg total) by mouth daily at bedtime   tamsulosin (FLOMAX) 0.4 mg   No No   Sig: Take 1 capsule (0.4 mg total) by mouth daily with dinner      Facility-Administered Medications: None       Past Medical History:   Diagnosis Date    Coronary artery calcification seen on CAT scan     Coronary artery disease     COVID-19 01/28/2022    Dyslipidemia     Dyspnea on exertion     History of echocardiogram 03/22/2018    Normal EF, normal LVSF.Thick and rebundent MV leaflets w/ mild posterior leaflet prolapse w/ trace regurg.    History of pericarditis     Hyperlipidemia     Lyme disease     Mitral valve prolapse     Shingles        Past Surgical History:   Procedure Laterality Date    APPENDECTOMY      EYE SURGERY      HERNIA REPAIR      TONSILLECTOMY         Family History   Problem Relation Age of Onset    Heart disease Mother     No Known Problems Father      I have reviewed and agree with the history as documented.    E-Cigarette/Vaping    E-Cigarette Use Never User      E-Cigarette/Vaping Substances    Nicotine No     THC No     CBD No     Flavoring No     Other No     Unknown No      Social History     Tobacco Use    Smoking status: Never    Smokeless tobacco: Never   Vaping Use    Vaping status: Never Used   Substance Use Topics    Alcohol use: Never     Alcohol/week: 0.0 standard drinks of alcohol    Drug use: Never       Review of Systems   Constitutional:  Positive for activity change and fatigue. Negative for chills and fever.   HENT:  Negative for ear pain and sore throat.    Eyes:  Negative for pain and visual disturbance.   Respiratory:  Negative for cough and shortness of breath.    Cardiovascular:  Negative for chest pain and palpitations.   Gastrointestinal:  Negative  for abdominal pain, diarrhea, nausea and vomiting.   Genitourinary:  Negative for dysuria and hematuria.   Musculoskeletal:  Negative for arthralgias and back pain.   Skin:  Negative for color change and rash.   Neurological:  Positive for dizziness. Negative for seizures and syncope.   All other systems reviewed and are negative.      Physical Exam  Physical Exam  Vitals and nursing note reviewed. Exam conducted with a chaperone present.   Constitutional:       General: He is not in acute distress.     Appearance: Normal appearance. He is not ill-appearing, toxic-appearing or diaphoretic.   HENT:      Head: Normocephalic and atraumatic.      Right Ear: External ear normal.      Left Ear: External ear normal.      Nose: Nose normal.      Mouth/Throat:      Mouth: Mucous membranes are moist.      Pharynx: Oropharynx is clear.   Eyes:      Conjunctiva/sclera: Conjunctivae normal.   Cardiovascular:      Rate and Rhythm: Normal rate and regular rhythm.      Pulses: Normal pulses.      Heart sounds: Normal heart sounds.   Pulmonary:      Effort: Pulmonary effort is normal.      Breath sounds: Normal breath sounds. No wheezing, rhonchi or rales.   Abdominal:      General: Abdomen is flat.   Musculoskeletal:      Cervical back: Neck supple.      Right lower leg: No edema.      Left lower leg: No edema.   Skin:     General: Skin is warm and dry.      Capillary Refill: Capillary refill takes less than 2 seconds.   Neurological:      General: No focal deficit present.      Mental Status: He is alert. Mental status is at baseline.         Vital Signs  ED Triage Vitals   Temperature Pulse Respirations Blood Pressure SpO2   08/06/24 1405 08/06/24 1317 08/06/24 1317 08/06/24 1317 08/06/24 1317   97.9 °F (36.6 °C) 83 18 129/83 96 %      Temp Source Heart Rate Source Patient Position - Orthostatic VS BP Location FiO2 (%)   08/06/24 1405 08/06/24 1317 08/06/24 1317 08/06/24 1317 --   Temporal Monitor Sitting Right arm       Pain  Score       08/06/24 1317       No Pain           Vitals:    08/06/24 1430 08/06/24 1500 08/06/24 1530 08/06/24 1600   BP: (!) 165/112 143/76 153/88 161/92   Pulse: 71 69 68 66   Patient Position - Orthostatic VS:             Visual Acuity      ED Medications  Medications   multi-electrolyte (ISOLYTE-S PH 7.4) bolus 1,000 mL (0 mL Intravenous Stopped 8/6/24 1615)       Diagnostic Studies  Results Reviewed       Procedure Component Value Units Date/Time    UA w Reflex to Microscopic w Reflex to Culture [[id number]] Collected: 08/06/24 1517    Lab Status: Final result Specimen: Urine, Clean Catch Updated: 08/06/24 1526     Color, UA Colorless     Clarity, UA Clear     Specific Gravity, UA 1.015     pH, UA 7.0     Leukocytes, UA Negative     Nitrite, UA Negative     Protein, UA Negative mg/dl      Glucose, UA Negative mg/dl      Ketones, UA Negative mg/dl      Urobilinogen, UA <2.0 mg/dl      Bilirubin, UA Negative     Occult Blood, UA Negative    FLU/RSV/COVID - if FLU/RSV clinically relevant [[id number]]  (Normal) Collected: 08/06/24 1405    Lab Status: Final result Specimen: Nares from Nose Updated: 08/06/24 1500     SARS-CoV-2 Negative     INFLUENZA A PCR Negative     INFLUENZA B PCR Negative     RSV PCR Negative    Narrative:      FOR PEDIATRIC PATIENTS - copy/paste COVID Guidelines URL to browser: [email or web address]    SARS-CoV-2 assay is a Nucleic Acid Amplification assay intended for the  qualitative detection of nucleic acid from SARS-CoV-2 in nasopharyngeal  swabs. Results are for the presumptive identification of SARS-CoV-2 RNA.    Positive results are indicative of infection with SARS-CoV-2, the virus  causing COVID-19, but do not rule out bacterial infection or co-infection  with other viruses. Laboratories within the [place] and its  territories are required to report all positive results to the appropriate  public health authorities. Negative  results do not preclude SARS-CoV-2  infection and should not be used as the sole basis for treatment or other  patient management decisions. Negative results must be combined with  clinical observations, patient history, and epidemiological information.  This test has not been FDA cleared or approved.    This test has been authorized by FDA under an Emergency Use Authorization  (EUA). This test is only authorized for the duration of time the  declaration that circumstances exist justifying the authorization of the  emergency use of an in vitro diagnostic tests for detection of SARS-CoV-2  virus and/or diagnosis of COVID-19 infection under section 564(b)(1) of  the Act, 21 U.S.C. 360bbb-3(b)(1), unless the authorization is terminated  or revoked sooner. The test has been validated but independent review by FDA  and CLIA is pending.    Test performed using [email or web address]: This RT-PCR assay targets N2,  a region unique to SARS-CoV-2. A conserved region in the E-gene was chosen  for pan-Sarbecovirus detection which includes SARS-CoV-2.    According to CMS-2020-01-R, this platform meets the definition of high-throughput technology.    TSH, 3rd generation with Free T4 reflex [[id number]]  (Normal) Collected: 08/06/24 1405    Lab Status: Final result Specimen: Blood from Arm, Right Updated: 08/06/24 1452     TSH 3RD GENERATON 2.493 uIU/mL     HS Troponin 0hr (reflex protocol) [[id number]]  (Normal) Collected: 08/06/24 1405    Lab Status: Final result Specimen: Blood from Arm, Right Updated: 08/06/24 1444     hs TnI 0hr 3 ng/L     B-Type Natriuretic Peptide(BNP) [[id number]]  (Normal) Collected: 08/06/24 1405    Lab Status: Final result Specimen: Blood from Arm, Right Updated: 08/06/24 1443     BNP 48 pg/mL     Comprehensive metabolic panel [[id number]] Collected: 08/06/24 1405    Lab Status: Final result Specimen: Blood from Arm, Right Updated: 08/06/24 1439     Sodium 138 mmol/L      Potassium 4.0 mmol/L      Chloride 99  mmol/L      CO2 30 mmol/L      ANION GAP 9 mmol/L      BUN 19 mg/dL      Creatinine 0.96 mg/dL      Glucose 101 mg/dL      Calcium 9.8 mg/dL      AST 22 U/L      ALT 17 U/L      Alkaline Phosphatase 57 U/L      Total Protein 7.1 g/dL      Albumin 4.4 g/dL      Total Bilirubin 0.80 mg/dL      eGFR 73 ml/min/1.73sq m     Narrative:      National Kidney Disease Foundation guidelines for Chronic Kidney Disease (CKD):     Stage 1 with normal or high GFR (GFR > 90 mL/min/1.73 square meters)    Stage 2 Mild CKD (GFR = 60-89 mL/min/1.73 square meters)    Stage 3A Moderate CKD (GFR = 45-59 mL/min/1.73 square meters)    Stage 3B Moderate CKD (GFR = 30-44 mL/min/1.73 square meters)    Stage 4 Severe CKD (GFR = 15-29 mL/min/1.73 square meters)    Stage 5 End Stage CKD (GFR <15 mL/min/1.73 square meters)  Note: GFR calculation is accurate only with a steady state creatinine    Magnesium [[id number]]  (Normal) Collected: 08/06/24 1405    Lab Status: Final result Specimen: Blood from Arm, Right Updated: 08/06/24 1439     Magnesium 2.2 mg/dL     CBC and differential [[id number]] Collected: 08/06/24 1405    Lab Status: Final result Specimen: Blood from Arm, Right Updated: 08/06/24 1425     WBC 6.37 Thousand/uL      RBC 4.93 Million/uL      Hemoglobin 14.4 g/dL      Hematocrit 44.9 %      MCV 91 fL      MCH 29.2 pg      MCHC 32.1 g/dL      RDW 12.5 %      MPV 10.5 fL      Platelets 201 Thousands/uL      nRBC 0 /100 WBCs      Segmented % 61 %      Immature Grans % 0 %      Lymphocytes % 24 %      Monocytes % 9 %      Eosinophils Relative 5 %      Basophils Relative 1 %      Absolute Neutrophils 3.85 Thousands/µL      Absolute Immature Grans 0.01 Thousand/uL      Absolute Lymphocytes 1.53 Thousands/µL      Absolute Monocytes 0.58 Thousand/µL      Eosinophils Absolute 0.32 Thousand/µL      Basophils Absolute 0.08 Thousands/µL                    No orders to display              Procedures  Procedures         ED Course  ED Course as of  08/06/24 1642   Tue Aug 06, 2024   1349 EKG interpreted by myself.  EKG dated 8/6/2024 1318 demonstrates normal sinus rhythm 81 bpm, normal MA, QRS, QTc durations, septal infarct, age indeterminate, no STEMI.                                 SBIRT 20yo+      Flowsheet Row Most Recent Value   Initial Alcohol Screen: US AUDIT-C     1. How often do you have a drink containing alcohol? 0 Filed at: 08/06/2024 1320   2. How many drinks containing alcohol do you have on a typical day you are drinking?  0 Filed at: 08/06/2024 1320   3b. FEMALE Any Age, or MALE 65+: How often do you have 4 or more drinks on one occassion? 0 Filed at: 08/06/2024 1320   Audit-C Score 0 Filed at: 08/06/2024 1320   JEFFREY: How many times in the past year have you...    Used an illegal drug or used a prescription medication for non-medical reasons? Never Filed at: 08/06/2024 1320                      Medical Decision Making  This is an 79-year-old male who presents to the ER with concerns for intermittent episodes which he attributes to heat exposure/heat intolerance as well as dehydration.  Sounds like the patient does not drink an adequate amount of water per family.  The episodes are intermittent tend to occur with exertion or recent exertion.  There is no chest pain shortness of breath syncope or diaphoresis associated with this.  The patient was brought here for general evaluation but is awake and alert quite well-appearing with normal screening vital signs.    Physical exam is unremarkable for age.    Diagnosis includes dehydration, autonomic instability, arrhythmia, atypical angina, anxiety, orthostasis due to other causes.    Workup in the ER reveals no acute findings.  Patient did receive IV fluids.  Vital signs were stable.  Patient was advised to monitor symptoms encouraged p.o. fluids observed if the IV fluid bolus given in the ER has an impact on symptoms and avoid prolonged heat exposure but if symptoms persist discussed other possible  causes and potential need to follow-up with PCP or cardiology for further testing/evaluation.  For any severe or new symptoms patient is advised to return here immediately.  He verbalized understanding and agreement.    Problems Addressed:  Dizziness: acute illness or injury  History of dehydration: acute illness or injury    Amount and/or Complexity of Data Reviewed  Labs: ordered.  ECG/medicine tests: ordered and independent interpretation performed. Decision-making details documented in ED Course.    Risk  Prescription drug management.                 Disposition  Final diagnoses:   Dizziness   History of dehydration     Time reflects when diagnosis was documented in both MDM as applicable and the Disposition within this note       Time User Action Codes Description Comment    8/6/2024  4:12 PM Adryan Le [[id number]] Dizziness     8/6/2024  4:12 PM Adryan Le [Z86.39] History of dehydration           ED Disposition       ED Disposition   Discharge    Condition   Stable    Date/Time   Tue Aug 6, 2024 1612    Comment   Vincent Chandler discharge to home/self care.                   Follow-up Information       Follow up With Specialties Details Why Contact Info Additional Information    Girish Hurley DO Family Medicine Schedule an appointment as soon as possible for a visit   [address] [facility] [address]  [phone]       [facility] Schedule an appointment as soon as possible for a visit  for further testing, if symptoms do not improve [address] [place] [id number] [phone] [facility] [address], [id number] [phone]            Patient's Medications   Discharge Prescriptions    No medications on file           PDMP Review         Value Time User    PDMP Reviewed  Yes 6/27/2022 11:43 AM Girish Hurley DO            ED  Provider  Electronically Signed by             Adryan Le DO  08/06/24 9906

## 2024-08-06 NOTE — DISCHARGE INSTRUCTIONS
Thank you for visiting the Emergency Department today.    Normal vital signs  Normal EKG  Normal Labs  Normal urine.     Symptoms may be related to heat or dehydration.  Workup in the ER often does not identify these kinds of problems.  Fortunately, there is no other severe abnormalities noted on your workup.  We have provided some IV fluids for hydration and you should increase your fluid intake and observe if your symptoms improve with those interventions.    If you do not notice any improvement in your symptoms despite IV hydration, suspicion for other causes goes up and we recommend follow-up with your PCP or a cardiologist for further testing/evaluation.  As I discussed before sometimes the symptoms of the result of blunted or delayed reflexes which help blood flow properly to the areas it is needed when you are standing or doing activity or changing positions.  Sometimes further testing can identify this problem and sometimes medicine or lifestyle changes can help.    If you experience severe or new symptoms or any other acute concerns prior to follow-up or despite follow-up we recommend you return to the ER immediately for reevaluation.

## 2024-08-08 LAB
ATRIAL RATE: 81 BPM
P AXIS: 61 DEGREES
PR INTERVAL: 144 MS
QRS AXIS: 85 DEGREES
QRSD INTERVAL: 82 MS
QT INTERVAL: 366 MS
QTC INTERVAL: 425 MS
T WAVE AXIS: 70 DEGREES
VENTRICULAR RATE: 81 BPM

## 2024-08-08 PROCEDURE — 93010 ELECTROCARDIOGRAM REPORT: CPT | Performed by: INTERNAL MEDICINE

## 2024-08-09 ENCOUNTER — OFFICE VISIT (OUTPATIENT)
Dept: CARDIOLOGY CLINIC | Facility: CLINIC | Age: 79
End: 2024-08-09
Payer: COMMERCIAL

## 2024-08-09 VITALS
BODY MASS INDEX: 18.1 KG/M2 | SYSTOLIC BLOOD PRESSURE: 140 MMHG | HEIGHT: 68 IN | HEART RATE: 68 BPM | DIASTOLIC BLOOD PRESSURE: 82 MMHG | WEIGHT: 119.4 LBS

## 2024-08-09 DIAGNOSIS — E78.5 DYSLIPIDEMIA: ICD-10-CM

## 2024-08-09 DIAGNOSIS — R53.83 OTHER FATIGUE: ICD-10-CM

## 2024-08-09 DIAGNOSIS — R07.9 CHEST PAIN, UNSPECIFIED TYPE: ICD-10-CM

## 2024-08-09 DIAGNOSIS — I25.10 CORONARY ARTERY CALCIFICATION SEEN ON CAT SCAN: Primary | ICD-10-CM

## 2024-08-09 DIAGNOSIS — I25.119 CORONARY ARTERY DISEASE INVOLVING NATIVE CORONARY ARTERY OF NATIVE HEART WITH ANGINA PECTORIS (HCC): ICD-10-CM

## 2024-08-09 DIAGNOSIS — R11.0 NAUSEA: ICD-10-CM

## 2024-08-09 DIAGNOSIS — R07.89 OTHER CHEST PAIN: ICD-10-CM

## 2024-08-09 PROCEDURE — 99214 OFFICE O/P EST MOD 30 MIN: CPT | Performed by: INTERNAL MEDICINE

## 2024-08-09 NOTE — PROGRESS NOTES
" Patient ID: Vincent Chandler is a 79 y.o. male.        Plan:      Coronary artery disease involving native coronary artery of native heart with angina pectoris (HCC)  Calcifications on CT scan  Cont ASA and Statin    Other chest pain  Further testing advised  LNST ordered, Feels he couldn't walk on treadmill    If - then I think again anxiety need be looked into, he tells me he is seeing you next week    Dyslipidemia  Controlled       Follow up Plan/Other summary comments:  Return for Next scheduled follow up.    HPI: Vincent presents with multiple complaints.  ER visit/results reviewed in detail.  A bit rambling, staring at floor, anxious, repetitive, concerned, genuinely.  Reminiscent of our  visit when he was on Prozac for awhile.  Regardless, his concern is is this his heart.  He has vague exertional fatigue, vague upper anterior chest heaviness though lasting for hours post exertion, keeps saying he just doesn't feel right.  Was told he was dehydrated by the ER and his family.  Keeps losing weight.  Says he can't sleep.  Doesn't feel like doing things he used to enjoy again.    Recent EKG no acute findings      Most recent or relevant cardiac/vascular testin NST WNL EF 69%      Past Surgical History:   Procedure Laterality Date    APPENDECTOMY      EYE SURGERY      HERNIA REPAIR      TONSILLECTOMY         Lipid Profile: Reviewed      Review of Systems   10  point ROS  was otherwise non pertinent or negative except as per HPI or as below.         Objective:     /82   Pulse 68   Ht 5' 8\" (1.727 m)   Wt 54.2 kg (119 lb 6.4 oz)   BMI 18.15 kg/m²     PHYSICAL EXAM:    General:  Cachectic  Eyes:  Anicteric.  Oral mucosa:  Moist.  Neck:  No JVD. Carotid upstrokes are brisk without bruits.  No masses.  Chest:  Clear to auscultation.  Cardiac:  Regular, palpable nondisplaced non delayed PMI.  Normal S1 and S2.  No murmur gallop or rub.  Abdomen:  Soft and nontender. No palpable organomegaly or " aortic enlargement.  Extremities:  No peripheral edema.  Musculoskeletal:  Symmetric.   VascularPedal pulses are intact.  Neuro:  Grossly symmetric.  Psych:  Alert and oriented x3, clearly anxious.      Meds reviewed.    Past Medical History:   Diagnosis Date    Coronary artery calcification seen on CAT scan     Coronary artery disease     COVID-19 01/28/2022    Dyslipidemia     Dyspnea on exertion     History of echocardiogram 03/22/2018    Normal EF, normal LVSF.Thick and rebundent MV leaflets w/ mild posterior leaflet prolapse w/ trace regurg.    History of pericarditis     Hyperlipidemia     Lyme disease     Mitral valve prolapse     Shingles            Social History     Tobacco Use   Smoking Status Never   Smokeless Tobacco Never       Current Outpatient Medications:     aspirin 81 mg chewable tablet, Chew 1 tablet (81 mg total) daily, Disp: , Rfl:     atorvastatin (LIPITOR) 20 mg tablet, Take 1 tablet (20 mg total) by mouth daily, Disp: 30 tablet, Rfl: 5    finasteride (PROSCAR) 5 mg tablet, Take 1 tablet (5 mg total) by mouth daily, Disp: 90 tablet, Rfl: 3    mirtazapine (REMERON) 15 mg tablet, Take 1 tablet (15 mg total) by mouth daily at bedtime, Disp: 30 tablet, Rfl: 5    tamsulosin (FLOMAX) 0.4 mg, Take 1 capsule (0.4 mg total) by mouth daily with dinner, Disp: 90 capsule, Rfl: 3

## 2024-08-09 NOTE — PATIENT INSTRUCTIONS
"Patient Education     High cholesterol   The Basics   Written by the doctors and editors at [facility]   What is cholesterol? -- Cholesterol is a substance found in blood. Everyone has some. It is needed for good health. But people sometimes have too much cholesterol.  Compared with people with normal cholesterol, people with high cholesterol have a higher risk of heart attack, stroke, and other health problems. The higher your cholesterol, the higher your risk of these problems.  Are there different types of cholesterol? -- Yes, there are a few different types. If you get a cholesterol test, you might hear your doctor or nurse talk about:   Total cholesterol   LDL cholesterol - Some people call this the \"bad\" cholesterol. That's because having high LDL levels raises your risk of heart attack, stroke, and other health problems.   HDL cholesterol - Some people call this the \"good\" cholesterol. That's because people with high HDL levels tend to have a lower risk of heart attack, stroke, and other health problems.   Non-HDL cholesterol - Non-HDL cholesterol is your total cholesterol minus your HDL cholesterol.   Triglycerides - Triglycerides are not cholesterol. They are another type of fat. But they often get measured when cholesterol is measured. (Having high triglycerides also seems to increase the risk of heart attack and stroke.)  What should my numbers be? -- Ask your doctor or nurse what your numbers should be. Different people need different goals. If you live outside of the [place], see the table (table 1).  In general, people who do not already have heart disease should aim for:   Total cholesterol below 200   LDL cholesterol below 130, or much lower if they are at risk of heart attack or stroke   HDL cholesterol above 60   Non-HDL cholesterol below 160, or lower if they are at risk of heart attack or stroke   Triglycerides below 150  Remember, though, that many people who cannot meet these goals still have a low " "risk of heart attack and stroke.  What should I do if I have high cholesterol? -- Ask your doctor what your overall risk of heart attack and stroke is. Just having high cholesterol is not always a reason to worry. Having high cholesterol is just one of many things that can increase your risk of heart attack and stroke.  Other things that increase your risk include:   Smoking   High blood pressure   Having a parent or sibling who got heart disease at a young age - Young, in this case, means younger than 55 for males and younger than 65 for females.   A diet that is not heart healthy - A \"heart-healthy\" diet includes lots of fruits and vegetables, fiber, and healthy fats (like those found in fish, nuts, and certain oils). It also means limiting sugar and unhealthy fats.   Older age  If you are at high risk of heart attack and stroke, having high cholesterol is a problem. But if you are at low risk, high cholesterol might not need treatment.  Should I take medicine to lower cholesterol? -- Not everyone who has high cholesterol needs medicines. Your doctor or nurse will decide if you need them based on your age, family history, and other health concerns.  There are many different medicines used to lower cholesterol (table 2). Some help your body make less cholesterol. Some keep your body from absorbing cholesterol from foods. Some help your body get rid of cholesterol faster. The medicines most often used to treat high cholesterol are called \"statins.\"  You should probably take a statin if you:   Already had a heart attack or stroke   Have known heart disease   Have diabetes   Have a condition called \"peripheral artery disease,\" which makes it painful to walk, and happens when the arteries in your legs get clogged with fatty deposits   Have an \"abdominal aortic aneurysm,\" which is a widening of the main artery in the belly  Most people with any of the conditions listed above should take a statin no matter what their " "cholesterol level is. If your doctor or nurse prescribes a statin, it's important to keep taking it. The medicine might not make you feel any different. But it can help prevent heart attack, stroke, and death.  If your doctor or nurse recommends taking medicine to help lower your cholesterol, make sure that you know what it is called. Follow all the instructions for how to take it. For example, some medicines work better when you take them in the evening. Some need to be taken with food.  Tell your doctor or nurse if your medicine causes any side effects that bother you. They might be able to switch you to a different medicine.  Can I lower my cholesterol without medicines? -- Yes. You can help lower your cholesterol by doing these things:   You can lower your LDL, or \"bad,\" cholesterol by avoiding red meat, butter, fried foods, cheese, and other foods that have a lot of saturated fat.   You can lower triglycerides by avoiding sugary foods, fried foods, and excess alcohol.   If you have excess weight, it can help to lose weight. Your doctor or nurse can help you do this in a healthy way.   Try to get regular physical activity. Even gentle forms of exercise, like walking, are good for your health.  Even if these steps don't change your cholesterol very much, they can improve your health in many other ways.  All topics are updated as new evidence becomes available and our peer review process is complete.  This topic retrieved from [employer] on: Mar 01, 2024.  Topic 58537 Version 25.0  Release: 32.2.4 - C32.59  © 2024 UpToDate, Inc. and/or its affiliates. All rights reserved.  table 1: Cholesterol and triglyceride measurements in the [place] and elsewhere     Measurement used within the [place] Milligrams/deciliter (mg/dL)  Measurement used most places outside of the [place] Millimoles/liter (mmol/Liter)     Level to aim for  Level to aim for    Total cholesterol  Below 200 Below 5.17   LDL cholesterol  Below 130, or much lower if at " risk of heart attack and stroke Below 3.36, or much lower if at risk of heart attack and stroke   HDL cholesterol  Above 60 Above 1.55   Triglycerides  Below 150 Below 1.7   Cholesterol is measured differently in the [place] than it is in most other countries. This table shows values used within and outside of the [place]. It includes the cholesterol and triglyceride levels that most people who do not have heart disease should aim for.  Graphic 03773 Version 5.0  table 2: Lipid-lowering medicines  Generic name  Brand name    Statins    Atorvastatin Lipitor   Fluvastatin Lescol, Lescol XL   Lovastatin Mevacor, Altoprev   Pitavastatin Livalo   Pravastatin Pravachol   Rosuvastatin Crestor   Simvastatin Zocor   PCSK9 inhibitors    Alirocumab Praluent   Evolocumab Repatha, Repatha SureClick   Cholesterol absorption inhibitors    Ezetimibe Zetia   Bile acid sequestrants    Cholestyramine Prevalite, Questran, Questran Light   Colesevelam Welchol   Colestipol Colestid   Niacin (nicotinic acid)    Niacin immediate release     Niacin extended release Niaspan   Fibrates    Fenofibrate Fenoglide, Tricor, Triglide, others   Gemfibrozil Lopid   Brand names listed are for medicines available in the [place] and some other countries.  Graphic 69412 Version 7.0  Consumer Information Use and Disclaimer   Disclaimer: This generalized information is a limited summary of diagnosis, treatment, and/or medication information. It is not meant to be comprehensive and should be used as a tool to help the user understand and/or assess potential diagnostic and treatment options. It does NOT include all information about conditions, treatments, medications, side effects, or risks that may apply to a specific patient. It is not intended to be medical advice or a substitute for the medical advice, diagnosis, or treatment of a health care provider based on the health care provider's examination and assessment of a patient's specific and unique circumstances.  Patients must speak with a health care provider for complete information about their health, medical questions, and treatment options, including any risks or benefits regarding use of medications. This information does not endorse any treatments or medications as safe, effective, or approved for treating a specific patient. UpToDate, Inc. and its affiliates disclaim any warranty or liability relating to this information or the use thereof.The use of this information is governed by the Terms of Use, available at https://www.woltersHopscotchuwer.com/en/know/clinical-effectiveness-terms. 2024© UpToDate, Inc. and its affiliates and/or licensors. All rights reserved.  Copyright   © 2024 UpToDate, Inc. and/or its affiliates. All rights reserved.

## 2024-08-09 NOTE — ASSESSMENT & PLAN NOTE
Further testing advised  LNST ordered, Feels he couldn't walk on treadmill    If - then I think again anxiety need be looked into, he tells me he is seeing you next week

## 2024-08-12 ENCOUNTER — OFFICE VISIT (OUTPATIENT)
Dept: FAMILY MEDICINE CLINIC | Facility: CLINIC | Age: 79
End: 2024-08-12
Payer: COMMERCIAL

## 2024-08-12 VITALS
HEIGHT: 68 IN | DIASTOLIC BLOOD PRESSURE: 76 MMHG | TEMPERATURE: 96.6 F | OXYGEN SATURATION: 99 % | SYSTOLIC BLOOD PRESSURE: 140 MMHG | WEIGHT: 121 LBS | BODY MASS INDEX: 18.34 KG/M2 | HEART RATE: 82 BPM

## 2024-08-12 DIAGNOSIS — F41.9 ANXIETY: Primary | ICD-10-CM

## 2024-08-12 PROCEDURE — 99214 OFFICE O/P EST MOD 30 MIN: CPT | Performed by: FAMILY MEDICINE

## 2024-08-12 PROCEDURE — G2211 COMPLEX E/M VISIT ADD ON: HCPCS | Performed by: FAMILY MEDICINE

## 2024-08-12 RX ORDER — HYDROXYZINE HYDROCHLORIDE 10 MG/1
TABLET, FILM COATED ORAL
Qty: 30 TABLET | Refills: 3 | Status: SHIPPED | OUTPATIENT
Start: 2024-08-12

## 2024-08-12 NOTE — PROGRESS NOTES
Ambulatory Visit  Name: Vincent Chandler      : 1945      MRN: [id number]  Encounter Provider: Girish Hurley DO  Encounter Date: 2024   Encounter department: [facility]    Assessment & Plan   1. Anxiety  -     hydrOXYzine HCL (ATARAX) 10 mg tablet; 1 tablet twice daily if needed for anxiety         History of Present Illness     Mr. Eduarod HASSAN is here with chief complaint of personal personal anxiety and persistent anxiety he has been on numerous medications over the time for the anxiety things he has done well with our combinations of SSRIs and anxiety medications recently he did stop the anxiety medication but can persisted with his medicine for sleep at night going to add hydroxyzine 10 mg twice daily to his current drug regimen    Nausea  Associated symptoms include nausea. Pertinent negatives include no abdominal pain, arthralgias, chest pain, coughing, diaphoresis, fatigue, fever, headaches, joint swelling, myalgias, numbness, rash or vomiting.     Review of Systems   Constitutional:  Negative for activity change, appetite change, diaphoresis, fatigue and fever.   HENT: Negative.     Eyes: Negative.    Respiratory:  Negative for apnea, cough, chest tightness, shortness of breath and wheezing.    Cardiovascular:  Negative for chest pain, palpitations and leg swelling.   Gastrointestinal:  Positive for nausea. Negative for abdominal distention, abdominal pain, anal bleeding, constipation, diarrhea and vomiting.   Endocrine: Negative for cold intolerance, heat intolerance, polydipsia, polyphagia and polyuria.   Genitourinary:  Negative for difficulty urinating, dysuria, flank pain, hematuria and urgency.   Musculoskeletal:  Negative for arthralgias, back pain, gait problem, joint swelling and myalgias.   Skin:  Negative for color change, rash and wound.   Allergic/Immunologic: Negative for environmental allergies, food allergies and immunocompromised state.   Neurological:  Negative for  dizziness, seizures, syncope, speech difficulty, numbness and headaches.   Hematological:  Negative for adenopathy. Does not bruise/bleed easily.   Psychiatric/Behavioral:  Negative for agitation, behavioral problems, hallucinations, sleep disturbance and suicidal ideas. The patient is nervous/anxious.      Past Medical History:   Diagnosis Date    Coronary artery calcification seen on CAT scan     Coronary artery disease     COVID-19 01/28/2022    Dyslipidemia     Dyspnea on exertion     History of echocardiogram 03/22/2018    Normal EF, normal LVSF.Thick and rebundent MV leaflets w/ mild posterior leaflet prolapse w/ trace regurg.    History of pericarditis     Hyperlipidemia     Lyme disease     Mitral valve prolapse     Shingles      Past Surgical History:   Procedure Laterality Date    APPENDECTOMY      EYE SURGERY      HERNIA REPAIR      TONSILLECTOMY       Family History   Problem Relation Age of Onset    Heart disease Mother     No Known Problems Father      Social History     Tobacco Use    Smoking status: Never    Smokeless tobacco: Never   Vaping Use    Vaping status: Never Used   Substance and Sexual Activity    Alcohol use: Never     Alcohol/week: 0.0 standard drinks of alcohol    Drug use: Never    Sexual activity: Not Currently     Partners: Female     Current Outpatient Medications on File Prior to Visit   Medication Sig    aspirin 81 mg chewable tablet Chew 1 tablet (81 mg total) daily    atorvastatin (LIPITOR) 20 mg tablet Take 1 tablet (20 mg total) by mouth daily    finasteride (PROSCAR) 5 mg tablet Take 1 tablet (5 mg total) by mouth daily    mirtazapine (REMERON) 15 mg tablet Take 1 tablet (15 mg total) by mouth daily at bedtime    tamsulosin (FLOMAX) 0.4 mg Take 1 capsule (0.4 mg total) by mouth daily with dinner     No Known Allergies  There is no immunization history for the selected administration types on file for this patient.  Objective     /76 (BP Location: Left arm, Patient  "Position: Sitting, Cuff Size: Standard)   Pulse 82   Temp (!) 96.6 °F (35.9 °C) (Temporal)   Ht 5' 8\" (1.727 m)   Wt 54.9 kg (121 lb)   SpO2 99%   BMI 18.40 kg/m²     Physical Exam  Constitutional:       Appearance: He is well-developed.   HENT:      Head: Normocephalic and atraumatic.      Right Ear: External ear normal.      Left Ear: External ear normal.      Nose: Nose normal.   Eyes:      Conjunctiva/sclera: Conjunctivae normal.      Pupils: Pupils are equal, round, and reactive to light.   Cardiovascular:      Rate and Rhythm: Normal rate and regular rhythm.      Heart sounds: Normal heart sounds. No murmur heard.     No friction rub.   Pulmonary:      Effort: Pulmonary effort is normal. No respiratory distress.      Breath sounds: Normal breath sounds. No wheezing or rales.   Chest:      Chest wall: No tenderness.   Abdominal:      General: Bowel sounds are normal.      Palpations: Abdomen is soft.   Musculoskeletal:         General: Normal range of motion.      Cervical back: Normal range of motion and neck supple.   Skin:     General: Skin is warm and dry.      Capillary Refill: Capillary refill takes 2 to 3 seconds.   Neurological:      Mental Status: He is alert and oriented to person, place, and time.   Psychiatric:         Behavior: Behavior normal.         Thought Content: Thought content normal.         Judgment: Judgment normal.         "

## 2024-08-23 ENCOUNTER — TELEPHONE (OUTPATIENT)
Age: 79
End: 2024-08-23

## 2024-08-23 NOTE — TELEPHONE ENCOUNTER
Pt called in and said he is suppose to be reporting at home BP:    8/14-8/23  Systolic: highest 149 and lowest 138  Diastolic: highest 86 and lowest 77  Pulse: highest 79 and lowest 68    Pt reports they stayed between that the whole time. Pt said he still isnt feeling well. Tightness in chest still and feeling lousy. He has a nuclear test Monday 8/26 at [facility].

## 2024-08-26 ENCOUNTER — HOSPITAL ENCOUNTER (OUTPATIENT)
Dept: NUCLEAR MEDICINE | Facility: HOSPITAL | Age: 79
Discharge: HOME | End: 2024-08-26
Attending: INTERNAL MEDICINE
Payer: COMMERCIAL

## 2024-08-26 DIAGNOSIS — R11.0 NAUSEA: ICD-10-CM

## 2024-08-26 DIAGNOSIS — I25.10 CORONARY ARTERY CALCIFICATION SEEN ON CAT SCAN: ICD-10-CM

## 2024-08-26 DIAGNOSIS — R53.83 OTHER FATIGUE: ICD-10-CM

## 2024-08-26 DIAGNOSIS — R07.9 CHEST PAIN, UNSPECIFIED TYPE: ICD-10-CM

## 2024-08-26 LAB
NUC STRESS EJECTION FRACTION: 68 %
SL CV REST NUCLEAR ISOTOPE DOSE: 11 MCI
SL CV STRESS NUCLEAR ISOTOPE DOSE: 33 MCI
STRESS/REST PERFUSION RATIO: 1.03

## 2024-08-26 PROCEDURE — 93018 CV STRESS TEST I&R ONLY: CPT | Performed by: INTERNAL MEDICINE

## 2024-08-26 PROCEDURE — 78452 HT MUSCLE IMAGE SPECT MULT: CPT | Performed by: INTERNAL MEDICINE

## 2024-08-26 PROCEDURE — 78452 HT MUSCLE IMAGE SPECT MULT: CPT

## 2024-08-26 PROCEDURE — 93017 CV STRESS TEST TRACING ONLY: CPT

## 2024-08-26 PROCEDURE — A9502 TC99M TETROFOSMIN: HCPCS

## 2024-08-26 PROCEDURE — 93016 CV STRESS TEST SUPVJ ONLY: CPT | Performed by: INTERNAL MEDICINE

## 2024-08-26 RX ORDER — REGADENOSON 0.08 MG/ML
0.4 INJECTION, SOLUTION INTRAVENOUS ONCE
Status: COMPLETED | OUTPATIENT
Start: 2024-08-26 | End: 2024-08-26

## 2024-08-26 RX ADMIN — REGADENOSON 0.4 MG: 0.08 INJECTION, SOLUTION INTRAVENOUS at 09:58

## 2024-08-27 ENCOUNTER — TELEPHONE (OUTPATIENT)
Age: 79
End: 2024-08-27

## 2024-08-27 NOTE — TELEPHONE ENCOUNTER
Pt calling for stress results. Please advise  (Pt stated he has called several times, but I do not see any other documentation regarding results)

## 2024-08-27 NOTE — TELEPHONE ENCOUNTER
Called patient and gave him results of stress test.  Normal heart function and no evidence of ischemia (blockages).

## 2024-08-29 LAB
CHEST PAIN STATEMENT: NORMAL
MAX DIASTOLIC BP: 80 MMHG
MAX PREDICTED HEART RATE: 139 BPM
PROTOCOL NAME: NORMAL
REASON FOR TERMINATION: NORMAL
STRESS POST EXERCISE DUR MIN: 3 MIN
STRESS POST EXERCISE DUR SEC: 0 SEC
STRESS POST PEAK HR: 96 BPM
STRESS POST PEAK SYSTOLIC BP: 148 MMHG
TARGET HR FORMULA: NORMAL
TEST INDICATION: NORMAL

## 2024-09-10 ENCOUNTER — OFFICE VISIT (OUTPATIENT)
Dept: FAMILY MEDICINE CLINIC | Facility: CLINIC | Age: 79
End: 2024-09-10
Payer: COMMERCIAL

## 2024-09-10 VITALS
TEMPERATURE: 97.6 F | BODY MASS INDEX: 18.43 KG/M2 | OXYGEN SATURATION: 99 % | HEART RATE: 74 BPM | WEIGHT: 121.6 LBS | HEIGHT: 68 IN | DIASTOLIC BLOOD PRESSURE: 80 MMHG | SYSTOLIC BLOOD PRESSURE: 132 MMHG

## 2024-09-10 DIAGNOSIS — K21.9 GASTROESOPHAGEAL REFLUX DISEASE, UNSPECIFIED WHETHER ESOPHAGITIS PRESENT: ICD-10-CM

## 2024-09-10 DIAGNOSIS — Z00.00 MEDICARE ANNUAL WELLNESS VISIT, SUBSEQUENT: Primary | ICD-10-CM

## 2024-09-10 PROCEDURE — G0439 PPPS, SUBSEQ VISIT: HCPCS | Performed by: FAMILY MEDICINE

## 2024-09-10 RX ORDER — MULTIVIT WITH MINERALS/LUTEIN
500 TABLET ORAL DAILY
COMMUNITY

## 2024-09-10 RX ORDER — FAMOTIDINE 40 MG/1
40 TABLET, FILM COATED ORAL
Qty: 30 TABLET | Refills: 3 | Status: SHIPPED | OUTPATIENT
Start: 2024-09-10 | End: 2025-09-05

## 2024-09-10 NOTE — PROGRESS NOTES
Ambulatory Visit  Name: Vincent Chandler      : 1945      MRN: [id number]  Encounter Provider: Girish Hurley DO  Encounter Date: 9/10/2024   Encounter department: [facility]    Assessment & Plan   1. Medicare annual wellness visit, subsequent      Depression Screening and Follow-up Plan: Patient's depression screening was positive with a PHQ-9 score of 6. Clincally patient does not have depression. No treatment is required.       Preventive health issues were discussed with patient, and age appropriate screening tests were ordered as noted in patient's After Visit Summary. Personalized health advice and appropriate referrals for health education or preventive services given if needed, as noted in patient's After Visit Summary.    History of Present Illness     Mr. Eduardo HASSAN is here today for the purposes of a Medicare subsequent wellness visit however he also is being treated for a sense of feeling ill, anxious, and just ill at ease this has been addressed at the previous visit and will be revisited today       Patient Care Team:  Girish uHrley DO as PCP - General (Family Medicine)    Review of Systems   Constitutional:  Positive for activity change and fatigue. Negative for fever and unexpected weight change.   HENT:  Negative for dental problem, hearing loss, sinus pressure, sinus pain and tinnitus.    Eyes:  Negative for visual disturbance.   Respiratory:  Negative for cough and shortness of breath.    Cardiovascular:  Negative for chest pain, palpitations and leg swelling.   Gastrointestinal:         Patient uses Tums or Pepto-Bismol for acid reflux sometimes he awakens at night and feels like he has to get up quickly and he has a nauseous feeling but he always feels better when he takes the Pepto-Bismol product   Genitourinary:  Positive for difficulty urinating. Negative for frequency and urgency.   Musculoskeletal:  Negative for arthralgias and gait problem.   Skin:  Negative for rash.    Neurological:  Positive for weakness. Negative for headaches.   Psychiatric/Behavioral:  Positive for sleep disturbance. Negative for agitation. The patient is nervous/anxious.      Medical History Reviewed by provider this encounter:       Annual Wellness Visit Questionnaire   Vincent is here for his Subsequent Wellness visit.     Health Risk Assessment:   Patient rates overall health as fair. Patient feels that their physical health rating is slightly worse. Patient is dissatisfied with their life. Eyesight was rated as same. Hearing was rated as same. Patient feels that their emotional and mental health rating is same. Patients states they are never, rarely angry. Patient states they are never, rarely unusually tired/fatigued. Pain experienced in the last 7 days has been none. Patient states that he has experienced no weight loss or gain in last 6 months.     Depression Screening:   PHQ-9 Score: 6      Fall Risk Screening:   In the past year, patient has experienced: no history of falling in past year      Home Safety:  Patient has trouble with stairs inside or outside of their home. Patient has working smoke alarms and has no working carbon monoxide detector. Home safety hazards include: none.     Nutrition:   Current diet is Regular and Limited junk food.     Medications:   Patient is currently taking over-the-counter supplements. OTC medications include: see medication list. Patient is able to manage medications.     Activities of Daily Living (ADLs)/Instrumental Activities of Daily Living (IADLs):   Walk and transfer into and out of bed and chair?: Yes  Dress and groom yourself?: Yes    Bathe or shower yourself?: Yes    Feed yourself? Yes  Do your laundry/housekeeping?: Yes  Manage your money, pay your bills and track your expenses?: Yes  Make your own meals?: Yes    Do your own shopping?: Yes    ADL comments: Has help for son    Previous Hospitalizations:   Any hospitalizations or ED visits within the last 12  months?: Yes    How many hospitalizations have you had in the last year?: 1-2    Advance Care Planning:   Living will: No    Durable POA for healthcare: No    Advanced directive: No    Advanced directive counseling given: No    Five wishes given: No    Patient declined ACP directive: No    End of Life Decisions reviewed with patient: Yes    Provider agrees with end of life decisions: Yes      Cognitive Screening:   Provider or family/friend/caregiver concerned regarding cognition?: No    PREVENTIVE SCREENINGS      Cardiovascular Screening:    General: Screening Current      Diabetes Screening:     General: Screening Current      Colorectal Cancer Screening:     General: Screening Not Indicated      Prostate Cancer Screening:    General: Screening Not Indicated      Osteoporosis Screening:    General: Screening Not Indicated      Abdominal Aortic Aneurysm (AAA) Screening:        General: Screening Not Indicated      Lung Cancer Screening:     General: Screening Not Indicated    Screening, Brief Intervention, and Referral to Treatment (SBIRT)    Screening  Typical number of drinks in a day: 0  Typical number of drinks in a week: 0  Interpretation: Low risk drinking behavior.    Single Item Drug Screening:  How often have you used an illegal drug (including marijuana) or a prescription medication for non-medical reasons in the past year? never    Single Item Drug Screen Score: 0  Interpretation: Negative screen for possible drug use disorder    Social Determinants of Health     Financial Resource Strain: Low Risk  (9/5/2023)    Overall Financial Resource Strain (CARDIA)     Difficulty of Paying Living Expenses: Not hard at all   Food Insecurity: No Food Insecurity (9/10/2024)    Hunger Vital Sign     Worried About Running Out of Food in the Last Year: Never true     Ran Out of Food in the Last Year: Never true   Transportation Needs: No Transportation Needs (9/10/2024)    PRAPARE - Transportation     Lack of  "Transportation (Medical): No     Lack of Transportation (Non-Medical): No   Housing Stability: Unknown (9/10/2024)    Housing Stability Vital Sign     Unable to Pay for Housing in the Last Year: No     Homeless in the Last Year: No   Utilities: Not At Risk (9/10/2024)    [facility] Utilities     Threatened with loss of utilities: No     No results found.    Objective     /80 (BP Location: Left arm, Patient Position: Sitting, Cuff Size: Standard)   Pulse 74   Temp 97.6 °F (36.4 °C) (Temporal)   Ht 5' 8\" (1.727 m)   Wt 55.2 kg (121 lb 9.6 oz)   SpO2 99%   BMI 18.49 kg/m²     Physical Exam  Constitutional:       Appearance: He is well-developed.   HENT:      Head: Normocephalic and atraumatic.      Right Ear: External ear normal.      Left Ear: External ear normal.      Nose: Nose normal.   Eyes:      Conjunctiva/sclera: Conjunctivae normal.      Pupils: Pupils are equal, round, and reactive to light.   Cardiovascular:      Rate and Rhythm: Normal rate and regular rhythm.      Heart sounds: Normal heart sounds. No murmur heard.     No friction rub.   Pulmonary:      Effort: Pulmonary effort is normal. No respiratory distress.      Breath sounds: Normal breath sounds. No wheezing or rales.   Chest:      Chest wall: No tenderness.   Abdominal:      General: Bowel sounds are normal.      Palpations: Abdomen is soft.   Musculoskeletal:         General: Normal range of motion.      Cervical back: Normal range of motion and neck supple.   Skin:     General: Skin is warm and dry.      Capillary Refill: Capillary refill takes 2 to 3 seconds.   Neurological:      Mental Status: He is alert and oriented to person, place, and time.   Psychiatric:         Behavior: Behavior normal.         Thought Content: Thought content normal.         Judgment: Judgment normal.           "

## 2024-09-10 NOTE — PATIENT INSTRUCTIONS
Medicare Preventive Visit Patient Instructions  Thank you for completing your Welcome to Medicare Visit or Medicare Annual Wellness Visit today. Your next wellness visit will be due in one year (9/11/2025).  The screening/preventive services that you may require over the next 5-10 years are detailed below. Some tests may not apply to you based off risk factors and/or age. Screening tests ordered at today's visit but not completed yet may show as past due. Also, please note that scanned in results may not display below.  Preventive Screenings:  Service Recommendations Previous Testing/Comments   Colorectal Cancer Screening  Colonoscopy    Fecal Occult Blood Test (FOBT)/Fecal Immunochemical Test (FIT)  Fecal DNA/Cologuard Test  Flexible Sigmoidoscopy Age: 45-75 years old   Colonoscopy: every 10 years (May be performed more frequently if at higher risk)  OR  FOBT/FIT: every 1 year  OR  Cologuard: every 3 years  OR  Sigmoidoscopy: every 5 years  Screening may be recommended earlier than age 45 if at higher risk for colorectal cancer. Also, an individualized decision between you and your healthcare provider will decide whether screening between the ages of 76-85 would be appropriate. Colonoscopy: 04/13/2022  FOBT/FIT: Not on file  Cologuard: Not on file  Sigmoidoscopy: Not on file          Prostate Cancer Screening Individualized decision between patient and health care provider in men between ages of 55-69   Medicare will cover every 12 months beginning on the day after your 50th birthday PSA: 3.31 ng/mL     Screening Not Indicated     Hepatitis C Screening Once for adults born between 1945 and 1965  More frequently in patients at high risk for Hepatitis C Hep C Antibody: Not on file        Diabetes Screening 1-2 times per year if you're at risk for diabetes or have pre-diabetes Fasting glucose: 109 mg/dL (7/18/2024)  A1C: No results in last 5 years (No results in last 5 years)  Screening Current   Cholesterol Screening  Once every 5 years if you don't have a lipid disorder. May order more often based on risk factors. Lipid panel: 06/10/2024  Screening Current      Other Preventive Screenings Covered by Medicare:  Abdominal Aortic Aneurysm (AAA) Screening: covered once if your at risk. You're considered to be at risk if you have a family history of AAA or a male between the age of 65-75 who smoking at least 100 cigarettes in your lifetime.  Lung Cancer Screening: covers low dose CT scan once per year if you meet all of the following conditions: (1) Age 55-77; (2) No signs or symptoms of lung cancer; (3) Current smoker or have quit smoking within the last 15 years; (4) You have a tobacco smoking history of at least 20 pack years (packs per day x number of years you smoked); (5) You get a written order from a healthcare provider.  Glaucoma Screening: covered annually if you're considered high risk: (1) You have diabetes OR (2) Family history of glaucoma OR (3)  aged 50 and older OR (4)  American aged 65 and older  Osteoporosis Screening: covered every 2 years if you meet one of the following conditions: (1) Have a vertebral abnormality; (2) On glucocorticoid therapy for more than 3 months; (3) Have primary hyperparathyroidism; (4) On osteoporosis medications and need to assess response to drug therapy.  HIV Screening: covered annually if you're between the age of 15-65. Also covered annually if you are younger than 15 and older than 65 with risk factors for HIV infection. For pregnant patients, it is covered up to 3 times per pregnancy.    Immunizations:  Immunization Recommendations   Influenza Vaccine Annual influenza vaccination during flu season is recommended for all persons aged >= 6 months who do not have contraindications   Pneumococcal Vaccine   * Pneumococcal conjugate vaccine = PCV13 (Prevnar 13), PCV15 (Vaxneuvance), PCV20 (Prevnar 20)  * Pneumococcal polysaccharide vaccine = PPSV23 (Pneumovax)  Adults 19-63 yo with certain risk factors or if 65+ yo  If never received any pneumonia vaccine: recommend Prevnar 20 (PCV20)  Give PCV20 if previously received 1 dose of PCV13 or PPSV23   Hepatitis B Vaccine 3 dose series if at intermediate or high risk (ex: diabetes, end stage renal disease, liver disease)   Respiratory syncytial virus (RSV) Vaccine - COVERED BY MEDICARE PART D  * RSVPreF3 (Arexvy) CDC recommends that adults 60 years of age and older may receive a single dose of RSV vaccine using shared clinical decision-making (SCDM)   Tetanus (Td) Vaccine - COST NOT COVERED BY MEDICARE PART B Following completion of primary series, a booster dose should be given every 10 years to maintain immunity against tetanus. Td may also be given as tetanus wound prophylaxis.   Tdap Vaccine - COST NOT COVERED BY MEDICARE PART B Recommended at least once for all adults. For pregnant patients, recommended with each pregnancy.   Shingles Vaccine (Shingrix) - COST NOT COVERED BY MEDICARE PART B  2 shot series recommended in those 19 years and older who have or will have weakened immune systems or those 50 years and older     Health Maintenance Due:  There are no preventive care reminders to display for this patient.  Immunizations Due:      Topic Date Due   • Pneumococcal Vaccine: 65+ Years (1 of 1 - PCV) Never done   • COVID-19 Vaccine (1 - 2023-24 season) Never done   • Influenza Vaccine (1) 09/01/2024     Advance Directives   What are advance directives?  Advance directives are legal documents that state your wishes and plans for medical care. These plans are made ahead of time in case you lose your ability to make decisions for yourself. Advance directives can apply to any medical decision, such as the treatments you want, and if you want to donate organs.   What are the types of advance directives?  There are many types of advance directives, and each state has rules about how to use them. You may choose a combination of  any of the following:  Living will:  This is a written record of the treatment you want. You can also choose which treatments you do not want, which to limit, and which to stop at a certain time. This includes surgery, medicine, IV fluid, and tube feedings.   Durable power of  for healthcare (DPAHC):  This is a written record that states who you want to make healthcare choices for you when you are unable to make them for yourself. This person, called a proxy, is usually a family member or a friend. You may choose more than 1 proxy.  Do not resuscitate (DNR) order:  A DNR order is used in case your heart stops beating or you stop breathing. It is a request not to have certain forms of treatment, such as CPR. A DNR order may be included in other types of advance directives.  Medical directive:  This covers the care that you want if you are in a coma, near death, or unable to make decisions for yourself. You can list the treatments you want for each condition. Treatment may include pain medicine, surgery, blood transfusions, dialysis, IV or tube feedings, and a ventilator (breathing machine).  Values history:  This document has questions about your views, beliefs, and how you feel and think about life. This information can help others choose the care that you would choose.  Why are advance directives important?  An advance directive helps you control your care. Although spoken wishes may be used, it is better to have your wishes written down. Spoken wishes can be misunderstood, or not followed. Treatments may be given even if you do not want them. An advance directive may make it easier for your family to make difficult choices about your care.   Underweight  Underweight is defined as having a body mass index (BMI) of less than 18.5 kg/m2   Anorexia  is a loss of appetite, decreased food intake, or both. Your appetite naturally decreases as you get older. You also get full faster than you used to. This occurs  because your body needs less energy. Other body changes can also lead to a decreased appetite. Even though some appetite loss is normal, you still need to get enough calories and nutrients to keep you healthy. You can start to lose too much weight if you do not eat as much food as your body needs. Unwanted weight loss can cause health problems, or worsen health problems you already have. You can also become dehydrated if you do not drink enough liquid.  How to eat healthy and get enough nutrients:   Choose healthy foods.  Eat a variety of fruits, vegetables, whole grains, low-fat dairy foods, lean meats, and other protein foods. Limit foods high in fat, sugar, and salt. Limit or avoid alcohol as directed. Work with a dietitian to help you plan your meals if you need to follow a special diet. A dietitian can also teach you how to modify foods if you have trouble chewing or swallowing.   Snack on healthy foods between meals  if you only eat a small amount during meals. Snacks provide extra healthy nutrients and calories between meals. Examples include fruit, cheese, and whole grain crackers.   Drink liquids as directed  to avoid dehydration. Drink liquids between meals if they cause you to get full too quickly during meals. Ask how much liquid to drink each day and which liquids are best for you.   Use herbs, spices, and flavor enhancers to add flavor to foods.  Avoid using herbs and spice blends that also contain sodium. Ask your healthcare provider or dietitian about flavor enhancers. Flavor enhancers with ham, natural watters, and roast beef flavors can also be sprinkled on food to add flavor.   Share meals with others as often as you can.  Eating with others may help you to eat better during meal time. Ask family members, neighbors, or friends to join you for lunch. There are also senior centers where you can meet people, and share meals with them.   Ask family and friends for help  with shopping or preparing foods.  Ask for a ride to the grocery store, if needed.       © Copyright [employer] 2018 Information is for End User's use only and may not be sold, redistributed or otherwise used for commercial purposes. All illustrations and images included in CareNotes® are the copyrighted property of [employer].A[employer]M., Inc. or [employer]

## 2024-09-24 ENCOUNTER — APPOINTMENT (EMERGENCY)
Dept: RADIOLOGY | Facility: HOSPITAL | Age: 79
End: 2024-09-24
Payer: COMMERCIAL

## 2024-09-24 ENCOUNTER — HOSPITAL ENCOUNTER (EMERGENCY)
Facility: HOSPITAL | Age: 79
Discharge: HOME | End: 2024-09-24
Attending: EMERGENCY MEDICINE | Admitting: EMERGENCY MEDICINE
Payer: COMMERCIAL

## 2024-09-24 VITALS
OXYGEN SATURATION: 99 % | BODY MASS INDEX: 18.5 KG/M2 | WEIGHT: 121.69 LBS | RESPIRATION RATE: 18 BRPM | TEMPERATURE: 98.6 F | HEART RATE: 99 BPM | SYSTOLIC BLOOD PRESSURE: 143 MMHG | DIASTOLIC BLOOD PRESSURE: 93 MMHG

## 2024-09-24 DIAGNOSIS — J40 BRONCHITIS: Primary | ICD-10-CM

## 2024-09-24 LAB
FLUAV AG UPPER RESP QL IA.RAPID: NEGATIVE
FLUBV AG UPPER RESP QL IA.RAPID: NEGATIVE
SARS-COV+SARS-COV-2 AG RESP QL IA.RAPID: NEGATIVE

## 2024-09-24 PROCEDURE — 99283 EMERGENCY DEPT VISIT LOW MDM: CPT

## 2024-09-24 PROCEDURE — 71046 X-RAY EXAM CHEST 2 VIEWS: CPT

## 2024-09-24 PROCEDURE — 87804 INFLUENZA ASSAY W/OPTIC: CPT | Performed by: PHYSICIAN ASSISTANT

## 2024-09-24 PROCEDURE — 99284 EMERGENCY DEPT VISIT MOD MDM: CPT | Performed by: EMERGENCY MEDICINE

## 2024-09-24 PROCEDURE — 87811 SARS-COV-2 COVID19 W/OPTIC: CPT | Performed by: PHYSICIAN ASSISTANT

## 2024-09-24 RX ORDER — AZITHROMYCIN 250 MG/1
TABLET, FILM COATED ORAL
Qty: 6 TABLET | Refills: 0 | Status: SHIPPED | OUTPATIENT
Start: 2024-09-24 | End: 2024-09-28

## 2024-09-24 RX ORDER — BENZONATATE 100 MG/1
100 CAPSULE ORAL 3 TIMES DAILY PRN
Qty: 21 CAPSULE | Refills: 0 | Status: SHIPPED | OUTPATIENT
Start: 2024-09-24

## 2024-09-24 RX ORDER — ALBUTEROL SULFATE 90 UG/1
1-2 INHALANT RESPIRATORY (INHALATION) EVERY 4 HOURS PRN
Qty: 8 G | Refills: 0 | Status: SHIPPED | OUTPATIENT
Start: 2024-09-24

## 2024-09-24 RX ORDER — PREDNISONE 20 MG/1
40 TABLET ORAL DAILY
Qty: 10 TABLET | Refills: 0 | OUTPATIENT
Start: 2024-09-24 | End: 2024-09-27

## 2024-09-24 NOTE — ED PROVIDER NOTES
1. Bronchitis      ED Disposition       ED Disposition   Discharge    Condition   Stable    Date/Time   Tue Sep 24, 2024  3:02 PM    Comment   Vincent Chandler discharge to home/self care.                   Assessment & Plan       Medical Decision Making  78 yo male presenting for evaluation of cough over the past week.  Will check viral swab, CXR to evaluate for pneumonia.  Pt afebrile, hemodynamically stable.  We reviewed her recent blood work.      CXR without findings of pneumonia or vascular congestion.  Clinically suspect URI vs bronchitis.  Given duration of symptoms, will place on Abx therapy.  Also discussed steroid, albuterol, tessalon.    Reviewed symptomatic management.  OTC meds reviewed.  Anticipatory guidance.  Advised recheck with PCP or return to ER as needed.  Strict return precautions outlined.  Patient and son voiced understanding and had no further questions.    Please refer to above ER course for further details/discussion.      Problems Addressed:  Bronchitis: acute illness or injury    Amount and/or Complexity of Data Reviewed  External Data Reviewed: labs and notes.  Labs: ordered. Decision-making details documented in ED Course.  Radiology: ordered and independent interpretation performed. Decision-making details documented in ED Course.    Risk  OTC drugs.  Prescription drug management.                ED Course as of 09/24/24 1613   Tue Sep 24, 2024   1439 XR chest 2 views  Independently viewed and interpreted by me - no acute cardiopulmonary process, no significant interval change from prior; pending official read.   1445 Pt updated on results.  Will discharge with outpatient management of bronchitis.   1507 SARS COV Rapid Antigen: Negative   1507 Influenza A Rapid Antigen: Negative   1507 Influenza B Rapid Antigen: Negative       Medications - No data to display    History of Present Illness       79 year old male with PMH CAD, HLD presenting with son from home for evaluation of cough.   Pt reports he's been sick over the past week.  He reports slight runny nose, congestion.  Notes a cough.  It has been mostly productive with clear phlegm but today he notes it was yellow.  Denies fever, chills.  Denies chest pain, SOB, wheezing.  Denies N/V/D, abdominal pain.  He endorses a good appetite.  Denies sick contacts.  No reported aggravating or alleviating factors.  Has been using Foster cough drops which helps some.  Also tried an OTC cough suppressant.    No reported h/o asthma or COPD.  No prior evaluation since onset.      History provided by:  Patient, relative and medical records   used: No    URI  Presenting symptoms: congestion, cough and fatigue    Presenting symptoms: no ear pain, no fever, no rhinorrhea and no sore throat    Duration:  1 week  Chronicity:  New  Relieved by:  Nothing  Worsened by:  Nothing  Associated symptoms: no headaches, no neck pain and no wheezing    Risk factors: being elderly    Risk factors: no chronic respiratory disease, no recent illness, no recent travel and no sick contacts        Review of Systems   Constitutional:  Positive for fatigue. Negative for chills and fever.   HENT:  Positive for congestion. Negative for ear pain, rhinorrhea and sore throat.    Eyes: Negative.  Negative for visual disturbance.   Respiratory:  Positive for cough. Negative for shortness of breath and wheezing.    Cardiovascular: Negative.  Negative for chest pain, palpitations and leg swelling.   Gastrointestinal: Negative.  Negative for abdominal pain, diarrhea, nausea and vomiting.   Genitourinary: Negative.  Negative for dysuria, flank pain, frequency and hematuria.   Musculoskeletal: Negative.  Negative for back pain and neck pain.   Skin: Negative.  Negative for rash.   Neurological: Negative.  Negative for dizziness, light-headedness, numbness and headaches.   Psychiatric/Behavioral: Negative.     All other systems reviewed and are negative.          Objective      ED Triage Vitals [09/24/24 1300]   Temperature Pulse Blood Pressure Respirations SpO2 Patient Position - Orthostatic VS   98.6 °F (37 °C) 99 143/93 18 99 % Sitting      Temp Source Heart Rate Source BP Location FiO2 (%) Pain Score    Temporal Monitor Right arm -- No Pain        Physical Exam  Vitals and nursing note reviewed.   Constitutional:       General: He is awake. He is not in acute distress.     Appearance: Normal appearance. He is well-developed. He is not toxic-appearing or diaphoretic.   HENT:      Head: Normocephalic and atraumatic.      Right Ear: Hearing, tympanic membrane, ear canal and external ear normal.      Left Ear: Hearing, tympanic membrane, ear canal and external ear normal.      Nose: Nose normal.      Mouth/Throat:      Mouth: Mucous membranes are moist.      Tongue: Tongue does not deviate from midline.      Pharynx: Oropharynx is clear. Uvula midline.   Eyes:      General: Lids are normal. No scleral icterus.     Conjunctiva/sclera: Conjunctivae normal.   Neck:      Trachea: Trachea and phonation normal.   Cardiovascular:      Rate and Rhythm: Normal rate and regular rhythm.      Pulses: Normal pulses.      Heart sounds: Normal heart sounds, S1 normal and S2 normal. No murmur heard.  Pulmonary:      Effort: Pulmonary effort is normal. No tachypnea or respiratory distress.      Breath sounds: Normal breath sounds. No wheezing, rhonchi or rales.      Comments: +occ cough  Abdominal:      General: Bowel sounds are normal. There is no distension.      Palpations: Abdomen is soft.      Tenderness: There is no abdominal tenderness. There is no guarding or rebound.   Musculoskeletal:      Cervical back: Normal range of motion and neck supple.      Right lower leg: No edema.      Left lower leg: No edema.   Skin:     General: Skin is warm and dry.      Capillary Refill: Capillary refill takes less than 2 seconds.      Findings: No rash.   Neurological:      General: No focal deficit present.       Mental Status: He is alert and oriented to person, place, and time.      GCS: GCS eye subscore is 4. GCS verbal subscore is 5. GCS motor subscore is 6.      Gait: Gait normal.      Comments: Pt ambulatory without assistance.   Psychiatric:         Mood and Affect: Mood normal.         Speech: Speech normal.         Behavior: Behavior normal. Behavior is cooperative.         Labs Reviewed   COVID-19/INFLUENZA A/B RAPID ANTIGEN (30 MIN.TAT) - Normal       Result Value    SARS COV Rapid Antigen Negative      Influenza A Rapid Antigen Negative      Influenza B Rapid Antigen Negative      Narrative:     This test has been performed using the [employer] [facility] 2 FLU+SARS Antigen test under the Emergency Use Authorization (EUA). This test has been validated by the  and verified by the performing laboratory. The Mahi uses lateral flow immunofluorescent sandwich assay to detect SARS-COV, Influenza A and Influenza B Antigen.     The [employer] Mahi 2 SARS Antigen test does not differentiate between SARS-CoV and SARS-CoV-2.     Negative results are presumptive and may be confirmed with a molecular assay, if necessary, for patient management. Negative results do not rule out SARS-CoV-2 or influenza infection and should not be used as the sole basis for treatment or patient management decisions. A negative test result may occur if the level of antigen in a sample is below the limit of detection of this test.     Positive results are indicative of the presence of viral antigens, but do not rule out bacterial infection or co-infection with other viruses.     All test results should be used as an adjunct to clinical observations and other information available to the provider.    FOR PEDIATRIC PATIENTS - copy/paste COVID Guidelines URL to browser: [email or web address]     XR chest 2 views   Final Interpretation by Ray Pandey MD (09/24 2367)      No acute  cardiopulmonary disease.            Workstation performed: [id number]             Procedures    ED Medication and Procedure Management   Prior to Admission Medications   Prescriptions Last Dose Informant Patient Reported? Taking?   ascorbic acid (VITAMIN C) 250 mg tablet  Self Yes No   Sig: Take 500 mg by mouth daily   aspirin 81 mg chewable tablet  Self No No   Sig: Chew 1 tablet (81 mg total) daily   atorvastatin (LIPITOR) 20 mg tablet  Self No No   Sig: Take 1 tablet (20 mg total) by mouth daily   famotidine (PEPCID) 40 MG tablet   No No   Sig: Take 1 tablet (40 mg total) by mouth daily at bedtime   finasteride (PROSCAR) 5 mg tablet  Self No No   Sig: Take 1 tablet (5 mg total) by mouth daily   hydrOXYzine HCL (ATARAX) 10 mg tablet   No No   Si tablet twice daily if needed for anxiety   mirtazapine (REMERON) 15 mg tablet  Self No No   Sig: Take 1 tablet (15 mg total) by mouth daily at bedtime   tamsulosin (FLOMAX) 0.4 mg   No No   Sig: Take 1 capsule (0.4 mg total) by mouth daily with dinner      Facility-Administered Medications: None     Discharge Medication List as of 2024  3:28 PM        START taking these medications    Details   albuterol (PROVENTIL HFA,VENTOLIN HFA) 90 mcg/act inhaler Inhale 1-2 puffs every 4 (four) hours as needed for shortness of breath or wheezing, Starting 2024, Normal      azithromycin (ZITHROMAX) 250 mg tablet Take 2 tablets today then 1 tablet daily x 4 days, Normal      benzonatate (TESSALON PERLES) 100 mg capsule Take 1 capsule (100 mg total) by mouth 3 (three) times a day as needed for cough, Starting 2024, Normal      predniSONE 20 mg tablet Take 2 tablets (40 mg total) by mouth daily for 5 days, Starting 2024, Until Sun 2024, Normal           CONTINUE these medications which have NOT CHANGED    Details   ascorbic acid (VITAMIN C) 250 mg tablet Take 500 mg by mouth daily, Historical Med      aspirin 81 mg chewable tablet Chew 1 tablet (81  mg total) daily, Starting Mon 4/22/2024, No Print      atorvastatin (LIPITOR) 20 mg tablet Take 1 tablet (20 mg total) by mouth daily, Starting Mon 4/22/2024, Normal      famotidine (PEPCID) 40 MG tablet Take 1 tablet (40 mg total) by mouth daily at bedtime, Starting Tue 9/10/2024, Until Fri 9/5/2025, Normal      finasteride (PROSCAR) 5 mg tablet Take 1 tablet (5 mg total) by mouth daily, Starting Wed 3/6/2024, Normal      hydrOXYzine HCL (ATARAX) 10 mg tablet 1 tablet twice daily if needed for anxiety, Normal      mirtazapine (REMERON) 15 mg tablet Take 1 tablet (15 mg total) by mouth daily at bedtime, Starting Mon 6/17/2024, Normal      tamsulosin (FLOMAX) 0.4 mg Take 1 capsule (0.4 mg total) by mouth daily with dinner, Starting Tue 6/18/2024, Normal           No discharge procedures on file.     Stephanie Duncan PA-C  09/24/24 [id number]

## 2024-09-24 NOTE — DISCHARGE INSTRUCTIONS
Rest, plenty of fluids.  Take antibiotic as directed for the full duration.  Take steroid with food.  Albuterol inhaler every 4-6 hours as needed.  Tessalon perles every 8 hours as needed for cough.  Continue to alternate OTC tylenol and ibuprofen as needed for fever/discomfort.  Follow up with PCP in 3-5 days if not improving or return to ER as needed.

## 2024-09-27 ENCOUNTER — HOSPITAL ENCOUNTER (EMERGENCY)
Facility: HOSPITAL | Age: 79
Discharge: HOME | End: 2024-09-27
Attending: EMERGENCY MEDICINE
Payer: COMMERCIAL

## 2024-09-27 ENCOUNTER — APPOINTMENT (EMERGENCY)
Dept: RADIOLOGY | Facility: HOSPITAL | Age: 79
End: 2024-09-27
Payer: COMMERCIAL

## 2024-09-27 VITALS
SYSTOLIC BLOOD PRESSURE: 152 MMHG | TEMPERATURE: 98 F | DIASTOLIC BLOOD PRESSURE: 82 MMHG | HEART RATE: 74 BPM | RESPIRATION RATE: 18 BRPM | OXYGEN SATURATION: 99 %

## 2024-09-27 DIAGNOSIS — R05.9 COUGH: ICD-10-CM

## 2024-09-27 DIAGNOSIS — R07.89 CHEST TIGHTNESS: Primary | ICD-10-CM

## 2024-09-27 DIAGNOSIS — R45.89 ANXIETY ABOUT HEALTH: ICD-10-CM

## 2024-09-27 LAB
2HR DELTA HS TROPONIN: 0 NG/L
ALBUMIN SERPL BCG-MCNC: 4.3 G/DL (ref 3.5–5)
ALP SERPL-CCNC: 64 U/L (ref 34–104)
ALT SERPL W P-5'-P-CCNC: 22 U/L (ref 7–52)
ANION GAP SERPL CALCULATED.3IONS-SCNC: 10 MMOL/L (ref 4–13)
APTT PPP: 25 SECONDS (ref 23–34)
AST SERPL W P-5'-P-CCNC: 35 U/L (ref 13–39)
ATRIAL RATE: 81 BPM
BASOPHILS # BLD AUTO: 0.01 THOUSANDS/ΜL (ref 0–0.1)
BASOPHILS NFR BLD AUTO: 0 % (ref 0–1)
BILIRUB SERPL-MCNC: 0.48 MG/DL (ref 0.2–1)
BNP SERPL-MCNC: 40 PG/ML (ref 0–100)
BUN SERPL-MCNC: 24 MG/DL (ref 5–25)
CALCIUM SERPL-MCNC: 9.8 MG/DL (ref 8.4–10.2)
CARDIAC TROPONIN I PNL SERPL HS: 4 NG/L
CARDIAC TROPONIN I PNL SERPL HS: 4 NG/L
CHLORIDE SERPL-SCNC: 98 MMOL/L (ref 96–108)
CO2 SERPL-SCNC: 31 MMOL/L (ref 21–32)
CREAT SERPL-MCNC: 0.98 MG/DL (ref 0.6–1.3)
D DIMER PPP FEU-MCNC: 0.68 UG/ML FEU
EOSINOPHIL # BLD AUTO: 0 THOUSAND/ΜL (ref 0–0.61)
EOSINOPHIL NFR BLD AUTO: 0 % (ref 0–6)
ERYTHROCYTE [DISTWIDTH] IN BLOOD BY AUTOMATED COUNT: 12.4 % (ref 11.6–15.1)
GFR SERPL CREATININE-BSD FRML MDRD: 72 ML/MIN/1.73SQ M
GLUCOSE SERPL-MCNC: 144 MG/DL (ref 65–140)
HCT VFR BLD AUTO: 42.6 % (ref 36.5–49.3)
HGB BLD-MCNC: 14 G/DL (ref 12–17)
IMM GRANULOCYTES # BLD AUTO: 0.07 THOUSAND/UL (ref 0–0.2)
IMM GRANULOCYTES NFR BLD AUTO: 1 % (ref 0–2)
INR PPP: 1.03 (ref 0.85–1.19)
LYMPHOCYTES # BLD AUTO: 0.75 THOUSANDS/ΜL (ref 0.6–4.47)
LYMPHOCYTES NFR BLD AUTO: 7 % (ref 14–44)
MCH RBC QN AUTO: 29.7 PG (ref 26.8–34.3)
MCHC RBC AUTO-ENTMCNC: 32.9 G/DL (ref 31.4–37.4)
MCV RBC AUTO: 90 FL (ref 82–98)
MONOCYTES # BLD AUTO: 0.12 THOUSAND/ΜL (ref 0.17–1.22)
MONOCYTES NFR BLD AUTO: 1 % (ref 4–12)
NEUTROPHILS # BLD AUTO: 9.65 THOUSANDS/ΜL (ref 1.85–7.62)
NEUTS SEG NFR BLD AUTO: 91 % (ref 43–75)
NRBC BLD AUTO-RTO: 0 /100 WBCS
P AXIS: 42 DEGREES
PLATELET # BLD AUTO: 274 THOUSANDS/UL (ref 149–390)
PLATELET BLD QL SMEAR: ADEQUATE
PMV BLD AUTO: 9.8 FL (ref 8.9–12.7)
POTASSIUM SERPL-SCNC: 4.1 MMOL/L (ref 3.5–5.3)
PR INTERVAL: 126 MS
PROT SERPL-MCNC: 7.2 G/DL (ref 6.4–8.4)
PROTHROMBIN TIME: 14 SECONDS (ref 12.3–15)
QRS AXIS: 61 DEGREES
QRSD INTERVAL: 82 MS
QT INTERVAL: 366 MS
QTC INTERVAL: 425 MS
RBC # BLD AUTO: 4.71 MILLION/UL (ref 3.88–5.62)
RBC MORPH BLD: NORMAL
SODIUM SERPL-SCNC: 139 MMOL/L (ref 135–147)
T WAVE AXIS: 76 DEGREES
VENTRICULAR RATE: 81 BPM
WBC # BLD AUTO: 10.6 THOUSAND/UL (ref 4.31–10.16)

## 2024-09-27 PROCEDURE — 85025 COMPLETE CBC W/AUTO DIFF WBC: CPT | Performed by: PHYSICIAN ASSISTANT

## 2024-09-27 PROCEDURE — 85730 THROMBOPLASTIN TIME PARTIAL: CPT | Performed by: PHYSICIAN ASSISTANT

## 2024-09-27 PROCEDURE — 99285 EMERGENCY DEPT VISIT HI MDM: CPT | Performed by: PHYSICIAN ASSISTANT

## 2024-09-27 PROCEDURE — 93005 ELECTROCARDIOGRAM TRACING: CPT

## 2024-09-27 PROCEDURE — 85379 FIBRIN DEGRADATION QUANT: CPT | Performed by: PHYSICIAN ASSISTANT

## 2024-09-27 PROCEDURE — 96375 TX/PRO/DX INJ NEW DRUG ADDON: CPT

## 2024-09-27 PROCEDURE — 83880 ASSAY OF NATRIURETIC PEPTIDE: CPT | Performed by: PHYSICIAN ASSISTANT

## 2024-09-27 PROCEDURE — 36415 COLL VENOUS BLD VENIPUNCTURE: CPT | Performed by: PHYSICIAN ASSISTANT

## 2024-09-27 PROCEDURE — 85610 PROTHROMBIN TIME: CPT | Performed by: PHYSICIAN ASSISTANT

## 2024-09-27 PROCEDURE — 96374 THER/PROPH/DIAG INJ IV PUSH: CPT

## 2024-09-27 PROCEDURE — 71046 X-RAY EXAM CHEST 2 VIEWS: CPT

## 2024-09-27 PROCEDURE — 99285 EMERGENCY DEPT VISIT HI MDM: CPT

## 2024-09-27 PROCEDURE — 84484 ASSAY OF TROPONIN QUANT: CPT | Performed by: PHYSICIAN ASSISTANT

## 2024-09-27 PROCEDURE — 80053 COMPREHEN METABOLIC PANEL: CPT | Performed by: PHYSICIAN ASSISTANT

## 2024-09-27 RX ORDER — ONDANSETRON 2 MG/ML
4 INJECTION INTRAMUSCULAR; INTRAVENOUS ONCE
Status: COMPLETED | OUTPATIENT
Start: 2024-09-27 | End: 2024-09-27

## 2024-09-27 RX ORDER — LORAZEPAM 2 MG/ML
0.5 INJECTION INTRAMUSCULAR ONCE
Status: COMPLETED | OUTPATIENT
Start: 2024-09-27 | End: 2024-09-27

## 2024-09-27 RX ORDER — LORAZEPAM 0.5 MG/1
0.5 TABLET ORAL 2 TIMES DAILY PRN
Qty: 8 TABLET | Refills: 0 | Status: SHIPPED | OUTPATIENT
Start: 2024-09-27

## 2024-09-27 RX ADMIN — LORAZEPAM 0.5 MG: 2 INJECTION INTRAMUSCULAR; INTRAVENOUS at 18:42

## 2024-09-27 RX ADMIN — ONDANSETRON 4 MG: 2 INJECTION INTRAMUSCULAR; INTRAVENOUS at 18:41

## 2024-09-27 NOTE — ED PROVIDER NOTES
Final diagnoses:   Chest tightness   Cough   Anxiety about health     ED Disposition       ED Disposition   Discharge    Condition   Stable    Date/Time   Fri Sep 27, 2024  8:52 PM    Comment   Vincent Chandler discharge to home/self care.                   Assessment & Plan       Medical Decision Making  79 year old male presenting for evaluation of chest tightness and shortness of breath.  Pt was recently seen and diagnosed with bronchitis.  Pt is very anxious regarding his symptoms.  Will obtain EKG, labs.  Will screen for PE with d dimer.      Work up obtained as noted above.  EKG and serial troponins not c/w ACS.  EKG unchanged from prior.  CXR does not reveal pneumonia, pneumothorax, vascular congestion or pleural effusion.  No significant noted leukocytosis, no anemia.  No hypo or hyperglycemia.  Renal function within normal limits.  Electrolytes within normal limits.  BNP normal limits, does not appear volume overloaded, doubt CHF.  D dimer mildly elevated however negative when adjusted for age.  Pt otherwise low risk by Wells' and there is no noted tachycardia, tachypnea or hypoxia.  He had a recent NM stress test which was negative.  I have low suspicion for cardiac etiology.  Pt afebrile, hemodynamically stable.  Anxiety improved after dose of ativan.  He had complete resolution of his symptoms after anxiolytic.  Had long discussion with pt and son.  It sounds like he was on prozac in the past but unclear why this was discontinued.  They feel his anxiety has stemmed from covid infection.  Is prescribed atarax 10 mg, takes PRN.  Discussed daily medication to help with anxiety as opposed to ongoing regular use of abortive therapies.  Recommended recheck with Dr. Hurley to further discuss.  Will provide small amount of ativan for as needed to help until he's recovered from his acute illness.  Recommended stopping prednisone at this time as this could be contributing to worsening symptoms.    Reviewed  symptomatic management.  OTC meds reviewed.  Anticipatory guidance.  Advised recheck with PCP or return to ER as needed.  Strict return precautions outlined.  Patient and son voiced understanding and had no further questions.    Please refer to above ER course for further details/discussion.      Problems Addressed:  Anxiety about health: chronic illness or injury  Chest tightness: acute illness or injury  Cough: acute illness or injury     Details: Recent bronchitis diagnosis.  Has been improving.  Respiratory status stable on room air.    Amount and/or Complexity of Data Reviewed  Independent Historian:      Details: son  External Data Reviewed: labs, radiology and notes.  Labs: ordered. Decision-making details documented in ED Course.  Radiology: ordered and independent interpretation performed. Decision-making details documented in ED Course.  ECG/medicine tests: ordered and independent interpretation performed. Decision-making details documented in ED Course.    Risk  OTC drugs.  Prescription drug management.        ED Course as of 09/27/24 2116   Fri Sep 27, 2024   1859 WBC(!): 10.60  Minimally elevated.  Currently on steroid.   1859 Hemoglobin: 14.0   1859 Platelet Count: 274   1908 POCT INR: 1.03   1908 PROTIME: 14.0   1908 PTT: 25   1913 GLUCOSE(!): 144  Non fasting   1913 Creatinine: 0.98   1913 BUN: 24   1913 Sodium: 139   1913 Potassium: 4.1   1913 Chloride: 98   1913 Carbon Dioxide: 31   1913 ANION GAP: 10   1913 Calcium: 9.8   1913 AST: 35   1913 ALT: 22   1913 ALK PHOS: 64   1913 Total Protein: 7.2   1913 Albumin: 4.3   1913 Total Bilirubin: 0.48   1913 GFR, Calculated: 72   1913 D-Dimer, Quant(!): 0.68  Mildly elevated but considered negative for age   1920 hs TnI 0hr: 4  Similar to prior   1920 BNP: 40  Not c/w CHF   1942 XR chest 2 views  Independently viewed and interpreted by me - no acute cardiopulmonary process, no significant interval change from 9/24/24; pending official read.   1948 Pt notes  complete resolution of chest tightness and shortness of breath after the ativan.   2047 Delta 2hr hsTnI: 0  Not c/w ACS       Medications   LORazepam (ATIVAN) injection 0.5 mg (0.5 mg Intravenous Given 9/27/24 1842)   ondansetron (ZOFRAN) injection 4 mg (4 mg Intravenous Given 9/27/24 1841)       ED Risk Strat Scores   HEART Risk Score      Flowsheet Row Most Recent Value   Heart Score Risk Calculator    History 0 Filed at: 09/27/2024 1848   ECG 0 Filed at: 09/27/2024 1848   Age 2 Filed at: 09/27/2024 1848   Risk Factors 2 Filed at: 09/27/2024 1848   Troponin 0 Filed at: 09/27/2024 1848   HEART Score 4 Filed at: 09/27/2024 1848                               SBIRT 20yo+      Flowsheet Row Most Recent Value   Initial Alcohol Screen: US AUDIT-C     1. How often do you have a drink containing alcohol? 0 Filed at: 09/27/2024 1809   2. How many drinks containing alcohol do you have on a typical day you are drinking?  0 Filed at: 09/27/2024 1809   3a. Male UNDER 65: How often do you have five or more drinks on one occasion? 0 Filed at: 09/27/2024 1809   3b. FEMALE Any Age, or MALE 65+: How often do you have 4 or more drinks on one occassion? 0 Filed at: 09/27/2024 1809   Audit-C Score 0 Filed at: 09/27/2024 1809   JEFFREY: How many times in the past year have you...    Used an illegal drug or used a prescription medication for non-medical reasons? Never Filed at: 09/27/2024 1809            Wells' Criteria for PE      Flowsheet Row Most Recent Value   Wells' Criteria for PE    Clinical signs and symptoms of DVT 0 Filed at: 09/27/2024 1848   PE is primary diagnosis or equally likely 0 Filed at: 09/27/2024 1848   HR >100 0 Filed at: 09/27/2024 1848   Immobilization at least 3 days or Surgery in the previous 4 weeks 0 Filed at: 09/27/2024 1848   Previous, objectively diagnosed PE or DVT 0 Filed at: 09/27/2024 1848   Hemoptysis 0 Filed at: 09/27/2024 1848   Malignancy with treatment within 6 months or palliative 0 Filed at:  09/27/2024 1848   Wells' Criteria Total 0 Filed at: 09/27/2024 1848                        History of Present Illness       Chief Complaint   Patient presents with    Shortness of Breath     Patient reports to this ED c/o SOB. Dx with bronchitis on Tuesday and has been compliant with abx treatment. Pt reports trouble sleeping and high anxiety from the pressure he is feeling in his chest.        Past Medical History:   Diagnosis Date    Coronary artery calcification seen on CAT scan     Coronary artery disease     COVID-19 01/28/2022    Dyslipidemia     Dyspnea on exertion     History of echocardiogram 03/22/2018    Normal EF, normal LVSF.Thick and rebundent MV leaflets w/ mild posterior leaflet prolapse w/ trace regurg.    History of pericarditis     Hyperlipidemia     Lyme disease     Mitral valve prolapse     Shingles       Past Surgical History:   Procedure Laterality Date    APPENDECTOMY      EYE SURGERY      HERNIA REPAIR      TONSILLECTOMY        Family History   Problem Relation Age of Onset    Heart disease Mother     No Known Problems Father       Social History     Tobacco Use    Smoking status: Never    Smokeless tobacco: Never   Vaping Use    Vaping status: Never Used   Substance Use Topics    Alcohol use: Never     Alcohol/week: 0.0 standard drinks of alcohol    Drug use: Never      E-Cigarette/Vaping    E-Cigarette Use Never User       E-Cigarette/Vaping Substances    Nicotine No     THC No     CBD No     Flavoring No     Other No     Unknown No       I have reviewed and agree with the history as documented.     79 year old male with PMH CAD, HLD presenting with son from home for evaluation.  Pt was recently seen here on Tuesday, was diagnosed with bronchitis.  Pt reports he's been sick over the past week.  He reports slight runny nose, congestion.  Notes a cough.  It has been mostly productive.  Recently yellow phlegm as of Tuesday.  He feels his cough has lessened in intensity.  Denies fever, chills.   Pt notes after he was seen here (negative covid/flu, negative CXR) he was discharged home and treated with zithromax, prednisone, tessalon and albuterol inhaler.  He notes he was feeling pretty good on Wednesday and Thursday and felt things were improving.  He notes this morning he woke up and was experiencing chest tightness and shortness of breath.  He reports that he tried the inhaler without relief.  Nothing seems to make it better or worse.  He reports it has been persistent today.  He's had increased anxiety.  Tried his atarax without relief.  He has been pacing the house.  Denies N/V/D, abdominal pain.  He endorses a good appetite.  Denies sick contacts.  No reported aggravating or alleviating factors.  Also tried an OTC cough suppressant.    No reported h/o asthma or COPD.   No personal h/o DVT/PE.        History provided by:  Patient, medical records and relative   used: No    Shortness of Breath  Relieved by:  Nothing  Worsened by:  Nothing  Ineffective treatments:  Inhaler and rest  Associated symptoms: chest pain and cough    Associated symptoms: no abdominal pain, no diaphoresis, no fever, no headaches, no hemoptysis, no neck pain, no rash, no sore throat, no syncope, no vomiting and no wheezing    Risk factors: no hx of cancer, no hx of PE/DVT, no prolonged immobilization, no recent surgery and no tobacco use        Review of Systems   Constitutional: Negative.  Negative for chills, diaphoresis, fatigue and fever.   HENT: Negative.  Negative for congestion, rhinorrhea and sore throat.    Eyes: Negative.  Negative for visual disturbance.   Respiratory:  Positive for cough, chest tightness and shortness of breath. Negative for hemoptysis and wheezing.    Cardiovascular:  Positive for chest pain. Negative for palpitations, leg swelling and syncope.   Gastrointestinal:  Positive for nausea. Negative for abdominal pain, constipation, diarrhea and vomiting.   Genitourinary: Negative.   Negative for dysuria, flank pain, frequency and hematuria.   Musculoskeletal: Negative.  Negative for back pain, myalgias and neck pain.   Skin: Negative.  Negative for rash.   Neurological: Negative.  Negative for dizziness, syncope, light-headedness, numbness and headaches.   Psychiatric/Behavioral:  Negative for confusion. The patient is nervous/anxious.    All other systems reviewed and are negative.          Objective       ED Triage Vitals   Temperature Pulse Blood Pressure Respirations SpO2 Patient Position - Orthostatic VS   09/27/24 1805 09/27/24 1811 09/27/24 1811 09/27/24 1811 09/27/24 1811 09/27/24 1811   98 °F (36.7 °C) 81 (!) 182/91 22 99 % Sitting      Temp Source Heart Rate Source BP Location FiO2 (%) Pain Score    09/27/24 1805 09/27/24 1811 09/27/24 1811 -- 09/27/24 1811    Temporal Monitor Right arm  No Pain      Vitals      Date and Time Temp Pulse SpO2 Resp BP Pain Score FACES Pain Rating User   09/27/24 1900 -- 74 99 % 18 152/82 -- -- LD   09/27/24 1811 -- 81 99 % 22 182/91 No Pain -- LD   09/27/24 1805 98 °F (36.7 °C) -- -- -- -- -- -- LD            Physical Exam  Vitals and nursing note reviewed.   Constitutional:       General: He is awake. He is not in acute distress.     Appearance: Normal appearance. He is well-developed. He is not toxic-appearing or diaphoretic.   HENT:      Head: Normocephalic and atraumatic.      Right Ear: Hearing, tympanic membrane, ear canal and external ear normal.      Left Ear: Hearing, tympanic membrane, ear canal and external ear normal.      Nose: Nose normal.      Mouth/Throat:      Mouth: Mucous membranes are moist.      Pharynx: Oropharynx is clear. Uvula midline.   Eyes:      General: Lids are normal. No scleral icterus.     Conjunctiva/sclera: Conjunctivae normal.      Comments: +glasses   Neck:      Trachea: Trachea and phonation normal.   Cardiovascular:      Rate and Rhythm: Normal rate and regular rhythm.      Pulses: Normal pulses.           Radial  pulses are 2+ on the right side and 2+ on the left side.      Heart sounds: Normal heart sounds, S1 normal and S2 normal.   Pulmonary:      Effort: Pulmonary effort is normal. No tachypnea or respiratory distress.      Breath sounds: Normal breath sounds. No wheezing, rhonchi or rales.   Abdominal:      General: Bowel sounds are normal. There is no distension.      Palpations: Abdomen is soft.      Tenderness: There is no abdominal tenderness. There is no guarding or rebound.   Musculoskeletal:         General: No tenderness.      Cervical back: Normal range of motion and neck supple.      Right lower leg: No tenderness. No edema.      Left lower leg: No tenderness. No edema.   Skin:     General: Skin is warm and dry.      Capillary Refill: Capillary refill takes less than 2 seconds.      Findings: No rash.      Comments: +multiple seborrheic keratoses   Neurological:      General: No focal deficit present.      Mental Status: He is alert and oriented to person, place, and time.      GCS: GCS eye subscore is 4. GCS verbal subscore is 5. GCS motor subscore is 6.      Cranial Nerves: Cranial nerves 2-12 are intact.      Gait: Gait normal.   Psychiatric:         Mood and Affect: Mood is anxious.         Speech: Speech normal.         Behavior: Behavior normal. Behavior is cooperative.         Results Reviewed       Procedure Component Value Units Date/Time    HS Troponin I 2hr [[id number]]  (Normal) Collected: 09/27/24 2015    Lab Status: Final result Specimen: Blood from Arm, Right Updated: 09/27/24 2045     hs TnI 2hr 4 ng/L      Delta 2hr hsTnI 0 ng/L     HS Troponin I 4hr [[id number]]     Lab Status: No result Specimen: Blood     HS Troponin 0hr (reflex protocol) [[id number]]  (Normal) Collected: 09/27/24 1850    Lab Status: Final result Specimen: Blood from Arm, Left Updated: 09/27/24 1920     hs TnI 0hr 4 ng/L     B-Type Natriuretic Peptide(BNP) [[id number]]  (Normal) Collected: 09/27/24 1850    Lab Status: Final  result Specimen: Blood from Arm, Left Updated: 09/27/24 1918     BNP 40 pg/mL     Comprehensive metabolic panel [[id number]]  (Abnormal) Collected: 09/27/24 1850    Lab Status: Final result Specimen: Blood from Arm, Left Updated: 09/27/24 1913     Sodium 139 mmol/L      Potassium 4.1 mmol/L      Chloride 98 mmol/L      CO2 31 mmol/L      ANION GAP 10 mmol/L      BUN 24 mg/dL      Creatinine 0.98 mg/dL      Glucose 144 mg/dL      Calcium 9.8 mg/dL      AST 35 U/L      ALT 22 U/L      Alkaline Phosphatase 64 U/L      Total Protein 7.2 g/dL      Albumin 4.3 g/dL      Total Bilirubin 0.48 mg/dL      eGFR 72 ml/min/1.73sq m     Narrative:      [employer] Kid[employer] guidelines for Chronic Kidney Disease (CKD):     Stage 1 with normal or high GFR (GFR > 90 mL/min/1.73 square meters)    Stage 2 Mild CKD (GFR = 60-89 mL/min/1.73 square meters)    Stage 3A Moderate CKD (GFR = 45-59 mL/min/1.73 square meters)    Stage 3B Moderate CKD (GFR = 30-44 mL/min/1.73 square meters)    Stage 4 Severe CKD (GFR = 15-29 mL/min/1.73 square meters)    Stage 5 End Stage CKD (GFR <15 mL/min/1.73 square meters)  Note: GFR calculation is accurate only with a steady state creatinine    D-Dimer [[id number]]  (Abnormal) Collected: 09/27/24 1850    Lab Status: Final result Specimen: Blood from Arm, Left Updated: 09/27/24 1912     D-Dimer, Quant 0.68 ug/ml FEU     Narrative:      In the evaluation for possible pulmonary embolism, in the appropriate (Well's Score of 4 or less) patient, the age adjusted d-dimer cutoff for this patient can be calculated as:    Age x 0.01 (in ug/mL) for Age-adjusted D-dimer exclusion threshold for a patient over 50 years.    CBC and differential [[id number]]  (Abnormal) Collected: 09/27/24 1850    Lab Status: Final result Specimen: Blood from Arm, Left Updated: 09/27/24 1912     WBC 10.60 Thousand/uL      RBC 4.71 Million/uL      Hemoglobin 14.0 g/dL      Hematocrit 42.6 %      MCV 90 fL      MCH 29.7 pg       MCHC 32.9 g/dL      RDW 12.4 %      MPV 9.8 fL      Platelets 274 Thousands/uL      nRBC 0 /100 WBCs      Segmented % 91 %      Immature Grans % 1 %      Lymphocytes % 7 %      Monocytes % 1 %      Eosinophils Relative 0 %      Basophils Relative 0 %      Absolute Neutrophils 9.65 Thousands/µL      Absolute Immature Grans 0.07 Thousand/uL      Absolute Lymphocytes 0.75 Thousands/µL      Absolute Monocytes 0.12 Thousand/µL      Eosinophils Absolute 0.00 Thousand/µL      Basophils Absolute 0.01 Thousands/µL     Narrative:      This is an appended report.  These results have been appended to a previously verified report.    Smear Review(Phlebs Do Not Order) [[id number]] Collected: 09/27/24 1850    Lab Status: Final result Specimen: Blood from Arm, Left Updated: 09/27/24 1912     RBC Morphology Normal     Platelet Estimate Adequate    Protime-INR [[id number]]  (Normal) Collected: 09/27/24 1850    Lab Status: Final result Specimen: Blood from Arm, Left Updated: 09/27/24 1908     Protime 14.0 seconds      INR 1.03    Narrative:      INR Therapeutic Range    Indication                                             INR Range      Atrial Fibrillation                                               2.0-3.0  Hypercoagulable State                                    2.0.2.3  Left Ventricular Asist Device                            2.0-3.0  Mechanical Heart Valve                                  -    Aortic(with afib, MI, embolism, HF, LA enlargement,    and/or coagulopathy)                                     2.0-3.0 (2.5-3.5)     Mitral                                                             2.5-3.5  Prosthetic/Bioprosthetic Heart Valve               2.0-3.0  Venous thromboembolism (VTE: VT, PE        2.0-3.0    APTT [[id number]]  (Normal) Collected: 09/27/24 1850    Lab Status: Final result Specimen: Blood from Arm, Left Updated: 09/27/24 1908     PTT 25 seconds             XR chest 2 views    (Results Pending)       ECG 12 Lead  Documentation Only    Date/Time: 9/27/2024 6:20 PM    Performed by: Stephanie Duncan PA-C  Authorized by: Stephanie Duncan PA-C    Indications / Diagnosis:  Chest tightness, dyspnea  ECG reviewed by me, the ED Provider: yes    Patient location:  ED  Previous ECG:     Previous ECG:  Compared to current    Comparison ECG info:  8/6/24    Similarity:  No change    Comparison to cardiac monitor: Yes    Interpretation:     Interpretation: non-specific    Rate:     ECG rate:  81    ECG rate assessment: normal    Rhythm:     Rhythm: sinus rhythm    Ectopy:     Ectopy: none    QRS:     QRS axis:  Normal    QRS intervals:  Normal  Conduction:     Conduction: normal    ST segments:     ST segments:  Non-specific  T waves:     T waves: normal    Comments:      , QRS 82, QT//425; no acute ischemic changes, no significant interval change from prior.      ED Medication and Procedure Management   Prior to Admission Medications   Prescriptions Last Dose Informant Patient Reported? Taking?   albuterol (PROVENTIL HFA,VENTOLIN HFA) 90 mcg/act inhaler   No No   Sig: Inhale 1-2 puffs every 4 (four) hours as needed for shortness of breath or wheezing   ascorbic acid (VITAMIN C) 250 mg tablet  Self Yes No   Sig: Take 500 mg by mouth daily   aspirin 81 mg chewable tablet  Self No No   Sig: Chew 1 tablet (81 mg total) daily   atorvastatin (LIPITOR) 20 mg tablet  Self No No   Sig: Take 1 tablet (20 mg total) by mouth daily   azithromycin (ZITHROMAX) 250 mg tablet 9/27/2024  No Yes   Sig: Take 2 tablets today then 1 tablet daily x 4 days   benzonatate (TESSALON PERLES) 100 mg capsule   No No   Sig: Take 1 capsule (100 mg total) by mouth 3 (three) times a day as needed for cough   famotidine (PEPCID) 40 MG tablet   No No   Sig: Take 1 tablet (40 mg total) by mouth daily at bedtime   finasteride (PROSCAR) 5 mg tablet  Self No No   Sig: Take 1 tablet (5 mg total) by mouth daily   hydrOXYzine HCL (ATARAX) 10 mg tablet   No No    Si tablet twice daily if needed for anxiety   mirtazapine (REMERON) 15 mg tablet  Self No No   Sig: Take 1 tablet (15 mg total) by mouth daily at bedtime   predniSONE 20 mg tablet   No No   Sig: Take 2 tablets (40 mg total) by mouth daily for 5 days   tamsulosin (FLOMAX) 0.4 mg   No No   Sig: Take 1 capsule (0.4 mg total) by mouth daily with dinner      Facility-Administered Medications: None     Discharge Medication List as of 2024  9:03 PM        START taking these medications    Details   LORazepam (Ativan) 0.5 mg tablet Take 1 tablet (0.5 mg total) by mouth 2 (two) times a day as needed for anxiety, Starting 2024, Normal           CONTINUE these medications which have NOT CHANGED    Details   azithromycin (ZITHROMAX) 250 mg tablet Take 2 tablets today then 1 tablet daily x 4 days, Normal      albuterol (PROVENTIL HFA,VENTOLIN HFA) 90 mcg/act inhaler Inhale 1-2 puffs every 4 (four) hours as needed for shortness of breath or wheezing, Starting 2024, Normal      ascorbic acid (VITAMIN C) 250 mg tablet Take 500 mg by mouth daily, Historical Med      aspirin 81 mg chewable tablet Chew 1 tablet (81 mg total) daily, Starting 2024, No Print      atorvastatin (LIPITOR) 20 mg tablet Take 1 tablet (20 mg total) by mouth daily, Starting 2024, Normal      benzonatate (TESSALON PERLES) 100 mg capsule Take 1 capsule (100 mg total) by mouth 3 (three) times a day as needed for cough, Starting 2024, Normal      famotidine (PEPCID) 40 MG tablet Take 1 tablet (40 mg total) by mouth daily at bedtime, Starting Tue 9/10/2024, Until 2025, Normal      finasteride (PROSCAR) 5 mg tablet Take 1 tablet (5 mg total) by mouth daily, Starting Wed 3/6/2024, Normal      hydrOXYzine HCL (ATARAX) 10 mg tablet 1 tablet twice daily if needed for anxiety, Normal      mirtazapine (REMERON) 15 mg tablet Take 1 tablet (15 mg total) by mouth daily at bedtime, Starting 2024, Normal       tamsulosin (FLOMAX) 0.4 mg Take 1 capsule (0.4 mg total) by mouth daily with dinner, Starting Tue 6/18/2024, Normal           STOP taking these medications       predniSONE 20 mg tablet Comments:   Reason for Stopping:             No discharge procedures on file.  ED SEPSIS DOCUMENTATION   Time reflects when diagnosis was documented in both MDM as applicable and the Disposition within this note       Time User Action Codes Description Comment    9/27/2024  8:52 PM Stephanie Duncan [R07.89] Chest tightness     9/27/2024  8:52 PM Stephanie Duncan [R05.9] Cough     9/27/2024  8:53 PM Stephanie Duncan [R45.89] Anxiety about health                  Stephanie Duncan PA-C  09/27/24 8514

## 2024-09-28 NOTE — DISCHARGE INSTRUCTIONS
Finish the antibiotic as prescribed.  Talk to Dr. Hurley about your anxiety and medication management.  Stop the prednisone.  Reserve the ativan as needed for severe anxiety.  Caution sedation.  Follow up with PCP or return to ER as needed.

## 2024-09-30 ENCOUNTER — OFFICE VISIT (OUTPATIENT)
Dept: FAMILY MEDICINE CLINIC | Facility: CLINIC | Age: 79
End: 2024-09-30
Payer: COMMERCIAL

## 2024-09-30 VITALS
HEART RATE: 89 BPM | DIASTOLIC BLOOD PRESSURE: 74 MMHG | OXYGEN SATURATION: 98 % | HEIGHT: 68 IN | BODY MASS INDEX: 17.73 KG/M2 | WEIGHT: 117 LBS | TEMPERATURE: 97.9 F | RESPIRATION RATE: 18 BRPM | SYSTOLIC BLOOD PRESSURE: 110 MMHG

## 2024-09-30 DIAGNOSIS — F41.9 ANXIETY: Primary | ICD-10-CM

## 2024-09-30 DIAGNOSIS — E46 PROTEIN-CALORIE MALNUTRITION, UNSPECIFIED SEVERITY (HCC): ICD-10-CM

## 2024-09-30 DIAGNOSIS — E55.9 VITAMIN D DEFICIENCY, UNSPECIFIED: ICD-10-CM

## 2024-09-30 DIAGNOSIS — Z13.89 SCREENING FOR MULTIPLE CONDITIONS: ICD-10-CM

## 2024-09-30 LAB
ATRIAL RATE: 81 BPM
P AXIS: 42 DEGREES
PR INTERVAL: 126 MS
QRS AXIS: 61 DEGREES
QRSD INTERVAL: 82 MS
QT INTERVAL: 366 MS
QTC INTERVAL: 425 MS
T WAVE AXIS: 76 DEGREES
VENTRICULAR RATE: 81 BPM

## 2024-09-30 PROCEDURE — 93010 ELECTROCARDIOGRAM REPORT: CPT | Performed by: INTERNAL MEDICINE

## 2024-09-30 PROCEDURE — 99213 OFFICE O/P EST LOW 20 MIN: CPT | Performed by: PHYSICIAN ASSISTANT

## 2024-09-30 RX ORDER — HYDROXYZINE HYDROCHLORIDE 25 MG/1
25 TABLET, FILM COATED ORAL 2 TIMES DAILY
Qty: 60 TABLET | Refills: 0 | Status: SHIPPED | OUTPATIENT
Start: 2024-09-30

## 2024-09-30 NOTE — ASSESSMENT & PLAN NOTE
Increase hydroxyzine to 25 mg BID. Cautioned to not use this with Ativan. Make sure he is not drinking caffeinated tea. Pt to RTO later this week to recheck with Dr. Hurley. Labs ordered at patient's request.  Orders:    hydrOXYzine HCL (ATARAX) 25 mg tablet; Take 1 tablet (25 mg total) by mouth 2 (two) times a day 1 tablet twice daily if needed for anxiety    Vitamin B12; Future    Vitamin D 25 hydroxy; Future    TSH, 3rd generation with Free T4 reflex; Future    CBC; Future    Comprehensive metabolic panel; Future

## 2024-09-30 NOTE — PROGRESS NOTES
"Ambulatory Visit  Name: Vincent Chandler      : 1945      MRN: [id number]  Encounter Provider: Candace Mitchell PA-C  Encounter Date: 2024   Encounter department: [facility]    Assessment & Plan  Anxiety  Increase hydroxyzine to 25 mg BID. Cautioned to not use this with Ativan. Make sure he is not drinking caffeinated tea. Pt to RTO later this week to recheck with Dr. Hurley. Labs ordered at patient's request.  Orders:    hydrOXYzine HCL (ATARAX) 25 mg tablet; Take 1 tablet (25 mg total) by mouth 2 (two) times a day 1 tablet twice daily if needed for anxiety    Vitamin B12; Future    Vitamin D 25 hydroxy; Future    TSH, 3rd generation with Free T4 reflex; Future    CBC; Future    Comprehensive metabolic panel; Future    Screening for multiple conditions    Orders:    Vitamin B12; Future    Vitamin D 25 hydroxy; Future    TSH, 3rd generation with Free T4 reflex; Future    CBC; Future    Comprehensive metabolic panel; Future    Protein-calorie malnutrition, unspecified severity (HCC)    Orders:    Vitamin B12; Future    Vitamin D deficiency, unspecified    Orders:    Vitamin D 25 hydroxy; Future      Depression Screening and Follow-up Plan: Patient was screened for depression during today's encounter. They screened negative with a PHQ-9 score of 0.      History of Present Illness     Vincent is here today with his granddaughter. He is besides himself with anxiety, feels that he is \"not going to make it.\" He is not eating well, has lost some weight, states that he cannot stand another night with his anxiety. Does not have any specific life-events that have increased his anxiety. He went to the ER and got a script for a few Ativan, he is unsure if these help significantly or not. He was taking prednisone for bronchitis but stopped taking the prednisone. He was not using hydroxyzine 10 mg from Dr. Hurley, however started using x 2 days and reports \"it does nothing for me.\"          Review of Systems " "  Constitutional:  Positive for appetite change and unexpected weight change. Negative for chills and fever.   HENT:  Negative for ear pain and sore throat.    Eyes:  Negative for pain and visual disturbance.   Respiratory:  Negative for cough and shortness of breath.    Cardiovascular:  Negative for chest pain and palpitations.   Gastrointestinal:  Negative for abdominal pain and vomiting.   Genitourinary:  Negative for dysuria and hematuria.   Musculoskeletal:  Negative for arthralgias and back pain.   Skin:  Negative for color change and rash.   Neurological:  Negative for seizures and syncope.   Psychiatric/Behavioral:  Positive for sleep disturbance. The patient is nervous/anxious and is hyperactive.    All other systems reviewed and are negative.          Objective     /74 (BP Location: Left arm, Patient Position: Sitting, Cuff Size: Standard)   Pulse 89   Temp 97.9 °F (36.6 °C) (Temporal)   Resp 18   Ht 5' 8\" (1.727 m)   Wt 53.1 kg (117 lb)   SpO2 98%   BMI 17.79 kg/m²     Physical Exam  Vitals reviewed.   Constitutional:       General: He is not in acute distress.     Appearance: He is well-developed and underweight. He is not diaphoretic.   HENT:      Head: Normocephalic and atraumatic.      Right Ear: Hearing, tympanic membrane, ear canal and external ear normal.      Left Ear: Hearing, tympanic membrane, ear canal and external ear normal.      Nose: Nose normal.      Mouth/Throat:      Mouth: Mucous membranes are moist.      Pharynx: Oropharynx is clear. Uvula midline. No oropharyngeal exudate.   Eyes:      General: No scleral icterus.        Right eye: No discharge.         Left eye: No discharge.      Conjunctiva/sclera: Conjunctivae normal.   Neck:      Thyroid: No thyromegaly.      Vascular: No carotid bruit.   Cardiovascular:      Rate and Rhythm: Normal rate and regular rhythm.      Heart sounds: Normal heart sounds. No murmur heard.  Pulmonary:      Effort: Pulmonary effort is normal. " No respiratory distress.      Breath sounds: Normal breath sounds. No wheezing.   Abdominal:      General: Bowel sounds are normal. There is no distension.      Palpations: Abdomen is soft. There is no mass.      Tenderness: There is no abdominal tenderness. There is no guarding or rebound.   Musculoskeletal:         General: No tenderness. Normal range of motion.      Cervical back: Neck supple.   Lymphadenopathy:      Cervical: No cervical adenopathy.   Skin:     General: Skin is warm and dry.      Findings: No erythema or rash.   Neurological:      Mental Status: He is alert and oriented to person, place, and time.   Psychiatric:         Mood and Affect: Mood is anxious.         Speech: Speech is rapid and pressured.         Behavior: Behavior is agitated.         Thought Content: Thought content normal. Thought content is not paranoid or delusional. Thought content does not include homicidal or suicidal ideation. Thought content does not include homicidal or suicidal plan.

## 2024-10-02 ENCOUNTER — TELEPHONE (OUTPATIENT)
Age: 79
End: 2024-10-02

## 2024-10-02 NOTE — TELEPHONE ENCOUNTER
PA for hydrOXYzine HCL (ATARAX) 25 mg tablet SUBMITTED     via    []CMM-KEY:   [x]Surescripts-  []Availity-Auth ID # NDC #   []Faxed to plan   []Other website   []Phone call Case ID #     Office notes sent, clinical questions answered. Awaiting determination    Turnaround time for your insurance to make a decision on your Prior Authorization can take 7-21 business days.

## 2024-10-02 NOTE — TELEPHONE ENCOUNTER
PA for hydrOXYzine HCL (ATARAX) 25 mg tablet  APPROVED     Date(s) approved July 4, 2024 to October 2, 2025       Patient advised by          []MyChart Message  [x]Phone call   []LMOM  []L/M to call office as no active Communication consent on file  []Unable to leave detailed message as VM not approved on Communication consent       Pharmacy advised by    [x]Fax  []Phone call    Approval letter scanned into Media No Not available at decision

## 2024-10-03 ENCOUNTER — OFFICE VISIT (OUTPATIENT)
Dept: FAMILY MEDICINE CLINIC | Facility: CLINIC | Age: 79
End: 2024-10-03
Payer: COMMERCIAL

## 2024-10-03 VITALS
TEMPERATURE: 98.1 F | SYSTOLIC BLOOD PRESSURE: 148 MMHG | HEIGHT: 68 IN | OXYGEN SATURATION: 97 % | HEART RATE: 80 BPM | BODY MASS INDEX: 17.28 KG/M2 | WEIGHT: 114 LBS | DIASTOLIC BLOOD PRESSURE: 98 MMHG

## 2024-10-03 DIAGNOSIS — R45.89 ANXIETY ABOUT HEALTH: ICD-10-CM

## 2024-10-03 DIAGNOSIS — Z23 NEED FOR IMMUNIZATION AGAINST INFLUENZA: Primary | ICD-10-CM

## 2024-10-03 PROCEDURE — G2211 COMPLEX E/M VISIT ADD ON: HCPCS | Performed by: FAMILY MEDICINE

## 2024-10-03 PROCEDURE — 99214 OFFICE O/P EST MOD 30 MIN: CPT | Performed by: FAMILY MEDICINE

## 2024-10-03 RX ORDER — LORAZEPAM 0.5 MG/1
0.5 TABLET ORAL 2 TIMES DAILY PRN
Qty: 8 TABLET | Refills: 0 | Status: SHIPPED | OUTPATIENT
Start: 2024-10-03

## 2024-10-03 NOTE — PROGRESS NOTES
Ambulatory Visit  Name: Vincent Chandler      : 1945      MRN: [id number]  Encounter Provider: Girish Hurley DO  Encounter Date: 10/3/2024   Encounter department: [facility]    Assessment & Plan  Anxiety about health  Patient needs acute inpatient care for his severe anxiety I have asked him to go to the inpatient emergency behavioral health at [facility]     I did speak with the intake person at the crisis center and in [place] who will be awaiting his arrival.  A complete history of his issues and his medical condition was given to that person.  Need for immunization against influenza            History of Present Illness     Patient presents with his granddaughter patient is very anxious he is worried about his health he states that he feels terrible he cannot eat he says he is losing weight and he is patient has no other somatic complaints just terrible anxiety about his health he also states that he feels so ill that he prays to God that God will either cure him or take him so is that he does not have any specific suicidal ideation but is quite anxious.  I had tried him on hydroxyzine last week it was not successful he went to the emergency room this weekend and was Ativan 0.5 mg which he was taking 2 a day and that really made a difference and made him feel better but he was only given a few Ativan pills so he needs to have more          Review of Systems   Constitutional:  Positive for activity change and fatigue. Negative for appetite change, diaphoresis and fever.   HENT: Negative.     Eyes: Negative.    Respiratory:  Negative for apnea, cough, chest tightness, shortness of breath and wheezing.    Cardiovascular:  Negative for chest pain, palpitations and leg swelling.   Gastrointestinal:  Negative for abdominal distention, abdominal pain, anal bleeding, constipation, diarrhea, nausea and vomiting.   Endocrine: Negative for cold intolerance, heat intolerance,  "polydipsia, polyphagia and polyuria.   Genitourinary:  Negative for difficulty urinating, dysuria, flank pain, hematuria and urgency.   Musculoskeletal:  Negative for arthralgias, back pain, gait problem, joint swelling and myalgias.   Skin:  Negative for color change, rash and wound.   Allergic/Immunologic: Negative for environmental allergies, food allergies and immunocompromised state.   Neurological:  Negative for dizziness, seizures, syncope, speech difficulty, numbness and headaches.   Hematological:  Negative for adenopathy. Does not bruise/bleed easily.   Psychiatric/Behavioral:  Positive for sleep disturbance. Negative for agitation, behavioral problems, hallucinations and suicidal ideas. The patient is nervous/anxious.            Objective     /98 (BP Location: Left arm, Patient Position: Sitting, Cuff Size: Standard)   Pulse 80   Temp 98.1 °F (36.7 °C) (Temporal)   Ht 5' 8\" (1.727 m)   Wt 51.7 kg (114 lb)   SpO2 97%   BMI 17.33 kg/m²     Physical Exam  Constitutional:       Appearance: He is ill-appearing.   HENT:      Head: Normocephalic.      Nose: Rhinorrhea present.   Cardiovascular:      Rate and Rhythm: Normal rate and regular rhythm.   Pulmonary:      Effort: Pulmonary effort is normal.      Breath sounds: Normal breath sounds.   Neurological:      General: No focal deficit present.      Mental Status: He is alert and oriented to person, place, and time.   Psychiatric:      Comments: Severe anxiety, worried about health, not eating well with weight loss.         "

## 2024-10-03 NOTE — TELEPHONE ENCOUNTER
Sarah called in and stated after pt saw Dr Hurley today, he went home and discussed being seen in [facility]. After speaking with his family, pt would like to stay at home.     Pt is also wondering if the Ativan can be called in as an as needed medication due to the limited amount he was given at the hospital.     Please advise, thanks.     Please call pt at their home number- [phone]

## 2024-10-04 ENCOUNTER — TELEPHONE (OUTPATIENT)
Age: 79
End: 2024-10-04

## 2024-10-04 NOTE — TELEPHONE ENCOUNTER
PA for LORazepam (Ativan) 0.5 mg tablet SUBMITTED     via    []CMM-KEY:   [x]Surescripts  []Availity-Auth ID # NDC #   []Faxed to plan   []Other website   []Phone call Case ID #    Office notes sent, clinical questions answered. Awaiting determination    Turnaround time for your insurance to make a decision on your Prior Authorization can take 7-21 business days.

## 2024-10-07 ENCOUNTER — APPOINTMENT (EMERGENCY)
Dept: RADIOLOGY | Facility: HOSPITAL | Age: 79
End: 2024-10-07
Payer: COMMERCIAL

## 2024-10-07 ENCOUNTER — HOSPITAL ENCOUNTER (EMERGENCY)
Facility: HOSPITAL | Age: 79
Discharge: HOME | End: 2024-10-07
Attending: EMERGENCY MEDICINE
Payer: COMMERCIAL

## 2024-10-07 VITALS
OXYGEN SATURATION: 97 % | DIASTOLIC BLOOD PRESSURE: 87 MMHG | HEART RATE: 77 BPM | SYSTOLIC BLOOD PRESSURE: 143 MMHG | TEMPERATURE: 98.8 F | RESPIRATION RATE: 20 BRPM

## 2024-10-07 DIAGNOSIS — R53.1 WEAKNESS: Primary | ICD-10-CM

## 2024-10-07 DIAGNOSIS — R11.0 NAUSEA: ICD-10-CM

## 2024-10-07 DIAGNOSIS — R63.0 DECREASED APPETITE: ICD-10-CM

## 2024-10-07 PROBLEM — R07.89 OTHER CHEST PAIN: Status: RESOLVED | Noted: 2022-02-05 | Resolved: 2024-10-07

## 2024-10-07 LAB
2HR DELTA HS TROPONIN: 0 NG/L
ALBUMIN SERPL BCG-MCNC: 4.2 G/DL (ref 3.5–5)
ALP SERPL-CCNC: 70 U/L (ref 34–104)
ALT SERPL W P-5'-P-CCNC: 14 U/L (ref 7–52)
ANION GAP SERPL CALCULATED.3IONS-SCNC: 7 MMOL/L (ref 4–13)
AST SERPL W P-5'-P-CCNC: 23 U/L (ref 13–39)
BASOPHILS # BLD AUTO: 0.09 THOUSANDS/ΜL (ref 0–0.1)
BASOPHILS NFR BLD AUTO: 1 % (ref 0–1)
BILIRUB SERPL-MCNC: 0.67 MG/DL (ref 0.2–1)
BUN SERPL-MCNC: 19 MG/DL (ref 5–25)
CALCIUM SERPL-MCNC: 9.6 MG/DL (ref 8.4–10.2)
CARDIAC TROPONIN I PNL SERPL HS: 3 NG/L
CARDIAC TROPONIN I PNL SERPL HS: 3 NG/L
CHLORIDE SERPL-SCNC: 96 MMOL/L (ref 96–108)
CO2 SERPL-SCNC: 34 MMOL/L (ref 21–32)
CREAT SERPL-MCNC: 0.96 MG/DL (ref 0.6–1.3)
EOSINOPHIL # BLD AUTO: 0.1 THOUSAND/ΜL (ref 0–0.61)
EOSINOPHIL NFR BLD AUTO: 1 % (ref 0–6)
ERYTHROCYTE [DISTWIDTH] IN BLOOD BY AUTOMATED COUNT: 12.3 % (ref 11.6–15.1)
GFR SERPL CREATININE-BSD FRML MDRD: 73 ML/MIN/1.73SQ M
GLUCOSE SERPL-MCNC: 143 MG/DL (ref 65–140)
HCT VFR BLD AUTO: 43.5 % (ref 36.5–49.3)
HGB BLD-MCNC: 14.3 G/DL (ref 12–17)
IMM GRANULOCYTES # BLD AUTO: 0.03 THOUSAND/UL (ref 0–0.2)
IMM GRANULOCYTES NFR BLD AUTO: 0 % (ref 0–2)
LACTATE SERPL-SCNC: 1.5 MMOL/L (ref 0.5–2)
LYMPHOCYTES # BLD AUTO: 1.85 THOUSANDS/ΜL (ref 0.6–4.47)
LYMPHOCYTES NFR BLD AUTO: 23 % (ref 14–44)
MAGNESIUM SERPL-MCNC: 2.1 MG/DL (ref 1.9–2.7)
MCH RBC QN AUTO: 29.2 PG (ref 26.8–34.3)
MCHC RBC AUTO-ENTMCNC: 32.9 G/DL (ref 31.4–37.4)
MCV RBC AUTO: 89 FL (ref 82–98)
MONOCYTES # BLD AUTO: 0.69 THOUSAND/ΜL (ref 0.17–1.22)
MONOCYTES NFR BLD AUTO: 9 % (ref 4–12)
NEUTROPHILS # BLD AUTO: 5.26 THOUSANDS/ΜL (ref 1.85–7.62)
NEUTS SEG NFR BLD AUTO: 66 % (ref 43–75)
NRBC BLD AUTO-RTO: 0 /100 WBCS
PLATELET # BLD AUTO: 229 THOUSANDS/UL (ref 149–390)
PMV BLD AUTO: 10.8 FL (ref 8.9–12.7)
POTASSIUM SERPL-SCNC: 3.7 MMOL/L (ref 3.5–5.3)
PROT SERPL-MCNC: 7.2 G/DL (ref 6.4–8.4)
RBC # BLD AUTO: 4.89 MILLION/UL (ref 3.88–5.62)
SODIUM SERPL-SCNC: 137 MMOL/L (ref 135–147)
TSH SERPL DL<=0.05 MIU/L-ACNC: 1.65 UIU/ML (ref 0.45–4.5)
WBC # BLD AUTO: 8.02 THOUSAND/UL (ref 4.31–10.16)

## 2024-10-07 PROCEDURE — 83735 ASSAY OF MAGNESIUM: CPT | Performed by: EMERGENCY MEDICINE

## 2024-10-07 PROCEDURE — 80053 COMPREHEN METABOLIC PANEL: CPT | Performed by: STUDENT IN AN ORGANIZED HEALTH CARE EDUCATION/TRAINING PROGRAM

## 2024-10-07 PROCEDURE — 85025 COMPLETE CBC W/AUTO DIFF WBC: CPT | Performed by: STUDENT IN AN ORGANIZED HEALTH CARE EDUCATION/TRAINING PROGRAM

## 2024-10-07 PROCEDURE — 93005 ELECTROCARDIOGRAM TRACING: CPT

## 2024-10-07 PROCEDURE — 71046 X-RAY EXAM CHEST 2 VIEWS: CPT

## 2024-10-07 PROCEDURE — 96365 THER/PROPH/DIAG IV INF INIT: CPT

## 2024-10-07 PROCEDURE — 96375 TX/PRO/DX INJ NEW DRUG ADDON: CPT

## 2024-10-07 PROCEDURE — 96366 THER/PROPH/DIAG IV INF ADDON: CPT

## 2024-10-07 PROCEDURE — 36415 COLL VENOUS BLD VENIPUNCTURE: CPT | Performed by: STUDENT IN AN ORGANIZED HEALTH CARE EDUCATION/TRAINING PROGRAM

## 2024-10-07 PROCEDURE — 84443 ASSAY THYROID STIM HORMONE: CPT | Performed by: EMERGENCY MEDICINE

## 2024-10-07 PROCEDURE — 99284 EMERGENCY DEPT VISIT MOD MDM: CPT | Performed by: EMERGENCY MEDICINE

## 2024-10-07 PROCEDURE — 84484 ASSAY OF TROPONIN QUANT: CPT | Performed by: STUDENT IN AN ORGANIZED HEALTH CARE EDUCATION/TRAINING PROGRAM

## 2024-10-07 PROCEDURE — 99285 EMERGENCY DEPT VISIT HI MDM: CPT

## 2024-10-07 PROCEDURE — 83605 ASSAY OF LACTIC ACID: CPT | Performed by: EMERGENCY MEDICINE

## 2024-10-07 RX ORDER — ONDANSETRON 4 MG/1
4 TABLET, ORALLY DISINTEGRATING ORAL ONCE
Status: COMPLETED | OUTPATIENT
Start: 2024-10-07 | End: 2024-10-07

## 2024-10-07 RX ORDER — METOCLOPRAMIDE HYDROCHLORIDE 5 MG/ML
10 INJECTION INTRAMUSCULAR; INTRAVENOUS ONCE
Status: DISCONTINUED | OUTPATIENT
Start: 2024-10-07 | End: 2024-10-07

## 2024-10-07 RX ORDER — ONDANSETRON 4 MG/1
4 TABLET, FILM COATED ORAL EVERY 8 HOURS PRN
Qty: 20 TABLET | Refills: 0 | Status: SHIPPED | OUTPATIENT
Start: 2024-10-07

## 2024-10-07 RX ORDER — LORAZEPAM 0.5 MG/1
0.5 TABLET ORAL ONCE
Status: COMPLETED | OUTPATIENT
Start: 2024-10-07 | End: 2024-10-07

## 2024-10-07 RX ORDER — DROPERIDOL 2.5 MG/ML
0.62 INJECTION, SOLUTION INTRAMUSCULAR; INTRAVENOUS ONCE
Status: COMPLETED | OUTPATIENT
Start: 2024-10-07 | End: 2024-10-07

## 2024-10-07 RX ADMIN — LORAZEPAM 0.5 MG: 0.5 TABLET ORAL at 14:43

## 2024-10-07 RX ADMIN — DROPERIDOL 0.62 MG: 2.5 INJECTION, SOLUTION INTRAMUSCULAR; INTRAVENOUS at 16:47

## 2024-10-07 RX ADMIN — ONDANSETRON 4 MG: 4 TABLET, ORALLY DISINTEGRATING ORAL at 14:43

## 2024-10-07 RX ADMIN — SODIUM CHLORIDE, SODIUM LACTATE, POTASSIUM CHLORIDE, AND CALCIUM CHLORIDE 1000 ML: .6; .31; .03; .02 INJECTION, SOLUTION INTRAVENOUS at 14:42

## 2024-10-07 NOTE — TELEPHONE ENCOUNTER
PA for LORazepam (Ativan) 0.5 mg tablet  APPROVED     Date(s) approved 7/6/2024 - 10/4/2025    Patient advised by          []Continuing Education Records & Resourceshart Message  []Phone call   []LMOM  []L/M to call office as no active Communication consent on file  [x]Unable to leave detailed message as VM not approved on Communication consent       Pharmacy advised by    [x]Fax  []Phone call    Approval letter scanned into Media No no letter available at time of approval.

## 2024-10-07 NOTE — ED PROVIDER NOTES
Final diagnoses:   Weakness   Decreased appetite   Nausea     ED Disposition       ED Disposition   Discharge    Condition   Stable    Date/Time   Mon Oct 7, 2024  5:51 PM    Comment   Vincent Chandler discharge to home/self care.                   Assessment & Plan       Medical Decision Making  79-year-old man with CAD, BPH, anxiety presents with multiple complaints, with chief complaint of persistent nausea and poor appetite.  EKG and troponin did not support a diagnosis of myocardial infarction or NSTEMI.  Lactic acid was normal.  Clinical presentation is not consistent with pulmonary embolism, as he did not have tachycardia, hypoxia, recent surgery, hemoptysis, or unilateral leg swelling.  Wells criteria is 0.  Not consistent with bowel obstruction, as he has no vomiting or abdominal pain, only severe amount of nausea and poor appetite.  Chest x-ray without acute findings, there is no pneumonia or pleural effusion.  Patient received IV fluid for hydration, Zofran for nausea, and Ativan for severe anxiety.  On reassessment, patient received droperidol for ongoing nausea.  Given the normal vital signs, lab work, chest x-ray, and EKG, patient was discharged to home with Zofran as needed for nausea.  He has an appointment with family medicine tomorrow.  Advised patient to discuss the need for additional workup with them, including advanced imaging.  At this time, he does not have any life-threatening conditions that would warrant inpatient admission.  Patient and family agreed to be discharged to home.    Amount and/or Complexity of Data Reviewed  Independent Historian: caregiver  Labs: ordered.  Radiology: ordered and independent interpretation performed.    Risk  Prescription drug management.          Medications   ondansetron (ZOFRAN-ODT) dispersible tablet 4 mg (4 mg Oral Given 10/7/24 1443)   LORazepam (ATIVAN) tablet 0.5 mg (0.5 mg Oral Given 10/7/24 1443)   lactated ringers bolus 1,000 mL (0 mL Intravenous  Stopped 10/7/24 1642)   droperidol (INAPSINE) injection 0.625 mg (0.625 mg Intravenous Given 10/7/24 1647)       ED Risk Strat Scores                       PERC Rule for PE      Flowsheet Row Most Recent Value   PERC Rule for PE    Age >=50 1 Filed at: 10/07/2024 1459   HR >=100 0 Filed at: 10/07/2024 1459   O2 Sat on room air < 95% 0 Filed at: 10/07/2024 1459   History of PE or DVT 0 Filed at: 10/07/2024 1459   Recent trauma or surgery 0 Filed at: 10/07/2024 1459   Hemoptysis 0 Filed at: 10/07/2024 1459   Exogenous estrogen 0 Filed at: 10/07/2024 1459   Unilateral leg swelling 0 Filed at: 10/07/2024 1459   PERC Rule for PE Results 1 Filed at: 10/07/2024 1459            SBIRT 22yo+      Flowsheet Row Most Recent Value   Initial Alcohol Screen: US AUDIT-C     1. How often do you have a drink containing alcohol? 0 Filed at: 10/07/2024 1359   2. How many drinks containing alcohol do you have on a typical day you are drinking?  0 Filed at: 10/07/2024 1359   3a. Male UNDER 65: How often do you have five or more drinks on one occasion? 0 Filed at: 10/07/2024 1359   3b. FEMALE Any Age, or MALE 65+: How often do you have 4 or more drinks on one occassion? 0 Filed at: 10/07/2024 1359   Audit-C Score 0 Filed at: 10/07/2024 1359   JEFFREY: How many times in the past year have you...    Used an illegal drug or used a prescription medication for non-medical reasons? Never Filed at: 10/07/2024 1359            Wells' Criteria for PE      Flowsheet Row Most Recent Value   Wells' Criteria for PE    Clinical signs and symptoms of DVT 0 Filed at: 10/07/2024 1500   PE is primary diagnosis or equally likely 0 Filed at: 10/07/2024 1500   HR >100 0 Filed at: 10/07/2024 1500   Immobilization at least 3 days or Surgery in the previous 4 weeks 0 Filed at: 10/07/2024 1500   Previous, objectively diagnosed PE or DVT 0 Filed at: 10/07/2024 1500   Hemoptysis 0 Filed at: 10/07/2024 1500   Malignancy with treatment within 6 months or palliative 0  Filed at: 10/07/2024 1500   John Paul' Criteria Total 0 Filed at: 10/07/2024 1500                        History of Present Illness       Chief Complaint   Patient presents with    Weakness - Generalized     Pt c/o generalized weakness since he was diagnosed with bronchitis. Pt has no appetite to eat or drink.        Past Medical History:   Diagnosis Date    Coronary artery calcification seen on CAT scan     Coronary artery disease     COVID-19 01/28/2022    Dyslipidemia     Dyspnea on exertion     History of echocardiogram 03/22/2018    Normal EF, normal LVSF.Thick and rebundent MV leaflets w/ mild posterior leaflet prolapse w/ trace regurg.    History of pericarditis     Hyperlipidemia     Lyme disease     Mitral valve prolapse     Shingles       Past Surgical History:   Procedure Laterality Date    APPENDECTOMY      EYE SURGERY      HERNIA REPAIR      TONSILLECTOMY        Family History   Problem Relation Age of Onset    Heart disease Mother     No Known Problems Father       Social History     Tobacco Use    Smoking status: Never    Smokeless tobacco: Never   Vaping Use    Vaping status: Never Used   Substance Use Topics    Alcohol use: Never     Alcohol/week: 0.0 standard drinks of alcohol    Drug use: Never      E-Cigarette/Vaping    E-Cigarette Use Never User       E-Cigarette/Vaping Substances    Nicotine No     THC No     CBD No     Flavoring No     Other No     Unknown No       I have reviewed and agree with the history as documented.     79-year-old man with CAD, BPH, anxiety presents with multiple complaints.  He reports being diagnosed with bronchitis in September, and took antibiotics and prednisone.  The prednisone caused severe panic attack, for which he was put on Ativan.  He shortness of breath and coughing has resolved, however he is having severe nausea, decrease of appetite, and poor eating patterns.  He has malnutrition at baseline, and has stopped drinking his Ensure for the last few days.  Family  also reports he had 2 bites of oatmeal yesterday and nothing since then.  He reports feeling weak.  In addition, he reports having chest tightness which is relieved by walking long distance.  He is very concerned about his health because of the reasons above, which is causing him to not be able to sleep.  For his nausea, he took Pepto-Bismol, Tums, and other over-the-counter medication without relief.  He also stopped his mirtazapine a few days ago because he was worried about side effects.  Denies chest pain, shortness of breath, vomiting, diarrhea.        Review of Systems   Constitutional:  Negative for activity change and fever.   HENT:  Negative for hearing loss and sore throat.    Eyes:  Negative for discharge and visual disturbance.   Respiratory:  Negative for cough and shortness of breath.    Cardiovascular:  Negative for chest pain and palpitations.   Gastrointestinal:  Positive for nausea. Negative for abdominal pain and vomiting.   Genitourinary:  Negative for difficulty urinating and dysuria.   Musculoskeletal:  Negative for arthralgias and back pain.   Skin:  Negative for color change and rash.   Neurological:  Negative for dizziness and light-headedness.           Objective       ED Triage Vitals [10/07/24 1359]   Temperature Pulse Blood Pressure Respirations SpO2 Patient Position - Orthostatic VS   98.8 °F (37.1 °C) 95 162/66 20 98 % Sitting      Temp Source Heart Rate Source BP Location FiO2 (%) Pain Score    Temporal Monitor Right arm -- 6      Vitals      Date and Time Temp Pulse SpO2 Resp BP Pain Score FACES Pain Rating User   10/07/24 1800 -- 77 97 % 20 143/87 -- --    10/07/24 1730 -- 80 98 % 20 143/89 -- --    10/07/24 1700 -- 77 97 % 20 142/77 -- --    10/07/24 1630 -- 73 98 % 20 161/83 -- --    10/07/24 1359 98.8 °F (37.1 °C) 95 98 % 20 162/66 6 -- BMD            Physical Exam  Vitals reviewed.   Constitutional:       General: He is not in acute distress.     Appearance: He is  well-developed.   HENT:      Head: Normocephalic and atraumatic.      Nose: Nose normal.      Mouth/Throat:      Mouth: Mucous membranes are moist.   Eyes:      Extraocular Movements: Extraocular movements intact.      Conjunctiva/sclera: Conjunctivae normal.   Cardiovascular:      Rate and Rhythm: Normal rate and regular rhythm.      Heart sounds: Normal heart sounds. No murmur heard.  Pulmonary:      Effort: Pulmonary effort is normal. No respiratory distress.      Breath sounds: Normal breath sounds. No stridor. No wheezing, rhonchi or rales.   Abdominal:      General: Abdomen is flat. Bowel sounds are normal. There is no distension.      Palpations: Abdomen is soft. There is no mass.      Tenderness: There is no abdominal tenderness.      Hernia: No hernia is present.   Musculoskeletal:         General: No swelling or tenderness.      Cervical back: Neck supple.   Skin:     General: Skin is warm and dry.      Capillary Refill: Capillary refill takes less than 2 seconds.      Coloration: Skin is not jaundiced.   Neurological:      General: No focal deficit present.      Mental Status: He is alert. Mental status is at baseline.   Psychiatric:         Mood and Affect: Mood normal.         Behavior: Behavior normal.         Results Reviewed       Procedure Component Value Units Date/Time    HS Troponin I 2hr [[id number]]  (Normal) Collected: 10/07/24 1646    Lab Status: Final result Specimen: Blood from Arm, Right Updated: 10/07/24 1726     hs TnI 2hr 3 ng/L      Delta 2hr hsTnI 0 ng/L     Lactic acid, plasma (w/reflex if result > 2.0) [[id number]]  (Normal) Collected: 10/07/24 1646    Lab Status: Final result Specimen: Blood from Arm, Right Updated: 10/07/24 1718     LACTIC ACID 1.5 mmol/L     Narrative:      Result may be elevated if tourniquet was used during collection.    HS Troponin I 4hr [[id number]]     Lab Status: No result Specimen: Blood     TSH [[id number]]  (Normal) Collected: 10/07/24 1440    Lab Status:  Final result Specimen: Blood Updated: 10/07/24 1543     TSH 3RD GENERATON 1.653 uIU/mL     Magnesium [[id number]]  (Normal) Collected: 10/07/24 1440    Lab Status: Final result Specimen: Blood Updated: 10/07/24 1526     Magnesium 2.1 mg/dL     HS Troponin 0hr (reflex protocol) [[id number]]  (Normal) Collected: 10/07/24 1440    Lab Status: Final result Specimen: Blood from Arm, Right Updated: 10/07/24 1517     hs TnI 0hr 3 ng/L     Comprehensive metabolic panel [[id number]]  (Abnormal) Collected: 10/07/24 1440    Lab Status: Final result Specimen: Blood from Arm, Right Updated: 10/07/24 1515     Sodium 137 mmol/L      Potassium 3.7 mmol/L      Chloride 96 mmol/L      CO2 34 mmol/L      ANION GAP 7 mmol/L      BUN 19 mg/dL      Creatinine 0.96 mg/dL      Glucose 143 mg/dL      Calcium 9.6 mg/dL      AST 23 U/L      ALT 14 U/L      Alkaline Phosphatase 70 U/L      Total Protein 7.2 g/dL      Albumin 4.2 g/dL      Total Bilirubin 0.67 mg/dL      eGFR 73 ml/min/1.73sq m     Narrative:      National Kid[employer] guidelines for Chronic Kidney Disease (CKD):     Stage 1 with normal or high GFR (GFR > 90 mL/min/1.73 square meters)    Stage 2 Mild CKD (GFR = 60-89 mL/min/1.73 square meters)    Stage 3A Moderate CKD (GFR = 45-59 mL/min/1.73 square meters)    Stage 3B Moderate CKD (GFR = 30-44 mL/min/1.73 square meters)    Stage 4 Severe CKD (GFR = 15-29 mL/min/1.73 square meters)    Stage 5 End Stage CKD (GFR <15 mL/min/1.73 square meters)  Note: GFR calculation is accurate only with a steady state creatinine    CBC and differential [[id number]] Collected: 10/07/24 1440    Lab Status: Final result Specimen: Blood from Arm, Right Updated: 10/07/24 1454     WBC 8.02 Thousand/uL      RBC 4.89 Million/uL      Hemoglobin 14.3 g/dL      Hematocrit 43.5 %      MCV 89 fL      MCH 29.2 pg      MCHC 32.9 g/dL      RDW 12.3 %      MPV 10.8 fL      Platelets 229 Thousands/uL      nRBC 0 /100 WBCs      Segmented % 66 %       Immature Grans % 0 %      Lymphocytes % 23 %      Monocytes % 9 %      Eosinophils Relative 1 %      Basophils Relative 1 %      Absolute Neutrophils 5.26 Thousands/µL      Absolute Immature Grans 0.03 Thousand/uL      Absolute Lymphocytes 1.85 Thousands/µL      Absolute Monocytes 0.69 Thousand/µL      Eosinophils Absolute 0.10 Thousand/µL      Basophils Absolute 0.09 Thousands/µL             XR chest 2 views   ED Interpretation by Eduardo Ballesteros DO (10/07 0849)   Airway is midline. Lungs are clear bilaterally with no evidence of pulmonary vascular congestion/focal infiltrate/pleural effusion/pneumothorax. Cardiac and mediastinal silhouettes are within normal limits. Osseous structures appear normal.        Final Interpretation by Adryan Johnson MD (10/07 9157)      No acute cardiopulmonary disease.            Workstation performed: [id number]             ECG 12 Lead Documentation Only    Date/Time: 10/7/2024 6:24 PM    Performed by: Renetta Myers MD  Authorized by: Renetta Myers MD    Comments:      EKG interpreted with Dr. Ballesteros  ECG NSR 86 bpm   QRS 84 QTc 435  Baseline variability  Normal axis  Q wave V2-V3  No acute st/t changes  No ectopy    There is significant artifact noted on the EKG that is NOT correlated with ST wave changes.       ED Medication and Procedure Management   Prior to Admission Medications   Prescriptions Last Dose Informant Patient Reported? Taking?   LORazepam (Ativan) 0.5 mg tablet   No No   Sig: Take 1 tablet (0.5 mg total) by mouth 2 (two) times a day as needed for anxiety   albuterol (PROVENTIL HFA,VENTOLIN HFA) 90 mcg/act inhaler   No No   Sig: Inhale 1-2 puffs every 4 (four) hours as needed for shortness of breath or wheezing   ascorbic acid (VITAMIN C) 250 mg tablet  Self Yes No   Sig: Take 500 mg by mouth daily   aspirin 81 mg chewable tablet  Self No No   Sig: Chew 1 tablet (81 mg total) daily   atorvastatin (LIPITOR) 20 mg tablet  Self No No   Sig: Take 1 tablet (20  mg total) by mouth daily   benzonatate (TESSALON PERLES) 100 mg capsule   No No   Sig: Take 1 capsule (100 mg total) by mouth 3 (three) times a day as needed for cough   Patient not taking: Reported on 10/3/2024   famotidine (PEPCID) 40 MG tablet   No No   Sig: Take 1 tablet (40 mg total) by mouth daily at bedtime   finasteride (PROSCAR) 5 mg tablet  Self No No   Sig: Take 1 tablet (5 mg total) by mouth daily   hydrOXYzine HCL (ATARAX) 25 mg tablet   No No   Sig: Take 1 tablet (25 mg total) by mouth 2 (two) times a day 1 tablet twice daily if needed for anxiety   mirtazapine (REMERON) 15 mg tablet  Self No No   Sig: Take 1 tablet (15 mg total) by mouth daily at bedtime   tamsulosin (FLOMAX) 0.4 mg   No No   Sig: Take 1 capsule (0.4 mg total) by mouth daily with dinner      Facility-Administered Medications: None     Discharge Medication List as of 10/7/2024  5:51 PM        START taking these medications    Details   ondansetron (ZOFRAN) 4 mg tablet Take 1 tablet (4 mg total) by mouth every 8 (eight) hours as needed for nausea or vomiting, Starting Mon 10/7/2024, Normal           CONTINUE these medications which have NOT CHANGED    Details   albuterol (PROVENTIL HFA,VENTOLIN HFA) 90 mcg/act inhaler Inhale 1-2 puffs every 4 (four) hours as needed for shortness of breath or wheezing, Starting Tue 9/24/2024, Normal      ascorbic acid (VITAMIN C) 250 mg tablet Take 500 mg by mouth daily, Historical Med      aspirin 81 mg chewable tablet Chew 1 tablet (81 mg total) daily, Starting Mon 4/22/2024, No Print      atorvastatin (LIPITOR) 20 mg tablet Take 1 tablet (20 mg total) by mouth daily, Starting Mon 4/22/2024, Normal      famotidine (PEPCID) 40 MG tablet Take 1 tablet (40 mg total) by mouth daily at bedtime, Starting Tue 9/10/2024, Until Fri 9/5/2025, Normal      finasteride (PROSCAR) 5 mg tablet Take 1 tablet (5 mg total) by mouth daily, Starting Wed 3/6/2024, Normal      hydrOXYzine HCL (ATARAX) 25 mg tablet Take 1  tablet (25 mg total) by mouth 2 (two) times a day 1 tablet twice daily if needed for anxiety, Starting Mon 9/30/2024, Normal      LORazepam (Ativan) 0.5 mg tablet Take 1 tablet (0.5 mg total) by mouth 2 (two) times a day as needed for anxiety, Starting Thu 10/3/2024, Normal      mirtazapine (REMERON) 15 mg tablet Take 1 tablet (15 mg total) by mouth daily at bedtime, Starting Mon 6/17/2024, Normal      tamsulosin (FLOMAX) 0.4 mg Take 1 capsule (0.4 mg total) by mouth daily with dinner, Starting Tue 6/18/2024, Normal           STOP taking these medications       benzonatate (TESSALON PERLES) 100 mg capsule Comments:   Reason for Stopping:             No discharge procedures on file.  ED SEPSIS DOCUMENTATION   Time reflects when diagnosis was documented in both MDM as applicable and the Disposition within this note       Time User Action Codes Description Comment    10/7/2024  5:48 PM Renetta Myers [R53.1] Weakness     10/7/2024  5:49 PM Renetta Myers [R63.0] Decreased appetite     10/7/2024  5:50 PM Renetta Myers [R11.0] Nausea                  Renetta Myers MD  10/07/24 5145

## 2024-10-08 ENCOUNTER — OFFICE VISIT (OUTPATIENT)
Dept: FAMILY MEDICINE CLINIC | Facility: CLINIC | Age: 79
End: 2024-10-08
Payer: COMMERCIAL

## 2024-10-08 VITALS
SYSTOLIC BLOOD PRESSURE: 126 MMHG | WEIGHT: 114.4 LBS | TEMPERATURE: 98 F | BODY MASS INDEX: 17.34 KG/M2 | HEART RATE: 85 BPM | HEIGHT: 68 IN | RESPIRATION RATE: 18 BRPM | OXYGEN SATURATION: 97 % | DIASTOLIC BLOOD PRESSURE: 66 MMHG

## 2024-10-08 DIAGNOSIS — R63.4 UNINTENTIONAL WEIGHT LOSS: ICD-10-CM

## 2024-10-08 DIAGNOSIS — F41.9 ANXIETY: Primary | ICD-10-CM

## 2024-10-08 LAB
ATRIAL RATE: 86 BPM
P AXIS: 74 DEGREES
PR INTERVAL: 150 MS
QRS AXIS: 60 DEGREES
QRSD INTERVAL: 84 MS
QT INTERVAL: 364 MS
QTC INTERVAL: 435 MS
T WAVE AXIS: 83 DEGREES
VENTRICULAR RATE: 86 BPM

## 2024-10-08 PROCEDURE — 99213 OFFICE O/P EST LOW 20 MIN: CPT | Performed by: PHYSICIAN ASSISTANT

## 2024-10-08 PROCEDURE — 93010 ELECTROCARDIOGRAM REPORT: CPT | Performed by: INTERNAL MEDICINE

## 2024-10-08 NOTE — PROGRESS NOTES
"Ambulatory Visit  Name: Vincent Chandler      : 1945      MRN: [id number]  Encounter Provider: Candace Mitchell PA-C  Encounter Date: 10/8/2024   Encounter department: [facility]    Assessment & Plan  Anxiety  Continue current medications as directed. Will take hydroxyzine only in the morning if he is taking Ativan in the evening.       Unintentional weight loss  Continue to try eating each meal. Will check imaging to rule out any other causes of his weight loss.  Orders:    CT chest abdomen pelvis w contrast; Future      Depression Screening and Follow-up Plan: Patient was screened for depression during today's encounter. They screened negative with a PHQ-9 score of 1.      History of Present Illness     Vincent is here today for follow up.  Per granddaughter, Vincent is feeling somewhat improved. Was at the ER yesterday, given some IV fluids and Zofran per granddaughter. Vincent states that he still is not feeling well at all. He states he never followed through with walk-in behavioral health clinic in [place] as his brother-in-law told him not to go. He told Vincent that \"no one there helps you and you just wander around and no one will let you go home.\" Vincent is not refusing to seek any care in that facility. Granddaughter states that Vincent did eat a little last evening which was an improvement as he has been continuing to lose weight. Weight today is stable from 10/3, however he is unintentionally losing weight overall and he now is underweight.        History obtained from : patient  Review of Systems   Constitutional:  Positive for appetite change and unexpected weight change. Negative for chills and fever.   HENT:  Negative for ear pain and sore throat.    Eyes:  Negative for pain and visual disturbance.   Respiratory:  Negative for cough and shortness of breath.    Cardiovascular:  Negative for chest pain and palpitations.   Gastrointestinal:  Positive for nausea. Negative for abdominal pain and " "vomiting.   Genitourinary:  Negative for dysuria and hematuria.   Musculoskeletal:  Negative for arthralgias and back pain.   Skin:  Negative for color change and rash.   Neurological:  Negative for seizures and syncope.   Psychiatric/Behavioral:  The patient is nervous/anxious.    All other systems reviewed and are negative.          Objective     /66 (BP Location: Left arm, Patient Position: Sitting, Cuff Size: Standard)   Pulse 85   Temp 98 °F (36.7 °C) (Temporal)   Resp 18   Ht 5' 8\" (1.727 m)   Wt 51.9 kg (114 lb 6.4 oz)   SpO2 97%   BMI 17.39 kg/m²     Physical Exam  Vitals reviewed.   Constitutional:       General: He is not in acute distress.     Appearance: He is well-developed and underweight. He is not diaphoretic.   HENT:      Head: Normocephalic and atraumatic.      Right Ear: Hearing, tympanic membrane, ear canal and external ear normal.      Left Ear: Hearing, tympanic membrane, ear canal and external ear normal.      Nose: Nose normal.      Mouth/Throat:      Mouth: Mucous membranes are moist.      Pharynx: Oropharynx is clear. Uvula midline. No oropharyngeal exudate.   Eyes:      General: No scleral icterus.        Right eye: No discharge.         Left eye: No discharge.      Conjunctiva/sclera: Conjunctivae normal.   Neck:      Thyroid: No thyromegaly.      Vascular: No carotid bruit.   Cardiovascular:      Rate and Rhythm: Normal rate and regular rhythm.      Heart sounds: Normal heart sounds. No murmur heard.  Pulmonary:      Effort: Pulmonary effort is normal. No respiratory distress.      Breath sounds: Normal breath sounds. No wheezing.   Abdominal:      General: Bowel sounds are normal. There is no distension.      Palpations: Abdomen is soft. There is no mass.      Tenderness: There is no abdominal tenderness. There is no guarding or rebound.   Musculoskeletal:         General: No tenderness. Normal range of motion.      Cervical back: Neck supple.   Lymphadenopathy:      " Cervical: No cervical adenopathy.   Skin:     General: Skin is warm and dry.      Findings: No erythema or rash.   Neurological:      Mental Status: He is alert and oriented to person, place, and time.   Psychiatric:         Mood and Affect: Mood is anxious and depressed.         Behavior: Behavior is agitated.         Thought Content: Thought content normal.         Judgment: Judgment normal.

## 2024-10-08 NOTE — ASSESSMENT & PLAN NOTE
Continue current medications as directed. Will take hydroxyzine only in the morning if he is taking Ativan in the evening.

## 2024-10-10 NOTE — ED ATTENDING ATTESTATION
10/7/2024  I, Eduardo Ballesteros DO, saw and evaluated the patient on date of service 7 Oct 2024. I have discussed the patient with Dr Myers and agree with her findings, Plan of Care, and MDM as documented in her note, except where noted. All available labs and Radiology studies were reviewed.  I was present for key portions of any procedure(s) performed by Dr Myers, and I was immediately available to provide assistance.     At this point I agree with the current assessment done in the Emergency Department.  I have conducted an independent evaluation of this patient. The history and physical is as follows:    79M with noted PMH presents to ED with several concerns related to his health. Primarily he feels very anxious about episodes of nausea that he has been having recently accompanied by decreased PO intake and poor sleep. Was seen in this ED on 24 Sept with cough x1 wk; placed on azithromycin/prednisone for diagnosis of bronchitis. Was seen subsequently on 27 Sept with worsening dyspnea/anxiety; workup was negative for any definitive cause. Sx did improve after dose of lorazepam in ED. Suspect that sx were primarily anxiety-drive and potentially worsened by adverse reaction to prednisone, which was stopped. He did complete the abx. Since that visit, did see his primary physician who noted the high anxiety level and actually recommended inpatient BH treatment which patient did not pursue. Family notes that he continues to be quite anxious; decreased PO intake over past several days attributed both to nausea and lack of appetite. No fever/chills/abd distention/vomiting/abd pain per se/diarrhea. No chest pain/hemoptysis/palpitations/syncope/extremity edema. Prescribed atarax and mirtazipine, the latter of which he recently stopped. Has been taking lorazepam intermittently as well. Previously had been on SSRI but has since stopped the medication. Also taking Tums, Pepto-Bismol. Has some degree of malnutrition at baseline  for which he has used Ensure previously but not at present.  ROS as per HPI: otherwise negative.    A/p: he has had recent extensive workup for some of the same sx he was displaying here with no dangerous causes identified (ACS, PE, pneumonia, ptx, etc.). Laboratory testing to evaluate for any end-organ dysfunction, which could easily be exacerbated by poor PO intake luana contributing to his poor mood. He has nausea but normal abd exam with no s/sx to suggest obstructive pathology: low yield to abdominal imaging. Treat sx of anxiety, nausea while workup ongoing. He is not suicidal or homicidal; I don't see any indication for involuntary BH treatment.     General: Awake/alert/no distress.   Thin body habitus  Vital signs and nursing notes reviewed    HEENT:  Normocephalic/atraumatic  External ear/hearing wnl bilaterally.    Neck:  Phonation normal with no stridor/dysphonia.  Normal active ROM.  No palpable masses or thyromegaly.  No overlying skin changes.    Cardiac:  Radial pulses 2+ bilaterally  DP/PT pulses 2+ bilaterally  RRR with s1/s2; no m/r/g.    Pulmonary:   No respiratory distress.  No accessory muscle use  Lungs CTA b/l with no w/c/r    Abdomen:  Flat. Nondistended. Nontender. No palpable masses.  No guarding/rebound ttp.    Skin:  Warm/dry. No diaphoresis.  No visible rashes or skin changes.    Neuro:  GCS 15.  PERRLA; EOMI. Facial expressions symmetric.  Tongue/uvula midline.  Shoulder shrug equal bilaterally  Strength 5/5 in UE/LE bilaterally  Intact sensation in UE/LE bilaterally.    Psych:  Awake/alert  Oriented to person/place/time  Very anxious affect  Mood reported as anxious  No obvious hallucinations/delusions  Reasonable recall of recent past events as confirmed by family members with him        ED Course  ED Course as of 10/09/24 2031   Mon Oct 07, 2024   1443 ECG NSR 86 bpm   QRS 84 QTc 435  Baseline variability  Normal axis  Q wave V2-V3  No acute st/t changes  No ectopy  Interpreted  by me   1532 HS Troponin 0hr (reflex protocol)   1532 Comprehensive metabolic panel(!)   1532 Magnesium   1532 CBC and differential   1552 TSH  Normal         Critical Care Time  Procedures

## 2024-10-14 DIAGNOSIS — R45.89 ANXIETY ABOUT HEALTH: ICD-10-CM

## 2024-10-14 RX ORDER — LORAZEPAM 0.5 MG/1
0.5 TABLET ORAL 2 TIMES DAILY PRN
Qty: 8 TABLET | Refills: 0 | Status: SHIPPED | OUTPATIENT
Start: 2024-10-14 | End: 2024-10-21 | Stop reason: SDUPTHER

## 2024-10-14 NOTE — TELEPHONE ENCOUNTER
Requested Prescriptions     Pending Prescriptions Disp Refills    LORazepam (Ativan) 0.5 mg tablet 8 tablet 0     Sig: Take 1 tablet (0.5 mg total) by mouth 2 (two) times a day as needed for anxiety

## 2024-10-17 ENCOUNTER — TELEPHONE (OUTPATIENT)
Age: 79
End: 2024-10-17

## 2024-10-17 NOTE — TELEPHONE ENCOUNTER
Patient would like a referral for a gastrologist. He was in the ER with stomach issues and was told to go to [facility] in [place] or is there a closer gastrologist with [facility] in [place] or [facility] for him to go to   Please advise patient  Thank you

## 2024-10-21 DIAGNOSIS — R45.89 ANXIETY ABOUT HEALTH: ICD-10-CM

## 2024-10-21 RX ORDER — LORAZEPAM 0.5 MG/1
0.5 TABLET ORAL 2 TIMES DAILY PRN
Qty: 30 TABLET | Refills: 0 | Status: SHIPPED | OUTPATIENT
Start: 2024-10-21

## 2024-10-21 NOTE — TELEPHONE ENCOUNTER
Pt called in requesting medication refill of the following medication:    Requested Prescriptions     Pending Prescriptions Disp Refills    LORazepam (Ativan) 0.5 mg tablet 8 tablet 0     Sig: Take 1 tablet (0.5 mg total) by mouth 2 (two) times a day as needed for anxiety     Pt is inquiring if it is possible to receive a 30 day supply, so that he doesn't have to keep calling in weekly to refill it?    Script going to the following pharmacy:  [address], [facility] [address] [phone]     Please advise & call pt back with update on refill/inquiry. Thank you!    Vincent Chandler   [phone]

## 2024-11-06 ENCOUNTER — OFFICE VISIT (OUTPATIENT)
Dept: FAMILY MEDICINE CLINIC | Facility: CLINIC | Age: 79
End: 2024-11-06
Payer: COMMERCIAL

## 2024-11-06 ENCOUNTER — APPOINTMENT (OUTPATIENT)
Dept: RADIOLOGY | Facility: MEDICAL CENTER | Age: 79
End: 2024-11-06
Payer: COMMERCIAL

## 2024-11-06 VITALS
HEART RATE: 93 BPM | HEIGHT: 68 IN | BODY MASS INDEX: 16.82 KG/M2 | OXYGEN SATURATION: 98 % | WEIGHT: 111 LBS | SYSTOLIC BLOOD PRESSURE: 152 MMHG | DIASTOLIC BLOOD PRESSURE: 86 MMHG | TEMPERATURE: 97 F

## 2024-11-06 DIAGNOSIS — D51.9 ANEMIA DUE TO VITAMIN B12 DEFICIENCY, UNSPECIFIED B12 DEFICIENCY TYPE: Primary | ICD-10-CM

## 2024-11-06 DIAGNOSIS — M25.552 PAIN IN LEFT HIP: ICD-10-CM

## 2024-11-06 DIAGNOSIS — M54.50 PAIN OF LUMBAR SPINE: ICD-10-CM

## 2024-11-06 PROCEDURE — 96372 THER/PROPH/DIAG INJ SC/IM: CPT | Performed by: FAMILY MEDICINE

## 2024-11-06 PROCEDURE — 72100 X-RAY EXAM L-S SPINE 2/3 VWS: CPT

## 2024-11-06 PROCEDURE — 99214 OFFICE O/P EST MOD 30 MIN: CPT | Performed by: FAMILY MEDICINE

## 2024-11-06 PROCEDURE — 73502 X-RAY EXAM HIP UNI 2-3 VIEWS: CPT

## 2024-11-06 RX ORDER — CYANOCOBALAMIN 1000 UG/ML
1000 INJECTION, SOLUTION INTRAMUSCULAR; SUBCUTANEOUS
Status: SHIPPED | OUTPATIENT
Start: 2024-11-06

## 2024-11-06 RX ADMIN — CYANOCOBALAMIN 1000 MCG: 1000 INJECTION, SOLUTION INTRAMUSCULAR; SUBCUTANEOUS at 13:43

## 2024-11-06 NOTE — PROGRESS NOTES
Ambulatory Visit  Name: Vincent Chandler      : 1945      MRN: [id number]  Encounter Provider: Girish Hurley DO  Encounter Date: 2024   Encounter department: [facility]    Assessment & Plan  Anemia due to vitamin B12 deficiency, unspecified B12 deficiency type  Dose of B12 1000 mcg IM       Pain of lumbar spine  X-ray of lumbar spine ordered regarding left hip pain       Pain in left hip  X-ray of left hip ordered          History of Present Illness     Mr. Eduardo HASSAN is here he on the  the [facility] in the ER had a pretty extensive workup noting see the chest abdomen pelvis lab work everything seemed to checked out okay he is complaint was abdominal discomfort failure to thrive weight unexplained weight loss labs were reviewed patient is will start him on some B12 to see if we can stimulate his appetite and also help him to feel better          Review of Systems   Constitutional:  Negative for activity change, appetite change, diaphoresis, fatigue and fever.   HENT: Negative.     Eyes: Negative.    Respiratory:  Negative for apnea, cough, chest tightness, shortness of breath and wheezing.    Cardiovascular:  Negative for chest pain, palpitations and leg swelling.   Gastrointestinal:  Negative for abdominal distention, abdominal pain, anal bleeding, constipation, diarrhea, nausea and vomiting.   Endocrine: Negative for cold intolerance, heat intolerance, polydipsia, polyphagia and polyuria.   Genitourinary:  Negative for difficulty urinating, dysuria, flank pain, hematuria and urgency.   Musculoskeletal:  Negative for arthralgias, back pain, gait problem, joint swelling and myalgias.   Skin:  Negative for color change, rash and wound.   Allergic/Immunologic: Negative for environmental allergies, food allergies and immunocompromised state.   Neurological:  Negative for dizziness, seizures, syncope, speech difficulty, numbness and headaches.   Hematological:  Negative for  "adenopathy. Does not bruise/bleed easily.   Psychiatric/Behavioral:  Negative for agitation, behavioral problems, hallucinations, sleep disturbance and suicidal ideas.            Objective     /86 (BP Location: Left arm, Patient Position: Sitting, Cuff Size: Standard)   Pulse 93   Temp (!) 97 °F (36.1 °C) (Temporal)   Ht 5' 8\" (1.727 m)   Wt 50.3 kg (111 lb)   SpO2 98%   BMI 16.88 kg/m²     Physical Exam  Constitutional:       Appearance: He is well-developed.   HENT:      Head: Normocephalic and atraumatic.      Right Ear: External ear normal.      Left Ear: External ear normal.      Nose: Nose normal.   Eyes:      Conjunctiva/sclera: Conjunctivae normal.      Pupils: Pupils are equal, round, and reactive to light.   Cardiovascular:      Rate and Rhythm: Normal rate and regular rhythm.      Heart sounds: Normal heart sounds. No murmur heard.     No friction rub.   Pulmonary:      Effort: Pulmonary effort is normal. No respiratory distress.      Breath sounds: Normal breath sounds. No wheezing or rales.   Chest:      Chest wall: No tenderness.   Abdominal:      General: Bowel sounds are normal.      Palpations: Abdomen is soft.   Musculoskeletal:         General: Normal range of motion.      Cervical back: Normal range of motion and neck supple.   Skin:     General: Skin is warm and dry.      Capillary Refill: Capillary refill takes 2 to 3 seconds.   Neurological:      Mental Status: He is alert and oriented to person, place, and time.   Psychiatric:         Behavior: Behavior normal.         Thought Content: Thought content normal.         Judgment: Judgment normal.         "

## 2024-11-11 NOTE — RESULT ENCOUNTER NOTE
Please call the patient regarding his abnormal result.  Patient does have some narrowed disc spaces in his low back but no fracture and no tumors

## 2024-11-18 DIAGNOSIS — F41.9 ANXIETY: ICD-10-CM

## 2024-11-18 DIAGNOSIS — R45.89 ANXIETY ABOUT HEALTH: ICD-10-CM

## 2024-11-19 ENCOUNTER — TELEPHONE (OUTPATIENT)
Age: 79
End: 2024-11-19

## 2024-11-19 RX ORDER — LORAZEPAM 0.5 MG/1
0.5 TABLET ORAL 2 TIMES DAILY PRN
Qty: 30 TABLET | Refills: 0 | Status: SHIPPED | OUTPATIENT
Start: 2024-11-19

## 2024-11-19 RX ORDER — HYDROXYZINE HYDROCHLORIDE 25 MG/1
25 TABLET, FILM COATED ORAL 2 TIMES DAILY
Qty: 60 TABLET | Refills: 0 | Status: SHIPPED | OUTPATIENT
Start: 2024-11-19

## 2024-11-19 NOTE — TELEPHONE ENCOUNTER
Patient would like a call back at [phone] when these two medications are sent to [facility] pharmacy   LORazepam (ATIVAN) 0.5 mg tablet   hydrOXYzine HCL (ATARAX) 25 mg tablet

## 2024-11-27 ENCOUNTER — OFFICE VISIT (OUTPATIENT)
Dept: CARDIOLOGY CLINIC | Facility: CLINIC | Age: 79
End: 2024-11-27
Payer: COMMERCIAL

## 2024-11-27 VITALS
HEIGHT: 68 IN | BODY MASS INDEX: 17.13 KG/M2 | WEIGHT: 113 LBS | DIASTOLIC BLOOD PRESSURE: 70 MMHG | SYSTOLIC BLOOD PRESSURE: 120 MMHG | HEART RATE: 80 BPM

## 2024-11-27 DIAGNOSIS — E78.5 DYSLIPIDEMIA: ICD-10-CM

## 2024-11-27 DIAGNOSIS — I25.10 CORONARY ARTERY CALCIFICATION SEEN ON CAT SCAN: ICD-10-CM

## 2024-11-27 DIAGNOSIS — R07.89 OTHER CHEST PAIN: Primary | ICD-10-CM

## 2024-11-27 DIAGNOSIS — F41.9 ANXIETY: ICD-10-CM

## 2024-11-27 DIAGNOSIS — R42 DIZZINESS: ICD-10-CM

## 2024-11-27 DIAGNOSIS — I34.1 MITRAL VALVE PROLAPSE: ICD-10-CM

## 2024-11-27 PROCEDURE — 93000 ELECTROCARDIOGRAM COMPLETE: CPT | Performed by: INTERNAL MEDICINE

## 2024-11-27 PROCEDURE — 99213 OFFICE O/P EST LOW 20 MIN: CPT | Performed by: INTERNAL MEDICINE

## 2024-11-27 NOTE — PROGRESS NOTES
" Patient ID: Vincent Chandler is a 79 y.o. male.        Plan:      Coronary artery calcification seen on CAT scan  His chest pain sounds noncardiac  Recent normal stress test  Continue Asa and Statin    Anxiety  Suspect playing large role here    Dyslipidemia  Well treated       Follow up Plan/Other summary comments:  There is no evidence for angina, CHF, electrical instability.   Advised no medication changes today.   Return in about 6 months (around 2025).  Call sooner with issues. Especially if he develops exervise mediated chest pains, he understands.    HPI: Vincent is here for routine fu.  24 NST WNL.  C/O at rest, actually gets better if he gets up and walks around, belching, gas, generalized ill feeling, chest tightness, relieved also with OTC remedies like TUMS antiacids etc he says.  No exertionally mediated CP.  No HOOVER, no edema, no palpitations.  Saw LV ER, no findings-advised GI eval.  Told him I agree with this advice.  Tremulous during exam, seemingly anxious.        Results for orders placed or performed in visit on 24   POCT ECG    Impression    NSR, PRWP, otherwise normal, 80 bpm         Most recent or relevant cardiac/vascular testin and   NST WNL       Past Surgical History:   Procedure Laterality Date    APPENDECTOMY      EYE SURGERY      HERNIA REPAIR      TONSILLECTOMY         Lipid Profile: Reviewed      Review of Systems   10  point ROS  was otherwise non pertinent or negative except as per HPI or as below.         Objective:     /70 (BP Location: Left arm, Patient Position: Sitting)   Pulse 80   Ht 5' 8\" (1.727 m)   Wt 51.3 kg (113 lb)   BMI 17.18 kg/m²     PHYSICAL EXAM:    General:  Normal appearance in no distress.  Eyes:  Anicteric.  Oral mucosa:  Moist.  Neck:  No JVD. Carotid upstrokes are brisk without bruits.  No masses.  Chest:  Clear to auscultation.  Cardiac:  No palpable PMI.  Normal S1 and S2.  No murmur gallop or rub.  Abdomen:  Soft and " nontender. No palpable organomegaly or aortic enlargement.  Extremities:  No peripheral edema.  Musculoskeletal:  Symmetric.   Vascular:  Femoral pulses are brisk without bruits.  Popliteal pulses are intact bilaterally.   Pedal pulses are intact.  Neuro:  Grossly symmetric.  Psych:  Alert and oriented x3.      Meds reviewed.    Current Outpatient Medications:     albuterol (PROVENTIL HFA,VENTOLIN HFA) 90 mcg/act inhaler, Inhale 1-2 puffs every 4 (four) hours as needed for shortness of breath or wheezing, Disp: 8 g, Rfl: 0    ascorbic acid (VITAMIN C) 250 mg tablet, Take 500 mg by mouth daily, Disp: , Rfl:     aspirin 81 mg chewable tablet, Chew 1 tablet (81 mg total) daily, Disp: , Rfl:     atorvastatin (LIPITOR) 20 mg tablet, Take 1 tablet (20 mg total) by mouth daily, Disp: 30 tablet, Rfl: 5    famotidine (PEPCID) 40 MG tablet, Take 1 tablet (40 mg total) by mouth daily at bedtime, Disp: 30 tablet, Rfl: 3    finasteride (PROSCAR) 5 mg tablet, Take 1 tablet (5 mg total) by mouth daily, Disp: 90 tablet, Rfl: 3    hydrOXYzine HCL (ATARAX) 25 mg tablet, Take 1 tablet (25 mg total) by mouth 2 (two) times a day 1 tablet twice daily if needed for anxiety, Disp: 60 tablet, Rfl: 0    LORazepam (Ativan) 0.5 mg tablet, Take 1 tablet (0.5 mg total) by mouth 2 (two) times a day as needed for anxiety, Disp: 30 tablet, Rfl: 0    mirtazapine (REMERON) 15 mg tablet, Take 1 tablet (15 mg total) by mouth daily at bedtime, Disp: 30 tablet, Rfl: 5    tamsulosin (FLOMAX) 0.4 mg, Take 1 capsule (0.4 mg total) by mouth daily with dinner, Disp: 90 capsule, Rfl: 3    ondansetron (ZOFRAN) 4 mg tablet, Take 1 tablet (4 mg total) by mouth every 8 (eight) hours as needed for nausea or vomiting (Patient not taking: Reported on 11/6/2024), Disp: 20 tablet, Rfl: 0    Current Facility-Administered Medications:     cyanocobalamin injection 1,000 mcg, 1,000 mcg, Intramuscular, Q30 Days, , 1,000 mcg at 11/06/24 1343     Past Medical History:    Diagnosis Date    Coronary artery calcification seen on CAT scan     Coronary artery disease     COVID-19 01/28/2022    Dyslipidemia     Dyspnea on exertion     History of echocardiogram 03/22/2018    Normal EF, normal LVSF.Thick and rebundent MV leaflets w/ mild posterior leaflet prolapse w/ trace regurg.    History of pericarditis     Hyperlipidemia     Lyme disease     Mitral valve prolapse     Shingles            Social History     Tobacco Use   Smoking Status Never   Smokeless Tobacco Never

## 2024-11-27 NOTE — PATIENT INSTRUCTIONS
"Patient Education     High cholesterol   The Basics   Written by the doctors and editors at [facility]   What is cholesterol? -- Cholesterol is a substance found in blood. Everyone has some. It is needed for good health. But people sometimes have too much cholesterol.  Compared with people with normal cholesterol, people with high cholesterol have a higher risk of heart attack, stroke, and other health problems. The higher your cholesterol, the higher your risk of these problems.  Are there different types of cholesterol? -- Yes, there are a few different types. If you get a cholesterol test, you might hear your doctor or nurse talk about:   Total cholesterol   LDL cholesterol - Some people call this the \"bad\" cholesterol. That's because having high LDL levels raises your risk of heart attack, stroke, and other health problems.   HDL cholesterol - Some people call this the \"good\" cholesterol. That's because people with high HDL levels tend to have a lower risk of heart attack, stroke, and other health problems.   Non-HDL cholesterol - Non-HDL cholesterol is your total cholesterol minus your HDL cholesterol.   Triglycerides - Triglycerides are not cholesterol. They are another type of fat. But they often get measured when cholesterol is measured. (Having high triglycerides also seems to increase the risk of heart attack and stroke.)  What should my numbers be? -- Ask your doctor or nurse what your numbers should be. Different people need different goals. If you live outside of the [place], see the table (table 1).  In general, people who do not already have heart disease should aim for:   Total cholesterol below 200   LDL cholesterol below 130, or much lower if they are at risk of heart attack or stroke   HDL cholesterol above 60   Non-HDL cholesterol below 160, or lower if they are at risk of heart attack or stroke   Triglycerides below 150  Remember, though, that many people who cannot meet these goals still have a low " "risk of heart attack and stroke.  What should I do if I have high cholesterol? -- Ask your doctor what your overall risk of heart attack and stroke is. Just having high cholesterol is not always a reason to worry. Having high cholesterol is just one of many things that can increase your risk of heart attack and stroke.  Other things that increase your risk include:   Smoking   High blood pressure   Having a parent or sibling who got heart disease at a young age - Young, in this case, means younger than 55 for males and younger than 65 for females.   A diet that is not heart healthy - A \"heart-healthy\" diet includes lots of fruits and vegetables, fiber, and healthy fats (like those found in fish, nuts, and certain oils). It also means limiting sugar and unhealthy fats.   Older age  If you are at high risk of heart attack and stroke, having high cholesterol is a problem. But if you are at low risk, high cholesterol might not need treatment.  Should I take medicine to lower cholesterol? -- Not everyone who has high cholesterol needs medicines. Your doctor or nurse will decide if you need them based on your age, family history, and other health concerns.  There are many different medicines used to lower cholesterol (table 2). Some help your body make less cholesterol. Some keep your body from absorbing cholesterol from foods. Some help your body get rid of cholesterol faster. The medicines most often used to treat high cholesterol are called \"statins.\"  You should probably take a statin if you:   Already had a heart attack or stroke   Have known heart disease   Have diabetes   Have a condition called \"peripheral artery disease,\" which makes it painful to walk, and happens when the arteries in your legs get clogged with fatty deposits   Have an \"abdominal aortic aneurysm,\" which is a widening of the main artery in the belly  Most people with any of the conditions listed above should take a statin no matter what their " "cholesterol level is. If your doctor or nurse prescribes a statin, it's important to keep taking it. The medicine might not make you feel any different. But it can help prevent heart attack, stroke, and death.  If your doctor or nurse recommends taking medicine to help lower your cholesterol, make sure that you know what it is called. Follow all the instructions for how to take it. For example, some medicines work better when you take them in the evening. Some need to be taken with food.  Tell your doctor or nurse if your medicine causes any side effects that bother you. They might be able to switch you to a different medicine.  Can I lower my cholesterol without medicines? -- Yes. You can help lower your cholesterol by doing these things:   You can lower your LDL, or \"bad,\" cholesterol by avoiding red meat, butter, fried foods, cheese, and other foods that have a lot of saturated fat.   You can lower triglycerides by avoiding sugary foods, fried foods, and excess alcohol.   If you have excess weight, it can help to lose weight. Your doctor or nurse can help you do this in a healthy way.   Try to get regular physical activity. Even gentle forms of exercise, like walking, are good for your health.  Even if these steps don't change your cholesterol very much, they can improve your health in many other ways.  All topics are updated as new evidence becomes available and our peer review process is complete.  This topic retrieved from [employer] on: Mar 01, 2024.  Topic 33540 Version 25.0  Release: 32.2.4 - C32.59  © 2024 UpToDate, Inc. and/or its affiliates. All rights reserved.  table 1: Cholesterol and triglyceride measurements in the [place] and elsewhere     Measurement used within the [place] Milligrams/deciliter (mg/dL)  Measurement used most places outside of the [place] Millimoles/liter (mmol/Liter)     Level to aim for  Level to aim for    Total cholesterol  Below 200 Below 5.17   LDL cholesterol  Below 130, or much lower if at " risk of heart attack and stroke Below 3.36, or much lower if at risk of heart attack and stroke   HDL cholesterol  Above 60 Above 1.55   Triglycerides  Below 150 Below 1.7   Cholesterol is measured differently in the [place] than it is in most other countries. This table shows values used within and outside of the [place]. It includes the cholesterol and triglyceride levels that most people who do not have heart disease should aim for.  Graphic 46077 Version 5.0  table 2: Lipid-lowering medicines  Generic name  Brand name    Statins    Atorvastatin Lipitor   Fluvastatin Lescol, Lescol XL   Lovastatin Mevacor, Altoprev   Pitavastatin Livalo   Pravastatin Pravachol   Rosuvastatin Crestor   Simvastatin Zocor   PCSK9 inhibitors    Alirocumab Praluent   Evolocumab Repatha, Repatha SureClick   Cholesterol absorption inhibitors    Ezetimibe Zetia   Bile acid sequestrants    Cholestyramine Prevalite, Questran, Questran Light   Colesevelam Welchol   Colestipol Colestid   Niacin (nicotinic acid)    Niacin immediate release     Niacin extended release Niaspan   Fibrates    Fenofibrate Fenoglide, Tricor, Triglide, others   Gemfibrozil Lopid   Brand names listed are for medicines available in the [place] and some other countries.  Graphic 56930 Version 7.0  Consumer Information Use and Disclaimer   Disclaimer: This generalized information is a limited summary of diagnosis, treatment, and/or medication information. It is not meant to be comprehensive and should be used as a tool to help the user understand and/or assess potential diagnostic and treatment options. It does NOT include all information about conditions, treatments, medications, side effects, or risks that may apply to a specific patient. It is not intended to be medical advice or a substitute for the medical advice, diagnosis, or treatment of a health care provider based on the health care provider's examination and assessment of a patient's specific and unique circumstances.  Patients must speak with a health care provider for complete information about their health, medical questions, and treatment options, including any risks or benefits regarding use of medications. This information does not endorse any treatments or medications as safe, effective, or approved for treating a specific patient. UpToDate, Inc. and its affiliates disclaim any warranty or liability relating to this information or the use thereof.The use of this information is governed by the Terms of Use, available at https://www.[employer].com/en/know/clinical-effectiveness-terms. 2024© UpToDate, Inc. and its affiliates and/or licensors. All rights reserved.  Copyright   © 2024 UpToDate, Inc. and/or its affiliates. All rights reserved.

## 2024-11-27 NOTE — LETTER
November 27, 2024     Girish Hurley DO  [address] [facility] [address]    Patient: Vincent Chandler   YOB: 1945   Date of Visit: 11/27/2024       Dear Dr. Hurley:    Thank you for referring Vincent Chanlder to me for evaluation. Below are my notes for this consultation.    If you have questions, please do not hesitate to call me. I look forward to following your patient along with you.         Sincerely,        Javier Black DO        CC: No Recipients    Javier Black DO  11/27/2024  3:45 PM  Sign when Signing Visit   Patient ID: Vincent Chnadler is a 79 y.o. male.        Plan:      Coronary artery calcification seen on CAT scan  His chest pain sounds noncardiac  Recent normal stress test  Continue Asa and Statin    Anxiety  Suspect playing large role here    Dyslipidemia  Well treated       Follow up Plan/Other summary comments:  There is no evidence for angina, CHF, electrical instability.   Advised no medication changes today.   Return in about 6 months (around 5/27/2025).  Call sooner with issues. Especially if he develops exervise mediated chest pains, he understands.    HPI: Vincent is here for routine fu.  8/26/24 NST WNL.  C/O at rest, actually gets better if he gets up and walks around, belching, gas, generalized ill feeling, chest tightness, relieved also with OTC remedies like TUMS antiacids etc he says.  No exertionally mediated CP.  No HOOVER, no edema, no palpitations.  Saw [facility] ER, no findings-advised GI eval.  Told him I agree with this advice.  Tremulous during exam, seemingly anxious.        Results for orders placed or performed in visit on 11/27/24   POCT ECG    Impression    NSR, PRWP, otherwise normal, 80 bpm         Most recent or relevant cardiac/vascular testing:    ***      Past Surgical History:   Procedure Laterality Date   • APPENDECTOMY     • EYE SURGERY     • HERNIA REPAIR     • TONSILLECTOMY         Lipid Profile: Reviewed      Review of Systems   10  point ROS  was  "otherwise non pertinent or negative except as per HPI or as below.   Gait: ***Normal.        Objective:     /70 (BP Location: Left arm, Patient Position: Sitting)   Pulse 80   Ht 5' 8\" (1.727 m)   Wt 51.3 kg (113 lb)   BMI 17.18 kg/m²     PHYSICAL EXAM:    General:  Normal appearance in no distress.  Eyes:  Anicteric.  Oral mucosa:  Moist.  Neck:  No JVD. Carotid upstrokes are brisk without bruits.  No masses.  Chest:  Clear to auscultation.  Cardiac:  No palpable PMI.  Normal S1 and S2.  No murmur gallop or rub.  Abdomen:  Soft and nontender. No palpable organomegaly or aortic enlargement.  Extremities:  No peripheral edema.  Musculoskeletal:  Symmetric.   Vascular:  Femoral pulses are brisk without bruits.  Popliteal pulses are intact bilaterally.   Pedal pulses are intact.  Neuro:  Grossly symmetric.  Psych:  Alert and oriented x3.      Meds reviewed.    Past Medical History:   Diagnosis Date   • Coronary artery calcification seen on CAT scan    • Coronary artery disease    • COVID-19 01/28/2022   • Dyslipidemia    • Dyspnea on exertion    • History of echocardiogram 03/22/2018    Normal EF, normal LVSF.Thick and rebundent MV leaflets w/ mild posterior leaflet prolapse w/ trace regurg.   • History of pericarditis    • Hyperlipidemia    • Lyme disease    • Mitral valve prolapse    • Shingles            Social History     Tobacco Use   Smoking Status Never   Smokeless Tobacco Never     "

## 2024-11-29 ENCOUNTER — TELEPHONE (OUTPATIENT)
Age: 79
End: 2024-11-29

## 2024-11-29 NOTE — TELEPHONE ENCOUNTER
Patient said that he had a missed call from the office, please return his call, no notes were listed

## 2024-12-10 ENCOUNTER — CLINICAL SUPPORT (OUTPATIENT)
Dept: FAMILY MEDICINE CLINIC | Facility: CLINIC | Age: 79
End: 2024-12-10
Payer: COMMERCIAL

## 2024-12-10 DIAGNOSIS — D51.9 ANEMIA DUE TO VITAMIN B12 DEFICIENCY, UNSPECIFIED B12 DEFICIENCY TYPE: Primary | ICD-10-CM

## 2024-12-10 RX ADMIN — CYANOCOBALAMIN 1000 MCG: 1000 INJECTION, SOLUTION INTRAMUSCULAR; SUBCUTANEOUS at 09:57

## 2024-12-12 ENCOUNTER — HOSPITAL ENCOUNTER (OUTPATIENT)
Dept: NON INVASIVE DIAGNOSTICS | Facility: HOSPITAL | Age: 79
Discharge: HOME | End: 2024-12-12
Attending: INTERNAL MEDICINE
Payer: COMMERCIAL

## 2024-12-12 VITALS
DIASTOLIC BLOOD PRESSURE: 70 MMHG | SYSTOLIC BLOOD PRESSURE: 120 MMHG | WEIGHT: 113 LBS | HEIGHT: 68 IN | HEART RATE: 80 BPM | BODY MASS INDEX: 17.13 KG/M2

## 2024-12-12 DIAGNOSIS — I34.1 MITRAL VALVE PROLAPSE: ICD-10-CM

## 2024-12-12 DIAGNOSIS — I25.10 CORONARY ARTERY CALCIFICATION SEEN ON CAT SCAN: ICD-10-CM

## 2024-12-12 DIAGNOSIS — R07.89 OTHER CHEST PAIN: ICD-10-CM

## 2024-12-12 LAB
AORTIC ROOT: 4 CM
APICAL FOUR CHAMBER EJECTION FRACTION: 54 %
BSA FOR ECHO PROCEDURE: 1.6 M2
E WAVE DECELERATION TIME: 258 MS
E/A RATIO: 0.79
FRACTIONAL SHORTENING: 28 (ref 28–44)
INTERVENTRICULAR SEPTUM IN DIASTOLE (PARASTERNAL SHORT AXIS VIEW): 0.9 CM
INTERVENTRICULAR SEPTUM: 0.9 CM (ref 0.6–1.1)
LAAS-AP2: 16.8 CM2
LAAS-AP4: 15.6 CM2
LEFT ATRIUM SIZE: 2.6 CM
LEFT ATRIUM VOLUME (MOD BIPLANE): 48 ML
LEFT ATRIUM VOLUME INDEX (MOD BIPLANE): 30 ML/M2
LEFT INTERNAL DIMENSION IN SYSTOLE: 3.1 CM (ref 2.1–4)
LEFT VENTRICULAR INTERNAL DIMENSION IN DIASTOLE: 4.3 CM (ref 3.5–6)
LEFT VENTRICULAR POSTERIOR WALL IN END DIASTOLE: 0.9 CM
LEFT VENTRICULAR STROKE VOLUME: 42 ML
LVSV (TEICH): 42 ML
MV E'TISSUE VEL-LAT: 5 CM/S
MV E'TISSUE VEL-SEP: 7 CM/S
MV PEAK A VEL: 0.77 M/S
MV PEAK E VEL: 61 CM/S
MV STENOSIS PRESSURE HALF TIME: 75 MS
MV VALVE AREA P 1/2 METHOD: 2.93
RA PRESSURE ESTIMATED: 10 MMHG
RIGHT ATRIUM AREA SYSTOLE A4C: 14.5 CM2
RIGHT VENTRICLE ID DIMENSION: 3.6 CM
RV PSP: 27 MMHG
SINOTUBULAR JUNCTION: 2.5 CM
SL CV LEFT ATRIUM LENGTH A2C: 5.4 CM
SL CV LV EF: 65
SL CV PED ECHO LEFT VENTRICLE DIASTOLIC VOLUME (MOD BIPLANE) 2D: 81 ML
SL CV PED ECHO LEFT VENTRICLE SYSTOLIC VOLUME (MOD BIPLANE) 2D: 39 ML
SL CV SINUS OF VALSALVA 2D: 3.6 CM
STJ: 2.5 CM
TR MAX PG: 17 MMHG
TR PEAK VELOCITY: 2.1 M/S
TRICUSPID ANNULAR PLANE SYSTOLIC EXCURSION: 2.8 CM
TRICUSPID VALVE PEAK REGURGITATION VELOCITY: 2.06 M/S

## 2024-12-12 PROCEDURE — 93306 TTE W/DOPPLER COMPLETE: CPT

## 2024-12-12 PROCEDURE — 93306 TTE W/DOPPLER COMPLETE: CPT | Performed by: INTERNAL MEDICINE

## 2024-12-16 DIAGNOSIS — R45.89 ANXIETY ABOUT HEALTH: ICD-10-CM

## 2024-12-16 DIAGNOSIS — F51.02 ADJUSTMENT INSOMNIA: ICD-10-CM

## 2024-12-17 RX ORDER — LORAZEPAM 0.5 MG/1
0.5 TABLET ORAL 2 TIMES DAILY PRN
Qty: 30 TABLET | Refills: 0 | Status: SHIPPED | OUTPATIENT
Start: 2024-12-17

## 2024-12-17 RX ORDER — MIRTAZAPINE 15 MG/1
15 TABLET, FILM COATED ORAL
Qty: 30 TABLET | Refills: 5 | Status: SHIPPED | OUTPATIENT
Start: 2024-12-17

## 2024-12-19 ENCOUNTER — RESULTS FOLLOW-UP (OUTPATIENT)
Dept: CARDIOLOGY CLINIC | Facility: CLINIC | Age: 79
End: 2024-12-19

## 2024-12-20 NOTE — TELEPHONE ENCOUNTER
----- Message from Javier Black DO sent at 12/19/2024  4:54 PM EST -----  Please call the patient regarding his good Echo result.  All looked ok heart wise, no new recs.  TRB

## 2025-01-06 ENCOUNTER — HOSPITAL ENCOUNTER (OUTPATIENT)
Dept: NUCLEAR MEDICINE | Facility: HOSPITAL | Age: 80
Discharge: HOME | End: 2025-01-06
Payer: COMMERCIAL

## 2025-01-06 DIAGNOSIS — I25.10 CORONARY ARTERY CALCIFICATION SEEN ON CAT SCAN: ICD-10-CM

## 2025-01-06 DIAGNOSIS — R68.81 EARLY SATIETY: ICD-10-CM

## 2025-01-06 PROCEDURE — A9541 TC99M SULFUR COLLOID: HCPCS

## 2025-01-06 PROCEDURE — 78264 GASTRIC EMPTYING IMG STUDY: CPT

## 2025-01-06 NOTE — TELEPHONE ENCOUNTER
Medication: Atorvastatin    Dose/Frequency: 20mg / 1 tablet daily    Quantity: 30/ 5 refills    Pharmacy: [facility]    Office:   [] PCP/Provider -   [x] Speciality/Provider -     Does the patient have enough for 3 days?   [x] Yes   [] No - Send as HP to POD

## 2025-01-08 RX ORDER — ATORVASTATIN CALCIUM 20 MG/1
20 TABLET, FILM COATED ORAL DAILY
Qty: 30 TABLET | Refills: 5 | Status: SHIPPED | OUTPATIENT
Start: 2025-01-08

## 2025-01-13 DIAGNOSIS — R45.89 ANXIETY ABOUT HEALTH: ICD-10-CM

## 2025-01-14 ENCOUNTER — OFFICE VISIT (OUTPATIENT)
Dept: FAMILY MEDICINE CLINIC | Facility: CLINIC | Age: 80
End: 2025-01-14
Payer: COMMERCIAL

## 2025-01-14 VITALS
BODY MASS INDEX: 17.43 KG/M2 | HEART RATE: 88 BPM | OXYGEN SATURATION: 93 % | TEMPERATURE: 98.1 F | SYSTOLIC BLOOD PRESSURE: 124 MMHG | HEIGHT: 68 IN | WEIGHT: 115 LBS | DIASTOLIC BLOOD PRESSURE: 76 MMHG

## 2025-01-14 DIAGNOSIS — K21.00 GASTROESOPHAGEAL REFLUX DISEASE WITH ESOPHAGITIS WITHOUT HEMORRHAGE: ICD-10-CM

## 2025-01-14 DIAGNOSIS — K29.40 CHRONIC EROSIVE GASTRITIS: Primary | ICD-10-CM

## 2025-01-14 PROCEDURE — 99214 OFFICE O/P EST MOD 30 MIN: CPT | Performed by: FAMILY MEDICINE

## 2025-01-14 PROCEDURE — 96372 THER/PROPH/DIAG INJ SC/IM: CPT | Performed by: FAMILY MEDICINE

## 2025-01-14 RX ADMIN — CYANOCOBALAMIN 1000 MCG: 1000 INJECTION, SOLUTION INTRAMUSCULAR; SUBCUTANEOUS at 08:31

## 2025-01-14 NOTE — PROGRESS NOTES
"Name: Vincent Chandler      : 1945      MRN: [id number]  Encounter Provider: Girish Hurley DO  Encounter Date: 2025   Encounter department: [facility]  :  Assessment & Plan           History of Present Illness     HPI  Review of Systems    Objective   /76 (BP Location: Left arm, Patient Position: Sitting, Cuff Size: Standard)   Pulse 88   Temp 98.1 °F (36.7 °C) (Temporal)   Ht 5' 8\" (1.727 m)   Wt 52.2 kg (115 lb)   SpO2 93%   BMI 17.49 kg/m²      Physical Exam    "

## 2025-01-15 RX ORDER — LORAZEPAM 0.5 MG/1
0.5 TABLET ORAL
Qty: 30 TABLET | Refills: 0 | Status: SHIPPED | OUTPATIENT
Start: 2025-01-15

## 2025-02-10 DIAGNOSIS — K21.9 GASTROESOPHAGEAL REFLUX DISEASE, UNSPECIFIED WHETHER ESOPHAGITIS PRESENT: ICD-10-CM

## 2025-02-10 NOTE — TELEPHONE ENCOUNTER
Medication:     famotidine (PEPCID) 40 MG tablet       Dose/Frequency:      Take 1 tablet (40 mg total) by mouth daily at bedtime                      Quantity: 30    Pharmacy: [facility]    Office:   [x] PCP/Provider - Dr Hurley  [] Speciality/Provider -     Does the patient have enough for 3 days?   [x] Yes   [] No - Send as HP to POD

## 2025-02-11 RX ORDER — FAMOTIDINE 40 MG/1
40 TABLET, FILM COATED ORAL
Qty: 30 TABLET | Refills: 5 | Status: SHIPPED | OUTPATIENT
Start: 2025-02-11 | End: 2026-02-06

## 2025-02-17 ENCOUNTER — CLINICAL SUPPORT (OUTPATIENT)
Dept: FAMILY MEDICINE CLINIC | Facility: CLINIC | Age: 80
End: 2025-02-17
Payer: COMMERCIAL

## 2025-02-17 DIAGNOSIS — F41.9 ANXIETY: ICD-10-CM

## 2025-02-17 DIAGNOSIS — R45.89 ANXIETY ABOUT HEALTH: ICD-10-CM

## 2025-02-17 DIAGNOSIS — D51.9 ANEMIA DUE TO VITAMIN B12 DEFICIENCY, UNSPECIFIED B12 DEFICIENCY TYPE: Primary | ICD-10-CM

## 2025-02-17 PROCEDURE — 96372 THER/PROPH/DIAG INJ SC/IM: CPT

## 2025-02-17 RX ADMIN — CYANOCOBALAMIN 1000 MCG: 1000 INJECTION, SOLUTION INTRAMUSCULAR; SUBCUTANEOUS at 13:23

## 2025-02-18 RX ORDER — HYDROXYZINE HYDROCHLORIDE 25 MG/1
25 TABLET, FILM COATED ORAL 2 TIMES DAILY
Qty: 180 TABLET | Refills: 1 | Status: SHIPPED | OUTPATIENT
Start: 2025-02-18

## 2025-02-18 RX ORDER — LORAZEPAM 0.5 MG/1
0.5 TABLET ORAL
Qty: 30 TABLET | Refills: 0 | Status: SHIPPED | OUTPATIENT
Start: 2025-02-18

## 2025-03-10 DIAGNOSIS — R31.0 GROSS HEMATURIA: ICD-10-CM

## 2025-03-10 DIAGNOSIS — R39.11 BENIGN PROSTATIC HYPERPLASIA WITH URINARY HESITANCY: ICD-10-CM

## 2025-03-10 DIAGNOSIS — N40.1 BENIGN PROSTATIC HYPERPLASIA WITH URINARY HESITANCY: ICD-10-CM

## 2025-03-10 DIAGNOSIS — R97.20 ELEVATED PSA: ICD-10-CM

## 2025-03-10 RX ORDER — FINASTERIDE 5 MG/1
5 TABLET, FILM COATED ORAL DAILY
Qty: 90 TABLET | Refills: 3 | Status: SHIPPED | OUTPATIENT
Start: 2025-03-10

## 2025-03-10 NOTE — TELEPHONE ENCOUNTER
Pt called requested a refill on finasteride 5 mg for 90 tab send to [facility] pharmacy    Please review

## 2025-03-17 ENCOUNTER — CLINICAL SUPPORT (OUTPATIENT)
Dept: FAMILY MEDICINE CLINIC | Facility: CLINIC | Age: 80
End: 2025-03-17
Payer: COMMERCIAL

## 2025-03-17 DIAGNOSIS — E55.9 VITAMIN D DEFICIENCY, UNSPECIFIED: Primary | ICD-10-CM

## 2025-03-17 DIAGNOSIS — R45.89 ANXIETY ABOUT HEALTH: ICD-10-CM

## 2025-03-17 PROCEDURE — 96372 THER/PROPH/DIAG INJ SC/IM: CPT

## 2025-03-17 RX ORDER — LORAZEPAM 0.5 MG/1
0.5 TABLET ORAL
Qty: 30 TABLET | Refills: 0 | Status: SHIPPED | OUTPATIENT
Start: 2025-03-17

## 2025-03-17 RX ADMIN — CYANOCOBALAMIN 1000 MCG: 1000 INJECTION, SOLUTION INTRAMUSCULAR; SUBCUTANEOUS at 10:00

## 2025-04-14 ENCOUNTER — CLINICAL SUPPORT (OUTPATIENT)
Dept: FAMILY MEDICINE CLINIC | Facility: CLINIC | Age: 80
End: 2025-04-14
Payer: COMMERCIAL

## 2025-04-14 DIAGNOSIS — E46 PROTEIN-CALORIE MALNUTRITION, UNSPECIFIED SEVERITY (HCC): ICD-10-CM

## 2025-04-14 DIAGNOSIS — R45.89 ANXIETY ABOUT HEALTH: ICD-10-CM

## 2025-04-14 DIAGNOSIS — F41.9 ANXIETY: ICD-10-CM

## 2025-04-14 DIAGNOSIS — Z13.89 SCREENING FOR MULTIPLE CONDITIONS: ICD-10-CM

## 2025-04-14 RX ADMIN — CYANOCOBALAMIN 1000 MCG: 1000 INJECTION, SOLUTION INTRAMUSCULAR; SUBCUTANEOUS at 11:26

## 2025-04-14 NOTE — TELEPHONE ENCOUNTER
Requested Prescriptions     Pending Prescriptions Disp Refills    LORazepam (Ativan) 0.5 mg tablet 30 tablet 0     Sig: Take 1 tablet (0.5 mg total) by mouth daily at bedtime       [facility] [address] [place] [address], [address] [phone]

## 2025-04-15 DIAGNOSIS — R45.89 ANXIETY ABOUT HEALTH: ICD-10-CM

## 2025-04-15 RX ORDER — LORAZEPAM 0.5 MG/1
0.5 TABLET ORAL
Qty: 30 TABLET | Refills: 0 | Status: SHIPPED | OUTPATIENT
Start: 2025-04-15

## 2025-04-16 RX ORDER — LORAZEPAM 0.5 MG/1
0.5 TABLET ORAL
Qty: 30 TABLET | Refills: 0 | OUTPATIENT
Start: 2025-04-16

## 2025-05-12 ENCOUNTER — CLINICAL SUPPORT (OUTPATIENT)
Dept: FAMILY MEDICINE CLINIC | Facility: CLINIC | Age: 80
End: 2025-05-12
Payer: COMMERCIAL

## 2025-05-12 DIAGNOSIS — F51.02 ADJUSTMENT INSOMNIA: ICD-10-CM

## 2025-05-12 DIAGNOSIS — R45.89 ANXIETY ABOUT HEALTH: ICD-10-CM

## 2025-05-12 DIAGNOSIS — I25.10 CORONARY ARTERY CALCIFICATION SEEN ON CAT SCAN: ICD-10-CM

## 2025-05-12 DIAGNOSIS — K21.9 GASTROESOPHAGEAL REFLUX DISEASE, UNSPECIFIED WHETHER ESOPHAGITIS PRESENT: ICD-10-CM

## 2025-05-12 DIAGNOSIS — E53.8 VITAMIN B 12 DEFICIENCY: Primary | ICD-10-CM

## 2025-05-12 PROCEDURE — 96372 THER/PROPH/DIAG INJ SC/IM: CPT

## 2025-05-12 RX ORDER — ATORVASTATIN CALCIUM 20 MG/1
20 TABLET, FILM COATED ORAL DAILY
Qty: 30 TABLET | Refills: 5 | Status: SHIPPED | OUTPATIENT
Start: 2025-05-12

## 2025-05-12 RX ORDER — MIRTAZAPINE 15 MG/1
15 TABLET, FILM COATED ORAL
Qty: 90 TABLET | Refills: 0 | Status: SHIPPED | OUTPATIENT
Start: 2025-05-12

## 2025-05-12 RX ORDER — FAMOTIDINE 40 MG/1
40 TABLET, FILM COATED ORAL
Qty: 100 TABLET | Refills: 3 | Status: SHIPPED | OUTPATIENT
Start: 2025-05-12 | End: 2026-05-07

## 2025-05-12 RX ORDER — LORAZEPAM 0.5 MG/1
0.5 TABLET ORAL
Qty: 90 TABLET | Refills: 0 | Status: SHIPPED | OUTPATIENT
Start: 2025-05-12

## 2025-05-12 RX ADMIN — CYANOCOBALAMIN 1000 MCG: 1000 INJECTION, SOLUTION INTRAMUSCULAR; SUBCUTANEOUS at 11:01

## 2025-05-12 NOTE — TELEPHONE ENCOUNTER
Pt contacted Call Center requested refill of their medication.      Patient states insurance is requesting him to have 100 day supply.     Doctor Name: Dr. Black      Medication Name: atorvastatin      Dosage of Med: 20mg      Frequency of Med: take 1 tablet by mouth daily      Remaining Medication: 3 days       Pharmacy and Location: [facility]         Pt. Preferred Callback Phone Number: [phone]      Thank you.

## 2025-05-12 NOTE — TELEPHONE ENCOUNTER
Patient called, stated he received a letter from insurance recommending that he requested medication refill for 100 days for following medication:     famotidine (PEPCID) 40 MG tablet  mirtazapine (REMERON) 15 mg tablet   LORazepam (Ativan) 0.5 mg tablet     Pharmacy confirmed: [address], [address]    Patient requested a call back at his home phone [phone] if this will be approved by PCP.     Please advise, thank you.

## 2025-05-28 ENCOUNTER — OFFICE VISIT (OUTPATIENT)
Dept: CARDIOLOGY CLINIC | Facility: CLINIC | Age: 80
End: 2025-05-28
Payer: COMMERCIAL

## 2025-05-28 VITALS
BODY MASS INDEX: 18.64 KG/M2 | HEART RATE: 88 BPM | OXYGEN SATURATION: 97 % | DIASTOLIC BLOOD PRESSURE: 62 MMHG | WEIGHT: 116 LBS | SYSTOLIC BLOOD PRESSURE: 118 MMHG | HEIGHT: 66 IN | RESPIRATION RATE: 18 BRPM

## 2025-05-28 DIAGNOSIS — I34.1 MITRAL VALVE PROLAPSE: Primary | ICD-10-CM

## 2025-05-28 DIAGNOSIS — E78.5 DYSLIPIDEMIA: ICD-10-CM

## 2025-05-28 DIAGNOSIS — I25.10 CORONARY ARTERY CALCIFICATION SEEN ON CAT SCAN: ICD-10-CM

## 2025-05-28 PROCEDURE — 99213 OFFICE O/P EST LOW 20 MIN: CPT | Performed by: INTERNAL MEDICINE

## 2025-05-28 RX ORDER — ATORVASTATIN CALCIUM 20 MG/1
20 TABLET, FILM COATED ORAL DAILY
Qty: 90 TABLET | Refills: 3 | Status: SHIPPED | OUTPATIENT
Start: 2025-06-09

## 2025-05-28 NOTE — PATIENT INSTRUCTIONS
"Patient Education     High cholesterol   The Basics   Written by the doctors and editors at [facility]   What is cholesterol? -- Cholesterol is a substance found in blood. Everyone has some. It is needed for good health. But people sometimes have too much cholesterol.  Compared with people with normal cholesterol, people with high cholesterol have a higher risk of heart attack, stroke, and other health problems. The higher your cholesterol, the higher your risk of these problems.  Are there different types of cholesterol? -- Yes, there are a few different types. If you get a cholesterol test, you might hear your doctor or nurse talk about:   Total cholesterol   LDL cholesterol - Some people call this the \"bad\" cholesterol. That's because having high LDL levels raises your risk of heart attack, stroke, and other health problems.   HDL cholesterol - Some people call this the \"good\" cholesterol. That's because people with high HDL levels tend to have a lower risk of heart attack, stroke, and other health problems.   Non-HDL cholesterol - Non-HDL cholesterol is your total cholesterol minus your HDL cholesterol.   Triglycerides - Triglycerides are not cholesterol. They are another type of fat. But they often get measured when cholesterol is measured. (Having high triglycerides also seems to increase the risk of heart attack and stroke.)  What should my numbers be? -- Ask your doctor or nurse what your numbers should be. Different people need different goals. If you live outside of the [place], see the table (table 1).  In general, people who do not already have heart disease should aim for:   Total cholesterol below 200   LDL cholesterol below 130, or much lower if they are at risk of heart attack or stroke   HDL cholesterol above 60   Non-HDL cholesterol below 160, or lower if they are at risk of heart attack or stroke   Triglycerides below 150  Remember, though, that many people who cannot meet these goals still have a low " "risk of heart attack and stroke.  What should I do if I have high cholesterol? -- Ask your doctor what your overall risk of heart attack and stroke is. Just having high cholesterol is not always a reason to worry. Having high cholesterol is just one of many things that can increase your risk of heart attack and stroke.  Other things that increase your risk include:   Smoking   High blood pressure   Having a parent or sibling who got heart disease at a young age - Young, in this case, means younger than 55 for males and younger than 65 for females.   A diet that is not heart healthy - A \"heart-healthy\" diet includes lots of fruits and vegetables, fiber, and healthy fats (like those found in fish, nuts, and certain oils). It also means limiting sugar and unhealthy fats.   Older age  If you are at high risk of heart attack and stroke, having high cholesterol is a problem. But if you are at low risk, high cholesterol might not need treatment.  Should I take medicine to lower cholesterol? -- Not everyone who has high cholesterol needs medicines. Your doctor or nurse will decide if you need them based on your age, family history, and other health concerns.  There are many different medicines used to lower cholesterol (table 2). Some help your body make less cholesterol. Some keep your body from absorbing cholesterol from foods. Some help your body get rid of cholesterol faster. The medicines most often used to treat high cholesterol are called \"statins.\"  You should probably take a statin if you:   Already had a heart attack or stroke   Have known heart disease   Have diabetes   Have a condition called \"peripheral artery disease,\" which makes it painful to walk, and happens when the arteries in your legs get clogged with fatty deposits   Have an \"abdominal aortic aneurysm,\" which is a widening of the main artery in the belly  Most people with any of the conditions listed above should take a statin no matter what their " "cholesterol level is. If your doctor or nurse prescribes a statin, it's important to keep taking it. The medicine might not make you feel any different. But it can help prevent heart attack, stroke, and death.  If your doctor or nurse recommends taking medicine to help lower your cholesterol, make sure that you know what it is called. Follow all the instructions for how to take it. For example, some medicines work better when you take them in the evening. Some need to be taken with food.  Tell your doctor or nurse if your medicine causes any side effects that bother you. They might be able to switch you to a different medicine.  Can I lower my cholesterol without medicines? -- Yes. You can help lower your cholesterol by doing these things:   You can lower your LDL, or \"bad,\" cholesterol by avoiding red meat, butter, fried foods, cheese, and other foods that have a lot of saturated fat.   You can lower triglycerides by avoiding sugary foods, fried foods, and excess alcohol.   If you have excess weight, it can help to lose weight. Your doctor or nurse can help you do this in a healthy way.   Try to get regular physical activity. Even gentle forms of exercise, like walking, are good for your health.  Even if these steps don't change your cholesterol very much, they can improve your health in many other ways.  All topics are updated as new evidence becomes available and our peer review process is complete.  This topic retrieved from [employer] on: Mar 01, 2024.  Topic 90470 Version 25.0  Release: 32.2.4 - C32.59  © 2024 UpToDate, Inc. and/or its affiliates. All rights reserved.  table 1: Cholesterol and triglyceride measurements in the [place] and elsewhere     Measurement used within the [place] Milligrams/deciliter (mg/dL)  Measurement used most places outside of the [place] Millimoles/liter (mmol/Liter)     Level to aim for  Level to aim for    Total cholesterol  Below 200 Below 5.17   LDL cholesterol  Below 130, or much lower if at " risk of heart attack and stroke Below 3.36, or much lower if at risk of heart attack and stroke   HDL cholesterol  Above 60 Above 1.55   Triglycerides  Below 150 Below 1.7   Cholesterol is measured differently in the [place] than it is in most other countries. This table shows values used within and outside of the [place]. It includes the cholesterol and triglyceride levels that most people who do not have heart disease should aim for.  Graphic 21863 Version 5.0  table 2: Lipid-lowering medicines  Generic name  Brand name    Statins    Atorvastatin Lipitor   Fluvastatin Lescol, Lescol XL   Lovastatin Mevacor, Altoprev   Pitavastatin Livalo   Pravastatin Pravachol   Rosuvastatin Crestor   Simvastatin Zocor   PCSK9 inhibitors    Alirocumab Praluent   Evolocumab Repatha, Repatha SureClick   Cholesterol absorption inhibitors    Ezetimibe Zetia   Bile acid sequestrants    Cholestyramine Prevalite, Questran, Questran Light   Colesevelam Welchol   Colestipol Colestid   Niacin (nicotinic acid)    Niacin immediate release     Niacin extended release Niaspan   Fibrates    Fenofibrate Fenoglide, Tricor, Triglide, others   Gemfibrozil Lopid   Brand names listed are for medicines available in the [place] and some other countries.  Graphic 78985 Version 7.0  Consumer Information Use and Disclaimer   Disclaimer: This generalized information is a limited summary of diagnosis, treatment, and/or medication information. It is not meant to be comprehensive and should be used as a tool to help the user understand and/or assess potential diagnostic and treatment options. It does NOT include all information about conditions, treatments, medications, side effects, or risks that may apply to a specific patient. It is not intended to be medical advice or a substitute for the medical advice, diagnosis, or treatment of a health care provider based on the health care provider's examination and assessment of a patient's specific and unique circumstances.  Patients must speak with a health care provider for complete information about their health, medical questions, and treatment options, including any risks or benefits regarding use of medications. This information does not endorse any treatments or medications as safe, effective, or approved for treating a specific patient. UpToDate, Inc. and its affiliates disclaim any warranty or liability relating to this information or the use thereof.The use of this information is governed by the Terms of Use, available at https://www.[employer] Backlinksuwer.com/en/know/clinical-effectiveness-terms. 2024© UpToDate, Inc. and its affiliates and/or licensors. All rights reserved.  Copyright   © 2024 UpToDate, Inc. and/or its affiliates. All rights reserved.

## 2025-05-28 NOTE — PROGRESS NOTES
" Patient ID: Vincent Chandler is a 80 y.o. male.        Plan:      Coronary artery calcification seen on CAT scan  Angina free  Cont baby Asa and statin    Mitral valve prolapse  Mild MR    Dyslipidemia  Controlled       Follow up Plan/Other summary comments:  There is no evidence for angina, CHF, electrical instability.   Advised no medication changes today.   Return in about 6 months (around 2025).  Call sooner with issues.     HPI: Vincent is here for routine FU.  No cardiac sx.  Many GI sx.  Slightly better on Omeprazole, saw [facility] GI.  No CP SOB Palps.  Some chest feelings relieved with belching and Tums.  LDL 78    Reviewed Dec Echo    Left Ventricle: Left ventricular cavity size is normal. Wall thickness is normal. The left ventricular ejection fraction is 65%. Systolic function is normal. Wall motion is normal. Diastolic function is mildly abnormal, consistent with grade I (abnormal) relaxation.    IVS: There is sigmoid appearance of the septum.    Right Ventricle: Right ventricular cavity size is normal. Systolic function is normal.    Mitral Valve: There is mild regurgitation.    Aorta: The aortic root is normal in size. The ascending aorta is moderately dilated.       Most recent or relevant cardiac/vascular testin NST WNL EF 69%      Past Surgical History[1]    Lipid Profile: Reviewed      Review of Systems   10  point ROS  was otherwise non pertinent or negative except as per HPI or as below.         Objective:     /62 (BP Location: Left arm, Patient Position: Sitting, Cuff Size: Standard)   Pulse 88   Resp 18   Ht 5' 6.14\" (1.68 m)   Wt 52.6 kg (116 lb)   SpO2 97%   BMI 18.64 kg/m²     PHYSICAL EXAM:    General:  Cachectic  Eyes:  Anicteric.  Oral mucosa:  Moist.  Neck:  No JVD. Carotid upstrokes are brisk without bruits.  No masses.  Chest:  Clear to auscultation.  Cardiac:  Regular, palpable nondisplaced non delayed PMI.  Normal S1 and S2.  No murmur gallop or rub.  Abdomen:  " Soft and nontender. No palpable organomegaly or aortic enlargement.  Extremities:  No peripheral edema.  Musculoskeletal:  Symmetric.   VascularPedal pulses are intact.  Neuro:  Grossly symmetric.  Psych:  Alert and oriented x3, clearly anxious.      Meds reviewed.  Current Medications[2]     Past Medical History[3]        Tobacco Use History[4]               [1]   Past Surgical History:  Procedure Laterality Date    APPENDECTOMY      EYE SURGERY      HERNIA REPAIR      TONSILLECTOMY     [2]   Current Outpatient Medications:     aspirin 81 mg chewable tablet, Chew 1 tablet (81 mg total) daily, Disp: , Rfl:     [START ON 6/9/2025] atorvastatin (LIPITOR) 20 mg tablet, Take 1 tablet (20 mg total) by mouth in the evening. Do not start before June 9, 2025., Disp: 90 tablet, Rfl: 3    Cholecalciferol (Vitamin D3) 125 MCG (5000 UT) CAPS, Take 5,000 Units by mouth in the morning., Disp: , Rfl:     famotidine (PEPCID) 40 MG tablet, Take 1 tablet (40 mg total) by mouth daily at bedtime, Disp: 100 tablet, Rfl: 3    finasteride (PROSCAR) 5 mg tablet, Take 1 tablet (5 mg total) by mouth daily, Disp: 90 tablet, Rfl: 3    hydrOXYzine HCL (ATARAX) 25 mg tablet, Take 1 tablet (25 mg total) by mouth 2 (two) times a day 1 tablet twice daily if needed for anxiety, Disp: 180 tablet, Rfl: 1    LORazepam (Ativan) 0.5 mg tablet, Take 1 tablet (0.5 mg total) by mouth daily at bedtime, Disp: 90 tablet, Rfl: 0    mirtazapine (REMERON) 15 mg tablet, Take 1 tablet (15 mg total) by mouth daily at bedtime, Disp: 90 tablet, Rfl: 0    omeprazole (PriLOSEC) 20 mg delayed release capsule, Take 20 mg by mouth in the morning. 1/2 hour before breakfast., Disp: , Rfl:     tamsulosin (FLOMAX) 0.4 mg, Take 1 capsule (0.4 mg total) by mouth daily with dinner, Disp: 90 capsule, Rfl: 3    albuterol (PROVENTIL HFA,VENTOLIN HFA) 90 mcg/act inhaler, Inhale 1-2 puffs every 4 (four) hours as needed for shortness of breath or wheezing (Patient not taking: Reported  on 5/28/2025), Disp: 8 g, Rfl: 0    ascorbic acid (VITAMIN C) 250 mg tablet, Take 500 mg by mouth daily (Patient not taking: Reported on 5/28/2025), Disp: , Rfl:     ondansetron (ZOFRAN) 4 mg tablet, Take 1 tablet (4 mg total) by mouth every 8 (eight) hours as needed for nausea or vomiting (Patient not taking: Reported on 11/6/2024), Disp: 20 tablet, Rfl: 0    Current Facility-Administered Medications:     cyanocobalamin injection 1,000 mcg, 1,000 mcg, Intramuscular, Q30 Days, , 1,000 mcg at 05/12/25 1101  [3]   Past Medical History:  Diagnosis Date    Coronary artery calcification seen on CAT scan     Coronary artery disease     COVID-19 01/28/2022    Dyslipidemia     Dyspnea on exertion     History of echocardiogram 03/22/2018    Normal EF, normal LVSF.Thick and rebundent MV leaflets w/ mild posterior leaflet prolapse w/ trace regurg.    History of pericarditis     Hyperlipidemia     Lyme disease     Mitral valve prolapse     Shingles    [4]   Social History  Tobacco Use   Smoking Status Never   Smokeless Tobacco Never

## 2025-06-09 ENCOUNTER — CLINICAL SUPPORT (OUTPATIENT)
Dept: FAMILY MEDICINE CLINIC | Facility: CLINIC | Age: 80
End: 2025-06-09
Payer: COMMERCIAL

## 2025-06-09 DIAGNOSIS — E53.8 VITAMIN B 12 DEFICIENCY: Primary | ICD-10-CM

## 2025-06-09 RX ADMIN — CYANOCOBALAMIN 1000 MCG: 1000 INJECTION, SOLUTION INTRAMUSCULAR; SUBCUTANEOUS at 11:17

## 2025-06-10 ENCOUNTER — APPOINTMENT (OUTPATIENT)
Dept: LAB | Facility: MEDICAL CENTER | Age: 80
End: 2025-06-10
Attending: UROLOGY
Payer: COMMERCIAL

## 2025-06-10 DIAGNOSIS — E55.9 VITAMIN D DEFICIENCY, UNSPECIFIED: ICD-10-CM

## 2025-06-10 DIAGNOSIS — R97.20 ELEVATED PSA: ICD-10-CM

## 2025-06-10 DIAGNOSIS — Z13.89 SCREENING FOR MULTIPLE CONDITIONS: ICD-10-CM

## 2025-06-10 DIAGNOSIS — F41.9 ANXIETY: ICD-10-CM

## 2025-06-10 LAB
25(OH)D3 SERPL-MCNC: 102.8 NG/ML (ref 30–100)
ALBUMIN SERPL BCG-MCNC: 4.2 G/DL (ref 3.5–5)
ALP SERPL-CCNC: 68 U/L (ref 34–104)
ALT SERPL W P-5'-P-CCNC: 20 U/L (ref 7–52)
ANION GAP SERPL CALCULATED.3IONS-SCNC: 9 MMOL/L (ref 4–13)
AST SERPL W P-5'-P-CCNC: 25 U/L (ref 13–39)
BILIRUB SERPL-MCNC: 0.83 MG/DL (ref 0.2–1)
BUN SERPL-MCNC: 18 MG/DL (ref 5–25)
CALCIUM SERPL-MCNC: 9.3 MG/DL (ref 8.4–10.2)
CHLORIDE SERPL-SCNC: 100 MMOL/L (ref 96–108)
CO2 SERPL-SCNC: 33 MMOL/L (ref 21–32)
CREAT SERPL-MCNC: 0.97 MG/DL (ref 0.6–1.3)
ERYTHROCYTE [DISTWIDTH] IN BLOOD BY AUTOMATED COUNT: 12.9 % (ref 11.6–15.1)
GFR SERPL CREATININE-BSD FRML MDRD: 72 ML/MIN/1.73SQ M
GLUCOSE P FAST SERPL-MCNC: 96 MG/DL (ref 65–99)
HCT VFR BLD AUTO: 40 % (ref 36.5–49.3)
HGB BLD-MCNC: 12.8 G/DL (ref 12–17)
MCH RBC QN AUTO: 29.5 PG (ref 26.8–34.3)
MCHC RBC AUTO-ENTMCNC: 32 G/DL (ref 31.4–37.4)
MCV RBC AUTO: 92 FL (ref 82–98)
PLATELET # BLD AUTO: 186 THOUSANDS/UL (ref 149–390)
PMV BLD AUTO: 11.8 FL (ref 8.9–12.7)
POTASSIUM SERPL-SCNC: 3.8 MMOL/L (ref 3.5–5.3)
PROT SERPL-MCNC: 6.7 G/DL (ref 6.4–8.4)
PSA SERPL-MCNC: 1.99 NG/ML (ref 0–4)
RBC # BLD AUTO: 4.34 MILLION/UL (ref 3.88–5.62)
SODIUM SERPL-SCNC: 142 MMOL/L (ref 135–147)
TSH SERPL DL<=0.05 MIU/L-ACNC: 3.76 UIU/ML (ref 0.45–4.5)
VIT B12 SERPL-MCNC: 4533 PG/ML (ref 180–914)
WBC # BLD AUTO: 6.76 THOUSAND/UL (ref 4.31–10.16)

## 2025-06-10 PROCEDURE — 82306 VITAMIN D 25 HYDROXY: CPT

## 2025-06-10 PROCEDURE — 85027 COMPLETE CBC AUTOMATED: CPT

## 2025-06-10 PROCEDURE — 84153 ASSAY OF PSA TOTAL: CPT

## 2025-06-10 PROCEDURE — 84443 ASSAY THYROID STIM HORMONE: CPT

## 2025-06-10 PROCEDURE — 80053 COMPREHEN METABOLIC PANEL: CPT

## 2025-06-10 PROCEDURE — 36415 COLL VENOUS BLD VENIPUNCTURE: CPT

## 2025-06-12 ENCOUNTER — RESULTS FOLLOW-UP (OUTPATIENT)
Dept: FAMILY MEDICINE CLINIC | Facility: CLINIC | Age: 80
End: 2025-06-12

## 2025-06-12 NOTE — RESULT ENCOUNTER NOTE
Call patient to notify normal results chemistry panel is good the thyroid is good and the vitamin D is good so those are those are all good it was his B12 that was a little too high and his blood count is good

## 2025-06-12 NOTE — RESULT ENCOUNTER NOTE
Call patient to notify normal results B12 level is actually high if he is taking B12 I think he should cut the dose in half recheck the B12 level in 6 months

## 2025-06-17 NOTE — PROGRESS NOTES
UROLOGY PROGRESS NOTE   [facility]  [address]  [address]  [place] [address]  [phone]  Fax:[phone]  [email or web address]      NAME: Vincent Chandler  AGE: 80 y.o. SEX: male  : 1945   MRN: [id number]    DATE: 2025  TIME: 9:52 AM    Assessment and Plan:    1. Benign prostatic hyperplasia with urinary hesitancy  2. Elevated PSA  3. Gross hematuria     BPH with obstruction on medications-doing very well and the medications no symptoms.  Continue with these indefinitely.    Elevated PSA normalized on Proscar-recheck this in 2 years.  See me in 2 years with a PSA.    Gross hematuria: No further episodes on Proscar.               Chief Complaint     Chief Complaint   Patient presents with    Follow-up     1 year follow up, PSA done. Pt states that he is doing great! No concerns or complaints        History of Present Illness   Last seen by me 2024-    Follow-up gross hematuria had cystoscopy in the past showed friable prostate vessels with nothing malignant.  Nothing recent.    BPH with obstruction: Given the appearance and size of his prostate it appeared that he was on the cusp of going into urinary retention.  He had a good response to Flomax and we also started him on finasteride.  No voiding complaints on these medications.  Remains on Flomax 0.4 mg daily at dinner and the finasteride daily 5 mg.    Elevated PSA:  Component  Ref Range & Units (hover) 6/10/25  7:07 AM 6/10/24  7:12 AM 3/11/24  7:10 AM 23  9:05 AM   PSA, Diagnostic 1.986 3.31 R, CM 2.4 R, CM 9.1 High  R, CM   He is having problems with persistent nausea-has some esophageal damage on endoscopy and is on famotidine as well as omeprazole.  Feels almost constant nausea.  Has lost some weight.        The following portions of the patient's history were reviewed and updated as appropriate: allergies, current medications, past family history, past medical history, past social history, past  "surgical history and problem list.  Past Medical History[1]  Past Surgical History[2]  shoulder  Review of Systems   Review of Systems   Constitutional:  Positive for unexpected weight change.   Respiratory:  Negative for shortness of breath.    Cardiovascular:  Negative for chest pain.   Gastrointestinal:  Positive for abdominal pain and nausea. Negative for blood in stool, constipation and diarrhea.   Genitourinary: Negative.        Active Problem List   Problem List[3]    Objective   /82   Pulse 93   Temp 98.4 °F (36.9 °C)   Ht 5' 6\" (1.676 m)   Wt 51.7 kg (114 lb)   SpO2 100%   BMI 18.40 kg/m²     Physical Exam  Vitals reviewed.   Constitutional:       Appearance: Normal appearance. He is normal weight.   HENT:      Head: Normocephalic and atraumatic.     Eyes:      Extraocular Movements: Extraocular movements intact.     Pulmonary:      Effort: Pulmonary effort is normal.     Musculoskeletal:         General: Normal range of motion.      Cervical back: Normal range of motion.     Skin:     Coloration: Skin is not jaundiced or pale.     Neurological:      General: No focal deficit present.      Mental Status: He is alert and oriented to person, place, and time. Mental status is at baseline.     Psychiatric:         Mood and Affect: Mood normal.         Behavior: Behavior normal.         Thought Content: Thought content normal.         Judgment: Judgment normal.             Current Medications   Current Medications[4]        Vincent Walsh MD                [1]   Past Medical History:  Diagnosis Date    Coronary artery calcification seen on CAT scan     Coronary artery disease     COVID-19 01/28/2022    Dyslipidemia     Dyspnea on exertion     History of echocardiogram 03/22/2018    Normal EF, normal LVSF.Thick and rebundent MV leaflets w/ mild posterior leaflet prolapse w/ trace regurg.    History of pericarditis     Hyperlipidemia     Lyme disease     Mitral valve prolapse     Shingles    [2]   Past " Surgical History:  Procedure Laterality Date    APPENDECTOMY      EYE SURGERY      HERNIA REPAIR      TONSILLECTOMY     [3]   Patient Active Problem List  Diagnosis    Dyslipidemia    Depression, recurrent (HCC)    Anxiety    Coronary artery disease involving native coronary artery of native heart with angina pectoris (HCC)    Coronary artery calcification seen on CAT scan    Vitamin D deficiency, unspecified    Mitral valve prolapse    Gastroesophageal reflux disease with esophagitis without hemorrhage    Chronic erosive gastritis   [4]   Current Outpatient Medications:     ascorbic acid (VITAMIN C) 250 mg tablet, Take 500 mg by mouth daily, Disp: , Rfl:     aspirin 81 mg chewable tablet, Chew 1 tablet (81 mg total) daily, Disp: , Rfl:     atorvastatin (LIPITOR) 20 mg tablet, Take 1 tablet (20 mg total) by mouth in the evening. Do not start before June 9, 2025., Disp: 90 tablet, Rfl: 3    Cholecalciferol (Vitamin D3) 125 MCG (5000 UT) CAPS, Take 5,000 Units by mouth in the morning., Disp: , Rfl:     famotidine (PEPCID) 40 MG tablet, Take 1 tablet (40 mg total) by mouth daily at bedtime, Disp: 100 tablet, Rfl: 3    finasteride (PROSCAR) 5 mg tablet, Take 1 tablet (5 mg total) by mouth daily, Disp: 90 tablet, Rfl: 3    hydrOXYzine HCL (ATARAX) 25 mg tablet, Take 1 tablet (25 mg total) by mouth 2 (two) times a day 1 tablet twice daily if needed for anxiety, Disp: 180 tablet, Rfl: 1    LORazepam (Ativan) 0.5 mg tablet, Take 1 tablet (0.5 mg total) by mouth daily at bedtime, Disp: 90 tablet, Rfl: 0    mirtazapine (REMERON) 15 mg tablet, Take 1 tablet (15 mg total) by mouth daily at bedtime, Disp: 90 tablet, Rfl: 0    omeprazole (PriLOSEC) 20 mg delayed release capsule, Take 20 mg by mouth in the morning. 1/2 hour before breakfast., Disp: , Rfl:     tamsulosin (FLOMAX) 0.4 mg, Take 1 capsule (0.4 mg total) by mouth daily with dinner, Disp: 90 capsule, Rfl: 3    albuterol (PROVENTIL HFA,VENTOLIN HFA) 90 mcg/act inhaler,  Inhale 1-2 puffs every 4 (four) hours as needed for shortness of breath or wheezing (Patient not taking: Reported on 6/18/2025), Disp: 8 g, Rfl: 0    ondansetron (ZOFRAN) 4 mg tablet, Take 1 tablet (4 mg total) by mouth every 8 (eight) hours as needed for nausea or vomiting (Patient not taking: Reported on 6/18/2025), Disp: 20 tablet, Rfl: 0    Current Facility-Administered Medications:     cyanocobalamin injection 1,000 mcg, 1,000 mcg, Intramuscular, Q30 Days, , 1,000 mcg at 06/09/25 1172

## 2025-06-18 ENCOUNTER — OFFICE VISIT (OUTPATIENT)
Age: 80
End: 2025-06-18
Payer: COMMERCIAL

## 2025-06-18 VITALS
BODY MASS INDEX: 18.32 KG/M2 | TEMPERATURE: 98.4 F | DIASTOLIC BLOOD PRESSURE: 82 MMHG | HEART RATE: 93 BPM | WEIGHT: 114 LBS | HEIGHT: 66 IN | SYSTOLIC BLOOD PRESSURE: 128 MMHG | OXYGEN SATURATION: 100 %

## 2025-06-18 DIAGNOSIS — N40.1 BENIGN PROSTATIC HYPERPLASIA WITH URINARY HESITANCY: Primary | ICD-10-CM

## 2025-06-18 DIAGNOSIS — R97.20 ELEVATED PSA: ICD-10-CM

## 2025-06-18 DIAGNOSIS — R39.11 BENIGN PROSTATIC HYPERPLASIA WITH URINARY HESITANCY: Primary | ICD-10-CM

## 2025-06-18 DIAGNOSIS — R31.0 GROSS HEMATURIA: ICD-10-CM

## 2025-06-18 PROCEDURE — 99213 OFFICE O/P EST LOW 20 MIN: CPT | Performed by: UROLOGY

## 2025-07-03 ENCOUNTER — RA CDI HCC (OUTPATIENT)
Dept: OTHER | Facility: HOSPITAL | Age: 80
End: 2025-07-03

## 2025-07-03 NOTE — PROGRESS NOTES
Please review if this dx is applicable to the patient's condition and assess and document, if applicable in next visit     F331    HCC coding opportunities          Chart Reviewed number of suggestions sent to Provider: 1     Patients Insurance     Medicare Insurance: [employer]

## 2025-07-14 ENCOUNTER — OFFICE VISIT (OUTPATIENT)
Dept: FAMILY MEDICINE CLINIC | Facility: CLINIC | Age: 80
End: 2025-07-14
Payer: COMMERCIAL

## 2025-07-14 ENCOUNTER — APPOINTMENT (OUTPATIENT)
Dept: RADIOLOGY | Facility: MEDICAL CENTER | Age: 80
End: 2025-07-14
Attending: FAMILY MEDICINE
Payer: COMMERCIAL

## 2025-07-14 VITALS
HEIGHT: 66 IN | HEART RATE: 76 BPM | SYSTOLIC BLOOD PRESSURE: 126 MMHG | OXYGEN SATURATION: 99 % | DIASTOLIC BLOOD PRESSURE: 82 MMHG | BODY MASS INDEX: 18.06 KG/M2 | WEIGHT: 112.4 LBS | TEMPERATURE: 97.8 F

## 2025-07-14 DIAGNOSIS — R09.89 CHEST CONGESTION: ICD-10-CM

## 2025-07-14 DIAGNOSIS — R09.89 CHEST CONGESTION: Primary | ICD-10-CM

## 2025-07-14 PROCEDURE — 99214 OFFICE O/P EST MOD 30 MIN: CPT | Performed by: FAMILY MEDICINE

## 2025-07-14 PROCEDURE — 71046 X-RAY EXAM CHEST 2 VIEWS: CPT

## 2025-07-14 PROCEDURE — G2211 COMPLEX E/M VISIT ADD ON: HCPCS | Performed by: FAMILY MEDICINE

## 2025-07-14 NOTE — PROGRESS NOTES
"Name: Vincent Chandler      : 1945      MRN: [id number]  Encounter Provider: Girish Hurley DO  Encounter Date: 2025   Encounter department: [facility]  :  Assessment & Plan  Chest congestion                History of Present Illness   Mr. Eduardo HASSAN is here today chief complaint is he still has a persistent sensation of pressure in the epigastrium and the chest he also is not moving his bowels very well despite the fact that he eats oatmeal and sometimes raisin bran he did try some MiraLAX which did open up his bowels but did not cause him to have a satisfying bowel movement no nausea or vomiting just pressure in the chest region he saw Dr. Black in May and received a clean bill of health his ejection fraction at that time was 69%      Review of Systems   Constitutional:  Positive for activity change.   Cardiovascular:  Positive for chest pain.   Gastrointestinal:  Positive for constipation.   Musculoskeletal:  Positive for arthralgias.       Objective   /82   Pulse 76   Temp 97.8 °F (36.6 °C)   Ht 5' 6\" (1.676 m)   Wt 51 kg (112 lb 6.4 oz)   SpO2 99%   BMI 18.14 kg/m²      Physical Exam  Constitutional:       Appearance: He is well-developed.   HENT:      Head: Normocephalic and atraumatic.      Right Ear: External ear normal.      Left Ear: External ear normal.      Nose: Nose normal.     Eyes:      Conjunctiva/sclera: Conjunctivae normal.      Pupils: Pupils are equal, round, and reactive to light.       Cardiovascular:      Rate and Rhythm: Normal rate and regular rhythm.      Heart sounds: Normal heart sounds. No murmur heard.     No friction rub.   Pulmonary:      Effort: Pulmonary effort is normal. No respiratory distress.      Breath sounds: Normal breath sounds. No wheezing or rales.   Chest:      Chest wall: No tenderness.   Abdominal:      General: Bowel sounds are normal.      Palpations: Abdomen is soft.     Musculoskeletal:         General: Normal range of motion. "      Cervical back: Normal range of motion and neck supple.     Skin:     General: Skin is warm and dry.      Capillary Refill: Capillary refill takes 2 to 3 seconds.     Neurological:      Mental Status: He is alert and oriented to person, place, and time.     Psychiatric:         Behavior: Behavior normal.         Thought Content: Thought content normal.         Judgment: Judgment normal.

## 2025-07-21 DIAGNOSIS — R39.11 BENIGN PROSTATIC HYPERPLASIA WITH URINARY HESITANCY: ICD-10-CM

## 2025-07-21 DIAGNOSIS — N40.1 BENIGN PROSTATIC HYPERPLASIA WITH URINARY HESITANCY: ICD-10-CM

## 2025-07-21 NOTE — TELEPHONE ENCOUNTER
Reason for call:   [x] Refill   [] Prior Auth  [] Other:     Office:   [] PCP/Provider -   [x] Specialty/Provider - UROLOGY PANKAJ  Authorized By: Vincent Walsh    Medication: Flomax    Dose/Frequency: 0.4 mg     Quantity: #90    Pharmacy: [facility]    [facility]   Does the patient have enough for 3 days?   [x] Yes   [] No - Send as HP to POD    [facility]   Does the patient have enough for 10 days?   [] Yes   [] No - Send as HP to POD

## 2025-07-22 RX ORDER — TAMSULOSIN HYDROCHLORIDE 0.4 MG/1
0.4 CAPSULE ORAL
Qty: 90 CAPSULE | Refills: 1 | Status: SHIPPED | OUTPATIENT
Start: 2025-07-22